# Patient Record
Sex: MALE | Race: BLACK OR AFRICAN AMERICAN | NOT HISPANIC OR LATINO
[De-identification: names, ages, dates, MRNs, and addresses within clinical notes are randomized per-mention and may not be internally consistent; named-entity substitution may affect disease eponyms.]

---

## 2017-01-13 ENCOUNTER — APPOINTMENT (OUTPATIENT)
Dept: CARDIOLOGY | Facility: CLINIC | Age: 62
End: 2017-01-13

## 2017-03-19 ENCOUNTER — RX RENEWAL (OUTPATIENT)
Age: 62
End: 2017-03-19

## 2017-04-24 ENCOUNTER — APPOINTMENT (OUTPATIENT)
Dept: CARDIOLOGY | Facility: CLINIC | Age: 62
End: 2017-04-24

## 2017-05-17 ENCOUNTER — EMERGENCY (EMERGENCY)
Facility: HOSPITAL | Age: 62
LOS: 1 days | Discharge: HOME | End: 2017-05-17
Attending: EMERGENCY MEDICINE | Admitting: SPECIALIST
Payer: COMMERCIAL

## 2017-05-17 VITALS
HEIGHT: 69 IN | SYSTOLIC BLOOD PRESSURE: 136 MMHG | OXYGEN SATURATION: 100 % | RESPIRATION RATE: 16 BRPM | HEART RATE: 71 BPM | WEIGHT: 218.04 LBS | TEMPERATURE: 98 F | DIASTOLIC BLOOD PRESSURE: 101 MMHG

## 2017-05-17 LAB
ALBUMIN SERPL ELPH-MCNC: 3.9 G/DL — SIGNIFICANT CHANGE UP (ref 3.3–5)
ALP SERPL-CCNC: 81 U/L — SIGNIFICANT CHANGE UP (ref 40–120)
ALT FLD-CCNC: 10 U/L — SIGNIFICANT CHANGE UP (ref 4–41)
AST SERPL-CCNC: 14 U/L — SIGNIFICANT CHANGE UP (ref 4–40)
BASE EXCESS BLDV CALC-SCNC: 4.2 MMOL/L — SIGNIFICANT CHANGE UP
BASOPHILS # BLD AUTO: 0.02 K/UL — SIGNIFICANT CHANGE UP (ref 0–0.2)
BASOPHILS NFR BLD AUTO: 0.3 % — SIGNIFICANT CHANGE UP (ref 0–2)
BILIRUB SERPL-MCNC: 0.6 MG/DL — SIGNIFICANT CHANGE UP (ref 0.2–1.2)
BLOOD GAS VENOUS - CREATININE: 1.18 MG/DL — SIGNIFICANT CHANGE UP (ref 0.5–1.3)
BUN SERPL-MCNC: 22 MG/DL — SIGNIFICANT CHANGE UP (ref 7–23)
CALCIUM SERPL-MCNC: 9.3 MG/DL — SIGNIFICANT CHANGE UP (ref 8.4–10.5)
CHLORIDE BLDV-SCNC: 110 MMOL/L — HIGH (ref 96–108)
CHLORIDE SERPL-SCNC: 105 MMOL/L — SIGNIFICANT CHANGE UP (ref 98–107)
CO2 SERPL-SCNC: 26 MMOL/L — SIGNIFICANT CHANGE UP (ref 22–31)
CREAT SERPL-MCNC: 1.22 MG/DL — SIGNIFICANT CHANGE UP (ref 0.5–1.3)
EOSINOPHIL # BLD AUTO: 0.08 K/UL — SIGNIFICANT CHANGE UP (ref 0–0.5)
EOSINOPHIL NFR BLD AUTO: 1.2 % — SIGNIFICANT CHANGE UP (ref 0–6)
GAS PNL BLDV: 136 MMOL/L — SIGNIFICANT CHANGE UP (ref 136–146)
GLUCOSE BLDV-MCNC: 99 — SIGNIFICANT CHANGE UP (ref 70–99)
GLUCOSE SERPL-MCNC: 94 MG/DL — SIGNIFICANT CHANGE UP (ref 70–99)
HCO3 BLDV-SCNC: 24 MMOL/L — SIGNIFICANT CHANGE UP (ref 20–27)
HCT VFR BLD CALC: 54.1 % — HIGH (ref 39–50)
HCT VFR BLDV CALC: 56.7 % — HIGH (ref 39–51)
HGB BLD-MCNC: 18.2 G/DL — HIGH (ref 13–17)
HGB BLDV-MCNC: 18.6 G/DL — HIGH (ref 13–17)
IMM GRANULOCYTES NFR BLD AUTO: 0.3 % — SIGNIFICANT CHANGE UP (ref 0–1.5)
LACTATE BLDV-MCNC: 1.6 MMOL/L — SIGNIFICANT CHANGE UP (ref 0.5–2)
LIDOCAIN IGE QN: 86.7 U/L — HIGH (ref 7–60)
LYMPHOCYTES # BLD AUTO: 1.3 K/UL — SIGNIFICANT CHANGE UP (ref 1–3.3)
LYMPHOCYTES # BLD AUTO: 19.5 % — SIGNIFICANT CHANGE UP (ref 13–44)
MCHC RBC-ENTMCNC: 31.1 PG — SIGNIFICANT CHANGE UP (ref 27–34)
MCHC RBC-ENTMCNC: 33.6 % — SIGNIFICANT CHANGE UP (ref 32–36)
MCV RBC AUTO: 92.3 FL — SIGNIFICANT CHANGE UP (ref 80–100)
MONOCYTES # BLD AUTO: 0.95 K/UL — HIGH (ref 0–0.9)
MONOCYTES NFR BLD AUTO: 14.2 % — HIGH (ref 2–14)
NEUTROPHILS # BLD AUTO: 4.3 K/UL — SIGNIFICANT CHANGE UP (ref 1.8–7.4)
NEUTROPHILS NFR BLD AUTO: 64.5 % — SIGNIFICANT CHANGE UP (ref 43–77)
PCO2 BLDV: 59 MMHG — HIGH (ref 41–51)
PH BLDV: 7.32 PH — SIGNIFICANT CHANGE UP (ref 7.32–7.43)
PLATELET # BLD AUTO: 197 K/UL — SIGNIFICANT CHANGE UP (ref 150–400)
PMV BLD: 11.8 FL — SIGNIFICANT CHANGE UP (ref 7–13)
PO2 BLDV: < 24 MMHG — LOW (ref 35–40)
POTASSIUM BLDV-SCNC: 4.5 MMOL/L — SIGNIFICANT CHANGE UP (ref 3.4–4.5)
POTASSIUM SERPL-MCNC: 5 MMOL/L — SIGNIFICANT CHANGE UP (ref 3.5–5.3)
POTASSIUM SERPL-SCNC: 5 MMOL/L — SIGNIFICANT CHANGE UP (ref 3.5–5.3)
PROT SERPL-MCNC: 7.6 G/DL — SIGNIFICANT CHANGE UP (ref 6–8.3)
RBC # BLD: 5.86 M/UL — HIGH (ref 4.2–5.8)
RBC # FLD: 13.6 % — SIGNIFICANT CHANGE UP (ref 10.3–14.5)
SAO2 % BLDV: 30.2 % — LOW (ref 60–85)
SODIUM SERPL-SCNC: 145 MMOL/L — SIGNIFICANT CHANGE UP (ref 135–145)
WBC # BLD: 6.67 K/UL — SIGNIFICANT CHANGE UP (ref 3.8–10.5)
WBC # FLD AUTO: 6.67 K/UL — SIGNIFICANT CHANGE UP (ref 3.8–10.5)

## 2017-05-17 PROCEDURE — 99285 EMERGENCY DEPT VISIT HI MDM: CPT

## 2017-05-17 RX ORDER — SODIUM CHLORIDE 9 MG/ML
1000 INJECTION INTRAMUSCULAR; INTRAVENOUS; SUBCUTANEOUS ONCE
Qty: 0 | Refills: 0 | Status: COMPLETED | OUTPATIENT
Start: 2017-05-17 | End: 2017-05-17

## 2017-05-17 RX ORDER — ACETAMINOPHEN 500 MG
1000 TABLET ORAL ONCE
Qty: 0 | Refills: 0 | Status: COMPLETED | OUTPATIENT
Start: 2017-05-17 | End: 2017-05-17

## 2017-05-17 RX ORDER — SODIUM CHLORIDE 9 MG/ML
1000 INJECTION INTRAMUSCULAR; INTRAVENOUS; SUBCUTANEOUS
Qty: 0 | Refills: 0 | Status: DISCONTINUED | OUTPATIENT
Start: 2017-05-17 | End: 2017-05-18

## 2017-05-17 RX ADMIN — Medication 400 MILLIGRAM(S): at 22:49

## 2017-05-17 RX ADMIN — SODIUM CHLORIDE 2000 MILLILITER(S): 9 INJECTION INTRAMUSCULAR; INTRAVENOUS; SUBCUTANEOUS at 20:45

## 2017-05-17 RX ADMIN — Medication 1000 MILLIGRAM(S): at 23:40

## 2017-05-17 NOTE — ED PROVIDER NOTE - CARE PLAN
Principal Discharge DX:	SBO (small bowel obstruction)  Secondary Diagnosis:	Ventral hernia  Secondary Diagnosis:	UTI (urinary tract infection)

## 2017-05-17 NOTE — ED PROVIDER NOTE - PROGRESS NOTE DETAILS
Pt ordered for CT c/p with IV and PO contrast given hx of abdominal surgery and ventral hernia.   f/u and reevaluate.

## 2017-05-17 NOTE — ED ADULT NURSE NOTE - OBJECTIVE STATEMENT
Pt received to room 20. Pt A&Ox3 complaining of abdominal pain. Pt states he started having mid abdominal pain that radiates to his flank area that started this morning. Pt states he has a hx of diverticulitis hx of colostomy bag that was reversed. Pt states he has felt nauseous but not vomiting or diarrhea.  Pt states he was seen at an urgent care and advised to come to the ED.

## 2017-05-17 NOTE — ED ADULT NURSE NOTE - CHIEF COMPLAINT QUOTE
Pt comes with complaints of  mid abdominal pain radiating around to flank area since this AM, admits to nausea denies V/D history of diverticulitis S/P colostomy bag with reversal in 2012 went to [facility] this afternoon who advised pt to come to ED for evaluation, at [facility] pt received an injection unsure of name

## 2017-05-17 NOTE — ED PROVIDER NOTE - PMH
Atrial fibrillation and flutter    Diverticulitis of Colon  with Hx of colostomy bag  Emphysema    HTN (Hypertension)    Inguinal hernia    Prostate Cancer  with seeds implant    Ventral hernia

## 2017-05-17 NOTE — ED PROVIDER NOTE - OBJECTIVE STATEMENT
Pt is a 62 year old M with a PMH of diverticulitis s/p colostomy s/p reversal in 2012, HTN, A-fib on eliquis, and prediabetes. He states that he was in his normal state of health yesterday, he had a egg plant parm for dinner and fell asleep. After he woke up he ate a sausage and started feeling abdominal pain. The pain he states was in he left lower abdomen and sometimes radiated to his left flank. He describes it as a dull ache associated with loss of appetite, and one episode of bilious vomiting. He had a BM the morning of presentation and a BM in the ED. He denies dysuria, melena, bloody diarrhea, back pain, or history of kidney stones. He denies fevers, sick contacts, or chest pain.  Of note pt does have reducible ventral hernia.

## 2017-05-17 NOTE — ED PROVIDER NOTE - MEDICAL DECISION MAKING DETAILS
att63 y/o m with h/o afib, on eliquis, HTN, diverticulitis, s/p diverting ileostomy and reversal , presents with mid abd cramping abd pain, 1bm, + vomiting, no fever. Exam nontoxic with lower abd ttp, reducible hernia, no rebound. Plan for ct with h/o multiple surgeries, r/u sbo, r/u abd complication/infection, labs, ua, and reassessment. + surgery c/s. + uti

## 2017-05-17 NOTE — ED ADULT TRIAGE NOTE - CHIEF COMPLAINT QUOTE
Pt comes with complaints of  mid abdominal pain radiating around to flank area since this AM, admits to nausea denies V/D history of diverticulitis S/P colostomy bag with reversal in 2012 went to [facility] this afternoon who advised pt to come to ED for evaluation, at [facility] pt received an injection unsure of name

## 2017-05-17 NOTE — ED PROVIDER NOTE - FAMILY HISTORY
Father  Still living? Unknown  Family history of heart attack, Age at diagnosis: Age Unknown  Family history of hypertension, Age at diagnosis: Age Unknown

## 2017-05-17 NOTE — ED PROVIDER NOTE - PSH
History of Appendectomy    History of Cholecystectomy    History of Hernia Surgery  BL inguinal, ventral  S/P Kamini's procedure  with reversal

## 2017-05-18 ENCOUNTER — TRANSCRIPTION ENCOUNTER (OUTPATIENT)
Age: 62
End: 2017-05-18

## 2017-05-18 VITALS
OXYGEN SATURATION: 96 % | HEART RATE: 66 BPM | SYSTOLIC BLOOD PRESSURE: 125 MMHG | DIASTOLIC BLOOD PRESSURE: 90 MMHG | RESPIRATION RATE: 18 BRPM | TEMPERATURE: 98 F

## 2017-05-18 DIAGNOSIS — K56.69 OTHER INTESTINAL OBSTRUCTION: ICD-10-CM

## 2017-05-18 LAB
APPEARANCE UR: SIGNIFICANT CHANGE UP
BACTERIA # UR AUTO: SIGNIFICANT CHANGE UP
BASOPHILS # BLD AUTO: 0.03 K/UL — SIGNIFICANT CHANGE UP (ref 0–0.2)
BASOPHILS NFR BLD AUTO: 0.5 % — SIGNIFICANT CHANGE UP (ref 0–2)
BILIRUB UR-MCNC: NEGATIVE — SIGNIFICANT CHANGE UP
BLOOD UR QL VISUAL: HIGH
BUN SERPL-MCNC: 20 MG/DL — SIGNIFICANT CHANGE UP (ref 7–23)
CALCIUM SERPL-MCNC: 8.6 MG/DL — SIGNIFICANT CHANGE UP (ref 8.4–10.5)
CHLORIDE SERPL-SCNC: 103 MMOL/L — SIGNIFICANT CHANGE UP (ref 98–107)
CO2 SERPL-SCNC: 20 MMOL/L — LOW (ref 22–31)
COLOR SPEC: YELLOW — SIGNIFICANT CHANGE UP
CREAT SERPL-MCNC: 1.05 MG/DL — SIGNIFICANT CHANGE UP (ref 0.5–1.3)
EOSINOPHIL # BLD AUTO: 0.2 K/UL — SIGNIFICANT CHANGE UP (ref 0–0.5)
EOSINOPHIL NFR BLD AUTO: 3.3 % — SIGNIFICANT CHANGE UP (ref 0–6)
GLUCOSE SERPL-MCNC: 82 MG/DL — SIGNIFICANT CHANGE UP (ref 70–99)
GLUCOSE UR-MCNC: NEGATIVE — SIGNIFICANT CHANGE UP
HCT VFR BLD CALC: 52.8 % — HIGH (ref 39–50)
HGB BLD-MCNC: 17.3 G/DL — HIGH (ref 13–17)
IMM GRANULOCYTES NFR BLD AUTO: 0.2 % — SIGNIFICANT CHANGE UP (ref 0–1.5)
KETONES UR-MCNC: SIGNIFICANT CHANGE UP
LEUKOCYTE ESTERASE UR-ACNC: HIGH
LYMPHOCYTES # BLD AUTO: 1.64 K/UL — SIGNIFICANT CHANGE UP (ref 1–3.3)
LYMPHOCYTES # BLD AUTO: 26.9 % — SIGNIFICANT CHANGE UP (ref 13–44)
MCHC RBC-ENTMCNC: 30 PG — SIGNIFICANT CHANGE UP (ref 27–34)
MCHC RBC-ENTMCNC: 32.8 % — SIGNIFICANT CHANGE UP (ref 32–36)
MCV RBC AUTO: 91.7 FL — SIGNIFICANT CHANGE UP (ref 80–100)
MONOCYTES # BLD AUTO: 0.99 K/UL — HIGH (ref 0–0.9)
MONOCYTES NFR BLD AUTO: 16.2 % — HIGH (ref 2–14)
MUCOUS THREADS # UR AUTO: SIGNIFICANT CHANGE UP
NEUTROPHILS # BLD AUTO: 3.23 K/UL — SIGNIFICANT CHANGE UP (ref 1.8–7.4)
NEUTROPHILS NFR BLD AUTO: 52.9 % — SIGNIFICANT CHANGE UP (ref 43–77)
NITRITE UR-MCNC: NEGATIVE — SIGNIFICANT CHANGE UP
NON-SQ EPI CELLS # UR AUTO: <1 — SIGNIFICANT CHANGE UP
PH UR: 6 — SIGNIFICANT CHANGE UP (ref 4.6–8)
PLATELET # BLD AUTO: 178 K/UL — SIGNIFICANT CHANGE UP (ref 150–400)
PMV BLD: 11.6 FL — SIGNIFICANT CHANGE UP (ref 7–13)
POTASSIUM SERPL-MCNC: 3.9 MMOL/L — SIGNIFICANT CHANGE UP (ref 3.5–5.3)
POTASSIUM SERPL-SCNC: 3.9 MMOL/L — SIGNIFICANT CHANGE UP (ref 3.5–5.3)
PROT UR-MCNC: 30 — SIGNIFICANT CHANGE UP
RBC # BLD: 5.76 M/UL — SIGNIFICANT CHANGE UP (ref 4.2–5.8)
RBC # FLD: 13.8 % — SIGNIFICANT CHANGE UP (ref 10.3–14.5)
RBC CASTS # UR COMP ASSIST: SIGNIFICANT CHANGE UP (ref 0–?)
SODIUM SERPL-SCNC: 138 MMOL/L — SIGNIFICANT CHANGE UP (ref 135–145)
SP GR SPEC: > 1.03 — HIGH (ref 1–1.03)
SQUAMOUS # UR AUTO: SIGNIFICANT CHANGE UP
UROBILINOGEN FLD QL: NORMAL E.U. — SIGNIFICANT CHANGE UP (ref 0.1–0.2)
WBC # BLD: 6.1 K/UL — SIGNIFICANT CHANGE UP (ref 3.8–10.5)
WBC # FLD AUTO: 6.1 K/UL — SIGNIFICANT CHANGE UP (ref 3.8–10.5)
WBC UR QL: >50 — HIGH (ref 0–?)

## 2017-05-18 RX ORDER — SENNA PLUS 8.6 MG/1
2 TABLET ORAL AT BEDTIME
Qty: 0 | Refills: 0 | Status: DISCONTINUED | OUTPATIENT
Start: 2017-05-18 | End: 2017-05-18

## 2017-05-18 RX ORDER — CEFTRIAXONE 500 MG/1
1 INJECTION, POWDER, FOR SOLUTION INTRAMUSCULAR; INTRAVENOUS ONCE
Qty: 0 | Refills: 0 | Status: COMPLETED | OUTPATIENT
Start: 2017-05-18 | End: 2017-05-18

## 2017-05-18 RX ORDER — SENNA PLUS 8.6 MG/1
2 TABLET ORAL
Qty: 28 | Refills: 0 | OUTPATIENT
Start: 2017-05-18 | End: 2017-06-01

## 2017-05-18 RX ORDER — METOPROLOL TARTRATE 50 MG
75 TABLET ORAL
Qty: 0 | Refills: 0 | Status: DISCONTINUED | OUTPATIENT
Start: 2017-05-18 | End: 2017-05-18

## 2017-05-18 RX ORDER — SODIUM CHLORIDE 9 MG/ML
1000 INJECTION, SOLUTION INTRAVENOUS
Qty: 0 | Refills: 0 | Status: DISCONTINUED | OUTPATIENT
Start: 2017-05-18 | End: 2017-05-18

## 2017-05-18 RX ORDER — HEPARIN SODIUM 5000 [USP'U]/ML
5000 INJECTION INTRAVENOUS; SUBCUTANEOUS EVERY 8 HOURS
Qty: 0 | Refills: 0 | Status: DISCONTINUED | OUTPATIENT
Start: 2017-05-18 | End: 2017-05-18

## 2017-05-18 RX ORDER — TIOTROPIUM BROMIDE 18 UG/1
1 CAPSULE ORAL; RESPIRATORY (INHALATION) AT BEDTIME
Qty: 0 | Refills: 0 | Status: DISCONTINUED | OUTPATIENT
Start: 2017-05-18 | End: 2017-05-18

## 2017-05-18 RX ORDER — ALBUTEROL 90 UG/1
2 AEROSOL, METERED ORAL EVERY 6 HOURS
Qty: 0 | Refills: 0 | Status: DISCONTINUED | OUTPATIENT
Start: 2017-05-18 | End: 2017-05-18

## 2017-05-18 RX ORDER — BUDESONIDE AND FORMOTEROL FUMARATE DIHYDRATE 160; 4.5 UG/1; UG/1
2 AEROSOL RESPIRATORY (INHALATION)
Qty: 0 | Refills: 0 | Status: DISCONTINUED | OUTPATIENT
Start: 2017-05-18 | End: 2017-05-18

## 2017-05-18 RX ORDER — METOPROLOL TARTRATE 50 MG
5 TABLET ORAL EVERY 6 HOURS
Qty: 0 | Refills: 0 | Status: DISCONTINUED | OUTPATIENT
Start: 2017-05-18 | End: 2017-05-18

## 2017-05-18 RX ORDER — DOCUSATE SODIUM 100 MG
100 CAPSULE ORAL DAILY
Qty: 0 | Refills: 0 | Status: DISCONTINUED | OUTPATIENT
Start: 2017-05-18 | End: 2017-05-18

## 2017-05-18 RX ORDER — DOCUSATE SODIUM 100 MG
1 CAPSULE ORAL
Qty: 14 | Refills: 0 | OUTPATIENT
Start: 2017-05-18 | End: 2017-06-01

## 2017-05-18 RX ORDER — ACETAMINOPHEN 500 MG
650 TABLET ORAL EVERY 6 HOURS
Qty: 0 | Refills: 0 | Status: DISCONTINUED | OUTPATIENT
Start: 2017-05-18 | End: 2017-05-18

## 2017-05-18 RX ADMIN — Medication 650 MILLIGRAM(S): at 12:45

## 2017-05-18 RX ADMIN — CEFTRIAXONE 100 GRAM(S): 500 INJECTION, POWDER, FOR SOLUTION INTRAMUSCULAR; INTRAVENOUS at 01:23

## 2017-05-18 RX ADMIN — Medication 5 MILLIGRAM(S): at 05:44

## 2017-05-18 RX ADMIN — SODIUM CHLORIDE 100 MILLILITER(S): 9 INJECTION INTRAMUSCULAR; INTRAVENOUS; SUBCUTANEOUS at 01:23

## 2017-05-18 RX ADMIN — SODIUM CHLORIDE 100 MILLILITER(S): 9 INJECTION, SOLUTION INTRAVENOUS at 02:34

## 2017-05-18 NOTE — DISCHARGE NOTE ADULT - CARE PLAN
Principal Discharge DX:	Ventral hernia  Goal:	Resolution of pain  Instructions for follow-up, activity and diet:	Please follow up with Dr. Ring in his office in 1-2 weeks.  Call his office to make an appointment.  Continue to eat a regular diet.  Take all home medications.  Please take stool softeners as needed.  Call office if you develop intractable pain, persistent nausea or vomiting, or hernia becomes irreducible.

## 2017-05-18 NOTE — DISCHARGE NOTE ADULT - MEDICATION SUMMARY - MEDICATIONS TO TAKE
I will START or STAY ON the medications listed below when I get home from the hospital:    Eliquis 5 mg oral tablet  -- 1 tab(s) by mouth 2 times a day  -- Indication: For A-fib    metoprolol tartrate 25 mg oral tablet  -- 3 tab(s) by mouth 2 times a day  -- Indication: For HTN    tiotropium 18 mcg inhalation capsule  -- 1 cap(s) inhaled once a day (at bedtime)  -- Indication: For Emphysema    Symbicort 160 mcg-4.5 mcg/inh inhalation aerosol  -- 2 puff(s) inhaled 2 times a day  -- Indication: For Emphysema    ProAir HFA 90 mcg/inh inhalation aerosol  -- 2 puff(s) inhaled 4 times a day, As Needed  -- Indication: For Emphysema    docusate sodium 100 mg oral capsule  -- 1 cap(s) by mouth once a day, As needed, Constipation  -- Indication: For Constipation    senna oral tablet  -- 2 tab(s) by mouth once a day (at bedtime), As needed, Constipation  -- Indication: For Constipation

## 2017-05-18 NOTE — H&P ADULT - ASSESSMENT
62y year old Male who presents with   - Admit to Team Surgery  - DVT PPx  - NPO/IVF  - Serial Abdominal Exams  - Full Support in Place  - If abdominal exam is unchanged in AM, would advance diet, restart home meds and D/C home with plans for outpatient repair  =========================  A Team Surgery  Pager: #[phone]

## 2017-05-18 NOTE — DISCHARGE NOTE ADULT - PLAN OF CARE
Resolution of pain Please follow up with Dr. Ring in his office in 1-2 weeks.  Call his office to make an appointment.  Continue to eat a regular diet.  Take all home medications.  Please take stool softeners as needed.  Call office if you develop intractable pain, persistent nausea or vomiting, or hernia becomes irreducible.

## 2017-05-18 NOTE — DISCHARGE NOTE ADULT - PATIENT PORTAL LINK FT
“You can access the Follow[facility] Patient Portal, offered by [facility], by registering with the following website: [email or web address]”

## 2017-05-18 NOTE — H&P ADULT - HISTORY OF PRESENT ILLNESS
CC: Abdominal Pain  HPI:    Patient is a 62y year old male with Hx of sanrda for perforated diverticulitis s/p reversal 2010, Ex lap, NEY, repair of incisional hernia with abdominal wall component separation and marlex mesh on 12/4/12. The patient developed an incisional hernia since that time at the superior aspect of the incision. He notes that he has to reduce it from time to time. Today he notes abdominal discomfort after eating a sausage. He has been passing flatus today and had multiple BMs. He notes one episode of bilious emesis at home, but no current nausea.      PMH  Prostate CA s/p seed therapy  Sandra and Reversal 2010  Diverticulitis  PSH  Appendectomy  Cholecystectomy  hernia repair  sandra/reversal 2010          MEDS  Lopressor 25  Tiotropium 18mcg  Symbicort 160mcg  Proair  Eliquis 5    Allergies  No Known Drug Allergies  shellfish (Unknown)  azithromycin (Vomiting)    Physical Exam  T(C): 36.4, Max: 37.1 (05-17 @ 19:32)  HR: 67 (65 - 75)  BP: 132/90 (113/72 - 138/99)  RR: 16 (16 - 18)  SpO2: 96% (95% - 100%)  Wt(kg): --  Tmax: T(C): , Max: 37.1 (05-17 @ 19:32)  Wt(kg): --    Gen: NAD  HEENT: normocephalic, atraumatic, no scleral icterus  CV: S1, S2, RRR  Pulm: CTA B/L  Abd: Soft, ND, Easily reducible ventral hernia, no tenderness, no rebound, no guarding, no palpable organomegaly/masses  Ext: warm, no edema    I & Os for current day (as of 05-18-17)  =============================================  IN:    Total IN: 0 ml  ---------------------------------------------  OUT:    Voided: 100 ml    Total OUT: 100 ml  ---------------------------------------------  Total NET: -100 ml      Labs:                        17.3   6.10  )-----------( 178      ( 18 May 2017 05:08 )             52.8     05-18    138  |  103  |  20  ----------------------------<  82  3.9   |  20<L>  |  1.05    Ca    8.6      18 May 2017 05:08    TPro  7.6  /  Alb  3.9  /  TBili  0.6  /  DBili  x   /  AST  14  /  ALT  10  /  AlkPhos  81  05-17      Urinalysis Basic - ( 18 May 2017 00:15 )    Color: YELLOW / Appearance: HAZY / SG: > 1.030 / pH: 6.0  Gluc: NEGATIVE / Ketone: TRACE  / Bili: NEGATIVE / Urobili: NORMAL E.U.   Blood: SMALL / Protein: 30 / Nitrite: NEGATIVE   Leuk Esterase: LARGE / RBC: 2-5 / WBC >50   Sq Epi: OCC / Non Sq Epi: x / Bacteria: MOD            Imaging  CT A/P: Small bowel obstruction caused by an upper ventral abdominal wall hernia.

## 2017-05-18 NOTE — DISCHARGE NOTE ADULT - HOSPITAL COURSE
Patient is a 62y year old male with Hx of sandra for perforated diverticulitis s/p reversal 2010, Ex lap, NEY, repair of incisional hernia with abdominal wall component separation and marlex mesh on 12/4/12. The patient developed an incisional hernia since that time at the superior aspect of the incision. He notes that he has to reduce it from time to time. Today he notes abdominal discomfort after eating a sausage. He has been passing flatus today and had multiple BMs. He notes one episode of bilious emesis at home, but no current nausea.  Hernia was reduced at bedside and patient was admitted for pain control.  Diet was advanced on HD 1, patient was started on home PO meds, and iv fluids were discontinued.  On day of discharge patient was ambulating independently, tolerating a regular diet, voiding appropriately, having regular bowel movements, and pain was well controlled.  Information for discharged was discussed with the patient and they were discharged to home.

## 2017-05-18 NOTE — PROGRESS NOTE ADULT - SUBJECTIVE AND OBJECTIVE BOX
S: Pain well controlled.  No nausea, vomiting, fevers, chills, chest pain or shortness of breath.    O:Vitals: T(C): 36.4, Max: 37.1 (05-17 @ 19:32)  HR: 67 (65 - 75)  BP: 132/90 (113/72 - 138/99)  RR: 16 (16 - 18)  SpO2: 96% (95% - 100%)  Wt(kg): --  Labs:  H/H:52.8/17.305-18 @ 05:08  Na:138     Cl:103     BUN:20   G  K:3.9     CO2:20    Cr:1.05  PT:--    INR:--    aPTT:--  H/H:54.1/18.205-17 @ 20:04  Na:145     Cl:105     BUN:22   G  K:5.0     CO2:26    Cr:1.22  PT:--    INR:--    aPTT:--    PE:  Gen: NAD,   Abd: soft, nontender, nondistended, well healed midline scar.  Midline epigastric hernia easily reducible with no tenders, or overlying skin changes.

## 2017-05-18 NOTE — PATIENT PROFILE ADULT. - NS TRANSFER PATIENT BELONGINGS
ring/Jewelry/Money (specify)/Clothing Cell Phone/PDA (specify)/Jewelry/Money (specify)/ring, chain/Clothing

## 2017-06-16 ENCOUNTER — RX RENEWAL (OUTPATIENT)
Age: 62
End: 2017-06-16

## 2017-09-26 ENCOUNTER — APPOINTMENT (OUTPATIENT)
Dept: CARDIOLOGY | Facility: CLINIC | Age: 62
End: 2017-09-26

## 2017-10-12 ENCOUNTER — EMERGENCY (EMERGENCY)
Facility: HOSPITAL | Age: 62
LOS: 1 days | Discharge: HOME | End: 2017-10-12
Attending: EMERGENCY MEDICINE | Admitting: EMERGENCY MEDICINE
Payer: COMMERCIAL

## 2017-10-12 VITALS
DIASTOLIC BLOOD PRESSURE: 86 MMHG | RESPIRATION RATE: 18 BRPM | OXYGEN SATURATION: 100 % | HEIGHT: 69 IN | HEART RATE: 64 BPM | SYSTOLIC BLOOD PRESSURE: 148 MMHG | WEIGHT: 218.04 LBS | TEMPERATURE: 98 F

## 2017-10-12 LAB
ALBUMIN SERPL ELPH-MCNC: 3.8 G/DL — SIGNIFICANT CHANGE UP (ref 3.3–5)
ALP SERPL-CCNC: 73 U/L — SIGNIFICANT CHANGE UP (ref 40–120)
ALT FLD-CCNC: 17 U/L — SIGNIFICANT CHANGE UP (ref 4–41)
APTT BLD: 31.6 SEC — SIGNIFICANT CHANGE UP (ref 27.5–37.4)
AST SERPL-CCNC: 21 U/L — SIGNIFICANT CHANGE UP (ref 4–40)
BASOPHILS # BLD AUTO: 0.08 K/UL — SIGNIFICANT CHANGE UP (ref 0–0.2)
BASOPHILS NFR BLD AUTO: 1.1 % — SIGNIFICANT CHANGE UP (ref 0–2)
BILIRUB SERPL-MCNC: 0.5 MG/DL — SIGNIFICANT CHANGE UP (ref 0.2–1.2)
BUN SERPL-MCNC: 10 MG/DL — SIGNIFICANT CHANGE UP (ref 7–23)
CALCIUM SERPL-MCNC: 9.3 MG/DL — SIGNIFICANT CHANGE UP (ref 8.4–10.5)
CHLORIDE SERPL-SCNC: 105 MMOL/L — SIGNIFICANT CHANGE UP (ref 98–107)
CO2 SERPL-SCNC: 25 MMOL/L — SIGNIFICANT CHANGE UP (ref 22–31)
CREAT SERPL-MCNC: 1.13 MG/DL — SIGNIFICANT CHANGE UP (ref 0.5–1.3)
EOSINOPHIL # BLD AUTO: 0.24 K/UL — SIGNIFICANT CHANGE UP (ref 0–0.5)
EOSINOPHIL NFR BLD AUTO: 3.2 % — SIGNIFICANT CHANGE UP (ref 0–6)
GLUCOSE SERPL-MCNC: 105 MG/DL — HIGH (ref 70–99)
HCT VFR BLD CALC: 55.5 % — HIGH (ref 39–50)
HGB BLD-MCNC: 18.1 G/DL — HIGH (ref 13–17)
IMM GRANULOCYTES # BLD AUTO: 0.02 # — SIGNIFICANT CHANGE UP
IMM GRANULOCYTES NFR BLD AUTO: 0.3 % — SIGNIFICANT CHANGE UP (ref 0–1.5)
INR BLD: 1.3 — HIGH (ref 0.88–1.17)
LYMPHOCYTES # BLD AUTO: 1.24 K/UL — SIGNIFICANT CHANGE UP (ref 1–3.3)
LYMPHOCYTES # BLD AUTO: 16.5 % — SIGNIFICANT CHANGE UP (ref 13–44)
MCHC RBC-ENTMCNC: 29.8 PG — SIGNIFICANT CHANGE UP (ref 27–34)
MCHC RBC-ENTMCNC: 32.6 % — SIGNIFICANT CHANGE UP (ref 32–36)
MCV RBC AUTO: 91.4 FL — SIGNIFICANT CHANGE UP (ref 80–100)
MONOCYTES # BLD AUTO: 0.8 K/UL — SIGNIFICANT CHANGE UP (ref 0–0.9)
MONOCYTES NFR BLD AUTO: 10.7 % — SIGNIFICANT CHANGE UP (ref 2–14)
NEUTROPHILS # BLD AUTO: 5.12 K/UL — SIGNIFICANT CHANGE UP (ref 1.8–7.4)
NEUTROPHILS NFR BLD AUTO: 68.2 % — SIGNIFICANT CHANGE UP (ref 43–77)
NRBC # FLD: 0 — SIGNIFICANT CHANGE UP
PLATELET # BLD AUTO: 216 K/UL — SIGNIFICANT CHANGE UP (ref 150–400)
PMV BLD: 11.3 FL — SIGNIFICANT CHANGE UP (ref 7–13)
POTASSIUM SERPL-MCNC: 5 MMOL/L — SIGNIFICANT CHANGE UP (ref 3.5–5.3)
POTASSIUM SERPL-SCNC: 5 MMOL/L — SIGNIFICANT CHANGE UP (ref 3.5–5.3)
PROT SERPL-MCNC: 7.5 G/DL — SIGNIFICANT CHANGE UP (ref 6–8.3)
PROTHROM AB SERPL-ACNC: 14.6 SEC — HIGH (ref 9.8–13.1)
RBC # BLD: 6.07 M/UL — HIGH (ref 4.2–5.8)
RBC # FLD: 13.7 % — SIGNIFICANT CHANGE UP (ref 10.3–14.5)
SODIUM SERPL-SCNC: 141 MMOL/L — SIGNIFICANT CHANGE UP (ref 135–145)
TROPONIN T SERPL-MCNC: < 0.06 NG/ML — SIGNIFICANT CHANGE UP (ref 0–0.06)
WBC # BLD: 7.5 K/UL — SIGNIFICANT CHANGE UP (ref 3.8–10.5)
WBC # FLD AUTO: 7.5 K/UL — SIGNIFICANT CHANGE UP (ref 3.8–10.5)

## 2017-10-12 PROCEDURE — 93010 ELECTROCARDIOGRAM REPORT: CPT

## 2017-10-12 PROCEDURE — 99285 EMERGENCY DEPT VISIT HI MDM: CPT | Mod: 25

## 2017-10-12 PROCEDURE — 71010: CPT | Mod: 26

## 2017-10-12 NOTE — ED PROVIDER NOTE - PROGRESS NOTE DETAILS
Spoke with cardiology fellow regarding patient, who states that the last time patient was seen in clinic, he was noted to be in atrial flutter (after the ablation).  Given normal labs and CXR, and patient asymptomatic from flutter, stable for discharge and recommends that he follow up with Dr. Cooley in the next 1-2 weeks.

## 2017-10-12 NOTE — ED ADULT NURSE NOTE - OBJECTIVE STATEMENT
Pt received to main ED room 4 with reports of '3 or 4'/10 reproducible L sided chest pain. Pt states 'it only hurts when I touch it, I was moving and I thought I just pulled a muscle. I was hoping it would go away but it didn't so I went to urgent care and they sent me here'. Pt states 'my mother passed away earlier this year and im moving out her apartment, its emotional and stressful'. Pt received awake, alert, oriented x4, denies lightheadedness/dizziness at this time. Pt appears calm, cooperative. Pt with respirations appearing even, unlabored, pt denies SOB at this time. Pt reporting intermittent nausea, pt attributes to prescribed PO Flagyl and cipro he is taking for diverticulitis. Pt ambulatory at baseline, denies recent falls. Unable to obtain IV access, MD Huerta made aware. labs drawn and sent per orders. Pt with no apparent acute distress observed at this time. cardiac monitor in place, EKG done. Safety maintained, will continue to monitor.

## 2017-10-12 NOTE — ED ADULT TRIAGE NOTE - CHIEF COMPLAINT QUOTE
pt presents via ems for evaluation of abnormal ekg sent from urgent care. pt reports chest pain with movement, denies any additional symptoms. PMHX: copd, htn, afib

## 2017-10-12 NOTE — ED PROVIDER NOTE - RESPIRATORY, MLM
Breath sounds clear and equal bilaterally.  +reproducible chest pain on palpation of chest wall over left pectoral muscle

## 2017-10-12 NOTE — CONSULT NOTE ADULT - SUBJECTIVE AND OBJECTIVE BOX
Patient seen and evaluated at bedside    Chief Complaint:  chest pain    HPI:  61yo M with PMHx of A-fib/A-flutter on eliquis, COPD who presents to the ED from his doctors office for an "abnormal ECG." Patient reports he was moving boxes two days ago and started having L sided sharp pain with moving his arm or deep breaths. If he does not take a deep breath or twist his torso or move his L arm then he does not have pain. Does not worsen with exertion. No diaphoresis or SOB. Patient hemodynamically stable in the ED. ECG showed a-flutter with a rate in 60's. Patient see Dr. Howell, last visit in 8/2016 showed rate controlled a-flutter on ECG as well. Trop neg, no ischemic changes on ECG.    PMH:   Atrial fibrillation and flutter  Ventral hernia  Inguinal hernia  Atrial fibrillation  Prostate Cancer  Diverticulitis of Colon  Emphysema  HTN (Hypertension)      PSH:   S/P Kamini's procedure  History of Hernia Surgery  History of Cholecystectomy  History of Appendectomy      Medications:       Allergies:  azithromycin (Vomiting)  No Known Drug Allergies  shellfish (Unknown)      FAMILY HISTORY:  Family history of hypertension  Family history of heart attack      Social History:  Smoking History:  Alcohol Use:  Drug Use:    Review of Systems:  REVIEW OF SYSTEMS:    CONSTITUTIONAL: No weakness, fevers or chills  EYES/ENT: No visual changes;  No dysphagia  NECK: No pain or stiffness  RESPIRATORY: No cough, wheezing, hemoptysis; No shortness of breath  CARDIOVASCULAR: +chest pain, no palpitations; No lower extremity edema  GASTROINTESTINAL: No abdominal or epigastric pain. No nausea, vomiting, or hematemesis; No diarrhea or constipation. No melena or hematochezia.  BACK: No back pain  GENITOURINARY: No dysuria, frequency or hematuria  NEUROLOGICAL: No numbness or weakness  SKIN: No itching, burning, rashes, or lesions   All other review of systems is negative unless indicated above.    Physical Exam:  T(F): 97.6 (10-12), Max: 97.6 (10-12)  HR: 64 (10-12) (64 - 64)  BP: 148/86 (10-12) (148/86 - 148/86)  RR: 18 (10-12)  SpO2: 100% (10-12)  GENERAL: No acute distress, well-developed  HEAD:  Atraumatic, Normocephalic  ENT: EOMI, PERRLA, conjunctiva and sclera clear, Neck supple, No JVD, moist mucosa  CHEST/LUNG: Clear to auscultation bilaterally  HEART: Irregular, normal S1, S2. No murmura  ABDOMEN: Soft, Nontender  EXTREMITIES:  No clubbing, cyanosis, or edema  PSYCH: Nl behavior, nl affect  NEUROLOGY: AAOx3, non-focal, cranial nerves intact  SKIN: Normal color, No rashes or lesions        Labs: Personally reviewed                        18.1   7.50  )-----------( 216      ( 12 Oct 2017 17:28 )             55.5     10-12    141  |  105  |  10  ----------------------------<  105<H>  5.0   |  25  |  1.13    Ca    9.3      12 Oct 2017 17:28    TPro  7.5  /  Alb  3.8  /  TBili  0.5  /  DBili  x   /  AST  21  /  ALT  17  /  AlkPhos  73  10-12    PT/INR - ( 12 Oct 2017 17:28 )   PT: 14.6 SEC;   INR: 1.30          PTT - ( 12 Oct 2017 17:28 )  PTT:31.6 SEC  CARDIAC MARKERS ( 12 Oct 2017 17:28 )  x     / < 0.06 ng/mL / x     / x     / x

## 2017-10-12 NOTE — ED PROVIDER NOTE - MEDICAL DECISION MAKING DETAILS
- atrial flutter, r/o ACS  - cbc, cmp, trop, ua, cxr  - will contact patient's cardiologist, Dr. Cooley

## 2017-10-12 NOTE — CONSULT NOTE ADULT - ASSESSMENT
63yo M with PMHx of A-fib/A-flutter s/p ablation on eliquis, COPD who presents to the ED from his doctors office for an "abnormal ECG." Patient in a-flutter that is rate controlled, no change from prior ECG in 2016  -rule out ACS, if ruled out then patient OK for discharge  -patient should continue current home meds - metoprolol and eliquis. Should follow up with Dr. Howell in next 2 weeks

## 2017-10-12 NOTE — ED PROVIDER NOTE - OBJECTIVE STATEMENT
62M h/o COPD, diverticulitis on current abx, afib s/p ablation and on Eliquis, sent from urgent care for abnormal ekg.  Pt went to urgent care for left sided chest pain x 2 days.  States he has been moving heavy boxes, chest pain is localized to left pectoral muscle, worse with coughing and with direct palpation.  Denies fever/chills, cough, cp with exertion, palpitations, sob, n/v/d.

## 2017-10-31 ENCOUNTER — APPOINTMENT (OUTPATIENT)
Dept: CARDIOLOGY | Facility: CLINIC | Age: 62
End: 2017-10-31
Payer: COMMERCIAL

## 2017-10-31 ENCOUNTER — NON-APPOINTMENT (OUTPATIENT)
Age: 62
End: 2017-10-31

## 2017-10-31 VITALS
DIASTOLIC BLOOD PRESSURE: 92 MMHG | BODY MASS INDEX: 30.98 KG/M2 | HEIGHT: 69.5 IN | RESPIRATION RATE: 16 BRPM | OXYGEN SATURATION: 95 % | SYSTOLIC BLOOD PRESSURE: 145 MMHG | HEART RATE: 72 BPM | WEIGHT: 214 LBS

## 2017-10-31 VITALS — DIASTOLIC BLOOD PRESSURE: 86 MMHG | SYSTOLIC BLOOD PRESSURE: 127 MMHG

## 2017-10-31 PROCEDURE — 99214 OFFICE O/P EST MOD 30 MIN: CPT

## 2017-10-31 PROCEDURE — 93000 ELECTROCARDIOGRAM COMPLETE: CPT

## 2017-11-16 ENCOUNTER — RX RENEWAL (OUTPATIENT)
Age: 62
End: 2017-11-16

## 2018-01-22 ENCOUNTER — INPATIENT (INPATIENT)
Facility: HOSPITAL | Age: 63
LOS: 8 days | Discharge: HOME | End: 2018-01-31
Attending: INTERNAL MEDICINE | Admitting: INTERNAL MEDICINE
Payer: COMMERCIAL

## 2018-01-22 VITALS
SYSTOLIC BLOOD PRESSURE: 133 MMHG | DIASTOLIC BLOOD PRESSURE: 92 MMHG | RESPIRATION RATE: 18 BRPM | HEART RATE: 105 BPM | TEMPERATURE: 99 F

## 2018-01-22 NOTE — ED ADULT TRIAGE NOTE - CHIEF COMPLAINT QUOTE
abdominal pain, vomiting and diarrhea since 2 days ago. vomited 4 times today and diarrhea  x5 times.  nauseous now. PMH of HTN,

## 2018-01-23 DIAGNOSIS — I10 ESSENTIAL (PRIMARY) HYPERTENSION: ICD-10-CM

## 2018-01-23 DIAGNOSIS — C61 MALIGNANT NEOPLASM OF PROSTATE: ICD-10-CM

## 2018-01-23 DIAGNOSIS — K43.9 VENTRAL HERNIA WITHOUT OBSTRUCTION OR GANGRENE: ICD-10-CM

## 2018-01-23 DIAGNOSIS — K56.609 UNSPECIFIED INTESTINAL OBSTRUCTION, UNSPECIFIED AS TO PARTIAL VERSUS COMPLETE OBSTRUCTION: ICD-10-CM

## 2018-01-23 DIAGNOSIS — Z98.890 OTHER SPECIFIED POSTPROCEDURAL STATES: Chronic | ICD-10-CM

## 2018-01-23 LAB
ALBUMIN SERPL ELPH-MCNC: 3.7 G/DL — SIGNIFICANT CHANGE UP (ref 3.3–5)
ALP SERPL-CCNC: 145 U/L — HIGH (ref 40–120)
ALT FLD-CCNC: 13 U/L — SIGNIFICANT CHANGE UP (ref 4–41)
AST SERPL-CCNC: 16 U/L — SIGNIFICANT CHANGE UP (ref 4–40)
BASE EXCESS BLDV CALC-SCNC: -0.4 MMOL/L — SIGNIFICANT CHANGE UP
BASE EXCESS BLDV CALC-SCNC: 1.6 MMOL/L — SIGNIFICANT CHANGE UP
BASOPHILS # BLD AUTO: 0.02 K/UL — SIGNIFICANT CHANGE UP (ref 0–0.2)
BASOPHILS NFR BLD AUTO: 0.3 % — SIGNIFICANT CHANGE UP (ref 0–2)
BILIRUB SERPL-MCNC: 0.7 MG/DL — SIGNIFICANT CHANGE UP (ref 0.2–1.2)
BLOOD GAS VENOUS - CREATININE: 0.8 MG/DL — SIGNIFICANT CHANGE UP (ref 0.5–1.3)
BLOOD GAS VENOUS - CREATININE: 1.14 MG/DL — SIGNIFICANT CHANGE UP (ref 0.5–1.3)
BUN SERPL-MCNC: 19 MG/DL — SIGNIFICANT CHANGE UP (ref 7–23)
BUN SERPL-MCNC: 24 MG/DL — HIGH (ref 7–23)
CALCIUM SERPL-MCNC: 8.3 MG/DL — LOW (ref 8.4–10.5)
CALCIUM SERPL-MCNC: 9.5 MG/DL — SIGNIFICANT CHANGE UP (ref 8.4–10.5)
CHLORIDE BLDV-SCNC: 102 MMOL/L — SIGNIFICANT CHANGE UP (ref 96–108)
CHLORIDE BLDV-SCNC: 110 MMOL/L — HIGH (ref 96–108)
CHLORIDE SERPL-SCNC: 102 MMOL/L — SIGNIFICANT CHANGE UP (ref 98–107)
CHLORIDE SERPL-SCNC: 105 MMOL/L — SIGNIFICANT CHANGE UP (ref 98–107)
CO2 SERPL-SCNC: 22 MMOL/L — SIGNIFICANT CHANGE UP (ref 22–31)
CO2 SERPL-SCNC: 24 MMOL/L — SIGNIFICANT CHANGE UP (ref 22–31)
CREAT SERPL-MCNC: 1.06 MG/DL — SIGNIFICANT CHANGE UP (ref 0.5–1.3)
CREAT SERPL-MCNC: 1.27 MG/DL — SIGNIFICANT CHANGE UP (ref 0.5–1.3)
EOSINOPHIL # BLD AUTO: 0.04 K/UL — SIGNIFICANT CHANGE UP (ref 0–0.5)
EOSINOPHIL NFR BLD AUTO: 0.6 % — SIGNIFICANT CHANGE UP (ref 0–6)
GAS PNL BLDV: 131 MMOL/L — LOW (ref 136–146)
GAS PNL BLDV: 139 MMOL/L — SIGNIFICANT CHANGE UP (ref 136–146)
GLUCOSE BLDV-MCNC: 116 — HIGH (ref 70–99)
GLUCOSE BLDV-MCNC: 93 — SIGNIFICANT CHANGE UP (ref 70–99)
GLUCOSE SERPL-MCNC: 108 MG/DL — HIGH (ref 70–99)
GLUCOSE SERPL-MCNC: 84 MG/DL — SIGNIFICANT CHANGE UP (ref 70–99)
HCO3 BLDV-SCNC: 22 MMOL/L — SIGNIFICANT CHANGE UP (ref 20–27)
HCO3 BLDV-SCNC: 24 MMOL/L — SIGNIFICANT CHANGE UP (ref 20–27)
HCT VFR BLD CALC: 52.8 % — HIGH (ref 39–50)
HCT VFR BLDV CALC: 52.8 % — HIGH (ref 39–51)
HCT VFR BLDV CALC: 54.1 % — HIGH (ref 39–51)
HGB BLD-MCNC: 17 G/DL — SIGNIFICANT CHANGE UP (ref 13–17)
HGB BLDV-MCNC: 17.3 G/DL — HIGH (ref 13–17)
HGB BLDV-MCNC: 17.7 G/DL — HIGH (ref 13–17)
IMM GRANULOCYTES # BLD AUTO: 0.03 # — SIGNIFICANT CHANGE UP
IMM GRANULOCYTES NFR BLD AUTO: 0.5 % — SIGNIFICANT CHANGE UP (ref 0–1.5)
LACTATE BLDV-MCNC: 1.8 MMOL/L — SIGNIFICANT CHANGE UP (ref 0.5–2)
LACTATE BLDV-MCNC: 2.1 MMOL/L — HIGH (ref 0.5–2)
LIDOCAIN IGE QN: 12.7 U/L — SIGNIFICANT CHANGE UP (ref 7–60)
LYMPHOCYTES # BLD AUTO: 0.89 K/UL — LOW (ref 1–3.3)
LYMPHOCYTES # BLD AUTO: 13.5 % — SIGNIFICANT CHANGE UP (ref 13–44)
MCHC RBC-ENTMCNC: 28.4 PG — SIGNIFICANT CHANGE UP (ref 27–34)
MCHC RBC-ENTMCNC: 32.2 % — SIGNIFICANT CHANGE UP (ref 32–36)
MCV RBC AUTO: 88.3 FL — SIGNIFICANT CHANGE UP (ref 80–100)
MONOCYTES # BLD AUTO: 1.04 K/UL — HIGH (ref 0–0.9)
MONOCYTES NFR BLD AUTO: 15.8 % — HIGH (ref 2–14)
NEUTROPHILS # BLD AUTO: 4.55 K/UL — SIGNIFICANT CHANGE UP (ref 1.8–7.4)
NEUTROPHILS NFR BLD AUTO: 69.3 % — SIGNIFICANT CHANGE UP (ref 43–77)
NRBC # FLD: 0 — SIGNIFICANT CHANGE UP
PCO2 BLDV: 47 MMHG — SIGNIFICANT CHANGE UP (ref 41–51)
PCO2 BLDV: 48 MMHG — SIGNIFICANT CHANGE UP (ref 41–51)
PH BLDV: 7.34 PH — SIGNIFICANT CHANGE UP (ref 7.32–7.43)
PH BLDV: 7.37 PH — SIGNIFICANT CHANGE UP (ref 7.32–7.43)
PLATELET # BLD AUTO: 349 K/UL — SIGNIFICANT CHANGE UP (ref 150–400)
PMV BLD: 11.1 FL — SIGNIFICANT CHANGE UP (ref 7–13)
PO2 BLDV: 28 MMHG — LOW (ref 35–40)
PO2 BLDV: 28 MMHG — LOW (ref 35–40)
POTASSIUM BLDV-SCNC: 4.4 MMOL/L — SIGNIFICANT CHANGE UP (ref 3.4–4.5)
POTASSIUM BLDV-SCNC: 8.5 MMOL/L — CRITICAL HIGH (ref 3.4–4.5)
POTASSIUM SERPL-MCNC: 3.8 MMOL/L — SIGNIFICANT CHANGE UP (ref 3.5–5.3)
POTASSIUM SERPL-MCNC: 4.3 MMOL/L — SIGNIFICANT CHANGE UP (ref 3.5–5.3)
POTASSIUM SERPL-MCNC: 6.4 MMOL/L — CRITICAL HIGH (ref 3.5–5.3)
POTASSIUM SERPL-SCNC: 3.8 MMOL/L — SIGNIFICANT CHANGE UP (ref 3.5–5.3)
POTASSIUM SERPL-SCNC: 4.3 MMOL/L — SIGNIFICANT CHANGE UP (ref 3.5–5.3)
POTASSIUM SERPL-SCNC: 6.4 MMOL/L — CRITICAL HIGH (ref 3.5–5.3)
PROT SERPL-MCNC: 8.2 G/DL — SIGNIFICANT CHANGE UP (ref 6–8.3)
RBC # BLD: 5.98 M/UL — HIGH (ref 4.2–5.8)
RBC # FLD: 13 % — SIGNIFICANT CHANGE UP (ref 10.3–14.5)
SAO2 % BLDV: 43.7 % — LOW (ref 60–85)
SAO2 % BLDV: 46.3 % — LOW (ref 60–85)
SODIUM SERPL-SCNC: 143 MMOL/L — SIGNIFICANT CHANGE UP (ref 135–145)
SODIUM SERPL-SCNC: 145 MMOL/L — SIGNIFICANT CHANGE UP (ref 135–145)
WBC # BLD: 6.57 K/UL — SIGNIFICANT CHANGE UP (ref 3.8–10.5)
WBC # FLD AUTO: 6.57 K/UL — SIGNIFICANT CHANGE UP (ref 3.8–10.5)

## 2018-01-23 PROCEDURE — 93010 ELECTROCARDIOGRAM REPORT: CPT | Mod: 76

## 2018-01-23 PROCEDURE — 74177 CT ABD & PELVIS W/CONTRAST: CPT | Mod: 26

## 2018-01-23 RX ORDER — METOPROLOL TARTRATE 50 MG
10 TABLET ORAL EVERY 6 HOURS
Qty: 0 | Refills: 0 | Status: DISCONTINUED | OUTPATIENT
Start: 2018-01-23 | End: 2018-01-24

## 2018-01-23 RX ORDER — SODIUM CHLORIDE 9 MG/ML
1000 INJECTION INTRAMUSCULAR; INTRAVENOUS; SUBCUTANEOUS ONCE
Qty: 0 | Refills: 0 | Status: COMPLETED | OUTPATIENT
Start: 2018-01-23 | End: 2018-01-23

## 2018-01-23 RX ORDER — SODIUM CHLORIDE 9 MG/ML
1000 INJECTION, SOLUTION INTRAVENOUS
Qty: 0 | Refills: 0 | Status: DISCONTINUED | OUTPATIENT
Start: 2018-01-23 | End: 2018-01-24

## 2018-01-23 RX ORDER — MORPHINE SULFATE 50 MG/1
4 CAPSULE, EXTENDED RELEASE ORAL ONCE
Qty: 0 | Refills: 0 | Status: DISCONTINUED | OUTPATIENT
Start: 2018-01-23 | End: 2018-01-23

## 2018-01-23 RX ORDER — METOPROLOL TARTRATE 50 MG
5 TABLET ORAL EVERY 6 HOURS
Qty: 0 | Refills: 0 | Status: DISCONTINUED | OUTPATIENT
Start: 2018-01-23 | End: 2018-01-23

## 2018-01-23 RX ORDER — ONDANSETRON 8 MG/1
4 TABLET, FILM COATED ORAL ONCE
Qty: 0 | Refills: 0 | Status: COMPLETED | OUTPATIENT
Start: 2018-01-23 | End: 2018-01-23

## 2018-01-23 RX ORDER — BUDESONIDE AND FORMOTEROL FUMARATE DIHYDRATE 160; 4.5 UG/1; UG/1
2 AEROSOL RESPIRATORY (INHALATION)
Qty: 0 | Refills: 0 | Status: DISCONTINUED | OUTPATIENT
Start: 2018-01-23 | End: 2018-01-31

## 2018-01-23 RX ORDER — ENOXAPARIN SODIUM 100 MG/ML
40 INJECTION SUBCUTANEOUS EVERY 24 HOURS
Qty: 0 | Refills: 0 | Status: DISCONTINUED | OUTPATIENT
Start: 2018-01-23 | End: 2018-01-26

## 2018-01-23 RX ORDER — SODIUM CHLORIDE 9 MG/ML
1000 INJECTION, SOLUTION INTRAVENOUS ONCE
Qty: 0 | Refills: 0 | Status: COMPLETED | OUTPATIENT
Start: 2018-01-23 | End: 2018-01-23

## 2018-01-23 RX ORDER — ALBUTEROL 90 UG/1
2 AEROSOL, METERED ORAL EVERY 6 HOURS
Qty: 0 | Refills: 0 | Status: DISCONTINUED | OUTPATIENT
Start: 2018-01-23 | End: 2018-01-31

## 2018-01-23 RX ORDER — METOPROLOL TARTRATE 50 MG
5 TABLET ORAL ONCE
Qty: 0 | Refills: 0 | Status: COMPLETED | OUTPATIENT
Start: 2018-01-23 | End: 2018-01-23

## 2018-01-23 RX ORDER — INFLUENZA VIRUS VACCINE 15; 15; 15; 15 UG/.5ML; UG/.5ML; UG/.5ML; UG/.5ML
0.5 SUSPENSION INTRAMUSCULAR ONCE
Qty: 0 | Refills: 0 | Status: DISCONTINUED | OUTPATIENT
Start: 2018-01-23 | End: 2018-01-31

## 2018-01-23 RX ORDER — TIOTROPIUM BROMIDE 18 UG/1
1 CAPSULE ORAL; RESPIRATORY (INHALATION) AT BEDTIME
Qty: 0 | Refills: 0 | Status: DISCONTINUED | OUTPATIENT
Start: 2018-01-23 | End: 2018-01-31

## 2018-01-23 RX ORDER — ACETAMINOPHEN 500 MG
1000 TABLET ORAL ONCE
Qty: 0 | Refills: 0 | Status: COMPLETED | OUTPATIENT
Start: 2018-01-23 | End: 2018-01-23

## 2018-01-23 RX ADMIN — BUDESONIDE AND FORMOTEROL FUMARATE DIHYDRATE 2 PUFF(S): 160; 4.5 AEROSOL RESPIRATORY (INHALATION) at 13:21

## 2018-01-23 RX ADMIN — Medication 110 MILLIGRAM(S): at 12:01

## 2018-01-23 RX ADMIN — SODIUM CHLORIDE 2000 MILLILITER(S): 9 INJECTION, SOLUTION INTRAVENOUS at 11:00

## 2018-01-23 RX ADMIN — MORPHINE SULFATE 4 MILLIGRAM(S): 50 CAPSULE, EXTENDED RELEASE ORAL at 02:40

## 2018-01-23 RX ADMIN — SODIUM CHLORIDE 100 MILLILITER(S): 9 INJECTION, SOLUTION INTRAVENOUS at 10:41

## 2018-01-23 RX ADMIN — MORPHINE SULFATE 4 MILLIGRAM(S): 50 CAPSULE, EXTENDED RELEASE ORAL at 02:55

## 2018-01-23 RX ADMIN — Medication 5 MILLIGRAM(S): at 16:36

## 2018-01-23 RX ADMIN — Medication 400 MILLIGRAM(S): at 16:36

## 2018-01-23 RX ADMIN — Medication 1000 MILLIGRAM(S): at 17:15

## 2018-01-23 RX ADMIN — SODIUM CHLORIDE 100 MILLILITER(S): 9 INJECTION, SOLUTION INTRAVENOUS at 16:36

## 2018-01-23 RX ADMIN — ONDANSETRON 4 MILLIGRAM(S): 8 TABLET, FILM COATED ORAL at 02:40

## 2018-01-23 RX ADMIN — SODIUM CHLORIDE 1000 MILLILITER(S): 9 INJECTION INTRAMUSCULAR; INTRAVENOUS; SUBCUTANEOUS at 03:55

## 2018-01-23 RX ADMIN — Medication 120 MILLIGRAM(S): at 22:17

## 2018-01-23 RX ADMIN — SODIUM CHLORIDE 1000 MILLILITER(S): 9 INJECTION INTRAMUSCULAR; INTRAVENOUS; SUBCUTANEOUS at 02:40

## 2018-01-23 RX ADMIN — ENOXAPARIN SODIUM 40 MILLIGRAM(S): 100 INJECTION SUBCUTANEOUS at 13:00

## 2018-01-23 RX ADMIN — BUDESONIDE AND FORMOTEROL FUMARATE DIHYDRATE 2 PUFF(S): 160; 4.5 AEROSOL RESPIRATORY (INHALATION) at 22:10

## 2018-01-23 NOTE — H&P ADULT - HISTORY OF PRESENT ILLNESS
HPI: 62 year old male with history of large incisional hernia presenting with 2 days of nonspecific abdominal discomfort, located mostly in the lower abdomen and suprapubic region, mildly improved with OTC analgesics, and tense in nature, associated with diarrhea and some nausea starting this morning. Patient has had episode of emesis in ED, and now reports moderate nausea. Nonbilious, nonbloody emesis. Patient states he has been able to reduce his hernia like normal, but because of the pain he presented to the ED. Denies fevers, chills, dysuria, chest pain, palpitations.    PMHx/PSHx: HTN, COPD, aflutter s/p ablation (2015), bilateral inguinal hernias, cholecystectomy, appendectomy, perforated diverticulitis s/p Kamini's (2010) with reversal (2010), incisional hernia s/p component separation and mesh (12/2012), prostate ca s/p radiation    Medications (home): spiriva, symbicort, albuterol, metoprolol, eliquis, docusate, senna    Allergies: No Known Drug Allergies  shellfish (Unknown)  Intolerances: azithromycin (Vomiting)    Social Hx:     Family Hx: Father - CAD    Physical exam significant for easily reducible hernia without abdominal tenderness    Imaging and labs remarkable for mildly elevated lactate, and evidence of obstruction with transition point at hernia, multiple large renal cysts, and bony lesions concerning for metastatic disease HPI: 62 year old male with history of large incisional hernia presenting with 2 days of nonspecific abdominal discomfort, located mostly in the lower abdomen and suprapubic region, mildly improved with OTC analgesics, and tense in nature, associated with diarrhea and some nausea starting this morning. Patient has had episode of emesis in ED, and now reports moderate nausea. Nonbilious, nonbloody emesis. Patient states he has been able to reduce his hernia like normal, but because of the pain he presented to the ED. Denies fevers, chills, dysuria, chest pain, palpitations. NG tube placed in ED with 500 ccs of bilious contents returned.    PMHx/PSHx: HTN, COPD, aflutter s/p ablation (2015), bilateral inguinal hernias, cholecystectomy, appendectomy, perforated diverticulitis s/p Kamini's (2010) with reversal (2010), incisional hernia s/p component separation and mesh (12/2012), prostate ca s/p radiation    Medications (home): spiriva, albuterol, metoprolol, eliquis    Allergies: No Known Drug Allergies, shellfish (Unknown)  Intolerances: azithromycin (Vomiting)    Social Hx: denies EtOH, tobacco, illicit drug use    Family Hx: Father - CAD, HTN    Physical exam significant for easily reducible hernia without abdominal tenderness    Imaging and labs remarkable for mildly elevated lactate, and evidence of obstruction with transition point at hernia, multiple large renal cysts, and bony lesions concerning for metastatic disease

## 2018-01-23 NOTE — ED PROCEDURE NOTE - CPROC ED GASTRIC INTUB DETAIL1
The nasogastric tube (see size above) was inserted via the anatomic location./Placement was confirmed by aspiration of gastric secretions./Gastric tube connected to low continuous suction.

## 2018-01-23 NOTE — H&P ADULT - NSHPPHYSICALEXAM_GEN_ALL_CORE
General: well developed, well nourished, NAD  Neuro: alert and oriented, no focal deficits, moves all extremities spontaneously  HEENT: NCAT, EOMI, anicteric  Respiratory: airway patent, respirations unlabored  CVS: regular rate  Abdomen: soft, easily reducible hernia at superior aspect of prior incision, mild distension without tenderness  Extremities: no edema, sensation and movement grossly intact  Skin: warm, dry, appropriate color

## 2018-01-23 NOTE — ED ADULT NURSE NOTE - OBJECTIVE STATEMENT
Pt recd A+Ox3. C/o abd pain x3 days with N/V/D. Denies fever. Pt medicated for pain and nausea as ordered. VS as noted. Will continue to monitor.

## 2018-01-23 NOTE — H&P ADULT - NSHPLABSRESULTS_GEN_ALL_CORE
17.0   6.57  )-----------( 349      ( 23 Jan 2018 02:45 )             52.8   01-23    145  |  102  |  24<H>  ----------------------------<  108<H>  4.3   |  22  |  1.27    Ca    9.5      23 Jan 2018 02:45    TPro  8.2  /  Alb  3.7  /  TBili  0.7  /  DBili  x   /  AST  16  /  ALT  13  /  AlkPhos  145<H>  01-23      < from: CT Abdomen and Pelvis w/ Oral Cont and w/ IV Cont (01.23.18 @ 05:06) >    IMPRESSION:   1. Small bowel obstruction with a transition point in the ventral hernia.  2. Multiple osteolytic lesions, likely metastasis. Prostate metastasis is   usually osteoblastic. Please check for other possibilities for primary   neoplasm.   3. Numerous renal cysts bilaterally. It is not certain whether these are   simple cysts or a manifestation of polycystic kidney disease.    < end of copied text > 17.0   6.57  )-----------( 349      ( 23 Jan 2018 02:45 )             52.8   01-23    145  |  102  |  24<H>  ----------------------------<  108<H>  4.3   |  22  |  1.27    Ca    9.5      23 Jan 2018 02:45    TPro  8.2  /  Alb  3.7  /  TBili  0.7  /  DBili  x   /  AST  16  /  ALT  13  /  AlkPhos  145<H>  01-23      < from: CT Abdomen and Pelvis w/ Oral Cont and w/ IV Cont (01.23.18 @ 05:06) >    IMPRESSION:   1. Small bowel obstruction with a transition point in the ventral hernia.  2. Multiple osteolytic lesions, likely metastasis. Prostate metastasis is usually osteoblastic. Please check for other possibilities for primary neoplasm.   3. Numerous renal cysts bilaterally. It is not certain whether these are simple cysts or a manifestation of polycystic kidney disease.    < end of copied text >

## 2018-01-23 NOTE — CONSULT NOTE ADULT - SUBJECTIVE AND OBJECTIVE BOX
Patient is a 62y old  Male who presents with a chief complaint of abdominal pain, nausea (2018 08:20), back pain with radiation into the right leg, diarrhea as the main symptoms on detailed ROS questions, No weight loss or bleeding or hematuria. Drinks lots of liquids.    last colonoscopy was 2 years ago.      PAST MEDICAL & SURGICAL HISTORY:  Atrial fibrillation and flutter  Ventral hernia  Inguinal hernia  Prostate Cancer: with seeds implant - in , no Rx with injections or pills.  had a colostomy for diverticulosis related issues    Diverticulitis of Colon: with Hx of colostomy bag  Emphysema  HTN (Hypertension)  History of ventral hernia repair  S/P Kamini's procedure: with reversal  History of Hernia Surgery: BL inguinal  History of Cholecystectomy  History of Appendectomy    Meds:  ALBUTerol    90 MICROgram(s) HFA Inhaler 2 Puff(s) Inhalation every 6 hours PRN  buDESOnide 160 MICROgram(s)/formoterol 4.5 MICROgram(s) Inhaler 2 Puff(s) Inhalation two times a day  enoxaparin Injectable 40 milliGRAM(s) SubCutaneous every 24 hours  influenza   Vaccine 0.5 milliLiter(s) IntraMuscular once  lactated ringers. 1000 milliLiter(s) IV Continuous <Continuous>  metoprolol   tartrate IVPB 10 milliGRAM(s) IV Intermittent every 6 hours  tiotropium 18 MICROgram(s) Capsule 1 Capsule(s) Inhalation at bedtime    Allergies:  azithromycin (Vomiting)  No Known Drug Allergies  shellfish (Unknown)      FAMILY HISTORY:  Family history of hypertension (Father)  Family history of heart attack (Father)  Mother had a renal cyst but never needed any intervention, was not on dialysis and  at 85  - no h/o any cancers in the family      Vital Signs Last 24 Hrs  T(C): 36.4 (2018 15:06), Max: 37.1 (2018 08:30)  T(F): 97.6 (2018 15:06), Max: 98.8 (2018 08:30)  HR: 112 (2018 16:33) (92 - 120)  BP: 141/101 (2018 16:33) (114/80 - 141/101)  BP(mean): --  RR: 18 (2018 15:06) (18 - 20)  SpO2: 95% (2018 15:06) (94% - 99%)                          17.0   6.57  )-----------( 349      ( 2018 02:45 )             52.8           143  |  105  |  19  ----------------------------<  84  3.8   |  24  |  1.06    Ca    8.3<L>      2018 15:30    TPro  8.2  /  Alb  3.7  /  TBili  0.7  /  DBili  x   /  AST  16  /  ALT  13  /  AlkPhos  145<H>          CT abd and pelvis: IMPRESSION:   1. Small bowel obstruction with a transition point in the ventral hernia.  2. Multiple osteolytic lesions, likely metastasis. Prostate metastasis is   usually osteoblastic. Please check for other possibilities for primary   neoplasm.   3. Numerous renal cysts bilaterally. It is not certain whether these are   simple cysts or a manifestation of polycystic kidney disease.    Findings were discussed with Dr. Barrett with read back at 6:00 AM.

## 2018-01-23 NOTE — CONSULT NOTE ADULT - ASSESSMENT
62M with above clinical features. Discussed the available results, possible differential and other issues in detail with him and questions answered. Prostate cancer is unlikely to cause lytic lesions. Will recommend:  - Rx of SBO as per surgical team  - pain ontrol  - obtain PEP and IFx studies  -- free light chains, QIG  - CT chest  - MRI of the kidneys with contrast, when safe to do so  - obtain iron studies, B12, uric acid, EPO levels and BHAVIN-2 mutation ( to be sent in lavender top tube and with a molecular genetics form)  - obtain PSA  - DVT prophylaxis

## 2018-01-23 NOTE — ED PROVIDER NOTE - MEDICAL DECISION MAKING DETAILS
62M PMH COPD, diverticulitis (last episode many yrs ago), afib on eliquis s/p ablation, HTN, ?umbilical hernia repair (per pt at [facility] w/ Dr. Ring) p/w periumbilical pain, NV, diarrhea. Tachycardic, other vitals wnl. Exam as above.  ddx: SBO vs. diverticulitis vs. pancreatitis  CBC, cmp, lipase, vbg comp. IVF, symptom control. CT a/p. Reassess.

## 2018-01-23 NOTE — ED PROVIDER NOTE - OBJECTIVE STATEMENT
62M PMH COPD, diverticulitis (last episode many yrs ago), afib on eliquis s/p ablation, HTN, ?umbilical hernia repair (per pt at [facility] w/ Dr. Ring) p/w periumbilical pain, x2d, intermittent but progressing, not similar to prior (doesn't remember what diverticular pain was like), no exacerbating/alleviating factors. Also NBNB NV. Had diarrhea x5 today. Denies f/c, SOB/CP, urinary complaints, black/bloody stool. Motrin at noon w/ some relief. No recent abx, sick contacts, recent travel.   Cards Yasir

## 2018-01-23 NOTE — CHART NOTE - NSCHARTNOTEFT_GEN_A_CORE
Patient seen and examined at bedside. Doing well since having NGT removed. Continues to have flatus and BM. BM are still loose. Denied n/v, fever, chills, CP or SOB.    Gen:NAD  Resp: Breathing comfortably on RA  GI: soft, nontender, minimally distended. Reducible  ventral hernia. No rebound or guarding.   Ext: Moving all 4 ext spontaneously    No acute changes on exam. Partial SBO now appears to have resolved. will continue to monitor.

## 2018-01-23 NOTE — H&P ADULT - PSH
History of Appendectomy    History of Cholecystectomy    History of Hernia Surgery  BL inguinal  History of ventral hernia repair    S/P Kamini's procedure  with reversal

## 2018-01-23 NOTE — ED PROVIDER NOTE - PHYSICAL EXAMINATION
no LE edema, normal equal distal pulses.   abd: soft, BS+, minimal diffuse ttp, more on LLQ  no CVAT

## 2018-01-23 NOTE — H&P ADULT - ASSESSMENT
62 year old male with evidence of partial small bowel obstruction secondary to ventral hernia, with nontender abdomen, hemodynamically stable and afebrile. 62 year old male with evidence of partial small bowel obstruction secondary to ventral hernia, with nontender abdomen, hemodynamically stable and afebrile.    - NG tube to low continuous suction  - await return of bowel function  - serial abdominal exams, no standing pain medication  - IV fluid while NPO  - repeat VBG requested now  - continue Eliquis when tolerating NPO    Discussed with Dr. Ring  --KHAI Lynn, [id number]

## 2018-01-23 NOTE — PROGRESS NOTE ADULT - PROBLEM SELECTOR PLAN 3
With evidence of skeletal metastasis   We will check PSA level and request Medical Oncology consultation

## 2018-01-23 NOTE — PROGRESS NOTE ADULT - SUBJECTIVE AND OBJECTIVE BOX
Subjective: No more abdominal pain. I have been having loose stools    Objective:   Vital Signs Last 24 Hrs  T(C): 37.1 (23 Jan 2018 08:30), Max: 37.1 (23 Jan 2018 08:30)  T(F): 98.8 (23 Jan 2018 08:30), Max: 98.8 (23 Jan 2018 08:30)  HR: 94 (23 Jan 2018 12:23) (92 - 120)  BP: 133/98 (23 Jan 2018 13:24) (114/80 - 138/95)  BP(mean): --  RR: 18 (23 Jan 2018 13:24) (18 - 20)  SpO2: 99% (23 Jan 2018 13:24) (94% - 99%)    Daily Height in cm: 176.53 (23 Jan 2018 02:46)    Daily     Heent: N/C, AT PER no scleral icterus, No JVD  Pul: clear  Cor: RRR  Abdomen: soft, normal BS. Soft tissue mass reducible to the right of the upper end of the scar/, non tender, no guarding or rebound  Extremities: without edema, motor/sensory intact    I&O's Detail    23 Jan 2018 07:01  -  23 Jan 2018 14:24  --------------------------------------------------------  IN:  Total IN: 0 mL    OUT:    Nasoenteral Tube: 600 mL  Total OUT: 600 mL    Total NET: -600 mL          MEDICATIONS  (STANDING):  buDESOnide 160 MICROgram(s)/formoterol 4.5 MICROgram(s) Inhaler 2 Puff(s) Inhalation two times a day  enoxaparin Injectable 40 milliGRAM(s) SubCutaneous every 24 hours  influenza   Vaccine 0.5 milliLiter(s) IntraMuscular once  lactated ringers. 1000 milliLiter(s) (100 mL/Hr) IV Continuous <Continuous>  metoprolol   tartrate IVPB 5 milliGRAM(s) IV Intermittent every 6 hours  tiotropium 18 MICROgram(s) Capsule 1 Capsule(s) Inhalation at bedtime    MEDICATIONS  (PRN):  ALBUTerol    90 MICROgram(s) HFA Inhaler 2 Puff(s) Inhalation every 6 hours PRN Shortness of Breath and/or Wheezing                            17.0   6.57  )-----------( 349      ( 23 Jan 2018 02:45 )             52.8     01-23    x   |  x   |  x   ----------------------------<  x   6.4<HH>   |  x   |  x     Ca    9.5      23 Jan 2018 02:45    TPro  8.2  /  Alb  3.7  /  TBili  0.7  /  DBili  x   /  AST  16  /  ALT  13  /  AlkPhos  145<H>  01-23        Radiologic Studies:< from: CT Abdomen and Pelvis w/ Oral Cont and w/ IV Cont (01.23.18 @ 05:06) >  1. Small bowel obstruction with a transition point in the ventral hernia.  2. Multiple osteolytic lesions, likely metastasis. Prostate metastasis is   usually osteoblastic. Please check for other possibilities for primary   neoplasm.   3. Numerous renal cysts bilaterally. It is not certain whether these are   simple cysts or a manifestation of polycystic kidney disease.      < end of copied text >

## 2018-01-24 ENCOUNTER — TRANSCRIPTION ENCOUNTER (OUTPATIENT)
Age: 63
End: 2018-01-24

## 2018-01-24 LAB
BUN SERPL-MCNC: 17 MG/DL — SIGNIFICANT CHANGE UP (ref 7–23)
CALCIUM SERPL-MCNC: 8.7 MG/DL — SIGNIFICANT CHANGE UP (ref 8.4–10.5)
CHLORIDE SERPL-SCNC: 103 MMOL/L — SIGNIFICANT CHANGE UP (ref 98–107)
CO2 SERPL-SCNC: 27 MMOL/L — SIGNIFICANT CHANGE UP (ref 22–31)
CREAT SERPL-MCNC: 0.96 MG/DL — SIGNIFICANT CHANGE UP (ref 0.5–1.3)
FERRITIN SERPL-MCNC: 402.6 NG/ML — HIGH (ref 30–400)
GLUCOSE SERPL-MCNC: 97 MG/DL — SIGNIFICANT CHANGE UP (ref 70–99)
HCT VFR BLD CALC: 48.5 % — SIGNIFICANT CHANGE UP (ref 39–50)
HGB BLD-MCNC: 15.4 G/DL — SIGNIFICANT CHANGE UP (ref 13–17)
IGA FLD-MCNC: 176 MG/DL — SIGNIFICANT CHANGE UP (ref 70–400)
IGM SERPL-MCNC: 55 MG/DL — SIGNIFICANT CHANGE UP (ref 40–230)
IRON SATN MFR SERPL: 204 UG/DL — SIGNIFICANT CHANGE UP (ref 155–535)
IRON SATN MFR SERPL: 33 UG/DL — LOW (ref 45–165)
MAGNESIUM SERPL-MCNC: 1.9 MG/DL — SIGNIFICANT CHANGE UP (ref 1.6–2.6)
MCHC RBC-ENTMCNC: 28.4 PG — SIGNIFICANT CHANGE UP (ref 27–34)
MCHC RBC-ENTMCNC: 31.8 % — LOW (ref 32–36)
MCV RBC AUTO: 89.5 FL — SIGNIFICANT CHANGE UP (ref 80–100)
NRBC # FLD: 0 — SIGNIFICANT CHANGE UP
PHOSPHATE SERPL-MCNC: 1.8 MG/DL — LOW (ref 2.5–4.5)
PLATELET # BLD AUTO: 320 K/UL — SIGNIFICANT CHANGE UP (ref 150–400)
PMV BLD: 11.3 FL — SIGNIFICANT CHANGE UP (ref 7–13)
POTASSIUM SERPL-MCNC: 3.3 MMOL/L — LOW (ref 3.5–5.3)
POTASSIUM SERPL-SCNC: 3.3 MMOL/L — LOW (ref 3.5–5.3)
PROT SERPL-MCNC: 6.8 G/DL — SIGNIFICANT CHANGE UP (ref 6–8.3)
PROT UR-MCNC: 29.7 MG/DL — SIGNIFICANT CHANGE UP
PSA FLD-MCNC: 69.78 NG/ML — HIGH (ref 0–4)
RBC # BLD: 5.42 M/UL — SIGNIFICANT CHANGE UP (ref 4.2–5.8)
RBC # FLD: 13.2 % — SIGNIFICANT CHANGE UP (ref 10.3–14.5)
SODIUM SERPL-SCNC: 143 MMOL/L — SIGNIFICANT CHANGE UP (ref 135–145)
UIBC SERPL-MCNC: 171 UG/DL — SIGNIFICANT CHANGE UP (ref 110–370)
URATE SERPL-MCNC: 6.9 MG/DL — SIGNIFICANT CHANGE UP (ref 3.4–8.8)
VIT B12 SERPL-MCNC: 376 PG/ML — SIGNIFICANT CHANGE UP (ref 200–900)
WBC # BLD: 6.89 K/UL — SIGNIFICANT CHANGE UP (ref 3.8–10.5)
WBC # FLD AUTO: 6.89 K/UL — SIGNIFICANT CHANGE UP (ref 3.8–10.5)

## 2018-01-24 PROCEDURE — 84166 PROTEIN E-PHORESIS/URINE/CSF: CPT | Mod: 26

## 2018-01-24 PROCEDURE — 71046 X-RAY EXAM CHEST 2 VIEWS: CPT | Mod: 26

## 2018-01-24 PROCEDURE — 84165 PROTEIN E-PHORESIS SERUM: CPT | Mod: 26

## 2018-01-24 PROCEDURE — 86334 IMMUNOFIX E-PHORESIS SERUM: CPT | Mod: 26

## 2018-01-24 PROCEDURE — 86335 IMMUNFIX E-PHORSIS/URINE/CSF: CPT | Mod: 26

## 2018-01-24 RX ORDER — SODIUM CHLORIDE 9 MG/ML
1000 INJECTION, SOLUTION INTRAVENOUS
Qty: 0 | Refills: 0 | Status: DISCONTINUED | OUTPATIENT
Start: 2018-01-24 | End: 2018-01-24

## 2018-01-24 RX ORDER — POTASSIUM CHLORIDE 20 MEQ
40 PACKET (EA) ORAL ONCE
Qty: 0 | Refills: 0 | Status: COMPLETED | OUTPATIENT
Start: 2018-01-24 | End: 2018-01-24

## 2018-01-24 RX ORDER — METOPROLOL TARTRATE 50 MG
75 TABLET ORAL EVERY 12 HOURS
Qty: 0 | Refills: 0 | Status: DISCONTINUED | OUTPATIENT
Start: 2018-01-24 | End: 2018-01-25

## 2018-01-24 RX ORDER — SODIUM,POTASSIUM PHOSPHATES 278-250MG
2 POWDER IN PACKET (EA) ORAL
Qty: 0 | Refills: 0 | Status: DISCONTINUED | OUTPATIENT
Start: 2018-01-24 | End: 2018-01-31

## 2018-01-24 RX ORDER — ACETAMINOPHEN 500 MG
650 TABLET ORAL ONCE
Qty: 0 | Refills: 0 | Status: COMPLETED | OUTPATIENT
Start: 2018-01-24 | End: 2018-01-24

## 2018-01-24 RX ORDER — METOPROLOL TARTRATE 50 MG
75 TABLET ORAL EVERY 8 HOURS
Qty: 0 | Refills: 0 | Status: DISCONTINUED | OUTPATIENT
Start: 2018-01-24 | End: 2018-01-24

## 2018-01-24 RX ORDER — POTASSIUM PHOSPHATE, MONOBASIC POTASSIUM PHOSPHATE, DIBASIC 236; 224 MG/ML; MG/ML
15 INJECTION, SOLUTION INTRAVENOUS ONCE
Qty: 0 | Refills: 0 | Status: COMPLETED | OUTPATIENT
Start: 2018-01-24 | End: 2018-01-24

## 2018-01-24 RX ORDER — MAGNESIUM SULFATE 500 MG/ML
1 VIAL (ML) INJECTION ONCE
Qty: 0 | Refills: 0 | Status: COMPLETED | OUTPATIENT
Start: 2018-01-24 | End: 2018-01-24

## 2018-01-24 RX ADMIN — Medication 650 MILLIGRAM(S): at 22:00

## 2018-01-24 RX ADMIN — BUDESONIDE AND FORMOTEROL FUMARATE DIHYDRATE 2 PUFF(S): 160; 4.5 AEROSOL RESPIRATORY (INHALATION) at 21:17

## 2018-01-24 RX ADMIN — Medication 650 MILLIGRAM(S): at 21:18

## 2018-01-24 RX ADMIN — BUDESONIDE AND FORMOTEROL FUMARATE DIHYDRATE 2 PUFF(S): 160; 4.5 AEROSOL RESPIRATORY (INHALATION) at 09:32

## 2018-01-24 RX ADMIN — Medication 100 GRAM(S): at 13:31

## 2018-01-24 RX ADMIN — POTASSIUM PHOSPHATE, MONOBASIC POTASSIUM PHOSPHATE, DIBASIC 62.5 MILLIMOLE(S): 236; 224 INJECTION, SOLUTION INTRAVENOUS at 16:07

## 2018-01-24 RX ADMIN — TIOTROPIUM BROMIDE 1 CAPSULE(S): 18 CAPSULE ORAL; RESPIRATORY (INHALATION) at 00:36

## 2018-01-24 RX ADMIN — ENOXAPARIN SODIUM 40 MILLIGRAM(S): 100 INJECTION SUBCUTANEOUS at 13:32

## 2018-01-24 RX ADMIN — TIOTROPIUM BROMIDE 1 CAPSULE(S): 18 CAPSULE ORAL; RESPIRATORY (INHALATION) at 21:17

## 2018-01-24 RX ADMIN — Medication 75 MILLIGRAM(S): at 13:32

## 2018-01-24 RX ADMIN — Medication 120 MILLIGRAM(S): at 04:52

## 2018-01-24 RX ADMIN — Medication 40 MILLIEQUIVALENT(S): at 13:31

## 2018-01-24 NOTE — DISCHARGE NOTE ADULT - MEDICATION SUMMARY - MEDICATIONS TO TAKE
I will START or STAY ON the medications listed below when I get home from the hospital:    Eliquis 5 mg oral tablet  -- 1 tab(s) by mouth 2 times a day  -- Indication: For Atrial fibrillation and flutter    metoprolol tartrate 25 mg oral tablet  -- 3 tab(s) by mouth 2 times a day  -- Indication: For HTN (Hypertension)    tiotropium 18 mcg inhalation capsule  -- 1 cap(s) inhaled once a day (at bedtime)  -- Indication: For Asthma    Symbicort 160 mcg-4.5 mcg/inh inhalation aerosol  -- 2 puff(s) inhaled 2 times a day  -- Indication: For Asthma    ProAir HFA 90 mcg/inh inhalation aerosol  -- 2 puff(s) inhaled 4 times a day, As Needed  -- Indication: For Asthma    docusate sodium 100 mg oral capsule  -- 1 cap(s) by mouth once a day, As needed, Constipation  -- Indication: For Constipation    senna oral tablet  -- 2 tab(s) by mouth once a day (at bedtime), As needed, Constipation  -- Indication: For Constipation I will START or STAY ON the medications listed below when I get home from the hospital:    ibuprofen 600 mg oral tablet  -- 1 tab(s) by mouth every 6 hours, as needed for pain  -- Indication: For Pain    acetaminophen 325 mg oral tablet  -- 2 tab(s) by mouth every 4 hours, as needed for pain  -- Indication: For Pain    Eliquis 5 mg oral tablet  -- 1 tab(s) by mouth 2 times a day  -- Indication: For Atrial fibrillation and flutter    bicalutamide 50 mg oral tablet  -- 1 tab(s) by mouth every 24 hours  -- Indication: For Chemotherapy    metoprolol tartrate 25 mg oral tablet  -- 3 tab(s) by mouth 2 times a day  -- Indication: For HTN (Hypertension)    tiotropium 18 mcg inhalation capsule  -- 1 cap(s) inhaled once a day (at bedtime)  -- Indication: For Asthma    Symbicort 160 mcg-4.5 mcg/inh inhalation aerosol  -- 2 puff(s) inhaled 2 times a day  -- Indication: For Asthma    ProAir HFA 90 mcg/inh inhalation aerosol  -- 2 puff(s) inhaled 4 times a day, As Needed  -- Indication: For Asthma    docusate sodium 100 mg oral capsule  -- 1 cap(s) by mouth once a day, As needed, Constipation  -- Indication: For Constipation    senna oral tablet  -- 2 tab(s) by mouth once a day (at bedtime), As needed, Constipation  -- Indication: For Constipation

## 2018-01-24 NOTE — DISCHARGE NOTE ADULT - CARE PLAN
Principal Discharge DX:	SBO (small bowel obstruction)  Goal:	Resolved Principal Discharge DX:	SBO (small bowel obstruction)  Goal:	Resolved  Assessment and plan of treatment:	- May resume regular diet.   -  Secondary Diagnosis:	Atrial fibrillation and flutter  Goal:	Continue treatment  Assessment and plan of treatment:	- Metoprolol 70 mg three times each day  Secondary Diagnosis:	Prostate Cancer  Goal:	Follow up with medical oncology for treatment planning. Principal Discharge DX:	SBO (small bowel obstruction)  Goal:	Resolved  Assessment and plan of treatment:	As per surgery, you may resume a regular diet. Follow up with Dr. Ring as needed. Please follow up with your primary care doctor 2-3 weeks from discharge. Please return to the hospital if you are vomiting, having abdominal pain, and not having bowel movements.  Secondary Diagnosis:	Atrial fibrillation and flutter  Goal:	Continue treatment  Assessment and plan of treatment:	Continue taking your metoprolol 70 mg three times each day and your eliquis. Follow up with your primary care doctor and cardiologist 2-3 weeks from discharge. Return to the hospital if you experience palpitations, chest pain, and SOB.  Secondary Diagnosis:	Prostate Cancer  Goal:	Follow up with medical oncology for treatment planning.  Assessment and plan of treatment:	Please follow up with your hematologist for further recommendations.  Secondary Diagnosis:	Renal cyst  Goal:	Follow up  Assessment and plan of treatment:	You had an MRI that showed multiple bilateral renal cortical cysts without evidence of renal malignancy. Please Inform your primary care doctor 2-3 weeks from discharge so that he can provide you with recommendations.  Secondary Diagnosis:	Osteolytic lesion  Goal:	follow up  Assessment and plan of treatment:	A CT scan shows multiple lesions in your spine and pelvis. An MRI confirmed that finding. Your hematologist, Dr. Beckwith, wanted to get a bone biopsy before discharging you. Please follow up with im 2-3 weeks from discharge.  Secondary Diagnosis:	Partial thromboplastin time (PTT) > 40 seconds  Goal:	Follow up  Assessment and plan of treatment:	Partial thromboplastin time (PTT) is a blood test that measures the time it takes your blood to clot. A PTT test can be used to check for bleeding problems. Yours was elevated. It was normal in october 2017. Dr. Beckwith ordered some further blood tests for evaluation. Please follow up with him 2-3 weeks from discharge for further evaluation. Principal Discharge DX:	SBO (small bowel obstruction)  Goal:	Resolved  Assessment and plan of treatment:	As per surgery, you may resume a regular diet. Follow up with Dr. Ring as needed. Please follow up with your primary care doctor 2-3 weeks from discharge. Please return to the hospital if you are vomiting, having abdominal pain, and not having bowel movements.  Secondary Diagnosis:	Atrial fibrillation and flutter  Goal:	Continue treatment  Assessment and plan of treatment:	Continue taking your metoprolol 70 mg three times each day and your eliquis. Follow up with your primary care doctor and cardiologist 2-3 weeks from discharge. Return to the hospital if you experience palpitations, chest pain, and SOB.  Secondary Diagnosis:	Prostate Cancer  Goal:	Follow up with medical oncology for treatment planning.  Assessment and plan of treatment:	Please follow up with your hematologist for further recommendations. Return to the hospital if you experience difficulty urinating or see blood in your urine.  Secondary Diagnosis:	Renal cyst  Goal:	Follow up  Assessment and plan of treatment:	You had an MRI that showed multiple bilateral renal cortical cysts without evidence of renal malignancy. Please Inform your primary care doctor 2-3 weeks from discharge so that he can provide you with recommendations.  Secondary Diagnosis:	Osteolytic lesion  Goal:	follow up  Assessment and plan of treatment:	A CT scan shows multiple lesions in your spine and pelvis. An MRI confirmed that finding. Your hematologist, Dr. Beckwith, wanted to get a bone biopsy before discharging you. Please follow up with im 2-3 weeks from discharge.  Secondary Diagnosis:	Partial thromboplastin time (PTT) > 40 seconds  Goal:	Follow up  Assessment and plan of treatment:	Partial thromboplastin time (PTT) is a blood test that measures the time it takes your blood to clot. A PTT test can be used to check for bleeding problems. Yours was elevated. It was normal in october 2017. Dr. Beckwith ordered some further blood tests for evaluation. Please follow up with him 2-3 weeks from discharge for further evaluation. Principal Discharge DX:	SBO (small bowel obstruction)  Goal:	Resolved  Assessment and plan of treatment:	You have an obstruction of your intestines causing your to have nausea and vomiting. This resolved after you had the nasogatric tube to decompress your intestines. Follow up with Dr. Ring as needed. Please follow up with your primary care doctor 2-3 weeks from discharge. Please return to the hospital if you are vomiting, having abdominal pain, and not having bowel movements.  Secondary Diagnosis:	Atrial fibrillation and flutter  Goal:	Continue treatment  Assessment and plan of treatment:	Continue taking your metoprolol 75 mg three times each day and your eliquis. Follow up with your primary care doctor and cardiologist 2-3 weeks from discharge. Return to the hospital if you experience palpitations, chest pain, and SOB.  Secondary Diagnosis:	Prostate Cancer  Goal:	Follow up with medical oncology for treatment planning.  Assessment and plan of treatment:	Please follow up with your hematologist for further recommendations. Return to the hospital if you experience difficulty urinating or see blood in your urine.  Secondary Diagnosis:	Renal cyst  Goal:	Follow up  Assessment and plan of treatment:	You had an MRI that showed multiple bilateral renal cortical cysts without evidence of cancerous features. Please Inform your primary care doctor 2-3 weeks from discharge so that he can provide you with recommendations.  Secondary Diagnosis:	Osteolytic lesion  Goal:	follow up  Assessment and plan of treatment:	A CT scan shows multiple lesions in your spine and pelvis. An MRI confirmed that finding. Your hematologist, Dr. Beckwith, wanted to get a bone biopsy before discharging you. Please follow up with him 2-3 weeks from discharge.  Secondary Diagnosis:	Partial thromboplastin time (PTT) > 40 seconds  Goal:	Follow up  Assessment and plan of treatment:	Partial thromboplastin time (PTT) is a blood test that measures the time it takes your blood to clot. A PTT test can be used to check for bleeding problems. Your PTT levels were elevated compared to normal values in October 2017. We ordered several test that suggest you have Principal Discharge DX:	SBO (small bowel obstruction)  Goal:	Resolved  Assessment and plan of treatment:	You have an obstruction of your intestines causing your to have nausea and vomiting. This resolved after you had the nasogatric tube to decompress your intestines. Follow up with Dr. Ring as needed. Please follow up with your primary care doctor 2-3 weeks from discharge. Please return to the hospital if you are vomiting, having abdominal pain, and not having bowel movements.  Secondary Diagnosis:	Atrial fibrillation and flutter  Goal:	Continue treatment  Assessment and plan of treatment:	Continue taking your metoprolol 75 mg three times each day and your eliquis. Follow up with your primary care doctor and cardiologist 2-3 weeks from discharge. Return to the hospital if you experience palpitations, chest pain, and SOB.  Secondary Diagnosis:	Prostate Cancer  Goal:	Follow up with medical oncology for treatment planning.  Assessment and plan of treatment:	Please follow up with your hematologist for further recommendations. Return to the hospital if you experience difficulty urinating or see blood in your urine.  Secondary Diagnosis:	Renal cyst  Goal:	Follow up  Assessment and plan of treatment:	You had an MRI that showed multiple bilateral renal cortical cysts without evidence of cancerous features. Please Inform your primary care doctor 2-3 weeks from discharge so that he can provide you with recommendations.  Secondary Diagnosis:	Osteolytic lesion  Goal:	follow up  Assessment and plan of treatment:	A CT scan shows multiple lesions in your spine and pelvis. An MRI confirmed that finding. Per Dr. Beckwith recommendation, you underwent bone biopsy. Please follow up w  Secondary Diagnosis:	Partial thromboplastin time (PTT) > 40 seconds  Goal:	Follow up  Assessment and plan of treatment:	Partial thromboplastin time (PTT) is a blood test that measures the time it takes your blood to clot. A PTT test can be used to check for bleeding problems. Your PTT levels were elevated compared to normal values in October 2017. We ordered several test that suggest you have a protein in your blood tat is impairing clotting factors from working optimally. Please have labs repeated in 6 weeks time.

## 2018-01-24 NOTE — DISCHARGE NOTE ADULT - ADDITIONAL INSTRUCTIONS
- Follow up with medical oncology.   - Follow up with surgery as needed. Please follow up with Dr. Beckwith, you medical oncologist after discharge.   Please follow up Dr. Jhonathan bai Please follow up with Dr. Beckwith, you medical oncologist on 2/5/2018 at 11 AM  Please follow up Dr. Ring from the surgical team.   Please follow up with your primary care doctor within 2 weeks of discharge.

## 2018-01-24 NOTE — DISCHARGE NOTE ADULT - SECONDARY DIAGNOSIS.
Atrial fibrillation and flutter Prostate Cancer Renal cyst Osteolytic lesion Partial thromboplastin time (PTT) > 40 seconds

## 2018-01-24 NOTE — DISCHARGE NOTE ADULT - PLAN OF CARE
Resolved - May resume regular diet.   - Continue treatment - Metoprolol 70 mg three times each day Follow up with medical oncology for treatment planning. As per surgery, you may resume a regular diet. Follow up with Dr. Ring as needed. Please follow up with your primary care doctor 2-3 weeks from discharge. Please return to the hospital if you are vomiting, having abdominal pain, and not having bowel movements. Continue taking your metoprolol 70 mg three times each day and your eliquis. Follow up with your primary care doctor and cardiologist 2-3 weeks from discharge. Return to the hospital if you experience palpitations, chest pain, and SOB. Please follow up with your hematologist for further recommendations. Follow up You had an MRI that showed multiple bilateral renal cortical cysts without evidence of renal malignancy. Please Inform your primary care doctor 2-3 weeks from discharge so that he can provide you with recommendations. follow up A CT scan shows multiple lesions in your spine and pelvis. An MRI confirmed that finding. Your hematologist, Dr. Beckwith, wanted to get a bone biopsy before discharging you. Please follow up with im 2-3 weeks from discharge. Partial thromboplastin time (PTT) is a blood test that measures the time it takes your blood to clot. A PTT test can be used to check for bleeding problems. Yours was elevated. It was normal in october 2017. Dr. Beckwith ordered some further blood tests for evaluation. Please follow up with him 2-3 weeks from discharge for further evaluation. Please follow up with your hematologist for further recommendations. Return to the hospital if you experience difficulty urinating or see blood in your urine. You have an obstruction of your intestines causing your to have nausea and vomiting. This resolved after you had the nasogatric tube to decompress your intestines. Follow up with Dr. Ring as needed. Please follow up with your primary care doctor 2-3 weeks from discharge. Please return to the hospital if you are vomiting, having abdominal pain, and not having bowel movements. Continue taking your metoprolol 75 mg three times each day and your eliquis. Follow up with your primary care doctor and cardiologist 2-3 weeks from discharge. Return to the hospital if you experience palpitations, chest pain, and SOB. You had an MRI that showed multiple bilateral renal cortical cysts without evidence of cancerous features. Please Inform your primary care doctor 2-3 weeks from discharge so that he can provide you with recommendations. A CT scan shows multiple lesions in your spine and pelvis. An MRI confirmed that finding. Your hematologist, Dr. Beckwith, wanted to get a bone biopsy before discharging you. Please follow up with him 2-3 weeks from discharge. Partial thromboplastin time (PTT) is a blood test that measures the time it takes your blood to clot. A PTT test can be used to check for bleeding problems. Your PTT levels were elevated compared to normal values in October 2017. We ordered several test that suggest you have A CT scan shows multiple lesions in your spine and pelvis. An MRI confirmed that finding. Per Dr. Beckwith recommendation, you underwent bone biopsy. Please follow up w Partial thromboplastin time (PTT) is a blood test that measures the time it takes your blood to clot. A PTT test can be used to check for bleeding problems. Your PTT levels were elevated compared to normal values in October 2017. We ordered several test that suggest you have a protein in your blood tat is impairing clotting factors from working optimally. Please have labs repeated in 6 weeks time.

## 2018-01-24 NOTE — PROGRESS NOTE ADULT - ASSESSMENT
62M with above clinical features. Discussed the available results, possible differential and other issues in detail with him and questions answered. Prostate cancer is unlikely to cause lytic lesions. Will recommend:  - Rx of SBO as per surgical team  - pain control  - obtain PEP and IFx studies - pending  -- free light chains, QIG  - CT chest with contrast  - MRI of the kidneys with contrast, when safe to do so  - f/u on pending labs  - DVT prophylaxis

## 2018-01-24 NOTE — DISCHARGE NOTE ADULT - PATIENT PORTAL LINK FT
“You can access the [facility] Patient Portal, offered by [facility], by registering with the following website: [email or web address]”

## 2018-01-24 NOTE — PROGRESS NOTE ADULT - ASSESSMENT
62 year old male presented with partial SBO treated with medical management, now resolved.     Plan:   Advance to clear liquid diet, then regular if able to tolerate  Will treat pain as needed  Restart home metoprolol 75mg BID for rate control  Albuterol, Budesonide, Spiriva  Heme/Onc consulted for osteolytic lesions seen on CT, Will obtain CXR to look for lung lesions, recs appreciated.  Lovenox for DVT prophylaxis  Out of bed to chair/Ambulate    [id number] 62 year old male pmhx  HTN, COPD, aflutter s/p ablation (2015), bilateral inguinal hernias, cholecystectomy, appendectomy, perforated diverticulitis s/p Kamini's (2010) with reversal (2010), incisional hernia s/p component separation and mesh (12/2012), pros presented with partial SBO treated with medical management, now resolved.     Plan:   Advance to clear liquid diet, then regular if able to tolerate  Will treat pain as needed  Restart home metoprolol 75mg BID for rate control  Albuterol, Budesonide, Spiriva  Heme/Onc consulted for osteolytic lesions seen on CT, Will obtain CXR to look for lung lesions, recs appreciated.  Lovenox for DVT prophylaxis  Out of bed to chair/Ambulate    [id number] 62 year old male pmhx  HTN, COPD, aflutter s/p ablation (2015), bilateral inguinal hernias, cholecystectomy, appendectomy, perforated diverticulitis s/p Kamini's (2010) with reversal (2010), incisional hernia s/p component separation and mesh (12/2012), Prostate CA, who presented with partial SBO, treated with medical management, now resolved.     Plan:   Advance to clear liquid diet, then regular if able to tolerate  Will treat pain as needed  Restart home metoprolol 75mg BID for rate control  Albuterol, Budesonide, Spiriva  Heme/Onc consulted for osteolytic lesions seen on CT, Will obtain CXR to look for lung lesions, recs appreciated.  Lovenox for DVT prophylaxis  Out of bed to chair/Ambulate    [id number]

## 2018-01-24 NOTE — DISCHARGE NOTE ADULT - CARE PROVIDER_API CALL
Lori Ring (MD), Surgery  [address] [place] [address]  Phone: [phone]  Fax: [phone] Lori Ring), Surgery  [address] [facility]  [address]  [place] [address]  Phone: [phone]  Fax: [phone]    Marcus Beckwith (CECILIO), Hematology; Medical Oncology  [address] [facility] [address]  [place] [address]  Phone: [phone]  Fax: [phone]

## 2018-01-24 NOTE — DISCHARGE NOTE ADULT - HOSPITAL COURSE
The patient presented to the ED on 1/23 with a chief complaint of abdominal pain with associated diarrhea and nausea. The patient has history of diverticulitis s/p Kamini with reversal (2010, incisional hernia repair with mesh (2012), cholecystectomy, bilateral inguinal hernias, and prostate cancer treated with radiation.     Given the extensive history, the patient was taken for CT scan, which showed, "1. Small bowel obstruction with a transition point in the ventral hernia.  2. Multiple osteolytic lesions, likely metastasis. Prostate metastasis is   usually osteoblastic. Please check for other possibilities for primary   neoplasm.   3. Numerous renal cysts bilaterally. It is not certain whether these are simple cysts or a manifestation of polycystic kidney disease. The patient presented to the ED on 1/23 with a chief complaint of abdominal pain with associated diarrhea and nausea. The patient has history of diverticulitis s/p Kamini with reversal (2010, incisional hernia repair with mesh (2012), cholecystectomy, bilateral inguinal hernias, and prostate cancer treated with radiation.     Given the extensive history, the patient was taken for CT scan, which showed, "1. Small bowel obstruction with a transition point in the ventral hernia. 2. Multiple osteolytic lesions, likely metastasis. Prostate metastasis is   usually osteoblastic. Please check for other possibilities for primary neoplasm. 3. Numerous renal cysts bilaterally. It is not certain whether these are simple cysts or a manifestation of polycystic kidney disease."     An NGT was placed in the ED and the patient was made NPO. The patient began having bowel function on hospital day 1 and the NGT was discontinued. The patient continued to have good GI function for the duration of his hospital stay.     Given the new osteolytic lesions identified, the patient was seen by medical oncology. Staging was started in the hospital and follow up was arranged. The patient presented to the ED on 1/23 with a chief complaint of abdominal pain with associated diarrhea and nausea. The patient has history of diverticulitis s/p Kamini with reversal (2010, incisional hernia repair with mesh (2012), cholecystectomy, bilateral inguinal hernias, and prostate cancer treated with radiation.   Given the extensive history, the patient was taken for CT scan, which showed, "1. Small bowel obstruction with a transition point in the ventral hernia. 2. Multiple osteolytic lesions, likely metastasis. Please check for other possibilities for primary neoplasm. 3. Numerous renal cysts bilaterally. It is not certain whether these are simple cysts or a manifestation of polycystic kidney disease." An NGT was placed in the ED and the patient was made NPO. The patient began having bowel function on hospital day 1 and the NGT was discontinued. The patient continued to have good GI function for the duration of his hospital stay. Given the new osteolytic lesions identified, the patient was seen by medical oncology and patient was transferred to the medicine team. IR was consulted for a bone bx, however it was not done at first as there was an isolated PTT. Patient had normal PTT in october 2017, repeat was also elevated. Mixing study showed no change. Further work up showed..... The patient presented to the ED on 1/23 with a chief complaint of abdominal pain with associated diarrhea and nausea. The patient has history of diverticulitis s/p Kamini with reversal (2010, incisional hernia repair with mesh (2012), cholecystectomy, bilateral inguinal hernias, and prostate cancer treated with radiation. Given the extensive history, the patient was taken for CT scan, which showed, "1. Small bowel obstruction with a transition point in the ventral hernia. 2. Multiple osteolytic lesions, likely metastasis. 3. Numerous renal cysts bilaterally. It is not certain whether these are simple cysts or a manifestation of polycystic kidney disease." An NGT was placed in the ED and the patient was made NPO. The patient began having bowel function on hospital day 1 and the NGT was discontinued. The patient continued to have good GI function for the duration of his hospital stay. Given the new osteolytic lesions identified, the patient was seen by medical oncology and patient was transferred to the medicine team. IR was consulted for a bone bx, however it was not done at first as there was an isolated PTT. Patient had normal PTT in october 2017, repeat was also elevated. Mixing study showed no change, suggestive of either APLS vs inhibitor. Further work up showed normal levels of FVIII, XII, and XI. In addition, APLS labs were elevated including silica clotting time and DRVVT S/C ratio elevated, overall suggestive of presence of lupus anticoagulant. The patient presented to the ED on 1/23 with a chief complaint of abdominal pain with associated diarrhea and nausea. The patient has history of diverticulitis s/p Kamini with reversal (2010, incisional hernia repair with mesh (2012), cholecystectomy, bilateral inguinal hernias, and prostate cancer treated with radiation. Given the extensive history, the patient was taken for CT scan, which showed, "1. Small bowel obstruction with a transition point in the ventral hernia. 2. Multiple osteolytic lesions, likely metastasis. 3. Numerous renal cysts bilaterally. It is not certain whether these are simple cysts or a manifestation of polycystic kidney disease." An NGT was placed in the ED and the patient was made NPO. The patient began having bowel function on hospital day 1 and the NGT was discontinued. The patient continued to have good GI function for the duration of his hospital stay. Given the new osteolytic lesions identified, the patient was seen by medical oncology and patient was transferred to the medicine team. IR was consulted for a bone bx, however it was not done at first as there was an isolated PTT. Patient had normal PTT in october 2017, repeat was also elevated. Mixing study showed no change, suggestive of either APLS vs inhibitor. Further work up showed normal levels of FVIII, XII, and XI. In addition, APLS labs were elevated including silica clotting time and DRVVT S/C ratio elevated, overall suggestive of presence of lupus anticoagulant. Patient underwent bone marrow bx uneventfully. Patient to follow up with Dr. Bearden on Monday, 2/5/2018.

## 2018-01-24 NOTE — DISCHARGE NOTE ADULT - CONDITIONS AT DISCHARGE
Patient is alert and oriented x4. Vital signs stable. No signs/symptoms fo respiratory distress. No complaints of pain. OOB ambulating independently. Voiding freely. Tolerating regular diet without GI distress. Biopsy site on lower back with tegaderm and gauze is clean, dry and intact. Safety maintained. Skin integrity maintained. Patient discharged to home. Discharge instructions given and understood. IV removed.

## 2018-01-24 NOTE — PROGRESS NOTE ADULT - SUBJECTIVE AND OBJECTIVE BOX
Pt is feeling a little better, able to tolerate some clear liquids, still has diarrhea. None other active symptoms today.      Meds:  ALBUTerol    90 MICROgram(s) HFA Inhaler 2 Puff(s) Inhalation every 6 hours PRN  buDESOnide 160 MICROgram(s)/formoterol 4.5 MICROgram(s) Inhaler 2 Puff(s) Inhalation two times a day  enoxaparin Injectable 40 milliGRAM(s) SubCutaneous every 24 hours  influenza   Vaccine 0.5 milliLiter(s) IntraMuscular once  metoprolol     tartrate 75 milliGRAM(s) Oral every 12 hours  potassium acid phosphate/sodium acid phosphate tablet (K-PHOS No. 2) 2 Tablet(s) Oral two times a day with meals  tiotropium 18 MICROgram(s) Capsule 1 Capsule(s) Inhalation at bedtime      Vital Signs Last 24 Hrs  T(C): 36.9 (24 Jan 2018 13:41), Max: 36.9 (24 Jan 2018 13:41)  T(F): 98.5 (24 Jan 2018 13:41), Max: 98.5 (24 Jan 2018 13:41)  HR: 110 (24 Jan 2018 13:41) (107 - 112)  BP: 142/95 (24 Jan 2018 13:41) (122/82 - 150/87)  BP(mean): --  RR: 17 (24 Jan 2018 13:41) (17 - 18)  SpO2: 94% (24 Jan 2018 13:41) (92% - 96%)                          15.4   6.89  )-----------( 320      ( 24 Jan 2018 07:00 )             48.5       01-24    143  |  103  |  17  ----------------------------<  97  3.3<L>   |  27  |  0.96    Ca    8.7      24 Jan 2018 07:00  Phos  1.8     01-24  Mg     1.9     01-24    TPro  6.8  /  Alb  x   /  TBili  x   /  DBili  x   /  AST  x   /  ALT  x   /  AlkPhos  x   01-24          IgM and IgA normal, IgG, k and lambda pending    uric acid, LD, iron studies, folate normal. B12 <400.

## 2018-01-24 NOTE — PROGRESS NOTE ADULT - SUBJECTIVE AND OBJECTIVE BOX
Team B Surgery Progress Note     Subjective/24hour Events: Patient seen and examined at the bedside. Patient had a bowel movement and is passing gas. NG tube removed yesterday. Diet advanced to sips. Patient is feeling well, no complains of abdominal pain or nausea.    Vital Signs:  Vital Signs Last 24 Hrs  T(C): 36.4 (24 Jan 2018 10:29), Max: 36.6 (23 Jan 2018 22:02)  T(F): 97.5 (24 Jan 2018 10:29), Max: 97.8 (23 Jan 2018 22:02)  HR: 112 (24 Jan 2018 10:29) (94 - 112)  BP: 150/87 (24 Jan 2018 05:00) (122/82 - 150/87)  RR: 18 (24 Jan 2018 10:29) (18 - 20)  SpO2: 94% (24 Jan 2018 10:29) (92% - 99%)      I&O's Detail    23 Jan 2018 07:01  -  24 Jan 2018 07:00  --------------------------------------------------------  IN:    IV PiggyBack: 100 mL    lactated ringers.: 1100 mL    lactated ringers.: 400 mL  Total IN: 1600 mL    OUT:    Nasoenteral Tube: 600 mL    Voided: 825 mL  Total OUT: 1425 mL    Total NET: 175 mL            MEDICATIONS  (STANDING):  buDESOnide 160 MICROgram(s)/formoterol 4.5 MICROgram(s) Inhaler 2 Puff(s) Inhalation two times a day  enoxaparin Injectable 40 milliGRAM(s) SubCutaneous every 24 hours  influenza   Vaccine 0.5 milliLiter(s) IntraMuscular once  magnesium sulfate  IVPB 1 Gram(s) IV Intermittent once  metoprolol     tartrate 75 milliGRAM(s) Oral every 12 hours  potassium acid phosphate/sodium acid phosphate tablet (K-PHOS No. 2) 2 Tablet(s) Oral two times a day with meals  potassium chloride    Tablet ER 40 milliEquivalent(s) Oral once  potassium phosphate IVPB 15 milliMole(s) IV Intermittent once  tiotropium 18 MICROgram(s) Capsule 1 Capsule(s) Inhalation at bedtime    MEDICATIONS  (PRN):  ALBUTerol    90 MICROgram(s) HFA Inhaler 2 Puff(s) Inhalation every 6 hours PRN Shortness of Breath and/or Wheezing        Physical Exam:  Gen: NAD, well-appearing  LS: unlabored breathing  Card: regular rhythm  GI: abdomen nondistended, soft, nontender  Ext: warm, well-perfused      Labs:    01-24    143  |  103  |  17  ----------------------------<  97  3.3<L>   |  27  |  0.96    Ca    8.7      24 Jan 2018 07:00  Phos  1.8     01-24  Mg     1.9     01-24    TPro  6.8  /  Alb  x   /  TBili  x   /  DBili  x   /  AST  x   /  ALT  x   /  AlkPhos  x   01-24    LIVER FUNCTIONS - ( 24 Jan 2018 07:00 )  Alb: x     / Pro: 6.8 g/dL / ALK PHOS: x     / ALT: x     / AST: x     / GGT: x                                 15.4   6.89  )-----------( 320      ( 24 Jan 2018 07:00 )             48.5 Team A Surgery Progress Note     Subjective/24hour Events: Patient seen and examined at the bedside. Patient had a bowel movement and is passing gas. NG tube removed yesterday. Diet advanced to sips. Patient is feeling well, no complains of abdominal pain or nausea.    Vital Signs:  Vital Signs Last 24 Hrs  T(C): 36.4 (24 Jan 2018 10:29), Max: 36.6 (23 Jan 2018 22:02)  T(F): 97.5 (24 Jan 2018 10:29), Max: 97.8 (23 Jan 2018 22:02)  HR: 112 (24 Jan 2018 10:29) (94 - 112)  BP: 150/87 (24 Jan 2018 05:00) (122/82 - 150/87)  RR: 18 (24 Jan 2018 10:29) (18 - 20)  SpO2: 94% (24 Jan 2018 10:29) (92% - 99%)      I&O's Detail    23 Jan 2018 07:01  -  24 Jan 2018 07:00  --------------------------------------------------------  IN:    IV PiggyBack: 100 mL    lactated ringers.: 1100 mL    lactated ringers.: 400 mL  Total IN: 1600 mL    OUT:    Nasoenteral Tube: 600 mL    Voided: 825 mL  Total OUT: 1425 mL    Total NET: 175 mL            MEDICATIONS  (STANDING):  buDESOnide 160 MICROgram(s)/formoterol 4.5 MICROgram(s) Inhaler 2 Puff(s) Inhalation two times a day  enoxaparin Injectable 40 milliGRAM(s) SubCutaneous every 24 hours  influenza   Vaccine 0.5 milliLiter(s) IntraMuscular once  magnesium sulfate  IVPB 1 Gram(s) IV Intermittent once  metoprolol     tartrate 75 milliGRAM(s) Oral every 12 hours  potassium acid phosphate/sodium acid phosphate tablet (K-PHOS No. 2) 2 Tablet(s) Oral two times a day with meals  potassium chloride    Tablet ER 40 milliEquivalent(s) Oral once  potassium phosphate IVPB 15 milliMole(s) IV Intermittent once  tiotropium 18 MICROgram(s) Capsule 1 Capsule(s) Inhalation at bedtime    MEDICATIONS  (PRN):  ALBUTerol    90 MICROgram(s) HFA Inhaler 2 Puff(s) Inhalation every 6 hours PRN Shortness of Breath and/or Wheezing        Physical Exam:  Gen: NAD, well-appearing  LS: unlabored breathing  Card: regular rhythm  GI: abdomen nondistended, soft, nontender  Ext: warm, well-perfused      Labs:    01-24    143  |  103  |  17  ----------------------------<  97  3.3<L>   |  27  |  0.96    Ca    8.7      24 Jan 2018 07:00  Phos  1.8     01-24  Mg     1.9     01-24    TPro  6.8  /  Alb  x   /  TBili  x   /  DBili  x   /  AST  x   /  ALT  x   /  AlkPhos  x   01-24    LIVER FUNCTIONS - ( 24 Jan 2018 07:00 )  Alb: x     / Pro: 6.8 g/dL / ALK PHOS: x     / ALT: x     / AST: x     / GGT: x                                 15.4   6.89  )-----------( 320      ( 24 Jan 2018 07:00 )             48.5

## 2018-01-24 NOTE — DISCHARGE NOTE ADULT - OTHER SIGNIFICANT FINDINGS
CXR (1/24): No focal consolidation or pleural effusion.    CT Abd/Pelvis (1/23): 1. Small bowel obstruction with a transition point in the ventral hernia.  2. Multiple osteolytic lesions, likely metastasis. Prostate metastasis is   usually osteoblastic. Please check for other possibilities for primary   neoplasm.   3. Numerous renal cysts bilaterally. It is not certain whether these are   simple cysts or a manifestation of polycystic kidney disease.

## 2018-01-25 DIAGNOSIS — Z29.9 ENCOUNTER FOR PROPHYLACTIC MEASURES, UNSPECIFIED: ICD-10-CM

## 2018-01-25 DIAGNOSIS — I48.91 UNSPECIFIED ATRIAL FIBRILLATION: ICD-10-CM

## 2018-01-25 DIAGNOSIS — J44.9 CHRONIC OBSTRUCTIVE PULMONARY DISEASE, UNSPECIFIED: ICD-10-CM

## 2018-01-25 DIAGNOSIS — I10 ESSENTIAL (PRIMARY) HYPERTENSION: ICD-10-CM

## 2018-01-25 DIAGNOSIS — M89.50 OSTEOLYSIS, UNSPECIFIED SITE: ICD-10-CM

## 2018-01-25 LAB
BUN SERPL-MCNC: 10 MG/DL — SIGNIFICANT CHANGE UP (ref 7–23)
CALCIUM SERPL-MCNC: 8.5 MG/DL — SIGNIFICANT CHANGE UP (ref 8.4–10.5)
CHLORIDE SERPL-SCNC: 105 MMOL/L — SIGNIFICANT CHANGE UP (ref 98–107)
CO2 SERPL-SCNC: 28 MMOL/L — SIGNIFICANT CHANGE UP (ref 22–31)
CREAT SERPL-MCNC: 0.98 MG/DL — SIGNIFICANT CHANGE UP (ref 0.5–1.3)
EPO SERPL-MCNC: 25.4 MIU/ML — HIGH (ref 2.6–18.5)
GAS PNL BLDMV: SIGNIFICANT CHANGE UP
GAS PNL BLDMV: SIGNIFICANT CHANGE UP
GLUCOSE SERPL-MCNC: 84 MG/DL — SIGNIFICANT CHANGE UP (ref 70–99)
HCT VFR BLD CALC: 47.6 % — SIGNIFICANT CHANGE UP (ref 39–50)
HGB BLD-MCNC: 15.6 G/DL — SIGNIFICANT CHANGE UP (ref 13–17)
IGG FLD-MCNC: 1332 MG/DL — SIGNIFICANT CHANGE UP (ref 700–1600)
KAPPA FREE LIGHT CHAINS, SERUM: 2.61 MG/DL — HIGH (ref 0.33–1.94)
LAMBDA FREE LIGHT CHAINS, SERUM: 2.22 MG/DL — SIGNIFICANT CHANGE UP (ref 0.57–2.63)
MAGNESIUM SERPL-MCNC: 1.9 MG/DL — SIGNIFICANT CHANGE UP (ref 1.6–2.6)
MCHC RBC-ENTMCNC: 29.3 PG — SIGNIFICANT CHANGE UP (ref 27–34)
MCHC RBC-ENTMCNC: 32.8 % — SIGNIFICANT CHANGE UP (ref 32–36)
MCV RBC AUTO: 89.3 FL — SIGNIFICANT CHANGE UP (ref 80–100)
NRBC # FLD: 0 — SIGNIFICANT CHANGE UP
PHOSPHATE SERPL-MCNC: 2.3 MG/DL — LOW (ref 2.5–4.5)
PLATELET # BLD AUTO: 288 K/UL — SIGNIFICANT CHANGE UP (ref 150–400)
PMV BLD: 10.7 FL — SIGNIFICANT CHANGE UP (ref 7–13)
POTASSIUM SERPL-MCNC: 3.7 MMOL/L — SIGNIFICANT CHANGE UP (ref 3.5–5.3)
POTASSIUM SERPL-SCNC: 3.7 MMOL/L — SIGNIFICANT CHANGE UP (ref 3.5–5.3)
RBC # BLD: 5.33 M/UL — SIGNIFICANT CHANGE UP (ref 4.2–5.8)
RBC # FLD: 12.8 % — SIGNIFICANT CHANGE UP (ref 10.3–14.5)
SODIUM SERPL-SCNC: 144 MMOL/L — SIGNIFICANT CHANGE UP (ref 135–145)
WBC # BLD: 5.62 K/UL — SIGNIFICANT CHANGE UP (ref 3.8–10.5)
WBC # FLD AUTO: 5.62 K/UL — SIGNIFICANT CHANGE UP (ref 3.8–10.5)

## 2018-01-25 PROCEDURE — 74182 MRI ABDOMEN W/CONTRAST: CPT | Mod: 26

## 2018-01-25 PROCEDURE — 99233 SBSQ HOSP IP/OBS HIGH 50: CPT | Mod: GC

## 2018-01-25 RX ORDER — METOPROLOL TARTRATE 50 MG
75 TABLET ORAL EVERY 8 HOURS
Qty: 0 | Refills: 0 | Status: DISCONTINUED | OUTPATIENT
Start: 2018-01-25 | End: 2018-01-25

## 2018-01-25 RX ORDER — SIMETHICONE 80 MG/1
80 TABLET, CHEWABLE ORAL
Qty: 0 | Refills: 0 | Status: DISCONTINUED | OUTPATIENT
Start: 2018-01-25 | End: 2018-01-31

## 2018-01-25 RX ORDER — METOPROLOL TARTRATE 50 MG
75 TABLET ORAL EVERY 12 HOURS
Qty: 0 | Refills: 0 | Status: DISCONTINUED | OUTPATIENT
Start: 2018-01-25 | End: 2018-01-31

## 2018-01-25 RX ORDER — SODIUM,POTASSIUM PHOSPHATES 278-250MG
1 POWDER IN PACKET (EA) ORAL
Qty: 0 | Refills: 0 | Status: COMPLETED | OUTPATIENT
Start: 2018-01-25 | End: 2018-01-26

## 2018-01-25 RX ORDER — POTASSIUM PHOSPHATE, MONOBASIC POTASSIUM PHOSPHATE, DIBASIC 236; 224 MG/ML; MG/ML
15 INJECTION, SOLUTION INTRAVENOUS ONCE
Qty: 0 | Refills: 0 | Status: DISCONTINUED | OUTPATIENT
Start: 2018-01-25 | End: 2018-01-25

## 2018-01-25 RX ORDER — SIMETHICONE 80 MG/1
80 TABLET, CHEWABLE ORAL ONCE
Qty: 0 | Refills: 0 | Status: COMPLETED | OUTPATIENT
Start: 2018-01-25 | End: 2018-01-25

## 2018-01-25 RX ORDER — SODIUM,POTASSIUM PHOSPHATES 278-250MG
1 POWDER IN PACKET (EA) ORAL
Qty: 0 | Refills: 0 | Status: DISCONTINUED | OUTPATIENT
Start: 2018-01-25 | End: 2018-01-25

## 2018-01-25 RX ADMIN — Medication 2 TABLET(S): at 10:13

## 2018-01-25 RX ADMIN — ENOXAPARIN SODIUM 40 MILLIGRAM(S): 100 INJECTION SUBCUTANEOUS at 13:25

## 2018-01-25 RX ADMIN — Medication 75 MILLIGRAM(S): at 01:48

## 2018-01-25 RX ADMIN — Medication 75 MILLIGRAM(S): at 18:31

## 2018-01-25 RX ADMIN — Medication 2 TABLET(S): at 05:26

## 2018-01-25 RX ADMIN — Medication 2 TABLET(S): at 18:30

## 2018-01-25 RX ADMIN — BUDESONIDE AND FORMOTEROL FUMARATE DIHYDRATE 2 PUFF(S): 160; 4.5 AEROSOL RESPIRATORY (INHALATION) at 10:15

## 2018-01-25 RX ADMIN — Medication 75 MILLIGRAM(S): at 13:25

## 2018-01-25 RX ADMIN — SIMETHICONE 80 MILLIGRAM(S): 80 TABLET, CHEWABLE ORAL at 10:14

## 2018-01-25 NOTE — PROGRESS NOTE ADULT - ASSESSMENT
62 year old male pmhx  HTN, COPD, aflutter s/p ablation (2015), bilateral inguinal hernias, cholecystectomy, appendectomy, perforated diverticulitis s/p Kamini's (2010) with reversal (2010), incisional hernia s/p component separation and mesh (12/2012), Prostate CA, who presented with partial SBO, treated with medical management, now resolved.     Plan:   LRD  Will treat pain as needed  Restart home metoprolol 75mg BID for rate control  Albuterol, Budesonide, Spiriva  Heme/Onc consulted for osteolytic lesions seen on CT, Will obtain CXR to look for lung lesions, recs appreciated.  Lovenox for DVT prophylaxis  Out of bed to chair/Ambulate  Will discuss potential transfer to medicine for further oncology workup    [id number]

## 2018-01-25 NOTE — PROGRESS NOTE ADULT - PROBLEM SELECTOR PLAN 1
-Hx of prostate cancer  -CT revealing multiple osteolytic lesions throughout spine and pelvis  -Plan for Bx tomorrow  -CXR clear for malignancy  -heme/onc on board

## 2018-01-25 NOTE — PROGRESS NOTE ADULT - SUBJECTIVE AND OBJECTIVE BOX
Team A Surgery Progress Note     Subjective/24hour Events: Some gas pain, but controlled with current medications. Tolerating LRD.     MEDICATIONS  (STANDING):  buDESOnide 160 MICROgram(s)/formoterol 4.5 MICROgram(s) Inhaler 2 Puff(s) Inhalation two times a day  enoxaparin Injectable 40 milliGRAM(s) SubCutaneous every 24 hours  influenza   Vaccine 0.5 milliLiter(s) IntraMuscular once  metoprolol     tartrate 75 milliGRAM(s) Oral every 12 hours  potassium acid phosphate/sodium acid phosphate tablet (K-PHOS No. 2) 2 Tablet(s) Oral two times a day with meals  potassium acid phosphate/sodium acid phosphate tablet (K-PHOS No. 2) 1 Tablet(s) Oral three times a day with meals  tiotropium 18 MICROgram(s) Capsule 1 Capsule(s) Inhalation at bedtime    MEDICATIONS  (PRN):  ALBUTerol    90 MICROgram(s) HFA Inhaler 2 Puff(s) Inhalation every 6 hours PRN Shortness of Breath and/or Wheezing  simethicone 80 milliGRAM(s) Chew four times a day PRN Gas    ICU Vital Signs Last 24 Hrs  T(C): 36.4 (25 Jan 2018 10:06), Max: 36.9 (24 Jan 2018 13:41)  T(F): 97.5 (25 Jan 2018 10:06), Max: 98.5 (24 Jan 2018 13:41)  HR: 110 (25 Jan 2018 10:06) (93 - 110)  BP: 135/96 (25 Jan 2018 10:06) (126/90 - 142/95)  BP(mean): --  ABP: --  ABP(mean): --  RR: 18 (25 Jan 2018 10:06) (16 - 18)  SpO2: 98% (25 Jan 2018 10:06) (93% - 98%)      I&O's Detail    24 Jan 2018 07:01  -  25 Jan 2018 07:00  --------------------------------------------------------  IN:    IV PiggyBack: 100 mL    lactated ringers.: 300 mL    Oral Fluid: 220 mL  Total IN: 620 mL    OUT:    Voided: 450 mL  Total OUT: 450 mL    Total NET: 170 mL      25 Jan 2018 07:01  -  25 Jan 2018 11:52  --------------------------------------------------------  IN:  Total IN: 0 mL    OUT:    Voided: 200 mL  Total OUT: 200 mL    Total NET: -200 mL    Physical Exam:  Gen: NAD, well-appearing  LS: unlabored breathing  Card: regular rate, normotensive   GI: abdomen nondistended, soft, nontender  Ext: warm, well-perfused      Labs:                        15.6   5.62  )-----------( 288      ( 25 Jan 2018 06:45 )             47.6     01-25    144  |  105  |  10  ----------------------------<  84  3.7   |  28  |  0.98    Ca    8.5      25 Jan 2018 06:45  Phos  2.3     01-25  Mg     1.9     01-25    TPro  6.8  /  Alb  x   /  TBili  x   /  DBili  x   /  AST  x   /  ALT  x   /  AlkPhos  x   01-24

## 2018-01-25 NOTE — PROGRESS NOTE ADULT - SUBJECTIVE AND OBJECTIVE BOX
Accept Note  Patient is a 62y old  Male who presents with a chief complaint of Partial SBO (24 Jan 2018 15:28)      SUBJECTIVE / OVERNIGHT EVENTS: Patient transferred from surgery team for partial SBO. tolerating PO.   Patient denies chest pain, SOB, palpitations, abdominal pain, nausea, vomiting, chills, and cough.    MEDICATIONS  (STANDING):  buDESOnide 160 MICROgram(s)/formoterol 4.5 MICROgram(s) Inhaler 2 Puff(s) Inhalation two times a day  enoxaparin Injectable 40 milliGRAM(s) SubCutaneous every 24 hours  influenza   Vaccine 0.5 milliLiter(s) IntraMuscular once  metoprolol     tartrate 75 milliGRAM(s) Oral every 12 hours  potassium acid phosphate/sodium acid phosphate tablet (K-PHOS No. 2) 2 Tablet(s) Oral two times a day with meals  potassium acid phosphate/sodium acid phosphate tablet (K-PHOS No. 2) 1 Tablet(s) Oral three times a day with meals  tiotropium 18 MICROgram(s) Capsule 1 Capsule(s) Inhalation at bedtime    MEDICATIONS  (PRN):  ALBUTerol    90 MICROgram(s) HFA Inhaler 2 Puff(s) Inhalation every 6 hours PRN Shortness of Breath and/or Wheezing  simethicone 80 milliGRAM(s) Chew four times a day PRN Gas      T(F): 98.6 (01-25 @ 14:11), Max: 98.6 (01-25 @ 14:11)  HR: 114 (01-25 @ 14:11) (93 - 114)  BP: 122/100 (01-25 @ 14:11) (122/100 - 140/95)  RR: 20 (01-25 @ 14:11) (16 - 20)  SpO2: 100% (01-25 @ 14:11) (93% - 100%)  CAPILLARY BLOOD GLUCOSE        I&O's Summary    24 Jan 2018 07:01  -  25 Jan 2018 07:00  --------------------------------------------------------  IN: 620 mL / OUT: 450 mL / NET: 170 mL    25 Jan 2018 07:01  -  25 Jan 2018 16:14  --------------------------------------------------------  IN: 0 mL / OUT: 200 mL / NET: -200 mL        PHYSICAL EXAM:  GEN: Appears in no acute distress  PULM: Clear to auscultation bilaterally, no wheezes, rales, rhonchi  CV: RRR, S1S2, no murmurs, rubs or gallops  Abdominal: Soft, nontender to palpation, non-distended, normoactive bowel sounds  Extremities: WWP, No edema, + peripheral pulses  NEURO: AAOx3, moving all four extremities spontaneously  Skin: No rashes, lesions, hematomas, ecchymosis      LABS:  Labs personally reviewed.                        15.6   5.62  )-----------( 288      ( 25 Jan 2018 06:45 )             47.6     Hgb Trend: 15.6<--, 15.4<--, 17.0<--  01-25    144  |  105  |  10  ----------------------------<  84  3.7   |  28  |  0.98    Ca    8.5      25 Jan 2018 06:45  Phos  2.3     01-25  Mg     1.9     01-25    TPro  6.8  /  Alb  x   /  TBili  x   /  DBili  x   /  AST  x   /  ALT  x   /  AlkPhos  x   01-24    Creatinine Trend: 0.98<--, 0.96<--, 1.06<--, 1.27<--        RADIOLOGY & ADDITIONAL TESTS:  Imaging Personally Reviewed.    Consultants: Hematology      Jun Fisher PGY-1 Pager: BELLA [phone]/ [phone]  Night Float: BELLA [phone]/ NS

## 2018-01-25 NOTE — PROGRESS NOTE ADULT - ASSESSMENT
62 year old male pmhx  HTN, COPD, aflutter s/p ablation (2015), bilateral inguinal hernias, cholecystectomy, appendectomy, perforated diverticulitis s/p Kamini's (2010) with reversal (2010), incisional hernia s/p component separation and mesh (12/2012), Prostate CA, who presented with partial SBO, treated with medical management, now resolved.     Plan:   LRD

## 2018-01-25 NOTE — CHART NOTE - NSCHARTNOTEFT_GEN_A_CORE
62 year old male pmhx  HTN, COPD, aflutter s/p ablation (2015), bilateral inguinal hernias, cholecystectomy, appendectomy, perforated diverticulitis s/p Kamini's (2010) with reversal (2010), incisional hernia s/p component separation and mesh (12/2012), Prostate CA, who presented with partial SBO, treated with medical management, now resolved.    Plan for IR guided Biopsy of R ilium on 1/26/18 with Dr. Pino.    Patient Hgb is 15.6, Plt of 288. BMP w/o abnormalities.    On lovenox for DVT PPX    Sushant Gross PGY 2  Spectra [phone]

## 2018-01-25 NOTE — PROGRESS NOTE ADULT - PROBLEM SELECTOR PLAN 3
-Patient is on eliquis  -CHADSVASC score: 1, will hold AC as patient to have IR guided bone bx of R ileum  -c/w metoprolol 75mg BID for rate control

## 2018-01-26 DIAGNOSIS — N28.1 CYST OF KIDNEY, ACQUIRED: ICD-10-CM

## 2018-01-26 LAB
APTT BLD: 56.4 SEC — HIGH (ref 27.5–37.4)
APTT BLD: 56.4 SEC — HIGH (ref 27.5–37.4)
APTT BLD: 57.5 SEC — HIGH (ref 27.5–37.4)
APTT BLD: 61.5 SEC — HIGH (ref 27.5–37.4)
BUN SERPL-MCNC: 10 MG/DL — SIGNIFICANT CHANGE UP (ref 7–23)
CALCIUM SERPL-MCNC: 8.4 MG/DL — SIGNIFICANT CHANGE UP (ref 8.4–10.5)
CHLORIDE SERPL-SCNC: 102 MMOL/L — SIGNIFICANT CHANGE UP (ref 98–107)
CO2 SERPL-SCNC: 26 MMOL/L — SIGNIFICANT CHANGE UP (ref 22–31)
CREAT SERPL-MCNC: 0.96 MG/DL — SIGNIFICANT CHANGE UP (ref 0.5–1.3)
DEPRECATED KAPPA LC FREE/LAMBDA SER: 17.7 — HIGH (ref 2.04–10.37)
FACT II CIRC INHIB PPP QL: 46 SEC — HIGH (ref 27.5–37.4)
FACT II CIRC INHIB PPP QL: SIGNIFICANT CHANGE UP SEC (ref 9.8–13.1)
GLUCOSE SERPL-MCNC: 75 MG/DL — SIGNIFICANT CHANGE UP (ref 70–99)
HCT VFR BLD CALC: 50.6 % — HIGH (ref 39–50)
HGB BLD-MCNC: 16.3 G/DL — SIGNIFICANT CHANGE UP (ref 13–17)
INR BLD: 1.11 — SIGNIFICANT CHANGE UP (ref 0.88–1.17)
INR BLD: 1.11 — SIGNIFICANT CHANGE UP (ref 0.88–1.17)
INR BLD: 1.13 — SIGNIFICANT CHANGE UP (ref 0.88–1.17)
KAPPA LC UR-MCNC: 476 MG/L — HIGH (ref 1.35–24.19)
KAPPA/LAMBDA FREE LIGHT CHAIN RATIO, SERUM: 1.18 — SIGNIFICANT CHANGE UP
LAMBDA LC UR-MCNC: 26.9 MG/L — HIGH (ref 0.24–6.66)
MAGNESIUM SERPL-MCNC: 1.8 MG/DL — SIGNIFICANT CHANGE UP (ref 1.6–2.6)
MCHC RBC-ENTMCNC: 28.3 PG — SIGNIFICANT CHANGE UP (ref 27–34)
MCHC RBC-ENTMCNC: 32.2 % — SIGNIFICANT CHANGE UP (ref 32–36)
MCV RBC AUTO: 87.8 FL — SIGNIFICANT CHANGE UP (ref 80–100)
NRBC # FLD: 0 — SIGNIFICANT CHANGE UP
PHOSPHATE SERPL-MCNC: 3.2 MG/DL — SIGNIFICANT CHANGE UP (ref 2.5–4.5)
PLATELET # BLD AUTO: 304 K/UL — SIGNIFICANT CHANGE UP (ref 150–400)
PMV BLD: 10.9 FL — SIGNIFICANT CHANGE UP (ref 7–13)
POTASSIUM SERPL-MCNC: 3.5 MMOL/L — SIGNIFICANT CHANGE UP (ref 3.5–5.3)
POTASSIUM SERPL-SCNC: 3.5 MMOL/L — SIGNIFICANT CHANGE UP (ref 3.5–5.3)
PROTHROM AB SERPL-ACNC: 12.3 SEC — SIGNIFICANT CHANGE UP (ref 9.8–13.1)
PROTHROM AB SERPL-ACNC: 12.3 SEC — SIGNIFICANT CHANGE UP (ref 9.8–13.1)
PROTHROM AB SERPL-ACNC: 12.6 SEC — SIGNIFICANT CHANGE UP (ref 9.8–13.1)
PROTHROMBIN TIME/NOMAL: 11.1 SEC — SIGNIFICANT CHANGE UP (ref 9.8–13.1)
PROTHROMBIN TIME/NOMAL: 34 SEC — SIGNIFICANT CHANGE UP (ref 27.5–37.4)
PSA FREE FLD-MCNC: 33.2 — SIGNIFICANT CHANGE UP
PSA FREE FLD-MCNC: SIGNIFICANT CHANGE UP
PSA FREE MFR FLD: 66.71 — SIGNIFICANT CHANGE UP
PTT INHIB SC 2 HR: 46.8 SEC — HIGH (ref 27.5–37.4)
RBC # BLD: 5.76 M/UL — SIGNIFICANT CHANGE UP (ref 4.2–5.8)
RBC # FLD: 12.6 % — SIGNIFICANT CHANGE UP (ref 10.3–14.5)
SODIUM SERPL-SCNC: 142 MMOL/L — SIGNIFICANT CHANGE UP (ref 135–145)
THROMBIN TIME: 19.6 SEC — SIGNIFICANT CHANGE UP (ref 17–26)
WBC # BLD: 7.07 K/UL — SIGNIFICANT CHANGE UP (ref 3.8–10.5)
WBC # FLD AUTO: 7.07 K/UL — SIGNIFICANT CHANGE UP (ref 3.8–10.5)

## 2018-01-26 PROCEDURE — 71260 CT THORAX DX C+: CPT | Mod: 26

## 2018-01-26 PROCEDURE — 99233 SBSQ HOSP IP/OBS HIGH 50: CPT | Mod: GC

## 2018-01-26 RX ORDER — BICALUTAMIDE 50 MG/1
50 TABLET, FILM COATED ORAL DAILY
Qty: 0 | Refills: 0 | Status: DISCONTINUED | OUTPATIENT
Start: 2018-01-26 | End: 2018-01-31

## 2018-01-26 RX ORDER — POTASSIUM CHLORIDE 20 MEQ
10 PACKET (EA) ORAL
Qty: 0 | Refills: 0 | Status: COMPLETED | OUTPATIENT
Start: 2018-01-26 | End: 2018-01-26

## 2018-01-26 RX ORDER — DOCUSATE SODIUM 100 MG
100 CAPSULE ORAL THREE TIMES A DAY
Qty: 0 | Refills: 0 | Status: DISCONTINUED | OUTPATIENT
Start: 2018-01-26 | End: 2018-01-31

## 2018-01-26 RX ORDER — MAGNESIUM SULFATE 500 MG/ML
2 VIAL (ML) INJECTION ONCE
Qty: 0 | Refills: 0 | Status: COMPLETED | OUTPATIENT
Start: 2018-01-26 | End: 2018-01-26

## 2018-01-26 RX ORDER — ENOXAPARIN SODIUM 100 MG/ML
40 INJECTION SUBCUTANEOUS EVERY 24 HOURS
Qty: 0 | Refills: 0 | Status: DISCONTINUED | OUTPATIENT
Start: 2018-01-26 | End: 2018-01-31

## 2018-01-26 RX ORDER — ACETAMINOPHEN 500 MG
650 TABLET ORAL EVERY 6 HOURS
Qty: 0 | Refills: 0 | Status: DISCONTINUED | OUTPATIENT
Start: 2018-01-26 | End: 2018-01-31

## 2018-01-26 RX ADMIN — Medication 2 TABLET(S): at 18:35

## 2018-01-26 RX ADMIN — BUDESONIDE AND FORMOTEROL FUMARATE DIHYDRATE 2 PUFF(S): 160; 4.5 AEROSOL RESPIRATORY (INHALATION) at 21:22

## 2018-01-26 RX ADMIN — Medication 100 MILLIEQUIVALENT(S): at 13:35

## 2018-01-26 RX ADMIN — Medication 650 MILLIGRAM(S): at 00:50

## 2018-01-26 RX ADMIN — Medication 100 MILLIEQUIVALENT(S): at 12:35

## 2018-01-26 RX ADMIN — Medication 75 MILLIGRAM(S): at 18:53

## 2018-01-26 RX ADMIN — TIOTROPIUM BROMIDE 1 CAPSULE(S): 18 CAPSULE ORAL; RESPIRATORY (INHALATION) at 00:10

## 2018-01-26 RX ADMIN — Medication 75 MILLIGRAM(S): at 06:22

## 2018-01-26 RX ADMIN — BUDESONIDE AND FORMOTEROL FUMARATE DIHYDRATE 2 PUFF(S): 160; 4.5 AEROSOL RESPIRATORY (INHALATION) at 00:10

## 2018-01-26 RX ADMIN — Medication 100 MILLIEQUIVALENT(S): at 10:57

## 2018-01-26 RX ADMIN — BUDESONIDE AND FORMOTEROL FUMARATE DIHYDRATE 2 PUFF(S): 160; 4.5 AEROSOL RESPIRATORY (INHALATION) at 08:49

## 2018-01-26 RX ADMIN — Medication 100 MILLIGRAM(S): at 21:22

## 2018-01-26 RX ADMIN — Medication 650 MILLIGRAM(S): at 00:20

## 2018-01-26 RX ADMIN — TIOTROPIUM BROMIDE 1 CAPSULE(S): 18 CAPSULE ORAL; RESPIRATORY (INHALATION) at 21:21

## 2018-01-26 RX ADMIN — Medication 50 GRAM(S): at 11:00

## 2018-01-26 NOTE — PROGRESS NOTE ADULT - ASSESSMENT
62 year old male pmhx  HTN, COPD, aflutter s/p ablation (2015), bilateral inguinal hernias, cholecystectomy, appendectomy, perforated diverticulitis s/p Kamini's (2010) with reversal (2010), incisional hernia s/p component separation and mesh (12/2012), Prostate CA, who presented with partial SBO, treated with medical management, now resolved.     Plan:   LRD 62 year old male pmhx  HTN, COPD, aflutter s/p ablation (2015), bilateral inguinal hernias, cholecystectomy, appendectomy, perforated diverticulitis s/p Kamini's (2010) with reversal (2010), incisional hernia s/p component separation and mesh (12/2012), Prostate CA, who presented with partial SBO, treated with medical management, now resolved.

## 2018-01-26 NOTE — PROGRESS NOTE ADULT - SUBJECTIVE AND OBJECTIVE BOX
Events since wednesday noted, spoke to the surgical and medical teams a few times yesterday. Pt has been doing good, denies any active symptoms except for mild low back pain since the MRI. ROS is otherwise unremarkable.    Meds:  acetaminophen   Tablet. 650 milliGRAM(s) Oral every 6 hours PRN  ALBUTerol    90 MICROgram(s) HFA Inhaler 2 Puff(s) Inhalation every 6 hours PRN  buDESOnide 160 MICROgram(s)/formoterol 4.5 MICROgram(s) Inhaler 2 Puff(s) Inhalation two times a day  influenza   Vaccine 0.5 milliLiter(s) IntraMuscular once  metoprolol     tartrate 75 milliGRAM(s) Oral every 12 hours  potassium acid phosphate/sodium acid phosphate tablet (K-PHOS No. 2) 2 Tablet(s) Oral two times a day with meals  simethicone 80 milliGRAM(s) Chew four times a day PRN  tiotropium 18 MICROgram(s) Capsule 1 Capsule(s) Inhalation at bedtime      Vital Signs Last 24 Hrs  T(C): 36.3 (2018 06:21), Max: 37 (2018 14:11)  T(F): 97.3 (2018 06:21), Max: 98.6 (2018 14:11)  HR: 100 (2018 06:21) (94 - 114)  BP: 146/98 (2018 06:21) (115/69 - 146/98)  BP(mean): --  RR: 18 (2018 06:21) (17 - 20)  SpO2: 94% (2018 06:21) (93% - 100%)                          16.3   7.07  )-----------( 304      ( 2018 07:20 )             50.6       -    144  |  105  |  10  ----------------------------<  84  3.7   |  28  |  0.98    Ca    8.5      2018 06:45  Phos  2.3     01-25  Mg     1.9     -25                PT/INR - ( 2018 07:20 )   PT: 12.3 SEC;   INR: 1.11          PTT - ( 2018 07:20 )B high      Special hematology:  Special Hematology Pathology:                       Department of Pathology   Laboratory Medicine                           [address] [place] [address]                                    [phone]            PATIENT: VENKATA SMITH/SEX:      59 - M         ACCT#: [id number]                             REG DR:       MORAIMA NEWTON                      UNIT#: [id number]                               LOCATION:     [facility]                                                 SPECIAL HEMATOLOGY PATHOLOGY              SUBMITTING DR:  MORAIMA NEWTON            SPECIMEN #: 18:                            [address] [facility]                      DATE OBTAINED : 18                       [place]     [id number]                     DATE SUBMITTED: 18                                                        DATE REPORTED:  18       TELEPHONE:      [phone]                       STATUS: TATY                                 DR: MORAIMA NEWTON             ====================================================================================              ****DIAGNOSIS****                                      URINE PROTEIN ELECTROPHORESIS      ===========================================================                      Albumin  -  PRESENT      Alpha-1 Globulin  -  NONE SEEN      Alpha-2 Globulin  -  NONE SEEN      Beta Globulins    -  NONE SEEN      Gamma Globulins   -  NONE SEEN      Total Protein     -  29.7 MG/DL      Specimen Type     -  RANDOM URINE X50 CONC             ASSESSMENT:        NORMAL URINE PROTEIN ELECTROPHORESIS           PROCEDURES: 12330 - PE CSF, D89.0    --------------------------------------------------------------------------------------------         Leroy JANG MD 18 1745              --------------------------------------------------------------------------------------------                                                                                                                      ** END OF REPORT **     (18 @ 09:03)    Special Hematology Pathology:                       Department of Pathology   Laboratory Medicine                           [address] [facility]  [place] [id number]                                    [phone]            PATIENT: VENKATA SMITH                            /SEX:      59 - M         ACCT#: [id number]                             REG DR:       MORAIMA NEWTON                      UNIT#: [id number]                               LOCATION:     [facility]                                                 SPECIAL HEMATOLOGY PATHOLOGY              SUBMITTING DR:  MORAIMA NEWTON            SPECIMEN #: 18:                            [address] [facility]                      DATE OBTAINED : 18                       [place]     [address]                     DATE SUBMITTED: 18                                                        DATE REPORTED:  18       TELEPHONE:      [phone]                       STATUS: TATY                                 DR: MORAIMA NEWTON             ====================================================================================              ****DIAGNOSIS****                                      SERUM PROTEIN ELECTROPHORESIS      ===========================================================                                            Reference Range         Total Protein -  6.80                6.2 - 8.2 G/DL             Albumin   -  2.99                3.3 - 4.4 G/DL      Alpha-1 Globulin -  0.41                0.1 - 0.3 G/DL      Alpha-2 Globulin -  1.10                0.6 - 1.0 G/DL      Beta Globulins   -  0.97                0.6 - 1.1 G/DL      Gamma Globulins  -  1.33                0.7 - 1.7 G/DL           FAINT BAND SEEN IN GAMMA REGION           TOTAL AMOUNT - 0.32 G/DL             ASSESSMENT:        A WEAK BAND IN GAMMA REGION IS NOTED. SERUM AND URINE IMMUNOFIXATION      ELECTROPHORESIS ARE RECOMMENDED IF NOT PREVIOUSLY PERFORMED.           PROCEDURES: 75381 - PROT GERMÁN, D47.2    --------------------------------------------------------------------------------------------         Leroy JANG MD 18 1742              --------------------------------------------------------------------------------------------                                                                                                             ** END OF REPORT **     (18 @ 09:02)

## 2018-01-26 NOTE — PROGRESS NOTE ADULT - SUBJECTIVE AND OBJECTIVE BOX
Patient is a 62y old  Male who presents with a chief complaint of Partial SBO (24 Jan 2018 15:28)      SUBJECTIVE / OVERNIGHT EVENTS:  Patient denies chest pain, SOB, palpitations, abdominal pain, nausea, vomiting, chills, and cough    MEDICATIONS  (STANDING):  buDESOnide 160 MICROgram(s)/formoterol 4.5 MICROgram(s) Inhaler 2 Puff(s) Inhalation two times a day  influenza   Vaccine 0.5 milliLiter(s) IntraMuscular once  metoprolol     tartrate 75 milliGRAM(s) Oral every 12 hours  potassium acid phosphate/sodium acid phosphate tablet (K-PHOS No. 2) 2 Tablet(s) Oral two times a day with meals  tiotropium 18 MICROgram(s) Capsule 1 Capsule(s) Inhalation at bedtime    MEDICATIONS  (PRN):  acetaminophen   Tablet. 650 milliGRAM(s) Oral every 6 hours PRN Mild Pain (1 - 3)  ALBUTerol    90 MICROgram(s) HFA Inhaler 2 Puff(s) Inhalation every 6 hours PRN Shortness of Breath and/or Wheezing  simethicone 80 milliGRAM(s) Chew four times a day PRN Gas      T(F): 97.3 (01-26 @ 06:21), Max: 98.6 (01-25 @ 14:11)  HR: 100 (01-26 @ 06:21) (94 - 114)  BP: 146/98 (01-26 @ 06:21) (115/69 - 146/98)  RR: 18 (01-26 @ 06:21) (17 - 20)  SpO2: 94% (01-26 @ 06:21) (93% - 100%)  CAPILLARY BLOOD GLUCOSE        I&O's Summary    25 Jan 2018 07:01  -  26 Jan 2018 07:00  --------------------------------------------------------  IN: 100 mL / OUT: 800 mL / NET: -700 mL        PHYSICAL EXAM:  GEN: Appears in no acute distress  HEENT: PERRLA, EOMI and accommodate, neck supple, no lymphadenopathy, no JVD  MOUTH: moist mucous membranes, no exudates or lesions   PULM: Clear to auscultation bilaterally, no wheezes, rales, rhonchi  CV: RRR, S1S2, no murmurs, rubs or gallops  Abdominal: Soft, nontender to palpation, non-distended, normoactive bowel sounds  Extremities: WWP, No edema, + peripheral pulses  NEURO: AAOx3, moving all four extremities spontaneously  Skin: No rashes, lesions, hematomas, ecchymosis      LABS:  Labs personally reviewed.                        15.6   5.62  )-----------( 288      ( 25 Jan 2018 06:45 )             47.6     Hgb Trend: 15.6<--, 15.4<--, 17.0<--  01-25    144  |  105  |  10  ----------------------------<  84  3.7   |  28  |  0.98    Ca    8.5      25 Jan 2018 06:45  Phos  2.3     01-25  Mg     1.9     01-25      Creatinine Trend: 0.98<--, 0.96<--, 1.06<--, 1.27<--        RADIOLOGY & ADDITIONAL TESTS:  Imaging Personally Reviewed.    Consultants:      Jun Fisher PGY-1 Pager: BELLA [phone]/ [phone]  Night Float: BELLA [phone]/ NS Patient is a 62y old  Male who presents with a chief complaint of Partial SBO (24 Jan 2018 15:28)      SUBJECTIVE / OVERNIGHT EVENTS: No events overnight. NPO for possible bone bx.  Patient denies chest pain, SOB, palpitations, abdominal pain, nausea, vomiting, chills, and cough.    MEDICATIONS  (STANDING):  buDESOnide 160 MICROgram(s)/formoterol 4.5 MICROgram(s) Inhaler 2 Puff(s) Inhalation two times a day  influenza   Vaccine 0.5 milliLiter(s) IntraMuscular once  metoprolol     tartrate 75 milliGRAM(s) Oral every 12 hours  potassium acid phosphate/sodium acid phosphate tablet (K-PHOS No. 2) 2 Tablet(s) Oral two times a day with meals  tiotropium 18 MICROgram(s) Capsule 1 Capsule(s) Inhalation at bedtime    MEDICATIONS  (PRN):  acetaminophen   Tablet. 650 milliGRAM(s) Oral every 6 hours PRN Mild Pain (1 - 3)  ALBUTerol    90 MICROgram(s) HFA Inhaler 2 Puff(s) Inhalation every 6 hours PRN Shortness of Breath and/or Wheezing  simethicone 80 milliGRAM(s) Chew four times a day PRN Gas      T(F): 97.3 (01-26 @ 06:21), Max: 98.6 (01-25 @ 14:11)  HR: 100 (01-26 @ 06:21) (94 - 114)  BP: 146/98 (01-26 @ 06:21) (115/69 - 146/98)  RR: 18 (01-26 @ 06:21) (17 - 20)  SpO2: 94% (01-26 @ 06:21) (93% - 100%)  CAPILLARY BLOOD GLUCOSE        I&O's Summary    25 Jan 2018 07:01  -  26 Jan 2018 07:00  --------------------------------------------------------  IN: 100 mL / OUT: 800 mL / NET: -700 mL        PHYSICAL EXAM:  GEN: Appears in no acute distress  PULM: Clear to auscultation bilaterally, no wheezes, rales, rhonchi  CV: RRR, S1S2, no murmurs, rubs or gallops  Abdominal: Soft, nontender to palpation, non-distended, normoactive bowel sounds  Extremities: WWP, No edema, + peripheral pulses  NEURO: AAOx3, moving all four extremities spontaneously  Skin: No rashes, lesions, hematomas, ecchymosis      LABS:  Labs personally reviewed.                        15.6   5.62  )-----------( 288      ( 25 Jan 2018 06:45 )             47.6     Hgb Trend: 15.6<--, 15.4<--, 17.0<--  01-25    144  |  105  |  10  ----------------------------<  84  3.7   |  28  |  0.98    Ca    8.5      25 Jan 2018 06:45  Phos  2.3     01-25  Mg     1.9     01-25      Creatinine Trend: 0.98<--, 0.96<--, 1.06<--, 1.27<--        RADIOLOGY & ADDITIONAL TESTS:  Imaging Personally Reviewed.    Consultants: Hematology, JESSICA Fisher PGY-1 Pager: BELLA [phone]/ [phone]  Night Float: BELLA [phone]/ NS

## 2018-01-26 NOTE — PROGRESS NOTE ADULT - ASSESSMENT
62M with above clinical features. Discussed the available results, possible differential and other issues in detail with him and questions answered. Prostate cancer is unlikely to cause lytic lesions. Will recommend:  - apTT is elevated, mixing studies ordered (called 7221 and added it)  - tolerating the PO food OK  - pain control  - weak band needs to be confirmed on IFx studies  -- free light chains  - CT chest with contrast  - f/u MRI of the kidneys with contrast, when safe to do so  - f/u on pending labs  - DVT prophylaxis  - possible Bone Bx today

## 2018-01-26 NOTE — PROGRESS NOTE ADULT - PROBLEM SELECTOR PLAN 7
DVT: Lovenox  Dispo: Bone bx to be done today and can f/u in with Onc MRI: Multiple bilateral renal cortical cysts without MR evidence of renal   neoplasm.

## 2018-01-26 NOTE — PROGRESS NOTE ADULT - PROBLEM SELECTOR PLAN 1
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

## 2018-01-27 DIAGNOSIS — R79.1 ABNORMAL COAGULATION PROFILE: ICD-10-CM

## 2018-01-27 LAB
APTT BLD: 50.9 SEC — HIGH (ref 27.5–37.4)
HCT VFR BLD CALC: 45.5 % — SIGNIFICANT CHANGE UP (ref 39–50)
HGB BLD-MCNC: 14.8 G/DL — SIGNIFICANT CHANGE UP (ref 13–17)
MCHC RBC-ENTMCNC: 28.2 PG — SIGNIFICANT CHANGE UP (ref 27–34)
MCHC RBC-ENTMCNC: 32.5 % — SIGNIFICANT CHANGE UP (ref 32–36)
MCV RBC AUTO: 86.8 FL — SIGNIFICANT CHANGE UP (ref 80–100)
NRBC # FLD: 0 — SIGNIFICANT CHANGE UP
PLATELET # BLD AUTO: 260 K/UL — SIGNIFICANT CHANGE UP (ref 150–400)
PMV BLD: 11 FL — SIGNIFICANT CHANGE UP (ref 7–13)
RBC # BLD: 5.24 M/UL — SIGNIFICANT CHANGE UP (ref 4.2–5.8)
RBC # FLD: 12.7 % — SIGNIFICANT CHANGE UP (ref 10.3–14.5)
WBC # BLD: 6.65 K/UL — SIGNIFICANT CHANGE UP (ref 3.8–10.5)
WBC # FLD AUTO: 6.65 K/UL — SIGNIFICANT CHANGE UP (ref 3.8–10.5)

## 2018-01-27 PROCEDURE — 99233 SBSQ HOSP IP/OBS HIGH 50: CPT

## 2018-01-27 RX ORDER — IBUPROFEN 200 MG
400 TABLET ORAL ONCE
Qty: 0 | Refills: 0 | Status: COMPLETED | OUTPATIENT
Start: 2018-01-27 | End: 2018-01-27

## 2018-01-27 RX ORDER — IBUPROFEN 200 MG
600 TABLET ORAL EVERY 6 HOURS
Qty: 0 | Refills: 0 | Status: DISCONTINUED | OUTPATIENT
Start: 2018-01-27 | End: 2018-01-31

## 2018-01-27 RX ADMIN — Medication 100 MILLIGRAM(S): at 05:29

## 2018-01-27 RX ADMIN — Medication 75 MILLIGRAM(S): at 18:11

## 2018-01-27 RX ADMIN — Medication 100 MILLIGRAM(S): at 13:37

## 2018-01-27 RX ADMIN — Medication 400 MILLIGRAM(S): at 01:51

## 2018-01-27 RX ADMIN — Medication 2 TABLET(S): at 13:37

## 2018-01-27 RX ADMIN — Medication 75 MILLIGRAM(S): at 05:30

## 2018-01-27 RX ADMIN — Medication 400 MILLIGRAM(S): at 02:43

## 2018-01-27 RX ADMIN — BUDESONIDE AND FORMOTEROL FUMARATE DIHYDRATE 2 PUFF(S): 160; 4.5 AEROSOL RESPIRATORY (INHALATION) at 21:53

## 2018-01-27 RX ADMIN — Medication 2 TABLET(S): at 18:11

## 2018-01-27 RX ADMIN — BICALUTAMIDE 50 MILLIGRAM(S): 50 TABLET, FILM COATED ORAL at 15:36

## 2018-01-27 RX ADMIN — ENOXAPARIN SODIUM 40 MILLIGRAM(S): 100 INJECTION SUBCUTANEOUS at 13:38

## 2018-01-27 RX ADMIN — Medication 100 MILLIGRAM(S): at 21:53

## 2018-01-27 RX ADMIN — TIOTROPIUM BROMIDE 1 CAPSULE(S): 18 CAPSULE ORAL; RESPIRATORY (INHALATION) at 21:53

## 2018-01-27 RX ADMIN — BUDESONIDE AND FORMOTEROL FUMARATE DIHYDRATE 2 PUFF(S): 160; 4.5 AEROSOL RESPIRATORY (INHALATION) at 13:37

## 2018-01-27 NOTE — PROGRESS NOTE ADULT - ASSESSMENT
62 year old male pmhx  HTN, COPD, aflutter s/p ablation (2015), bilateral inguinal hernias, cholecystectomy, appendectomy, perforated diverticulitis s/p Kamini's (2010) with reversal (2010), incisional hernia s/p component separation and mesh (12/2012), Prostate CA, who presented with partial SBO, treated with medical management, now resolved. 62 year old male pmhx  HTN, COPD, aflutter s/p ablation (2015), bilateral inguinal hernias, cholecystectomy, appendectomy, perforated diverticulitis s/p Kamini's (2010) with reversal (2010), incisional hernia s/p component separation and mesh (12/2012), Prostate CA, who presented with partial SBO, treated with medical management, now resolved. Now w/ likely mets to bone.

## 2018-01-27 NOTE — PROGRESS NOTE ADULT - PROBLEM SELECTOR PLAN 2
-Normal PTT on 10/2017  -Repeat PTT elevated  -Abnormal mixing study  -f/u lupas anticoagulant, factor VIII, XI, XII

## 2018-01-27 NOTE — PROGRESS NOTE ADULT - SUBJECTIVE AND OBJECTIVE BOX
Patient is a 62y old  Male who presents with a chief complaint of Partial SBO (24 Jan 2018 15:28)      SUBJECTIVE / OVERNIGHT EVENTS:  Patient denies chest pain, SOB, palpitations, abdominal pain, nausea, vomiting, chills, and cough    MEDICATIONS  (STANDING):  bicalutamide 50 milliGRAM(s) Oral daily  buDESOnide 160 MICROgram(s)/formoterol 4.5 MICROgram(s) Inhaler 2 Puff(s) Inhalation two times a day  docusate sodium 100 milliGRAM(s) Oral three times a day  enoxaparin Injectable 40 milliGRAM(s) SubCutaneous every 24 hours  influenza   Vaccine 0.5 milliLiter(s) IntraMuscular once  metoprolol     tartrate 75 milliGRAM(s) Oral every 12 hours  potassium acid phosphate/sodium acid phosphate tablet (K-PHOS No. 2) 2 Tablet(s) Oral two times a day with meals  tiotropium 18 MICROgram(s) Capsule 1 Capsule(s) Inhalation at bedtime    MEDICATIONS  (PRN):  acetaminophen   Tablet. 650 milliGRAM(s) Oral every 6 hours PRN Mild Pain (1 - 3)  ALBUTerol    90 MICROgram(s) HFA Inhaler 2 Puff(s) Inhalation every 6 hours PRN Shortness of Breath and/or Wheezing  simethicone 80 milliGRAM(s) Chew four times a day PRN Gas      T(F): 97.8 (01-27 @ 05:27), Max: 97.9 (01-26 @ 09:49)  HR: 100 (01-27 @ 05:27) (98 - 102)  BP: 131/90 (01-27 @ 05:27) (120/83 - 151/98)  RR: 18 (01-27 @ 05:27) (16 - 18)  SpO2: 96% (01-27 @ 05:27) (94% - 98%)  CAPILLARY BLOOD GLUCOSE        I&O's Summary    26 Jan 2018 07:01  -  27 Jan 2018 07:00  --------------------------------------------------------  IN: 0 mL / OUT: 1050 mL / NET: -1050 mL        PHYSICAL EXAM:  GEN: Appears in no acute distress  HEENT: PERRLA, EOMI and accommodate, neck supple, no lymphadenopathy, no JVD  MOUTH: moist mucous membranes, no exudates or lesions   PULM: Clear to auscultation bilaterally, no wheezes, rales, rhonchi  CV: RRR, S1S2, no murmurs, rubs or gallops  Abdominal: Soft, nontender to palpation, non-distended, normoactive bowel sounds  Extremities: WWP, No edema, + peripheral pulses  NEURO: AAOx3, moving all four extremities spontaneously  Skin: No rashes, lesions, hematomas, ecchymosis      LABS:  Labs personally reviewed.                        16.3   7.07  )-----------( 304      ( 26 Jan 2018 07:20 )             50.6     Hgb Trend: 16.3<--, 15.6<--, 15.4<--, 17.0<--  01-26    142  |  102  |  10  ----------------------------<  75  3.5   |  26  |  0.96    Ca    8.4      26 Jan 2018 07:20  Phos  3.2     01-26  Mg     1.8     01-26      Creatinine Trend: 0.96<--, 0.98<--, 0.96<--, 1.06<--, 1.27<--  PT/INR - ( 26 Jan 2018 19:16 )   PT: 12.6 SEC;   INR: 1.13          PTT - ( 27 Jan 2018 06:30 )  PTT:50.9 SEC      RADIOLOGY & ADDITIONAL TESTS:  Imaging Personally Reviewed.    Consultants:      Jun Fisher PGY-1 Pager: BELLA [phone]/ [phone]  Night Float: BELLA [id number]/ NS Patient is a 62y old  Male who presents with a chief complaint of Partial SBO (24 Jan 2018 15:28)      SUBJECTIVE / OVERNIGHT EVENTS: No overnight events. Tolerating PO.   Patient denies chest pain, SOB, palpitations, abdominal pain, nausea, vomiting, chills, and cough    MEDICATIONS  (STANDING):  bicalutamide 50 milliGRAM(s) Oral daily  buDESOnide 160 MICROgram(s)/formoterol 4.5 MICROgram(s) Inhaler 2 Puff(s) Inhalation two times a day  docusate sodium 100 milliGRAM(s) Oral three times a day  enoxaparin Injectable 40 milliGRAM(s) SubCutaneous every 24 hours  influenza   Vaccine 0.5 milliLiter(s) IntraMuscular once  metoprolol     tartrate 75 milliGRAM(s) Oral every 12 hours  potassium acid phosphate/sodium acid phosphate tablet (K-PHOS No. 2) 2 Tablet(s) Oral two times a day with meals  tiotropium 18 MICROgram(s) Capsule 1 Capsule(s) Inhalation at bedtime    MEDICATIONS  (PRN):  acetaminophen   Tablet. 650 milliGRAM(s) Oral every 6 hours PRN Mild Pain (1 - 3)  ALBUTerol    90 MICROgram(s) HFA Inhaler 2 Puff(s) Inhalation every 6 hours PRN Shortness of Breath and/or Wheezing  simethicone 80 milliGRAM(s) Chew four times a day PRN Gas      T(F): 97.8 (01-27 @ 05:27), Max: 97.9 (01-26 @ 09:49)  HR: 100 (01-27 @ 05:27) (98 - 102)  BP: 131/90 (01-27 @ 05:27) (120/83 - 151/98)  RR: 18 (01-27 @ 05:27) (16 - 18)  SpO2: 96% (01-27 @ 05:27) (94% - 98%)  CAPILLARY BLOOD GLUCOSE        I&O's Summary    26 Jan 2018 07:01  -  27 Jan 2018 07:00  --------------------------------------------------------  IN: 0 mL / OUT: 1050 mL / NET: -1050 mL        PHYSICAL EXAM:  GEN: Appears in no acute distress  PULM: Clear to auscultation bilaterally, no wheezes, rales, rhonchi  CV: RRR, S1S2, no murmurs, rubs or gallops  Abdominal: Soft, nontender to palpation, non-distended, normoactive bowel sounds  Extremities: WWP, No edema, + peripheral pulses  NEURO: AAOx3, moving all four extremities spontaneously  Skin: No rashes, lesions, hematomas, ecchymosis      LABS:  Labs personally reviewed.                        16.3   7.07  )-----------( 304      ( 26 Jan 2018 07:20 )             50.6     Hgb Trend: 16.3<--, 15.6<--, 15.4<--, 17.0<--  01-26    142  |  102  |  10  ----------------------------<  75  3.5   |  26  |  0.96    Ca    8.4      26 Jan 2018 07:20  Phos  3.2     01-26  Mg     1.8     01-26      Creatinine Trend: 0.96<--, 0.98<--, 0.96<--, 1.06<--, 1.27<--  PT/INR - ( 26 Jan 2018 19:16 )   PT: 12.6 SEC;   INR: 1.13          PTT - ( 27 Jan 2018 06:30 )  PTT:50.9 SEC      RADIOLOGY & ADDITIONAL TESTS:  Imaging Personally Reviewed.    Consultants:      Jun Fisher PGY-1 Pager: BELLA [phone]/ [phone]  Night Float: BELLA [id number]/ NS

## 2018-01-27 NOTE — PROGRESS NOTE ADULT - PROBLEM SELECTOR PLAN 4
-Patient is on eliquis  -CHADSVASC score: 1, will hold AC as patient to have IR guided bone bx of R ileum  -c/w metoprolol 75mg BID for rate control -Patient is on eliquis at home  -CHADSVASC score: 1, will hold AC as patient to have IR guided bone bx of R ileum  -c/w metoprolol 75mg BID for rate control

## 2018-01-27 NOTE — PROGRESS NOTE ADULT - PROBLEM SELECTOR PLAN 1
-Hx of prostate cancer  -CT/MRI revealing multiple osteolytic lesions throughout spine and pelvis  -Plan for Bx by IR on Monday  -heme/onc on board

## 2018-01-27 NOTE — PROGRESS NOTE ADULT - PROBLEM SELECTOR PLAN 5
-c/w Albuterol, Budesonide, Spiriva -c/w Albuterol, Budesonide, Spiriva  -CT chest shows Severe emphysema.

## 2018-01-27 NOTE — PROGRESS NOTE ADULT - PROBLEM SELECTOR PLAN 7
-In remission s/p RT -Was in remission s/p RT and seeds, now w/ suspicion for mets to bone possibly from prostate vs other malignancy

## 2018-01-28 PROCEDURE — 99233 SBSQ HOSP IP/OBS HIGH 50: CPT

## 2018-01-28 RX ADMIN — Medication 2 TABLET(S): at 17:58

## 2018-01-28 RX ADMIN — Medication 600 MILLIGRAM(S): at 00:13

## 2018-01-28 RX ADMIN — Medication 650 MILLIGRAM(S): at 20:20

## 2018-01-28 RX ADMIN — BICALUTAMIDE 50 MILLIGRAM(S): 50 TABLET, FILM COATED ORAL at 13:08

## 2018-01-28 RX ADMIN — Medication 100 MILLIGRAM(S): at 05:27

## 2018-01-28 RX ADMIN — Medication 600 MILLIGRAM(S): at 01:00

## 2018-01-28 RX ADMIN — Medication 650 MILLIGRAM(S): at 19:50

## 2018-01-28 RX ADMIN — Medication 2 TABLET(S): at 09:22

## 2018-01-28 RX ADMIN — Medication 75 MILLIGRAM(S): at 05:30

## 2018-01-28 RX ADMIN — Medication 75 MILLIGRAM(S): at 17:58

## 2018-01-28 RX ADMIN — ENOXAPARIN SODIUM 40 MILLIGRAM(S): 100 INJECTION SUBCUTANEOUS at 13:07

## 2018-01-28 RX ADMIN — TIOTROPIUM BROMIDE 1 CAPSULE(S): 18 CAPSULE ORAL; RESPIRATORY (INHALATION) at 21:25

## 2018-01-28 RX ADMIN — BUDESONIDE AND FORMOTEROL FUMARATE DIHYDRATE 2 PUFF(S): 160; 4.5 AEROSOL RESPIRATORY (INHALATION) at 21:26

## 2018-01-28 NOTE — PROGRESS NOTE ADULT - SUBJECTIVE AND OBJECTIVE BOX
Events since friday noted, discussed in detail with medicine team at length and multiple times since then. Pt is feeling OK today, pain in the back is better controlled, denies any other active symptoms on detailed ROS.    Pt has never bled either spontaneously or after tooth extraction or surgery.    Had RT at [facility] in 2010 for prostate cancer and did not require any injections for the cancer    Meds:   acetaminophen   Tablet. 650 milliGRAM(s) Oral every 6 hours PRN  ALBUTerol    90 MICROgram(s) HFA Inhaler 2 Puff(s) Inhalation every 6 hours PRN  bicalutamide 50 milliGRAM(s) Oral daily  buDESOnide 160 MICROgram(s)/formoterol 4.5 MICROgram(s) Inhaler 2 Puff(s) Inhalation two times a day  docusate sodium 100 milliGRAM(s) Oral three times a day  enoxaparin Injectable 40 milliGRAM(s) SubCutaneous every 24 hours  ibuprofen  Tablet 600 milliGRAM(s) Oral every 6 hours PRN  influenza   Vaccine 0.5 milliLiter(s) IntraMuscular once  metoprolol     tartrate 75 milliGRAM(s) Oral every 12 hours  potassium acid phosphate/sodium acid phosphate tablet (K-PHOS No. 2) 2 Tablet(s) Oral two times a day with meals  simethicone 80 milliGRAM(s) Chew four times a day PRN  tiotropium 18 MICROgram(s) Capsule 1 Capsule(s) Inhalation at bedtime      Vital Signs Last 24 Hrs  T(C): 36.4 (28 Jan 2018 10:33), Max: 36.8 (27 Jan 2018 18:00)  T(F): 97.6 (28 Jan 2018 10:33), Max: 98.2 (27 Jan 2018 18:00)  HR: 79 (28 Jan 2018 10:33) (78 - 100)  BP: 135/89 (28 Jan 2018 10:33) (123/90 - 147/100)  BP(mean): --  RR: 18 (28 Jan 2018 10:33) (18 - 20)  SpO2: 97% (28 Jan 2018 10:33) (94% - 100%)                          14.8   6.65  )-----------( 260      ( 27 Jan 2018 06:30 )             45.5                       PT/INR - ( 26 Jan 2018 19:16 )   PT: 12.6 SEC;   INR: 1.13          PTT - ( 27 Jan 2018 06:30 )  PTT:50.9 SEC    Mixing studies on aPTT - no correction at 0 or 2 hours for practical purposes.    DRVVT, LA profile, ACL abs and beta 2 GP-1, factor VIII, IX, XI and XII pending    urine free kappa 476, lambda also high but 26.9, serum kappa and lambda < 5      MRI abd/pelvis: IMPRESSION: Retroperitoneal lymphadenopathy. These appear amenable to   CT-guided percutaneous needle biopsy.    Numerous lesions in the axial skeleton, unchanged from recent CT.    Multiple bilateral renal cortical cysts without MR evidence of renal   neoplasm.                      ARTHUR BOB M.D., ATTENDING RADIOLOGIST      CT chest: IMPRESSION:   Multilevel vertebral lytic lesions, concerning for metastatic disease.  Severeemphysema.  Ventral epigastric hernia containing loop of small bowel with adjacent   dilated small bowel.                  JOLANTA TAMEZ M.D., RADIOLOGY RESIDENT  This document has been electronically signed.  STEFANIE JANSEN M.D., ATTENDING RADIOLOGIST  This document has been electronically signed. Jan 27 2018  9:53AM

## 2018-01-28 NOTE — PROGRESS NOTE ADULT - PROBLEM SELECTOR PLAN 2
-Normal PTT on 10/2017  -Repeat PTT elevated  -Abnormal mixing study  -f/u lupas anticoagulant, factor XI, XII. Factor VIII was not collected however PT WNL. Will reorder for the am

## 2018-01-28 NOTE — PROGRESS NOTE ADULT - ASSESSMENT
62 year old male pmhx  HTN, COPD, aflutter s/p ablation (2015), bilateral inguinal hernias, cholecystectomy, appendectomy, perforated diverticulitis s/p Kamini's (2010) with reversal (2010), incisional hernia s/p component separation and mesh (12/2012), Prostate CA, who presented with partial SBO, treated with medical management, now resolved. Now w/ likely mets to bone.

## 2018-01-28 NOTE — PROGRESS NOTE ADULT - PROBLEM SELECTOR PLAN 7
-Was in remission s/p RT and seeds, now w/ suspicion for mets to bone possibly from prostate vs other malignancy

## 2018-01-28 NOTE — PROGRESS NOTE ADULT - ASSESSMENT
62M with above clinical features. Discussed the available results, possible differential and other issues in detail with him and questions answered. Prostate cancer is unlikely to cause lytic lesions. Will recommend:  - apTT is elevated, w/u ongoing, it is favoring lupus or nonspecific anticoagulant and not an inhibitor that can cause bleeding (from clinical history)  - f/u on pending labs  - tolerating the PO food OK  - pain control  - weak band needs to be confirmed on IFx studies  - f/u on IFx studies  - casodex 50 mg daily  - DVT prophylaxis  - possible Bone Bx once safe to do so  - bone scan  - discussed all these issues in detail with him - pt has at least recurrent and ? metastatic prostate cancer but lytic bony lesions suggest another malignancy and Bx will be helpful

## 2018-01-28 NOTE — PROGRESS NOTE ADULT - PROBLEM SELECTOR PLAN 4
-Patient is on eliquis at home  -CHADSVASC score: 1, will hold AC as patient to have IR guided bone bx of R ileum  -c/w metoprolol 75mg BID for rate control

## 2018-01-28 NOTE — PROGRESS NOTE ADULT - SUBJECTIVE AND OBJECTIVE BOX
Sushant Gross MD PGY2    Pager [phone]/ [phone]    For Night coverage 7pm-7am: NS- page [phone] Team1-3, page [phone] Team4 & Care Model  Sat/[place] Coverage 12pm-7pm: NS- page [phone] for Team1-4, [facility]- pager forwarded to covering Resident    Patient is a 62y old  Male who presents with a chief complaint of Partial SBO (24 Jan 2018 15:28)      INTERVAL HPI/OVERNIGHT EVENTS: No overnight events, patient passing gas, denies abdominal pain. Has not yet had a BM. Denies CP, SOB, HA.     MEDICATIONS  (STANDING):  bicalutamide 50 milliGRAM(s) Oral daily  buDESOnide 160 MICROgram(s)/formoterol 4.5 MICROgram(s) Inhaler 2 Puff(s) Inhalation two times a day  docusate sodium 100 milliGRAM(s) Oral three times a day  enoxaparin Injectable 40 milliGRAM(s) SubCutaneous every 24 hours  influenza   Vaccine 0.5 milliLiter(s) IntraMuscular once  metoprolol     tartrate 75 milliGRAM(s) Oral every 12 hours  potassium acid phosphate/sodium acid phosphate tablet (K-PHOS No. 2) 2 Tablet(s) Oral two times a day with meals  tiotropium 18 MICROgram(s) Capsule 1 Capsule(s) Inhalation at bedtime    MEDICATIONS  (PRN):  acetaminophen   Tablet. 650 milliGRAM(s) Oral every 6 hours PRN Mild Pain (1 - 3)  ALBUTerol    90 MICROgram(s) HFA Inhaler 2 Puff(s) Inhalation every 6 hours PRN Shortness of Breath and/or Wheezing  ibuprofen  Tablet 600 milliGRAM(s) Oral every 6 hours PRN Moderate pain  simethicone 80 milliGRAM(s) Chew four times a day PRN Gas      Allergies    No Known Drug Allergies  shellfish (Unknown)    Intolerances    azithromycin (Vomiting)    Vital Signs Last 24 Hrs  T(C): 36.5 (28 Jan 2018 05:25), Max: 36.8 (27 Jan 2018 18:00)  T(F): 97.7 (28 Jan 2018 05:25), Max: 98.2 (27 Jan 2018 18:00)  HR: 90 (28 Jan 2018 05:25) (78 - 102)  BP: 126/89 (28 Jan 2018 05:25) (123/87 - 147/100)  BP(mean): --  RR: 18 (28 Jan 2018 05:25) (18 - 20)  SpO2: 99% (28 Jan 2018 05:25) (94% - 100%)  I&O's Summary    27 Jan 2018 07:01  -  28 Jan 2018 07:00  --------------------------------------------------------  IN: 0 mL / OUT: 800 mL / NET: -800 mL        PHYSICAL EXAM:  GENERAL: NAD, well-groomed, well-developed  HEAD:  Atraumatic, Normocephalic  EYES: EOMI, PERRLA, conjunctiva and sclera clear  ENMT: No tonsillar erythema, exudates, or enlargement; Moist mucous membranes, Good dentition, No lesions  NECK: Supple, No JVD  NERVOUS SYSTEM:  Alert & Oriented X3, Good concentration; Motor Strength 5/5 B/L upper and lower extremities  CHEST/LUNG: Clear to auscultation bilaterally; No rales, rhonchi, wheezing, or rubs  HEART: Regular rate and rhythm; No murmurs, rubs, or gallops  ABDOMEN: Soft, Nontender, Nondistended; Bowel sounds present. Epigastric palpated on exam.   EXTREMITIES:  2+ Peripheral Pulses, No clubbing, cyanosis, or edema  LYMPH: No lymphadenopathy noted  SKIN: No rashes or lesions    LABS:                        14.8   6.65  )-----------( 260      ( 27 Jan 2018 06:30 )             45.5           PT/INR - ( 26 Jan 2018 19:16 )   PT: 12.6 SEC;   INR: 1.13          PTT - ( 27 Jan 2018 06:30 )  PTT:50.9 SEC    CAPILLARY BLOOD GLUCOSE          Microbiology        RADIOLOGY & ADDITIONAL TESTS:    Imaging Personally Reviewed:  [x ] YES  [ ] NO    Consultant(s) Notes Reviewed:  x[ ] YES  [ ] NO

## 2018-01-29 LAB
APTT BLD: 53.6 SEC — HIGH (ref 27.5–37.4)
BUN SERPL-MCNC: 15 MG/DL — SIGNIFICANT CHANGE UP (ref 7–23)
CALCIUM SERPL-MCNC: 8.4 MG/DL — SIGNIFICANT CHANGE UP (ref 8.4–10.5)
CHLORIDE SERPL-SCNC: 103 MMOL/L — SIGNIFICANT CHANGE UP (ref 98–107)
CO2 SERPL-SCNC: 25 MMOL/L — SIGNIFICANT CHANGE UP (ref 22–31)
CREAT SERPL-MCNC: 0.92 MG/DL — SIGNIFICANT CHANGE UP (ref 0.5–1.3)
DRVVT SCREEN TO CONFIRM RATIO: 1.44 — HIGH (ref 0–1.2)
FACT VIII ACT/NOR PPP: 187.5 % — HIGH (ref 50–125)
FACT VIII ACT/NOR PPP: 198 % — HIGH (ref 50–125)
FACT XII ACT/NOR PPP: 130.1 % — SIGNIFICANT CHANGE UP (ref 50–140)
FACT XIIA PPP-ACNC: 73.9 % — SIGNIFICANT CHANGE UP (ref 45–150)
GAS PNL BLDMV: SIGNIFICANT CHANGE UP
GAS PNL BLDMV: SIGNIFICANT CHANGE UP
GLUCOSE SERPL-MCNC: 91 MG/DL — SIGNIFICANT CHANGE UP (ref 70–99)
HCT VFR BLD CALC: 50.2 % — HIGH (ref 39–50)
HGB BLD-MCNC: 16.3 G/DL — SIGNIFICANT CHANGE UP (ref 13–17)
INR BLD: 1.01 — SIGNIFICANT CHANGE UP (ref 0.88–1.17)
MAGNESIUM SERPL-MCNC: 1.8 MG/DL — SIGNIFICANT CHANGE UP (ref 1.6–2.6)
MCHC RBC-ENTMCNC: 28.4 PG — SIGNIFICANT CHANGE UP (ref 27–34)
MCHC RBC-ENTMCNC: 32.5 % — SIGNIFICANT CHANGE UP (ref 32–36)
MCV RBC AUTO: 87.5 FL — SIGNIFICANT CHANGE UP (ref 80–100)
NORMALIZED SCT PPP-RTO: 1.39 — HIGH (ref 0.88–1.27)
NRBC # FLD: 0 — SIGNIFICANT CHANGE UP
PHOSPHATE SERPL-MCNC: 2.8 MG/DL — SIGNIFICANT CHANGE UP (ref 2.5–4.5)
PLATELET # BLD AUTO: 289 K/UL — SIGNIFICANT CHANGE UP (ref 150–400)
PMV BLD: 11 FL — SIGNIFICANT CHANGE UP (ref 7–13)
POTASSIUM SERPL-MCNC: 3.8 MMOL/L — SIGNIFICANT CHANGE UP (ref 3.5–5.3)
POTASSIUM SERPL-SCNC: 3.8 MMOL/L — SIGNIFICANT CHANGE UP (ref 3.5–5.3)
PROTHROM AB SERPL-ACNC: 11.6 SEC — SIGNIFICANT CHANGE UP (ref 9.8–13.1)
RBC # BLD: 5.74 M/UL — SIGNIFICANT CHANGE UP (ref 4.2–5.8)
RBC # FLD: 13 % — SIGNIFICANT CHANGE UP (ref 10.3–14.5)
SODIUM SERPL-SCNC: 142 MMOL/L — SIGNIFICANT CHANGE UP (ref 135–145)
WBC # BLD: 6.07 K/UL — SIGNIFICANT CHANGE UP (ref 3.8–10.5)
WBC # FLD AUTO: 6.07 K/UL — SIGNIFICANT CHANGE UP (ref 3.8–10.5)

## 2018-01-29 PROCEDURE — 99233 SBSQ HOSP IP/OBS HIGH 50: CPT | Mod: GC

## 2018-01-29 RX ORDER — IPRATROPIUM/ALBUTEROL SULFATE 18-103MCG
3 AEROSOL WITH ADAPTER (GRAM) INHALATION EVERY 6 HOURS
Qty: 0 | Refills: 0 | Status: DISCONTINUED | OUTPATIENT
Start: 2018-01-29 | End: 2018-01-31

## 2018-01-29 RX ADMIN — BUDESONIDE AND FORMOTEROL FUMARATE DIHYDRATE 2 PUFF(S): 160; 4.5 AEROSOL RESPIRATORY (INHALATION) at 21:42

## 2018-01-29 RX ADMIN — Medication 2 TABLET(S): at 17:55

## 2018-01-29 RX ADMIN — BICALUTAMIDE 50 MILLIGRAM(S): 50 TABLET, FILM COATED ORAL at 13:37

## 2018-01-29 RX ADMIN — Medication 75 MILLIGRAM(S): at 05:11

## 2018-01-29 RX ADMIN — Medication 75 MILLIGRAM(S): at 17:55

## 2018-01-29 RX ADMIN — Medication 600 MILLIGRAM(S): at 22:12

## 2018-01-29 RX ADMIN — Medication 600 MILLIGRAM(S): at 21:42

## 2018-01-29 RX ADMIN — Medication 2 TABLET(S): at 10:45

## 2018-01-29 RX ADMIN — BUDESONIDE AND FORMOTEROL FUMARATE DIHYDRATE 2 PUFF(S): 160; 4.5 AEROSOL RESPIRATORY (INHALATION) at 10:46

## 2018-01-29 RX ADMIN — ENOXAPARIN SODIUM 40 MILLIGRAM(S): 100 INJECTION SUBCUTANEOUS at 13:37

## 2018-01-29 RX ADMIN — TIOTROPIUM BROMIDE 1 CAPSULE(S): 18 CAPSULE ORAL; RESPIRATORY (INHALATION) at 22:12

## 2018-01-29 NOTE — PROGRESS NOTE ADULT - ASSESSMENT
62 M pmhx  HTN, COPD, aflutter s/p ablation (2015), bilateral inguinal hernias, cholecystectomy, appendectomy, perforated diverticulitis s/p Kamini's (2010) with reversal (2010), incisional hernia s/p component separation and mesh (12/2012), Prostate CA, who presented with partial SBO, treated with medical management, now resolved. Incidentally noted on MRI abdomen to have osteolytic lesions.

## 2018-01-29 NOTE — PROGRESS NOTE ADULT - PROBLEM SELECTOR PLAN 3
-dx with partial SBO  -Resolving  -Tolerating regular diet with low residue Patient initially with partial SBO, s/p NGT decompression now resolved. Tolerating low residue diet.

## 2018-01-29 NOTE — PROGRESS NOTE ADULT - SUBJECTIVE AND OBJECTIVE BOX
Rayray Wright, PGY1  Pager: [phone]  After 7: Night Float pager  Alternate contact:     Patient is a 62y old  Male who presents with a chief complaint of Partial SBO (24 Jan 2018 15:28)      SUBJECTIVE / OVERNIGHT EVENTS:    MEDICATIONS  (STANDING):  bicalutamide 50 milliGRAM(s) Oral daily  buDESOnide 160 MICROgram(s)/formoterol 4.5 MICROgram(s) Inhaler 2 Puff(s) Inhalation two times a day  docusate sodium 100 milliGRAM(s) Oral three times a day  enoxaparin Injectable 40 milliGRAM(s) SubCutaneous every 24 hours  influenza   Vaccine 0.5 milliLiter(s) IntraMuscular once  metoprolol     tartrate 75 milliGRAM(s) Oral every 12 hours  potassium acid phosphate/sodium acid phosphate tablet (K-PHOS No. 2) 2 Tablet(s) Oral two times a day with meals  tiotropium 18 MICROgram(s) Capsule 1 Capsule(s) Inhalation at bedtime    MEDICATIONS  (PRN):  acetaminophen   Tablet. 650 milliGRAM(s) Oral every 6 hours PRN Mild Pain (1 - 3)  ALBUTerol    90 MICROgram(s) HFA Inhaler 2 Puff(s) Inhalation every 6 hours PRN Shortness of Breath and/or Wheezing  ibuprofen  Tablet 600 milliGRAM(s) Oral every 6 hours PRN Moderate pain  simethicone 80 milliGRAM(s) Chew four times a day PRN Gas      Vital Signs Last 24 Hrs  T(C): 36.4 (29 Jan 2018 10:59), Max: 36.7 (28 Jan 2018 18:03)  T(F): 97.5 (29 Jan 2018 10:59), Max: 98.1 (28 Jan 2018 18:03)  HR: 74 (29 Jan 2018 10:59) (67 - 85)  BP: 150/100 (29 Jan 2018 10:59) (131/91 - 150/100)  BP(mean): --  RR: 18 (29 Jan 2018 10:59) (17 - 18)  SpO2: 96% (29 Jan 2018 10:59) (95% - 100%)  CAPILLARY BLOOD GLUCOSE        I&O's Summary    28 Jan 2018 07:01  -  29 Jan 2018 07:00  --------------------------------------------------------  IN: 0 mL / OUT: 2325 mL / NET: -2325 mL    29 Jan 2018 07:01  -  29 Jan 2018 13:17  --------------------------------------------------------  IN: 0 mL / OUT: 400 mL / NET: -400 mL        PHYSICAL EXAM:  GENERAL: NAD, well-developed  HEAD:  Atraumatic, Normocephalic  EYES: EOMI, PERRLA, conjunctiva and sclera clear  NECK: Supple, No JVD  CHEST/LUNG: Clear to auscultation bilaterally; No wheeze  HEART: Regular rate and rhythm; No murmurs, rubs, or gallops  ABDOMEN: Soft, Nontender, Nondistended; Bowel sounds present  EXTREMITIES:  2+ Peripheral Pulses, No clubbing, cyanosis, or edema  PSYCH: AAOx3  NEUROLOGY: non-focal  SKIN: No rashes or lesions    LABS:                        16.3   6.07  )-----------( 289      ( 29 Jan 2018 07:00 )             50.2     01-29    142  |  103  |  15  ----------------------------<  91  3.8   |  25  |  0.92    Ca    8.4      29 Jan 2018 07:00  Phos  2.8     01-29  Mg     1.8     01-29      PT/INR - ( 29 Jan 2018 07:40 )   PT: 11.6 SEC;   INR: 1.01          PTT - ( 29 Jan 2018 07:40 )  PTT:53.6 SEC          RADIOLOGY & ADDITIONAL TESTS:    Imaging Personally Reviewed:    Consultant(s) Notes Reviewed:      Care Discussed with Consultants/Other Providers: Rayray Wright, PGY1  Pager: [phone]  After 7: Night Float pager  Alternate contact:     Patient is a 62y old  Male who presents with a chief complaint of Partial SBO (24 Jan 2018 15:28)      SUBJECTIVE / OVERNIGHT EVENTS:  No acute events overnight. Patient without abdominal pain     MEDICATIONS  (STANDING):  bicalutamide 50 milliGRAM(s) Oral daily  buDESOnide 160 MICROgram(s)/formoterol 4.5 MICROgram(s) Inhaler 2 Puff(s) Inhalation two times a day  docusate sodium 100 milliGRAM(s) Oral three times a day  enoxaparin Injectable 40 milliGRAM(s) SubCutaneous every 24 hours  influenza   Vaccine 0.5 milliLiter(s) IntraMuscular once  metoprolol     tartrate 75 milliGRAM(s) Oral every 12 hours  potassium acid phosphate/sodium acid phosphate tablet (K-PHOS No. 2) 2 Tablet(s) Oral two times a day with meals  tiotropium 18 MICROgram(s) Capsule 1 Capsule(s) Inhalation at bedtime    MEDICATIONS  (PRN):  acetaminophen   Tablet. 650 milliGRAM(s) Oral every 6 hours PRN Mild Pain (1 - 3)  ALBUTerol    90 MICROgram(s) HFA Inhaler 2 Puff(s) Inhalation every 6 hours PRN Shortness of Breath and/or Wheezing  ibuprofen  Tablet 600 milliGRAM(s) Oral every 6 hours PRN Moderate pain  simethicone 80 milliGRAM(s) Chew four times a day PRN Gas      Vital Signs Last 24 Hrs  T(C): 36.4 (29 Jan 2018 10:59), Max: 36.7 (28 Jan 2018 18:03)  T(F): 97.5 (29 Jan 2018 10:59), Max: 98.1 (28 Jan 2018 18:03)  HR: 74 (29 Jan 2018 10:59) (67 - 85)  BP: 150/100 (29 Jan 2018 10:59) (131/91 - 150/100)  BP(mean): --  RR: 18 (29 Jan 2018 10:59) (17 - 18)  SpO2: 96% (29 Jan 2018 10:59) (95% - 100%)  CAPILLARY BLOOD GLUCOSE        I&O's Summary    28 Jan 2018 07:01  -  29 Jan 2018 07:00  --------------------------------------------------------  IN: 0 mL / OUT: 2325 mL / NET: -2325 mL    29 Jan 2018 07:01  -  29 Jan 2018 13:17  --------------------------------------------------------  IN: 0 mL / OUT: 400 mL / NET: -400 mL        PHYSICAL EXAM:  GENERAL: NAD   HEAD:  Atraumatic, Normocephalic  EYES: EOMI, PERRLA, conjunctiva and sclera clear  NECK: Supple, No JVD  CHEST/LUNG: CTABL  HEART: Regular rate and rhythm; No murmurs, rubs, or gallops  ABDOMEN: Soft, Nontender, Nondistended; Bowel sounds present  EXTREMITIES:  2+ Peripheral Pulses, No edema  PSYCH: AAOx3  NEUROLOGY: non-focal  SKIN: No rashes or lesions    LABS:                        16.3   6.07  )-----------( 289      ( 29 Jan 2018 07:00 )             50.2     01-29    142  |  103  |  15  ----------------------------<  91  3.8   |  25  |  0.92    Ca    8.4      29 Jan 2018 07:00  Phos  2.8     01-29  Mg     1.8     01-29      PT/INR - ( 29 Jan 2018 07:40 )   PT: 11.6 SEC;   INR: 1.01          PTT - ( 29 Jan 2018 07:40 )  PTT:53.6 SEC          RADIOLOGY & ADDITIONAL TESTS:    Imaging Personally Reviewed:  < from: CT Chest w/ IV Cont (01.26.18 @ 18:24) >    IMPRESSION:   Multilevel vertebral lytic lesions, concerning for metastatic disease.  Severeemphysema.  Ventral epigastric hernia containing loop of small bowel with adjacent   dilated small bowel.    < end of copied text >          Consultant(s) Notes Reviewed:      Care Discussed with Consultants/Other Providers: Rayray Wright, PGY1  Pager: [phone]  After 7: Night Float pager  Alternate contact:     Patient is a 62y old  Male who presents with a chief complaint of Partial SBO (24 Jan 2018 15:28)      SUBJECTIVE / OVERNIGHT EVENTS:  No acute events overnight. Patient denying fevers, chills, dyspnea, or abdominal pain. He has been NPO for possible biopsy today.     MEDICATIONS  (STANDING):  bicalutamide 50 milliGRAM(s) Oral daily  buDESOnide 160 MICROgram(s)/formoterol 4.5 MICROgram(s) Inhaler 2 Puff(s) Inhalation two times a day  docusate sodium 100 milliGRAM(s) Oral three times a day  enoxaparin Injectable 40 milliGRAM(s) SubCutaneous every 24 hours  influenza   Vaccine 0.5 milliLiter(s) IntraMuscular once  metoprolol     tartrate 75 milliGRAM(s) Oral every 12 hours  potassium acid phosphate/sodium acid phosphate tablet (K-PHOS No. 2) 2 Tablet(s) Oral two times a day with meals  tiotropium 18 MICROgram(s) Capsule 1 Capsule(s) Inhalation at bedtime    MEDICATIONS  (PRN):  acetaminophen   Tablet. 650 milliGRAM(s) Oral every 6 hours PRN Mild Pain (1 - 3)  ALBUTerol    90 MICROgram(s) HFA Inhaler 2 Puff(s) Inhalation every 6 hours PRN Shortness of Breath and/or Wheezing  ibuprofen  Tablet 600 milliGRAM(s) Oral every 6 hours PRN Moderate pain  simethicone 80 milliGRAM(s) Chew four times a day PRN Gas      Vital Signs Last 24 Hrs  T(C): 36.4 (29 Jan 2018 10:59), Max: 36.7 (28 Jan 2018 18:03)  T(F): 97.5 (29 Jan 2018 10:59), Max: 98.1 (28 Jan 2018 18:03)  HR: 74 (29 Jan 2018 10:59) (67 - 85)  BP: 150/100 (29 Jan 2018 10:59) (131/91 - 150/100)  BP(mean): --  RR: 18 (29 Jan 2018 10:59) (17 - 18)  SpO2: 96% (29 Jan 2018 10:59) (95% - 100%)  CAPILLARY BLOOD GLUCOSE        I&O's Summary    28 Jan 2018 07:01  -  29 Jan 2018 07:00  --------------------------------------------------------  IN: 0 mL / OUT: 2325 mL / NET: -2325 mL    29 Jan 2018 07:01  -  29 Jan 2018 13:17  --------------------------------------------------------  IN: 0 mL / OUT: 400 mL / NET: -400 mL        PHYSICAL EXAM:  GENERAL: NAD   HEAD:  Atraumatic, Normocephalic  EYES: EOMI, PERRLA, conjunctiva and sclera clear  NECK: Supple, No JVD  CHEST/LUNG: CTABL  HEART: Regular rate and rhythm; No murmurs, rubs, or gallops  ABDOMEN: Soft, Nontender, Nondistended; Bowel sounds present  EXTREMITIES:  2+ Peripheral Pulses, No edema  PSYCH: AAOx3  NEUROLOGY: non-focal  SKIN: No rashes or lesions    LABS:                        16.3   6.07  )-----------( 289      ( 29 Jan 2018 07:00 )             50.2     01-29    142  |  103  |  15  ----------------------------<  91  3.8   |  25  |  0.92    Ca    8.4      29 Jan 2018 07:00  Phos  2.8     01-29  Mg     1.8     01-29      PT/INR - ( 29 Jan 2018 07:40 )   PT: 11.6 SEC;   INR: 1.01          PTT - ( 29 Jan 2018 07:40 )  PTT:53.6 SEC          RADIOLOGY & ADDITIONAL TESTS:    Imaging Personally Reviewed:  < from: CT Chest w/ IV Cont (01.26.18 @ 18:24) >    IMPRESSION:   Multilevel vertebral lytic lesions, concerning for metastatic disease.  Severeemphysema.  Ventral epigastric hernia containing loop of small bowel with adjacent   dilated small bowel.    < end of copied text >    < from: MR Abdomen w/ IV Cont (01.25.18 @ 17:29) >  IMPRESSION: Retroperitoneal lymphadenopathy. These appear amenable to   CT-guided percutaneous needle biopsy.    Numerous lesions in the axial skeleton, unchanged from recent CT.    Multiple bilateral renal cortical cysts without MR evidence of renal   neoplasm.    < end of copied text >    Consultant(s) Notes Reviewed:    heme onc      Care Discussed with Consultants/Other Providers: Rayray Wright, PGY1  Pager: [phone]  After 7: Night Float pager  Alternate contact:     Patient is a 62y old  Male who presents with a chief complaint of Partial SBO (24 Jan 2018 15:28)      SUBJECTIVE / OVERNIGHT EVENTS:  No acute events overnight. Patient denying fevers, chills, dyspnea, or abdominal pain. He has been NPO for possible biopsy today.     MEDICATIONS  (STANDING):  bicalutamide 50 milliGRAM(s) Oral daily  buDESOnide 160 MICROgram(s)/formoterol 4.5 MICROgram(s) Inhaler 2 Puff(s) Inhalation two times a day  docusate sodium 100 milliGRAM(s) Oral three times a day  enoxaparin Injectable 40 milliGRAM(s) SubCutaneous every 24 hours  influenza   Vaccine 0.5 milliLiter(s) IntraMuscular once  metoprolol     tartrate 75 milliGRAM(s) Oral every 12 hours  potassium acid phosphate/sodium acid phosphate tablet (K-PHOS No. 2) 2 Tablet(s) Oral two times a day with meals  tiotropium 18 MICROgram(s) Capsule 1 Capsule(s) Inhalation at bedtime    MEDICATIONS  (PRN):  acetaminophen   Tablet. 650 milliGRAM(s) Oral every 6 hours PRN Mild Pain (1 - 3)  ALBUTerol    90 MICROgram(s) HFA Inhaler 2 Puff(s) Inhalation every 6 hours PRN Shortness of Breath and/or Wheezing  ibuprofen  Tablet 600 milliGRAM(s) Oral every 6 hours PRN Moderate pain  simethicone 80 milliGRAM(s) Chew four times a day PRN Gas      Vital Signs Last 24 Hrs  T(C): 36.4 (29 Jan 2018 10:59), Max: 36.7 (28 Jan 2018 18:03)  T(F): 97.5 (29 Jan 2018 10:59), Max: 98.1 (28 Jan 2018 18:03)  HR: 74 (29 Jan 2018 10:59) (67 - 85)  BP: 150/100 (29 Jan 2018 10:59) (131/91 - 150/100)  BP(mean): --  RR: 18 (29 Jan 2018 10:59) (17 - 18)  SpO2: 96% (29 Jan 2018 10:59) (95% - 100%)  CAPILLARY BLOOD GLUCOSE        I&O's Summary    28 Jan 2018 07:01  -  29 Jan 2018 07:00  --------------------------------------------------------  IN: 0 mL / OUT: 2325 mL / NET: -2325 mL    29 Jan 2018 07:01  -  29 Jan 2018 13:17  --------------------------------------------------------  IN: 0 mL / OUT: 400 mL / NET: -400 mL        PHYSICAL EXAM:  GENERAL: NAD   HEAD:  Atraumatic, Normocephalic  EYES: EOMI, PERRLA, conjunctiva and sclera clear  NECK: Supple, No JVD  CHEST/LUNG: CTABL  HEART: Regular rate and rhythm; No murmurs, rubs, or gallops  ABDOMEN: Soft, Nontender, Nondistended; Bowel sounds present  EXTREMITIES:  2+ Peripheral Pulses, No edema  PSYCH: AAOx3  NEUROLOGY: non-focal  SKIN: No rashes or lesions    LABS:                        16.3   6.07  )-----------( 289      ( 29 Jan 2018 07:00 )             50.2     01-29    142  |  103  |  15  ----------------------------<  91  3.8   |  25  |  0.92    Ca    8.4      29 Jan 2018 07:00  Phos  2.8     01-29  Mg     1.8     01-29      PT/INR - ( 29 Jan 2018 07:40 )   PT: 11.6 SEC;   INR: 1.01          PTT - ( 29 Jan 2018 07:40 )  PTT:53.6 SEC    Free Kappa and Lambda Light Chains, Urine (01.24.18 @ 07:00)    [place] Free Light Chains, Urine: 476.00 mg/L    Lambda Free Light Chains, Urine: 26.90 mg/L    Kappa/Lambda Free Light Chain Ratio, Urine: 17.70: Test Performed by [facility],  [facility],  [address] [place] [address]  Wali Hassan M.D., Ph.D., Director of Laboratories  [phone], CLIA [id number]    Lupus Profile (01.26.18 @ 19:16)    Silica Clotting Time S/C Ratio: 1.39: NOTE: THE PRESENCE OF DIRECT THROMBIN INHIBITORS (SUCH AS  ARGATROBAN, REFLUDAN), UF HEPARIN >0.5 U/ml OR LMW HEPARIN  >1.0 U/ml MAY AFFECT RESULTS.  RATIO > 1.27 IS POSITIVE FOR LUPUS.  RATIO <= 1.27 IS NEGATIVE FOR LUPUS.      Imaging Personally Reviewed:  Prostate Ca Screen, PSA Total (01.24.18 @ 07:00)    Prostate Ca Screen, PSA Total: 69.78: Test Repeated  Serum PSA is not an absolute test for malignancy. The PSA  value  should be used in conjunction with information available  from  clinical and other diagnostic procedures.  METHOD: Roche EIA ng/mL      Factor VIII Assay (01.29.18 @ 07:40)    Factor VIII Assay: 198.0 %    Factor XI Assay (01.26.18 @ 19:16)    Factor XI Assay: 130.1 %    Factor XII Assay (01.26.18 @ 19:16)    Factor XII Assay: 73.9 %          RADIOLOGY & ADDITIONAL TESTS:      < from: CT Chest w/ IV Cont (01.26.18 @ 18:24) >    IMPRESSION:   Multilevel vertebral lytic lesions, concerning for metastatic disease.  Severeemphysema.  Ventral epigastric hernia containing loop of small bowel with adjacent   dilated small bowel.    < end of copied text >    < from: MR Abdomen w/ IV Cont (01.25.18 @ 17:29) >  IMPRESSION: Retroperitoneal lymphadenopathy. These appear amenable to   CT-guided percutaneous needle biopsy.    Numerous lesions in the axial skeleton, unchanged from recent CT.    Multiple bilateral renal cortical cysts without MR evidence of renal   neoplasm.    < end of copied text >    Consultant(s) Notes Reviewed:    heme onc      Care Discussed with Consultants/Other Providers:

## 2018-01-29 NOTE — PROGRESS NOTE ADULT - PROBLEM SELECTOR PLAN 5
-c/w Albuterol, Budesonide, Spiriva  -CT chest shows Severe emphysema. CT chest shows Severe emphysema. Currently saturating well on room air, VSS.   - C/w home Albuterol, Budesonide, Spiriva

## 2018-01-29 NOTE — PROGRESS NOTE ADULT - PROBLEM SELECTOR PLAN 7
-Was in remission s/p RT and seeds, now w/ suspicion for mets to bone possibly from prostate vs other malignancy In remission s/p RT and seeds, now w/ suspicion for mets to bone possibly from prostate vs other malignancy. PSA total screening value elevated.  - f/u bone bx

## 2018-01-29 NOTE — PROGRESS NOTE ADULT - NEGATIVE GENERAL SYMPTOMS
no chills/no anorexia/no fever
no fever/no chills
no fever/no chills/no anorexia
no weight loss/no chills/no anorexia/no fever

## 2018-01-29 NOTE — PROGRESS NOTE ADULT - ASSESSMENT
62M with above clinical features. Discussed the available results, possible differential and other issues in detail with him and questions answered. Prostate cancer is unlikely to cause lytic lesions. Will recommend:  - apTT is elevated, w/u shows lupus anticoagulant    - PT IS AT REGULAR RISK FOR BLEEDING AND NOT HIGH RISK FROM ANY BIOPSY    - tolerating the PO food OK  - pain control  - weak band needs to be confirmed on IFx studies  - f/u on IFx studies  - casodex 50 mg daily, WILL ADD LUPRON AS OUT PT  - DVT prophylaxis  - discharge plan after the Bx

## 2018-01-29 NOTE — PROGRESS NOTE ADULT - GASTROINTESTINAL DETAILS
soft/nontender/no distention
soft/nontender/no distention
nontender/soft/no distention
soft/no distention/nontender

## 2018-01-29 NOTE — PROGRESS NOTE ADULT - PROBLEM SELECTOR PLAN 4
-Patient is on eliquis at home  -CHADSVASC score: 1, will hold AC as patient to have IR guided bone bx of R ileum  -c/w metoprolol 75mg BID for rate control Patient is on eliquis at home for afib. LMTEC4APND of 1.   - Will maintain on eliquis for DVT ppx as pending bone bx.   - c/w metoprolol 75mg BID for rate control

## 2018-01-29 NOTE — PROGRESS NOTE ADULT - MS EXT PE MLT D E PC
no cyanosis/no pedal edema
no cyanosis/no pedal edema
no pedal edema/no cyanosis
no pedal edema/no cyanosis

## 2018-01-29 NOTE — PROGRESS NOTE ADULT - NEGATIVE HEMATOLOGY SYMPTOMS
no nose bleeding/no gum bleeding
no nose bleeding/no gum bleeding
no gum bleeding/no nose bleeding
no nose bleeding/no gum bleeding

## 2018-01-29 NOTE — PROGRESS NOTE ADULT - PROBLEM SELECTOR PLAN 2
Patient with history of normal PTT on 10/2017. Now with persistent Repeat PTT elevated  -Abnormal mixing study  -f/u lupas anticoagulant, factor XI, XII. Factor VIII was not collected however PT WNL. Will reorder for the am Patient with history of normal PTT on 10/2017. Now with persistent prolonged PTT in 50s.   - Uncorrected by mixing study, suggestive of lupus anticoagulant vs factor inhibitor  - FVIII, XII, and XI wnl. Thrombin time assay wnl   - silica clotting time and DRVVT S/C ratio elevated, suggestive of APLS  - f/u heme recs  - per heme, okay to proceed with IR bx

## 2018-01-29 NOTE — PROGRESS NOTE ADULT - NEGATIVE GASTROINTESTINAL SYMPTOMS
no nausea/no vomiting/no diarrhea/no constipation
no constipation/no nausea/no vomiting/no diarrhea
no nausea/no diarrhea/no constipation/no vomiting
no nausea/no vomiting

## 2018-01-29 NOTE — PROGRESS NOTE ADULT - PROBLEM SELECTOR PLAN 8
MRI: Multiple bilateral renal cortical cysts without MR evidence of renal   neoplasm. Abdominal MRI revealing multiple bilateral renal cortical cysts without MR evidence of renal neoplasm. Per patient, family history of renal cysts with likely component of PCKD.  - renal fxn wnl. no hematuria  - continue to monitor for now

## 2018-01-29 NOTE — PROGRESS NOTE ADULT - PROBLEM SELECTOR PLAN 1
CT/MRI revealing multiple osteolytic lesions throughout spine and pelvis. Possibly metastatic disease of prior prostate cancer vs new  heme malignancy.   - Biopsy postponed to 1/30 as patient with new prolonged PTT  - NPO after midnight  - Hold AM dose of Lovenox CT/MRI revealing multiple osteolytic lesions throughout spine and pelvis. Possibly metastatic disease of prior prostate cancer vs new  heme malignancy.   - Biopsy postponed to 1/30 as patient with new prolonged PTT  - serum and urine kappa/lambda free light chain ratio elevated, will f/u ANTOINETTE assay  - per heme, no objections to proceed with bx  - NPO after midnight  - Hold AM dose of Lovenox

## 2018-01-29 NOTE — PROGRESS NOTE ADULT - RS GEN PE MLT RESP DETAILS PC
no rhonchi/no rales/breath sounds equal
no rhonchi/no rales/breath sounds equal
breath sounds equal/no rales/no rhonchi
breath sounds equal/no rhonchi/no rales

## 2018-01-29 NOTE — PROGRESS NOTE ADULT - SUBJECTIVE AND OBJECTIVE BOX
Events since yesterday noted, pt is doing well, denies any active or bothersome symptoms on detailed ROS questions. No bleeding and none other active symptoms on ROS.    Meds;  acetaminophen   Tablet. 650 milliGRAM(s) Oral every 6 hours PRN  ALBUTerol    90 MICROgram(s) HFA Inhaler 2 Puff(s) Inhalation every 6 hours PRN  ALBUTerol/ipratropium for Nebulization 3 milliLiter(s) Nebulizer every 6 hours PRN  bicalutamide 50 milliGRAM(s) Oral daily  buDESOnide 160 MICROgram(s)/formoterol 4.5 MICROgram(s) Inhaler 2 Puff(s) Inhalation two times a day  docusate sodium 100 milliGRAM(s) Oral three times a day  enoxaparin Injectable 40 milliGRAM(s) SubCutaneous every 24 hours  ibuprofen  Tablet 600 milliGRAM(s) Oral every 6 hours PRN  influenza   Vaccine 0.5 milliLiter(s) IntraMuscular once  metoprolol     tartrate 75 milliGRAM(s) Oral every 12 hours  potassium acid phosphate/sodium acid phosphate tablet (K-PHOS No. 2) 2 Tablet(s) Oral two times a day with meals  simethicone 80 milliGRAM(s) Chew four times a day PRN  tiotropium 18 MICROgram(s) Capsule 1 Capsule(s) Inhalation at bedtime      Vital Signs Last 24 Hrs  T(C): 36.2 (29 Jan 2018 17:42), Max: 36.7 (28 Jan 2018 18:03)  T(F): 97.1 (29 Jan 2018 17:42), Max: 98.1 (28 Jan 2018 18:03)  HR: 96 (29 Jan 2018 17:42) (67 - 96)  BP: 125/91 (29 Jan 2018 13:38) (125/91 - 150/100)  BP(mean): --  RR: 18 (29 Jan 2018 17:42) (17 - 18)  SpO2: 96% (29 Jan 2018 17:42) (95% - 100%)                          16.3   6.07  )-----------( 289      ( 29 Jan 2018 07:00 )             50.2       01-29    142  |  103  |  15  ----------------------------<  91  3.8   |  25  |  0.92    Ca    8.4      29 Jan 2018 07:00  Phos  2.8     01-29  Mg     1.8     01-29                PT/INR - ( 29 Jan 2018 07:40 )   PT: 11.6 SEC;   INR: 1.01          PTT - ( 29 Jan 2018 07:40 )  PTT:53.6 SEC    DRVVT high and silica time high - c/w lupus anticoagulant    Factor VIII, XI and XII - normal or high

## 2018-01-30 ENCOUNTER — RESULT REVIEW (OUTPATIENT)
Age: 63
End: 2018-01-30

## 2018-01-30 LAB
APTT BLD: 55 SEC — HIGH (ref 27.5–37.4)
BUN SERPL-MCNC: 15 MG/DL — SIGNIFICANT CHANGE UP (ref 7–23)
CALCIUM SERPL-MCNC: 8.3 MG/DL — LOW (ref 8.4–10.5)
CHLORIDE SERPL-SCNC: 103 MMOL/L — SIGNIFICANT CHANGE UP (ref 98–107)
CO2 SERPL-SCNC: 24 MMOL/L — SIGNIFICANT CHANGE UP (ref 22–31)
CREAT SERPL-MCNC: 0.86 MG/DL — SIGNIFICANT CHANGE UP (ref 0.5–1.3)
GLUCOSE SERPL-MCNC: 88 MG/DL — SIGNIFICANT CHANGE UP (ref 70–99)
HCT VFR BLD CALC: 49.1 % — SIGNIFICANT CHANGE UP (ref 39–50)
HGB BLD-MCNC: 16.2 G/DL — SIGNIFICANT CHANGE UP (ref 13–17)
INR BLD: 1.08 — SIGNIFICANT CHANGE UP (ref 0.88–1.17)
MAGNESIUM SERPL-MCNC: 1.9 MG/DL — SIGNIFICANT CHANGE UP (ref 1.6–2.6)
MCHC RBC-ENTMCNC: 28.6 PG — SIGNIFICANT CHANGE UP (ref 27–34)
MCHC RBC-ENTMCNC: 33 % — SIGNIFICANT CHANGE UP (ref 32–36)
MCV RBC AUTO: 86.6 FL — SIGNIFICANT CHANGE UP (ref 80–100)
NRBC # FLD: 0 — SIGNIFICANT CHANGE UP
PHOSPHATE SERPL-MCNC: 3.1 MG/DL — SIGNIFICANT CHANGE UP (ref 2.5–4.5)
PLATELET # BLD AUTO: 275 K/UL — SIGNIFICANT CHANGE UP (ref 150–400)
PMV BLD: 10.9 FL — SIGNIFICANT CHANGE UP (ref 7–13)
POTASSIUM SERPL-MCNC: 3.8 MMOL/L — SIGNIFICANT CHANGE UP (ref 3.5–5.3)
POTASSIUM SERPL-SCNC: 3.8 MMOL/L — SIGNIFICANT CHANGE UP (ref 3.5–5.3)
PROTHROM AB SERPL-ACNC: 12 SEC — SIGNIFICANT CHANGE UP (ref 9.8–13.1)
RBC # BLD: 5.67 M/UL — SIGNIFICANT CHANGE UP (ref 4.2–5.8)
RBC # FLD: 13.2 % — SIGNIFICANT CHANGE UP (ref 10.3–14.5)
SODIUM SERPL-SCNC: 140 MMOL/L — SIGNIFICANT CHANGE UP (ref 135–145)
WBC # BLD: 5.37 K/UL — SIGNIFICANT CHANGE UP (ref 3.8–10.5)
WBC # FLD AUTO: 5.37 K/UL — SIGNIFICANT CHANGE UP (ref 3.8–10.5)

## 2018-01-30 PROCEDURE — 88341 IMHCHEM/IMCYTCHM EA ADD ANTB: CPT | Mod: 26

## 2018-01-30 PROCEDURE — 20220 BONE BIOPSY TROCAR/NDL SUPFC: CPT

## 2018-01-30 PROCEDURE — 99233 SBSQ HOSP IP/OBS HIGH 50: CPT | Mod: GC

## 2018-01-30 PROCEDURE — 77012 CT SCAN FOR NEEDLE BIOPSY: CPT | Mod: 26

## 2018-01-30 PROCEDURE — 88342 IMHCHEM/IMCYTCHM 1ST ANTB: CPT | Mod: 26

## 2018-01-30 PROCEDURE — 88305 TISSUE EXAM BY PATHOLOGIST: CPT | Mod: 26

## 2018-01-30 RX ADMIN — BICALUTAMIDE 50 MILLIGRAM(S): 50 TABLET, FILM COATED ORAL at 17:34

## 2018-01-30 RX ADMIN — Medication 75 MILLIGRAM(S): at 16:33

## 2018-01-30 RX ADMIN — Medication 600 MILLIGRAM(S): at 22:04

## 2018-01-30 RX ADMIN — Medication 600 MILLIGRAM(S): at 22:34

## 2018-01-30 RX ADMIN — BUDESONIDE AND FORMOTEROL FUMARATE DIHYDRATE 2 PUFF(S): 160; 4.5 AEROSOL RESPIRATORY (INHALATION) at 21:27

## 2018-01-30 RX ADMIN — Medication 2 TABLET(S): at 17:34

## 2018-01-30 RX ADMIN — Medication 75 MILLIGRAM(S): at 05:18

## 2018-01-30 RX ADMIN — TIOTROPIUM BROMIDE 1 CAPSULE(S): 18 CAPSULE ORAL; RESPIRATORY (INHALATION) at 21:27

## 2018-01-30 NOTE — PROGRESS NOTE ADULT - PROBLEM SELECTOR PLAN 5
CT chest shows severe emphysema. Currently saturating well on room air, VSS.   - C/w home Albuterol, Budesonide, Spiriva  - patient with extensive large bullase, monitor for spontaneous pneumothorax

## 2018-01-30 NOTE — PROGRESS NOTE ADULT - SUBJECTIVE AND OBJECTIVE BOX
Rayray Wright, PGY1  Pager: [phone]  After 7: Night Float pager  Alternate contact:     Patient is a 62y old  Male who presents with a chief complaint of Partial SBO (24 Jan 2018 15:28)      SUBJECTIVE / OVERNIGHT EVENTS:  No acute events overnight    MEDICATIONS  (STANDING):  bicalutamide 50 milliGRAM(s) Oral daily  buDESOnide 160 MICROgram(s)/formoterol 4.5 MICROgram(s) Inhaler 2 Puff(s) Inhalation two times a day  docusate sodium 100 milliGRAM(s) Oral three times a day  enoxaparin Injectable 40 milliGRAM(s) SubCutaneous every 24 hours  influenza   Vaccine 0.5 milliLiter(s) IntraMuscular once  metoprolol     tartrate 75 milliGRAM(s) Oral every 12 hours  potassium acid phosphate/sodium acid phosphate tablet (K-PHOS No. 2) 2 Tablet(s) Oral two times a day with meals  tiotropium 18 MICROgram(s) Capsule 1 Capsule(s) Inhalation at bedtime    MEDICATIONS  (PRN):  acetaminophen   Tablet. 650 milliGRAM(s) Oral every 6 hours PRN Mild Pain (1 - 3)  ALBUTerol    90 MICROgram(s) HFA Inhaler 2 Puff(s) Inhalation every 6 hours PRN Shortness of Breath and/or Wheezing  ALBUTerol/ipratropium for Nebulization 3 milliLiter(s) Nebulizer every 6 hours PRN Shortness of Breath and/or Wheezing  ibuprofen  Tablet 600 milliGRAM(s) Oral every 6 hours PRN Moderate pain  simethicone 80 milliGRAM(s) Chew four times a day PRN Gas      Vital Signs Last 24 Hrs  T(C): 36.4 (30 Jan 2018 05:17), Max: 36.4 (29 Jan 2018 10:59)  T(F): 97.5 (30 Jan 2018 05:17), Max: 97.5 (29 Jan 2018 10:59)  HR: 78 (30 Jan 2018 05:17) (74 - 96)  BP: 141/98 (30 Jan 2018 05:17) (125/91 - 150/100)  BP(mean): --  RR: 17 (30 Jan 2018 05:17) (17 - 18)  SpO2: 98% (30 Jan 2018 05:17) (96% - 98%)  CAPILLARY BLOOD GLUCOSE        I&O's Summary    29 Jan 2018 07:01  -  30 Jan 2018 07:00  --------------------------------------------------------  IN: 480 mL / OUT: 1075 mL / NET: -595 mL        PHYSICAL EXAM:  GENERAL: NAD   HEAD:  Atraumatic, Normocephalic  EYES: EOMI, PERRLA, conjunctiva and sclera clear  NECK: Supple, No JVD  CHEST/LUNG: CTABL  HEART: Regular rate and rhythm; No murmurs, rubs, or gallops  ABDOMEN: Soft, Nontender, Nondistended; Bowel sounds present  EXTREMITIES:  2+ Peripheral Pulses, No edema  PSYCH: AAOx3  NEUROLOGY: non-focal  SKIN: No rashes or lesions    LABS:                        16.3   6.07  )-----------( 289      ( 29 Jan 2018 07:00 )             50.2     01-29    142  |  103  |  15  ----------------------------<  91  3.8   |  25  |  0.92    Ca    8.4      29 Jan 2018 07:00  Phos  2.8     01-29  Mg     1.8     01-29      PT/INR - ( 29 Jan 2018 07:40 )   PT: 11.6 SEC;   INR: 1.01          PTT - ( 29 Jan 2018 07:40 )  PTT:53.6 SEC          RADIOLOGY & ADDITIONAL TESTS:    Imaging Personally Reviewed:    Consultant(s) Notes Reviewed:      Care Discussed with Consultants/Other Providers:

## 2018-01-30 NOTE — PROGRESS NOTE ADULT - PROBLEM SELECTOR PLAN 4
Patient is on eliquis at home for afib. [id number] of 1.   - Will maintain on eliquis for DVT ppx as pending bone bx.   - c/w metoprolol 75mg BID for rate control

## 2018-01-30 NOTE — CHART NOTE - NSCHARTNOTEFT_GEN_A_CORE
PRE-INTERVENTIONAL RADIOLOGY PROCEDURE NOTE      Patient Age: 62    Patient Gender: Male    Procedure: Bone biopsy    Diagnosis/Indication:    Interventional Radiology Attending Physician: Dr. Hall    Ordering Attending Physician: Dr. Sawyer    Pertinent Medical History: prostate cancer, sandra w/ reversal, prolonged PTT likely APLS    Patient cleared by hematology for procedure    Pertinent labs:                      16.2   5.37  )-----------( 275      ( 30 Jan 2018 07:10 )             49.1       01-30    140  |  103  |  15  ----------------------------<  88  3.8   |  24  |  0.86    Ca    8.3<L>      30 Jan 2018 07:10  Phos  3.1     01-30  Mg     1.9     01-30        PT/INR - ( 30 Jan 2018 07:00 )   PT: 12.0 SEC;   INR: 1.08          PTT - ( 30 Jan 2018 07:00 )  PTT:55.0 SEC        Patient and Family Aware ? Yes      Rayray Wright, PGY1  [phone]  spectra [phone]

## 2018-01-30 NOTE — PROGRESS NOTE ADULT - PROBLEM SELECTOR PLAN 1
CT/MRI revealing multiple osteolytic lesions throughout spine and pelvis. Possibly metastatic disease of prior prostate cancer vs new  heme malignancy. Elevated total PSA as well as serum and urine kappa/lambda free light chain ratio elevated, raising concern for either recurrence of prior prostate Ca vs new heme malignancy  - Biopsy per IR today, cleared by heme  - f/u ANTOINETTE assay

## 2018-01-30 NOTE — PROGRESS NOTE ADULT - ASSESSMENT
62 M PMH HTN, COPD, aflutter s/p ablation (2015), bilateral inguinal hernias, cholecystectomy, appendectomy, perforated diverticulitis s/p Kamini's (2010) with reversal (2010), incisional hernia s/p repair (12/2012), Prostate CA, who presented with partial SBO, treated with medical management, now resolved. Patient noted to have osteolytic vertebral  and pelvic lesions,  concerning for malignancy. Also with new onset prolong PTT, raising concern for APLS vs factor inhibitor

## 2018-01-30 NOTE — PROGRESS NOTE ADULT - PROBLEM SELECTOR PLAN 8
Abdominal MRI revealing multiple bilateral renal cortical cysts without MR evidence of renal neoplasm. Per patient, family history of renal cysts with likely component of PCKD.  - renal fxn wnl. no hematuria  - continue to monitor for now

## 2018-01-30 NOTE — PROGRESS NOTE ADULT - PROBLEM SELECTOR PLAN 7
In remission s/p RT and seeds, now w/ suspicion for mets to bone possibly from prostate vs other malignancy. PSA total screening value elevated.  - f/u bone bx

## 2018-01-30 NOTE — PROGRESS NOTE ADULT - PROBLEM SELECTOR PLAN 2
Patient with history of normal PTT on 10/2017. Now with persistent prolonged PTT in 50s.   - Uncorrected by mixing study, suggestive of lupus anticoagulant vs factor inhibitor  - FVIII, XII, and XI wnl. Thrombin time assay wnl   - silica clotting time and DRVVT S/C ratio elevated, suggestive of APLS  - f/u heme recs  - per heme, okay to proceed with IR bx

## 2018-01-30 NOTE — PROGRESS NOTE ADULT - PROBLEM SELECTOR PLAN 3
Patient initially with partial SBO, s/p NGT decompression now resolved. Tolerating low residue diet.  - will resume diet after IR procedure

## 2018-01-31 VITALS — WEIGHT: 210.1 LBS

## 2018-01-31 PROCEDURE — 99239 HOSP IP/OBS DSCHRG MGMT >30: CPT

## 2018-01-31 RX ORDER — ACETAMINOPHEN 500 MG
2 TABLET ORAL
Qty: 0 | Refills: 0 | COMMUNITY
Start: 2018-01-31

## 2018-01-31 RX ORDER — BICALUTAMIDE 50 MG/1
1 TABLET, FILM COATED ORAL
Qty: 30 | Refills: 0 | OUTPATIENT
Start: 2018-01-31 | End: 2018-03-01

## 2018-01-31 RX ORDER — IBUPROFEN 200 MG
1 TABLET ORAL
Qty: 0 | Refills: 0 | COMMUNITY
Start: 2018-01-31

## 2018-01-31 RX ADMIN — BICALUTAMIDE 50 MILLIGRAM(S): 50 TABLET, FILM COATED ORAL at 13:37

## 2018-01-31 RX ADMIN — Medication 2 TABLET(S): at 08:08

## 2018-01-31 RX ADMIN — Medication 75 MILLIGRAM(S): at 05:40

## 2018-01-31 NOTE — PROGRESS NOTE ADULT - PROBLEM SELECTOR PLAN 4
Patient is on eliquis at home for afib. [id number] of 1.   - c/w metoprolol 75mg BID for rate control  - will resume home dose eliquis

## 2018-01-31 NOTE — DIETITIAN INITIAL EVALUATION ADULT. - ADHERENCE
Regular diet. Avoids corn, seeds, lettuce due to h/o diverticulitis. Noted food allergy to shellfish.

## 2018-01-31 NOTE — PROGRESS NOTE ADULT - PROBLEM SELECTOR PROBLEM 2
Partial thromboplastin time (PTT) > 40 seconds
Ventral hernia
Partial thromboplastin time (PTT) > 40 seconds
SBO (small bowel obstruction)
SBO (small bowel obstruction)
Partial thromboplastin time (PTT) > 40 seconds

## 2018-01-31 NOTE — PROGRESS NOTE ADULT - PROBLEM SELECTOR PROBLEM 4
Atrial fibrillation and flutter
HTN (Hypertension)
Atrial fibrillation and flutter
COPD (chronic obstructive pulmonary disease)
COPD (chronic obstructive pulmonary disease)
Atrial fibrillation and flutter

## 2018-01-31 NOTE — PROGRESS NOTE ADULT - PROVIDER SPECIALTY LIST ADULT
Heme/Onc
Internal Medicine
Surgery
Heme/Onc
Internal Medicine

## 2018-01-31 NOTE — PROGRESS NOTE ADULT - ATTENDING COMMENTS
Getting bone biopsy today. Possible d/c after Bone biopsy.  F/U with Dr Beckwith as outpatient
Medically stable for d/c. Will f/u with Dr Beckwith on Monday at 11:00Am (appointment made for pt).  Spoke to dr Beckwith on the phone about discharge and a/c. OK to resume Eliquis as per Hem.  Spoke to pt at length about f/u issues, he understood and agreed with the plan.
(+) Isolated PTT elevation, etiology is not clear (Oct, 17 pt had normal PTT). Will repeat PTT and f/u Hem/Onc  rec re: further w/u.

## 2018-01-31 NOTE — PROGRESS NOTE ADULT - PROBLEM SELECTOR PLAN 1
CT/MRI revealing multiple osteolytic lesions throughout spine and pelvis. Possibly metastatic disease of prior prostate cancer vs new  heme malignancy. Elevated total PSA as well as serum and urine kappa/lambda free light chain ratio elevated, raising concern for either recurrence of prior prostate Ca vs new heme malignancy  - s/p bone bx  - f/u ANTOINETTE assay

## 2018-01-31 NOTE — PROGRESS NOTE ADULT - PROBLEM SELECTOR PROBLEM 6
Hypertension, unspecified type
Prostate Cancer
Prostate Cancer
Hypertension, unspecified type

## 2018-01-31 NOTE — PROGRESS NOTE ADULT - SUBJECTIVE AND OBJECTIVE BOX
Rayray Wright, PGY1  Pager: [phone]  After 7: Night Float pager  Alternate contact:     Patient is a 62y old  Male who presents with a chief complaint of Partial SBO (24 Jan 2018 15:28)      SUBJECTIVE / OVERNIGHT EVENTS:  Patient underwent bone marrow biopsy. Minimal pain at biopsy site. No fevers, chills, shortness of breath, or chest pain overnight.       MEDICATIONS  (STANDING):  bicalutamide 50 milliGRAM(s) Oral daily  buDESOnide 160 MICROgram(s)/formoterol 4.5 MICROgram(s) Inhaler 2 Puff(s) Inhalation two times a day  docusate sodium 100 milliGRAM(s) Oral three times a day  enoxaparin Injectable 40 milliGRAM(s) SubCutaneous every 24 hours  influenza   Vaccine 0.5 milliLiter(s) IntraMuscular once  metoprolol     tartrate 75 milliGRAM(s) Oral every 12 hours  potassium acid phosphate/sodium acid phosphate tablet (K-PHOS No. 2) 2 Tablet(s) Oral two times a day with meals  tiotropium 18 MICROgram(s) Capsule 1 Capsule(s) Inhalation at bedtime    MEDICATIONS  (PRN):  acetaminophen   Tablet. 650 milliGRAM(s) Oral every 6 hours PRN Mild Pain (1 - 3)  ALBUTerol    90 MICROgram(s) HFA Inhaler 2 Puff(s) Inhalation every 6 hours PRN Shortness of Breath and/or Wheezing  ALBUTerol/ipratropium for Nebulization 3 milliLiter(s) Nebulizer every 6 hours PRN Shortness of Breath and/or Wheezing  ibuprofen  Tablet 600 milliGRAM(s) Oral every 6 hours PRN Moderate pain  simethicone 80 milliGRAM(s) Chew four times a day PRN Gas      Vital Signs Last 24 Hrs  T(C): 36.5 (31 Jan 2018 05:34), Max: 36.7 (30 Jan 2018 13:28)  T(F): 97.7 (31 Jan 2018 05:34), Max: 98.1 (30 Jan 2018 13:28)  HR: 80 (31 Jan 2018 05:34) (68 - 92)  BP: 134/87 (31 Jan 2018 05:34) (128/88 - 153/108)  BP(mean): --  RR: 17 (31 Jan 2018 05:34) (16 - 18)  SpO2: 98% (31 Jan 2018 05:34) (93% - 99%)  CAPILLARY BLOOD GLUCOSE        I&O's Summary    30 Jan 2018 07:01  -  31 Jan 2018 07:00  --------------------------------------------------------  IN: 360 mL / OUT: 650 mL / NET: -290 mL        PHYSICAL EXAM:  GENERAL: NAD   HEAD:  Atraumatic, Normocephalic  EYES: EOMI, PERRLA, conjunctiva and sclera clear  NECK: Supple, No JVD  CHEST/LUNG: CTABL  HEART: Regular rate and rhythm; No murmurs, rubs, or gallops  ABDOMEN: Soft, Nontender, Nondistended; Bowel sounds present  EXTREMITIES:  2+ Peripheral Pulses, No edema  PSYCH: AAOx3  NEUROLOGY: non-focal  SKIN: No rashes or lesions    LABS:                        16.2   5.37  )-----------( 275      ( 30 Jan 2018 07:10 )             49.1     01-30    140  |  103  |  15  ----------------------------<  88  3.8   |  24  |  0.86    Ca    8.3<L>      30 Jan 2018 07:10  Phos  3.1     01-30  Mg     1.9     01-30      PT/INR - ( 30 Jan 2018 07:00 )   PT: 12.0 SEC;   INR: 1.08          PTT - ( 30 Jan 2018 07:00 )  PTT:55.0 SEC          RADIOLOGY & ADDITIONAL TESTS:    Imaging Personally Reviewed:    Consultant(s) Notes Reviewed:      Care Discussed with Consultants/Other Providers: Rayray Wright, PGY1  Pager: [phone]  After 7: Night Float pager  Alternate contact:     Patient is a 62y old  Male who presents with a chief complaint of Partial SBO (24 Jan 2018 15:28)      SUBJECTIVE / OVERNIGHT EVENTS:  Patient underwent bone marrow biopsy. Minimal pain at biopsy site. No fevers, chills, shortness of breath, or chest pain overnight.       MEDICATIONS  (STANDING):  bicalutamide 50 milliGRAM(s) Oral daily  buDESOnide 160 MICROgram(s)/formoterol 4.5 MICROgram(s) Inhaler 2 Puff(s) Inhalation two times a day  docusate sodium 100 milliGRAM(s) Oral three times a day  enoxaparin Injectable 40 milliGRAM(s) SubCutaneous every 24 hours  influenza   Vaccine 0.5 milliLiter(s) IntraMuscular once  metoprolol     tartrate 75 milliGRAM(s) Oral every 12 hours  potassium acid phosphate/sodium acid phosphate tablet (K-PHOS No. 2) 2 Tablet(s) Oral two times a day with meals  tiotropium 18 MICROgram(s) Capsule 1 Capsule(s) Inhalation at bedtime    MEDICATIONS  (PRN):  acetaminophen   Tablet. 650 milliGRAM(s) Oral every 6 hours PRN Mild Pain (1 - 3)  ALBUTerol    90 MICROgram(s) HFA Inhaler 2 Puff(s) Inhalation every 6 hours PRN Shortness of Breath and/or Wheezing  ALBUTerol/ipratropium for Nebulization 3 milliLiter(s) Nebulizer every 6 hours PRN Shortness of Breath and/or Wheezing  ibuprofen  Tablet 600 milliGRAM(s) Oral every 6 hours PRN Moderate pain  simethicone 80 milliGRAM(s) Chew four times a day PRN Gas      Vital Signs Last 24 Hrs  T(C): 36.5 (31 Jan 2018 05:34), Max: 36.7 (30 Jan 2018 13:28)  T(F): 97.7 (31 Jan 2018 05:34), Max: 98.1 (30 Jan 2018 13:28)  HR: 80 (31 Jan 2018 05:34) (68 - 92)  BP: 134/87 (31 Jan 2018 05:34) (128/88 - 153/108)  BP(mean): --  RR: 17 (31 Jan 2018 05:34) (16 - 18)  SpO2: 98% (31 Jan 2018 05:34) (93% - 99%)  CAPILLARY BLOOD GLUCOSE        I&O's Summary    30 Jan 2018 07:01  -  31 Jan 2018 07:00  --------------------------------------------------------  IN: 360 mL / OUT: 650 mL / NET: -290 mL        PHYSICAL EXAM:  GENERAL: NAD   HEAD:  Atraumatic, Normocephalic  EYES: EOMI, PERRLA, conjunctiva and sclera clear  NECK: Supple, No JVD  CHEST/LUNG: CTABL  HEART: Regular rate and rhythm; No murmurs, rubs, or gallops  ABDOMEN: Soft, Nontender, Nondistended; Bowel sounds present  EXTREMITIES:  2+ Peripheral Pulses, No edema  PSYCH: AAOx3  NEUROLOGY: non-focal  SKIN: No rashes or lesions. Biopsy site on left flank without hematoma, erythema, or TTP    LABS:                        16.2   5.37  )-----------( 275      ( 30 Jan 2018 07:10 )             49.1     01-30    140  |  103  |  15  ----------------------------<  88  3.8   |  24  |  0.86    Ca    8.3<L>      30 Jan 2018 07:10  Phos  3.1     01-30  Mg     1.9     01-30      PT/INR - ( 30 Jan 2018 07:00 )   PT: 12.0 SEC;   INR: 1.08          PTT - ( 30 Jan 2018 07:00 )  PTT:55.0 SEC          RADIOLOGY & ADDITIONAL TESTS:    Imaging Personally Reviewed:    Consultant(s) Notes Reviewed:      Care Discussed with Consultants/Other Providers:

## 2018-01-31 NOTE — DIETITIAN INITIAL EVALUATION ADULT. - NS AS NUTRI INTERV MEALS SNACK
1) Continue with low fiber diet which remains appropriate and adequate at this time. 2) Provide food preferences when requested by patient.

## 2018-01-31 NOTE — PROGRESS NOTE ADULT - PROBLEM SELECTOR PROBLEM 3
Prostate Cancer
SBO (small bowel obstruction)
Atrial fibrillation and flutter
Atrial fibrillation and flutter
SBO (small bowel obstruction)

## 2018-01-31 NOTE — DIETITIAN INITIAL EVALUATION ADULT. - OTHER INFO
Nutrition assessment for LOS. Pt is 61 y/o M with past medical history of Afib, HTN, SBO found to have osteolytic lesions on CT/MRI scan with concern for malignant metastatic disease. Met with patient at bedside. Pt reports good appetite and PO intake during this admission; consumes >75% of his meals. He denies any nausea, vomiting, diarrhea or constipation nor any chewing or swallowing difficulty. Patient has no known food allergies. No micronutrient supplements taken PTA. Pt denies any significant weight loss in the past; usual body weight 210-212 pounds. Noted weight Dec 2012 (210 pounds). Reviewed low fiber diet with patient. Food preferences obtained. RD remains available for any additional nutrition related recommendations.

## 2018-01-31 NOTE — PROGRESS NOTE ADULT - PROBLEM SELECTOR PLAN 3
Patient initially with partial SBO, s/p NGT decompression now resolved. Tolerating low residue diet. Diet resumed without issues

## 2018-01-31 NOTE — PROGRESS NOTE ADULT - PROBLEM SELECTOR PLAN 2
Persistent prolonged PTT in 50s despite normal values in 10/2017.  Uncorrected by mixing study. FVIII, XII, and XI wnl. Thrombin time assay wnl. No clinical thrombosis or hemorrhage. Silica clotting time and DRVVT S/C ratio elevated, suggestive of APLS  - will likely need repeat APLS labs in 6 wks  - f/u heme recs  - per heme, okay to proceed with IR bx

## 2018-01-31 NOTE — PROGRESS NOTE ADULT - ASSESSMENT
62 M PMH HTN, COPD, aflutter s/p ablation (2015), bilateral inguinal hernias, cholecystectomy, appendectomy, perforated diverticulitis s/p Kamini's (2010) with reversal (2010), incisional hernia s/p repair (12/2012), Prostate CA, who presented with partial SBO, treated with medical management, now resolved. Patient noted to have osteolytic vertebral  and pelvic lesions,  concerning for malignancy s/p IR guided bone biopsy. Also with new onset prolong PTT, raising concern for APLS vs factor inhibitor

## 2018-01-31 NOTE — PROGRESS NOTE ADULT - PROBLEM SELECTOR PROBLEM 5
COPD (chronic obstructive pulmonary disease)
Hypertension, unspecified type
Hypertension, unspecified type
COPD (chronic obstructive pulmonary disease)

## 2018-02-05 LAB
CARDIOLIPIN IGM SER-MCNC: 6.6 MPL — SIGNIFICANT CHANGE UP (ref 0–11)
CARDIOLIPIN IGM SER-MCNC: 8.47 GPL — SIGNIFICANT CHANGE UP (ref 0–23)
NON-GYNECOLOGICAL CYTOLOGY STUDY: SIGNIFICANT CHANGE UP

## 2018-02-09 ENCOUNTER — APPOINTMENT (OUTPATIENT)
Dept: CARDIOLOGY | Facility: CLINIC | Age: 63
End: 2018-02-09

## 2018-02-26 ENCOUNTER — RX RENEWAL (OUTPATIENT)
Age: 63
End: 2018-02-26

## 2018-02-27 ENCOUNTER — APPOINTMENT (OUTPATIENT)
Dept: INTERVENTIONAL RADIOLOGY/VASCULAR | Facility: CLINIC | Age: 63
End: 2018-02-27
Payer: COMMERCIAL

## 2018-02-28 ENCOUNTER — APPOINTMENT (OUTPATIENT)
Dept: INTERVENTIONAL RADIOLOGY/VASCULAR | Facility: CLINIC | Age: 63
End: 2018-02-28
Payer: COMMERCIAL

## 2018-03-06 ENCOUNTER — APPOINTMENT (OUTPATIENT)
Dept: INTERVENTIONAL RADIOLOGY/VASCULAR | Facility: CLINIC | Age: 63
End: 2018-03-06
Payer: COMMERCIAL

## 2018-03-06 VITALS
DIASTOLIC BLOOD PRESSURE: 88 MMHG | BODY MASS INDEX: 28.09 KG/M2 | HEIGHT: 69.5 IN | HEART RATE: 66 BPM | OXYGEN SATURATION: 97 % | WEIGHT: 194 LBS | SYSTOLIC BLOOD PRESSURE: 145 MMHG | RESPIRATION RATE: 16 BRPM

## 2018-03-06 DIAGNOSIS — M89.9 DISORDER OF BONE, UNSPECIFIED: ICD-10-CM

## 2018-03-06 PROCEDURE — 99244 OFF/OP CNSLTJ NEW/EST MOD 40: CPT

## 2018-03-06 RX ORDER — LEVOFLOXACIN 250 MG/1
250 TABLET, FILM COATED ORAL
Qty: 20 | Refills: 0 | Status: COMPLETED | COMMUNITY
Start: 2017-12-13

## 2018-03-06 RX ORDER — BACLOFEN 20 MG/1
20 TABLET ORAL
Qty: 30 | Refills: 0 | Status: COMPLETED | COMMUNITY
Start: 2018-01-18

## 2018-03-06 RX ORDER — DICLOFENAC SODIUM 75 MG/1
75 TABLET, DELAYED RELEASE ORAL
Qty: 20 | Refills: 0 | Status: COMPLETED | COMMUNITY
Start: 2018-01-18

## 2018-03-06 RX ORDER — CYCLOBENZAPRINE HYDROCHLORIDE 5 MG/1
5 TABLET, FILM COATED ORAL
Qty: 21 | Refills: 0 | Status: COMPLETED | COMMUNITY
Start: 2017-12-31

## 2018-03-06 RX ORDER — BENZONATATE 100 MG/1
100 CAPSULE ORAL
Qty: 30 | Refills: 0 | Status: COMPLETED | COMMUNITY
Start: 2017-12-13

## 2018-03-09 ENCOUNTER — RX RENEWAL (OUTPATIENT)
Age: 63
End: 2018-03-09

## 2018-04-04 ENCOUNTER — INPATIENT (INPATIENT)
Facility: HOSPITAL | Age: 63
LOS: 6 days | Discharge: HOME | End: 2018-04-11
Attending: HOSPITALIST | Admitting: HOSPITALIST
Payer: COMMERCIAL

## 2018-04-04 ENCOUNTER — OUTPATIENT (OUTPATIENT)
Dept: OUTPATIENT SERVICES | Facility: HOSPITAL | Age: 63
LOS: 1 days | End: 2018-04-04

## 2018-04-04 VITALS
RESPIRATION RATE: 18 BRPM | TEMPERATURE: 98 F | HEART RATE: 94 BPM | OXYGEN SATURATION: 100 % | SYSTOLIC BLOOD PRESSURE: 131 MMHG | DIASTOLIC BLOOD PRESSURE: 90 MMHG

## 2018-04-04 DIAGNOSIS — I48.91 UNSPECIFIED ATRIAL FIBRILLATION: ICD-10-CM

## 2018-04-04 DIAGNOSIS — J44.9 CHRONIC OBSTRUCTIVE PULMONARY DISEASE, UNSPECIFIED: ICD-10-CM

## 2018-04-04 DIAGNOSIS — I10 ESSENTIAL (PRIMARY) HYPERTENSION: ICD-10-CM

## 2018-04-04 DIAGNOSIS — Z98.890 OTHER SPECIFIED POSTPROCEDURAL STATES: Chronic | ICD-10-CM

## 2018-04-04 DIAGNOSIS — C61 MALIGNANT NEOPLASM OF PROSTATE: ICD-10-CM

## 2018-04-04 DIAGNOSIS — Z29.9 ENCOUNTER FOR PROPHYLACTIC MEASURES, UNSPECIFIED: ICD-10-CM

## 2018-04-04 DIAGNOSIS — I48.92 UNSPECIFIED ATRIAL FLUTTER: ICD-10-CM

## 2018-04-04 LAB
ALBUMIN SERPL ELPH-MCNC: 2.8 G/DL — LOW (ref 3.3–5)
ALP SERPL-CCNC: 229 U/L — HIGH (ref 40–120)
ALT FLD-CCNC: 14 U/L — SIGNIFICANT CHANGE UP (ref 4–41)
APTT BLD: 51.9 SEC — HIGH (ref 27.5–37.4)
AST SERPL-CCNC: 21 U/L — SIGNIFICANT CHANGE UP (ref 4–40)
BASOPHILS # BLD AUTO: 0.06 K/UL — SIGNIFICANT CHANGE UP (ref 0–0.2)
BASOPHILS NFR BLD AUTO: 0.6 % — SIGNIFICANT CHANGE UP (ref 0–2)
BILIRUB SERPL-MCNC: 0.4 MG/DL — SIGNIFICANT CHANGE UP (ref 0.2–1.2)
BUN SERPL-MCNC: 19 MG/DL — SIGNIFICANT CHANGE UP (ref 7–23)
CALCIUM SERPL-MCNC: 8.8 MG/DL — SIGNIFICANT CHANGE UP (ref 8.4–10.5)
CHLORIDE SERPL-SCNC: 100 MMOL/L — SIGNIFICANT CHANGE UP (ref 98–107)
CK MB BLD-MCNC: 1 NG/ML — SIGNIFICANT CHANGE UP (ref 1–6.6)
CK SERPL-CCNC: 55 U/L — SIGNIFICANT CHANGE UP (ref 30–200)
CO2 SERPL-SCNC: 26 MMOL/L — SIGNIFICANT CHANGE UP (ref 22–31)
CREAT SERPL-MCNC: 1.09 MG/DL — SIGNIFICANT CHANGE UP (ref 0.5–1.3)
EOSINOPHIL # BLD AUTO: 0.17 K/UL — SIGNIFICANT CHANGE UP (ref 0–0.5)
EOSINOPHIL NFR BLD AUTO: 1.8 % — SIGNIFICANT CHANGE UP (ref 0–6)
GLUCOSE SERPL-MCNC: 114 MG/DL — HIGH (ref 70–99)
HCT VFR BLD CALC: 44.3 % — SIGNIFICANT CHANGE UP (ref 39–50)
HGB BLD-MCNC: 14.1 G/DL — SIGNIFICANT CHANGE UP (ref 13–17)
IMM GRANULOCYTES # BLD AUTO: 0.12 # — SIGNIFICANT CHANGE UP
IMM GRANULOCYTES NFR BLD AUTO: 1.3 % — SIGNIFICANT CHANGE UP (ref 0–1.5)
INR BLD: 1.17 — SIGNIFICANT CHANGE UP (ref 0.88–1.17)
LYMPHOCYTES # BLD AUTO: 1.7 K/UL — SIGNIFICANT CHANGE UP (ref 1–3.3)
LYMPHOCYTES # BLD AUTO: 18.3 % — SIGNIFICANT CHANGE UP (ref 13–44)
MAGNESIUM SERPL-MCNC: 2 MG/DL — SIGNIFICANT CHANGE UP (ref 1.6–2.6)
MCHC RBC-ENTMCNC: 28.1 PG — SIGNIFICANT CHANGE UP (ref 27–34)
MCHC RBC-ENTMCNC: 31.8 % — LOW (ref 32–36)
MCV RBC AUTO: 88.4 FL — SIGNIFICANT CHANGE UP (ref 80–100)
MONOCYTES # BLD AUTO: 1.21 K/UL — HIGH (ref 0–0.9)
MONOCYTES NFR BLD AUTO: 13 % — SIGNIFICANT CHANGE UP (ref 2–14)
NEUTROPHILS # BLD AUTO: 6.05 K/UL — SIGNIFICANT CHANGE UP (ref 1.8–7.4)
NEUTROPHILS NFR BLD AUTO: 65 % — SIGNIFICANT CHANGE UP (ref 43–77)
NRBC # FLD: 0 — SIGNIFICANT CHANGE UP
PHOSPHATE SERPL-MCNC: 3.1 MG/DL — SIGNIFICANT CHANGE UP (ref 2.5–4.5)
PLATELET # BLD AUTO: 429 K/UL — HIGH (ref 150–400)
PMV BLD: 10.3 FL — SIGNIFICANT CHANGE UP (ref 7–13)
POTASSIUM SERPL-MCNC: 3.8 MMOL/L — SIGNIFICANT CHANGE UP (ref 3.5–5.3)
POTASSIUM SERPL-SCNC: 3.8 MMOL/L — SIGNIFICANT CHANGE UP (ref 3.5–5.3)
PROT SERPL-MCNC: 6.9 G/DL — SIGNIFICANT CHANGE UP (ref 6–8.3)
PROTHROM AB SERPL-ACNC: 13.5 SEC — HIGH (ref 9.8–13.1)
RBC # BLD: 5.01 M/UL — SIGNIFICANT CHANGE UP (ref 4.2–5.8)
RBC # FLD: 14.6 % — HIGH (ref 10.3–14.5)
SODIUM SERPL-SCNC: 138 MMOL/L — SIGNIFICANT CHANGE UP (ref 135–145)
TROPONIN T SERPL-MCNC: < 0.06 NG/ML — SIGNIFICANT CHANGE UP (ref 0–0.06)
TSH SERPL-MCNC: 4.12 UIU/ML — SIGNIFICANT CHANGE UP (ref 0.27–4.2)
WBC # BLD: 9.31 K/UL — SIGNIFICANT CHANGE UP (ref 3.8–10.5)
WBC # FLD AUTO: 9.31 K/UL — SIGNIFICANT CHANGE UP (ref 3.8–10.5)

## 2018-04-04 PROCEDURE — 99223 1ST HOSP IP/OBS HIGH 75: CPT

## 2018-04-04 PROCEDURE — 93010 ELECTROCARDIOGRAM REPORT: CPT

## 2018-04-04 RX ORDER — SENNA PLUS 8.6 MG/1
2 TABLET ORAL AT BEDTIME
Qty: 0 | Refills: 0 | Status: DISCONTINUED | OUTPATIENT
Start: 2018-04-04 | End: 2018-04-11

## 2018-04-04 RX ORDER — BICALUTAMIDE 50 MG/1
50 TABLET, FILM COATED ORAL DAILY
Qty: 0 | Refills: 0 | Status: DISCONTINUED | OUTPATIENT
Start: 2018-04-04 | End: 2018-04-11

## 2018-04-04 RX ORDER — METOPROLOL TARTRATE 50 MG
75 TABLET ORAL EVERY 12 HOURS
Qty: 0 | Refills: 0 | Status: DISCONTINUED | OUTPATIENT
Start: 2018-04-04 | End: 2018-04-05

## 2018-04-04 RX ORDER — METOPROLOL TARTRATE 50 MG
75 TABLET ORAL EVERY 8 HOURS
Qty: 0 | Refills: 0 | Status: DISCONTINUED | OUTPATIENT
Start: 2018-04-04 | End: 2018-04-04

## 2018-04-04 RX ADMIN — Medication 75 MILLIGRAM(S): at 19:08

## 2018-04-04 RX ADMIN — SENNA PLUS 2 TABLET(S): 8.6 TABLET ORAL at 21:12

## 2018-04-04 RX ADMIN — BICALUTAMIDE 50 MILLIGRAM(S): 50 TABLET, FILM COATED ORAL at 21:12

## 2018-04-04 NOTE — H&P ADULT - ASSESSMENT
62M PMH HTN, COPD, aflutter s/p ablation (2015) on eliquis, Prostate Ca (diag ~2013) s/p radiation seed implants, no chemo w/ ?mets to spine and pelvis, who presented for IR guided iliac crest bone biopsy found to be in afib with RVR. 62M PMH HTN, COPD, aflutter s/p ablation (2015) on eliquis, Prostate Ca (diag ~2013) s/p radiation seed implants, no chemo w/ ?mets to spine and pelvis, who presented for IR guided iliac crest bone biopsy found to be in aflutter with rate of 150. 63M PMH HTN, COPD, aflutter s/p ablation (2015) on eliquis, Prostate Ca (diag ~2013) s/p radiation seed implants, no chemo w/ ?mets to spine and pelvis, who presented for IR guided iliac crest bone biopsy found to be in aflutter with rate of 150.

## 2018-04-04 NOTE — H&P ADULT - NSHPLABSRESULTS_GEN_ALL_CORE
CBC Full  -  ( 04 Apr 2018 19:25 )  WBC Count : 9.31 K/uL  Hemoglobin : 14.1 g/dL  Hematocrit : 44.3 %  Platelet Count - Automated : 429 K/uL  Mean Cell Volume : 88.4 fL  Mean Cell Hemoglobin : 28.1 pg  Mean Cell Hemoglobin Concentration : 31.8 %  Auto Neutrophil # : 6.05 K/uL  Auto Lymphocyte # : 1.70 K/uL  Auto Monocyte # : 1.21 K/uL  Auto Eosinophil # : 0.17 K/uL  Auto Basophil # : 0.06 K/uL  Auto Neutrophil % : 65.0 %  Auto Lymphocyte % : 18.3 %  Auto Monocyte % : 13.0 %  Auto Eosinophil % : 1.8 %  Auto Basophil % : 0.6 %  04-04    138  |  100  |  19  ----------------------------<  114<H>  3.8   |  26  |  1.09    Ca    8.8      04 Apr 2018 19:25  Phos  3.1     04-04  Mg     2.0     04-04    TPro  6.9  /  Alb  2.8<L>  /  TBili  0.4  /  DBili  x   /  AST  21  /  ALT  14  /  AlkPhos  229<H>  04-04

## 2018-04-04 NOTE — H&P ADULT - NSHPREVIEWOFSYSTEMS_GEN_ALL_CORE
General: Denies fever, chills, weight loss  HEENT: Denies vision changes  Cardio: Denies CP, palpitations  Pulm: Denies SOB, wheezing, cough,   GI: Denies nausea, vomiting, abdominal pain, dark stools  Neuro: Denies focal weakness, headaches, decreased/altered sensation  Musc Skel: (+) back pain,   Uro: Denies dysuria  Skin: Denies recent rashes  Endocrine: Denies polyuria, polydipsia  Psych: Denies anxiety and depressed mood

## 2018-04-04 NOTE — H&P ADULT - PROBLEM SELECTOR PLAN 5
DVT: heparin sq, CHADSVASc 1 therefore bridging not indicated while holding eliquis for potential bone biopsy DVT ppx: Improve score of 2. Holding eliquis for now in anticipation of possible bone bx. CHADSVASc 1 therefore bridging not indicated at this time. Will use HSQ prophylactic dose for now.

## 2018-04-04 NOTE — H&P ADULT - PROBLEM SELECTOR PLAN 2
- presented for outpatient bone biopsy  - will continue with bicalutamide here - presented for outpatient bone biopsy  - will continue with bicalutamide here  - will contact Dr Beckwith tomorrow for any further w/u needed at this time while admitted - presented for outpatient bone biopsy  - c/w with bicalutamide here  - will contact pts oncologist Dr Beckwith tomorrow

## 2018-04-04 NOTE — H&P ADULT - HISTORY OF PRESENT ILLNESS
62M PMH HTN, COPD, aflutter s/p ablation (2015), Prostate Ca (diag) s/p radiation seed implants, no chemo w/ mets to spine ad pelvis, who presented for IR guided iliac crest bone biopsy. During preprocedure eval, pt noted to be tachycardic to 120s. EKG showed aflutter/afin. Pt received metop 5mg IV, esomol 10mg IV, fentanyl 100mg IV, phenylephrine 100. HR remained > 120, one documentation showed HR > 150. Sees a urologist, does not remember his name.     On tele in recovery room, HR 100s-140 mostly in 110s-120, BP 120s/80, RR 14, o2 97, on RA, 62M PMH HTN, COPD, aflutter s/p ablation (2015) on eliquis, Prostate Ca (diag ~2013) s/p radiation seed implants, no chemo w/ ?mets to spine and pelvis, who presented for IR guided iliac crest bone biopsy. During preprocedure eval, pt noted to be tachycardic to 120s. EKG showed aflutter/afib. Pt received metop 5mg IV, esmolol 10mg IV, fentanyl 100mg IV, phenylephrine 100. HR remained > 120, one documentation showed HR > 150. Sees a urologist, does not remember his name, last seen over a year ago. Patient reports 2 weeks ago he had an upper respiratory infection and went to urgent care for cough and increased sputum production. He reports he had a CXR that was clear. He was given 10 days of ceftin which was completed on yesterday (4/3). Patient denies any recent CP, palpitations, HA, lightheadedness. He reports SOB only with increased exertion, he is able to ambulate multiple blocks without SOB. Denies any LE swelling. He reports since the ablation he has always had good HR in his cardiology office. Patient reports during this episode at the preprocedure room he was not having any symptoms, only baseline back pain for which he takes ibuprofen at home. Denies any numbness, tingling, weakness, bowel or bladder incontinence.     On tele in recovery room, HR 100s-140 mostly in 110s-120, BP 120s/80, RR 14, o2 97, on RA. 63M PMH HTN, COPD, aflutter s/p ablation (2015) on eliquis, Prostate Ca (diag ~2013) s/p radiation seed implants, no chemo w/ ?mets to spine and pelvis, who presented for IR guided iliac crest bone biopsy. During preprocedure eval, pt noted to be tachycardic to 120s. EKG showed aflutter/afib. Pt received metop 5mg IV, esmolol 10mg IV, fentanyl 100mg IV, phenylephrine 100. HR remained > 120, one documentation showed HR > 150. Sees a urologist, does not remember his name, last seen over a year ago. Patient reports 2 weeks ago he had an upper respiratory infection and went to urgent care for cough and increased sputum production. He reports he had a CXR that was clear. He was given 10 days of ceftin which was completed on yesterday (4/3). Patient denies any recent CP, palpitations, HA, lightheadedness. He reports SOB only with increased exertion, he is able to ambulate multiple blocks without SOB. Denies any LE swelling. He reports since the ablation he has always had good HR in his cardiology office. Patient reports during this episode at the preprocedure room he was not having any symptoms, only baseline back pain for which he takes ibuprofen at home. Denies any numbness, tingling, weakness, bowel or bladder incontinence.     On tele in recovery room, HR 100s-140 mostly in 110s-120, BP 120s/80, RR 14, o2 97, on RA.

## 2018-04-04 NOTE — H&P ADULT - NSHPPHYSICALEXAM_GEN_ALL_CORE
GENERAL: NAD, well-developed  HEAD:  Atraumatic, Normocephalic  EYES: EOMI, PERRLA, conjunctiva and sclera clear  NECK: Supple, No JVD  CHEST/LUNG: Clear to auscultation bilaterally; No wheezes or rales  HEART: tachycardic and irregular rhythm; No murmurs, rubs, or gallops  ABDOMEN: Soft, Nontender, Nondistended; Bowel sounds present\  BACK: entirety of spine nontender to palpation  EXTREMITIES:  2+ Peripheral Pulses, No clubbing, cyanosis, or edema  PSYCH: AAOx3  NEUROLOGY: sensation grossly intact, strength intact in b/l LE and UE  SKIN: No rashes or lesions

## 2018-04-04 NOTE — CONSULT NOTE ADULT - ASSESSMENT
62 M PMH HTN, COPD, aflutter s/p ablation (2015),  Prostate CA, who presented for IR guided bone biopsy, which was cancelled due to tachycardia. Patient appears to be in aflutter on tele however no ECG available. No labs drawn today, no vitals charted.     -check basic labs- CBC, CMP, TSH  -check ECG  -continue home metoprolol dose. Recommend improved pain control. If pain adequately controlled, then would recommend increasing beta blocker for goal resting HR<110

## 2018-04-04 NOTE — CONSULT NOTE ADULT - SUBJECTIVE AND OBJECTIVE BOX
Chief Complaint:  back pain    HPI:  62 M PMH HTN, COPD, aflutter s/p ablation (2015),  Prostate CA, who presented for IR guided bone biopsy, which was cancelled due to tachycardia. Patient noted to have osteolytic vertebral  and pelvic lesions on previous scans. Patient was in preop and HR in 110-120's. Was complaining of back pain, reports history of herniated disc. Patient appeared to be in afib or aflutter per IR fellow. Was given esmolol IV and lopressor IV and HR has been 100-110s. No CP or SOB. Last TTE in 2015 with no abnormalities.  No vitals, labs, or ECG available at this time.    PMH:   Atrial fibrillation and flutter  Ventral hernia  Inguinal hernia  Atrial fibrillation  Prostate Cancer  Diverticulitis of Colon  Emphysema  HTN (Hypertension)      PSH:   History of ventral hernia repair  S/P Kamini's procedure  History of Hernia Surgery  History of Cholecystectomy  History of Appendectomy      Medications:       Allergies:  azithromycin (Vomiting)  No Known Drug Allergies  shellfish (Unknown)      FAMILY HISTORY:  Family history of hypertension (Father)  Family history of heart attack (Father)      Social History:  Smoking History: former  Alcohol Use: denies  Drug Use: denies    Review of Systems:  REVIEW OF SYSTEMS:    CONSTITUTIONAL: No weakness, fevers or chills  EYES/ENT: No visual changes;  No dysphagia  NECK: No pain or stiffness  RESPIRATORY: No cough, wheezing, hemoptysis; No shortness of breath  CARDIOVASCULAR: No chest pain or palpitations; No lower extremity edema  GASTROINTESTINAL: No abdominal or epigastric pain. No nausea, vomiting, or hematemesis; No diarrhea or constipation. No melena or hematochezia.  BACK: No back pain  GENITOURINARY: No dysuria, frequency or hematuria  NEUROLOGICAL: No numbness or weakness  SKIN: No itching, burning, rashes, or lesions   All other review of systems is negative unless indicated above.    Physical Exam:  T(F): --  HR: --  BP: --  RR: --  SpO2: --  GENERAL: No acute distress, NC in place  HEAD:  Atraumatic, Normocephalic  ENT: EOMI, no JVD  CHEST/LUNG: Clear to auscultation bilaterally  HEART: Irregular, tachycardic; No murmurs  ABDOMEN: Soft, Nontender  EXTREMITIES:  No LE edema  PSYCH: Nl behavior, nl affect  NEUROLOGY: AAOx3, non-focal, cranial nerves intact      Cardiovascular Diagnostic Testing:    TTE:  CONCLUSIONS:  1. Normal mitral valve. Mild mitral regurgitation.  2. Normal trileaflet aortic valve.  3. Normal size aortic root, aortic arch and descending  thoracic aorta. No plaque.  4. No left atrial or left atrial appendage thrombus.  5. Normal left ventricular systolic function. No segmental  wall motion abnormalities.  6. Normal pericardium with no pericardial effusion.

## 2018-04-04 NOTE — H&P ADULT - PROBLEM SELECTOR PLAN 1
- patient presented for outpatient bone biopsy and found to be in afib with RVR requiring metoprolol and esmolol IV pushes  - EKG   - f/u BMP, TSH  - will obtain 2 set cardiac enzymes  - monitor on tele  - c/w home dose metoprolol 75 BID, will uptitrate if needed  - holding AC for potential biopsy, will continue to hold for biopsy once rate controlled - patient presented for outpatient bone biopsy and found to be in aflutter with RVR requiring metoprolol and esmolol IV pushes, possibly in setting of uncontrolled pain  - EKG   - f/u BMP, TSH  - will obtain 2 set cardiac enzymes  - monitor on tele  - c/w home dose metoprolol 75 BID, will uptitrate if needed  - holding AC for potential biopsy, will continue to hold for biopsy once rate controlled - patient presented for outpatient bone biopsy and found to be in aflutter with RVR requiring metoprolol and esmolol IV pushes, possibly in setting of uncontrolled pain  - EKG with aflutter rate 106  - f/u BMP, TSH  - will obtain 2 set cardiac enzymes  - monitor on tele  - c/w home dose metoprolol 75 BID, will uptitrate if needed  - holding AC for potential biopsy, will continue to hold for biopsy once rate controlled [facility] for [facility] bone biopsy and found to be in aflutter with RVR requiring metoprolol and esmolol IV pushes, Possibly in the setting of pain  - EKG personally reviewed with aflutter rate 106  - BMP and TSH unremarkable, CE neg x1   - monitor on tele  - c/w home dose metoprolol 75 BID, will uptitrate if needed  - holding AC for potential biopsy, will continue to hold for biopsy once rate controlled

## 2018-04-04 NOTE — H&P ADULT - NSHPSOCIALHISTORY_GEN_ALL_CORE
Patient is a former smoker ~15py, quit 8 years ago. Does not drink alcohol. 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

## 2018-04-04 NOTE — H&P ADULT - PROBLEM SELECTOR PLAN 4
- no SOB, cough, sputum at this time  - not in exacerbation  - uses albuterol prn at home, will restart if symptomatic - no SOB, cough, sputum at this time  - not in exacerbation  - will treat w/ albuterol prn

## 2018-04-05 LAB
ALBUMIN SERPL ELPH-MCNC: 2.6 G/DL — LOW (ref 3.3–5)
ALP SERPL-CCNC: 223 U/L — HIGH (ref 40–120)
ALT FLD-CCNC: 14 U/L — SIGNIFICANT CHANGE UP (ref 4–41)
APTT BLD: 46 SEC — HIGH (ref 27.5–37.4)
AST SERPL-CCNC: 21 U/L — SIGNIFICANT CHANGE UP (ref 4–40)
BASOPHILS # BLD AUTO: 0.06 K/UL — SIGNIFICANT CHANGE UP (ref 0–0.2)
BASOPHILS NFR BLD AUTO: 0.6 % — SIGNIFICANT CHANGE UP (ref 0–2)
BILIRUB SERPL-MCNC: 0.4 MG/DL — SIGNIFICANT CHANGE UP (ref 0.2–1.2)
BUN SERPL-MCNC: 19 MG/DL — SIGNIFICANT CHANGE UP (ref 7–23)
BUN SERPL-MCNC: 21 MG/DL — SIGNIFICANT CHANGE UP (ref 7–23)
CALCIUM SERPL-MCNC: 8.2 MG/DL — LOW (ref 8.4–10.5)
CALCIUM SERPL-MCNC: 8.7 MG/DL — SIGNIFICANT CHANGE UP (ref 8.4–10.5)
CHLORIDE SERPL-SCNC: 101 MMOL/L — SIGNIFICANT CHANGE UP (ref 98–107)
CHLORIDE SERPL-SCNC: 103 MMOL/L — SIGNIFICANT CHANGE UP (ref 98–107)
CK MB BLD-MCNC: 1 NG/ML — SIGNIFICANT CHANGE UP (ref 1–6.6)
CK SERPL-CCNC: 57 U/L — SIGNIFICANT CHANGE UP (ref 30–200)
CO2 SERPL-SCNC: 22 MMOL/L — SIGNIFICANT CHANGE UP (ref 22–31)
CO2 SERPL-SCNC: 27 MMOL/L — SIGNIFICANT CHANGE UP (ref 22–31)
CREAT SERPL-MCNC: 0.95 MG/DL — SIGNIFICANT CHANGE UP (ref 0.5–1.3)
CREAT SERPL-MCNC: 0.98 MG/DL — SIGNIFICANT CHANGE UP (ref 0.5–1.3)
EOSINOPHIL # BLD AUTO: 0.14 K/UL — SIGNIFICANT CHANGE UP (ref 0–0.5)
EOSINOPHIL NFR BLD AUTO: 1.4 % — SIGNIFICANT CHANGE UP (ref 0–6)
GLUCOSE SERPL-MCNC: 109 MG/DL — HIGH (ref 70–99)
GLUCOSE SERPL-MCNC: 114 MG/DL — HIGH (ref 70–99)
HCT VFR BLD CALC: 39.6 % — SIGNIFICANT CHANGE UP (ref 39–50)
HGB BLD-MCNC: 12.9 G/DL — LOW (ref 13–17)
IMM GRANULOCYTES # BLD AUTO: 0.1 # — SIGNIFICANT CHANGE UP
IMM GRANULOCYTES NFR BLD AUTO: 1 % — SIGNIFICANT CHANGE UP (ref 0–1.5)
INR BLD: 1.27 — HIGH (ref 0.88–1.17)
LYMPHOCYTES # BLD AUTO: 1.24 K/UL — SIGNIFICANT CHANGE UP (ref 1–3.3)
LYMPHOCYTES # BLD AUTO: 12.1 % — LOW (ref 13–44)
MAGNESIUM SERPL-MCNC: 1.8 MG/DL — SIGNIFICANT CHANGE UP (ref 1.6–2.6)
MAGNESIUM SERPL-MCNC: 1.9 MG/DL — SIGNIFICANT CHANGE UP (ref 1.6–2.6)
MCHC RBC-ENTMCNC: 28.4 PG — SIGNIFICANT CHANGE UP (ref 27–34)
MCHC RBC-ENTMCNC: 32.6 % — SIGNIFICANT CHANGE UP (ref 32–36)
MCV RBC AUTO: 87.2 FL — SIGNIFICANT CHANGE UP (ref 80–100)
MONOCYTES # BLD AUTO: 1.43 K/UL — HIGH (ref 0–0.9)
MONOCYTES NFR BLD AUTO: 13.9 % — SIGNIFICANT CHANGE UP (ref 2–14)
NEUTROPHILS # BLD AUTO: 7.31 K/UL — SIGNIFICANT CHANGE UP (ref 1.8–7.4)
NEUTROPHILS NFR BLD AUTO: 71 % — SIGNIFICANT CHANGE UP (ref 43–77)
NRBC # FLD: 0 — SIGNIFICANT CHANGE UP
PHOSPHATE SERPL-MCNC: 2.1 MG/DL — LOW (ref 2.5–4.5)
PHOSPHATE SERPL-MCNC: 2.6 MG/DL — SIGNIFICANT CHANGE UP (ref 2.5–4.5)
PLATELET # BLD AUTO: 383 K/UL — SIGNIFICANT CHANGE UP (ref 150–400)
PMV BLD: 10.7 FL — SIGNIFICANT CHANGE UP (ref 7–13)
POTASSIUM SERPL-MCNC: 3 MMOL/L — LOW (ref 3.5–5.3)
POTASSIUM SERPL-MCNC: 4.1 MMOL/L — SIGNIFICANT CHANGE UP (ref 3.5–5.3)
POTASSIUM SERPL-SCNC: 3 MMOL/L — LOW (ref 3.5–5.3)
POTASSIUM SERPL-SCNC: 4.1 MMOL/L — SIGNIFICANT CHANGE UP (ref 3.5–5.3)
PROT SERPL-MCNC: 6.1 G/DL — SIGNIFICANT CHANGE UP (ref 6–8.3)
PROTHROM AB SERPL-ACNC: 14.2 SEC — HIGH (ref 9.8–13.1)
RBC # BLD: 4.54 M/UL — SIGNIFICANT CHANGE UP (ref 4.2–5.8)
RBC # FLD: 14.6 % — HIGH (ref 10.3–14.5)
SODIUM SERPL-SCNC: 138 MMOL/L — SIGNIFICANT CHANGE UP (ref 135–145)
SODIUM SERPL-SCNC: 140 MMOL/L — SIGNIFICANT CHANGE UP (ref 135–145)
TROPONIN T SERPL-MCNC: < 0.06 NG/ML — SIGNIFICANT CHANGE UP (ref 0–0.06)
WBC # BLD: 10.28 K/UL — SIGNIFICANT CHANGE UP (ref 3.8–10.5)
WBC # FLD AUTO: 10.28 K/UL — SIGNIFICANT CHANGE UP (ref 3.8–10.5)

## 2018-04-05 PROCEDURE — 99233 SBSQ HOSP IP/OBS HIGH 50: CPT

## 2018-04-05 PROCEDURE — 99222 1ST HOSP IP/OBS MODERATE 55: CPT

## 2018-04-05 RX ORDER — HEPARIN SODIUM 5000 [USP'U]/ML
5000 INJECTION INTRAVENOUS; SUBCUTANEOUS EVERY 8 HOURS
Qty: 0 | Refills: 0 | Status: COMPLETED | OUTPATIENT
Start: 2018-04-05 | End: 2018-04-05

## 2018-04-05 RX ORDER — MAGNESIUM SULFATE 500 MG/ML
1 VIAL (ML) INJECTION ONCE
Qty: 0 | Refills: 0 | Status: COMPLETED | OUTPATIENT
Start: 2018-04-05 | End: 2018-04-05

## 2018-04-05 RX ORDER — POTASSIUM CHLORIDE 20 MEQ
10 PACKET (EA) ORAL
Qty: 0 | Refills: 0 | Status: COMPLETED | OUTPATIENT
Start: 2018-04-05 | End: 2018-04-05

## 2018-04-05 RX ORDER — HEPARIN SODIUM 5000 [USP'U]/ML
5000 INJECTION INTRAVENOUS; SUBCUTANEOUS EVERY 8 HOURS
Qty: 0 | Refills: 0 | Status: DISCONTINUED | OUTPATIENT
Start: 2018-04-05 | End: 2018-04-05

## 2018-04-05 RX ORDER — METOPROLOL TARTRATE 50 MG
25 TABLET ORAL ONCE
Qty: 0 | Refills: 0 | Status: COMPLETED | OUTPATIENT
Start: 2018-04-05 | End: 2018-04-05

## 2018-04-05 RX ORDER — OXYCODONE HYDROCHLORIDE 5 MG/1
5 TABLET ORAL ONCE
Qty: 0 | Refills: 0 | Status: DISCONTINUED | OUTPATIENT
Start: 2018-04-05 | End: 2018-04-05

## 2018-04-05 RX ORDER — ACETAMINOPHEN 500 MG
975 TABLET ORAL EVERY 6 HOURS
Qty: 0 | Refills: 0 | Status: DISCONTINUED | OUTPATIENT
Start: 2018-04-05 | End: 2018-04-11

## 2018-04-05 RX ORDER — METOPROLOL TARTRATE 50 MG
5 TABLET ORAL ONCE
Qty: 0 | Refills: 0 | Status: COMPLETED | OUTPATIENT
Start: 2018-04-05 | End: 2018-04-05

## 2018-04-05 RX ORDER — OXYCODONE HYDROCHLORIDE 5 MG/1
5 TABLET ORAL EVERY 4 HOURS
Qty: 0 | Refills: 0 | Status: DISCONTINUED | OUTPATIENT
Start: 2018-04-05 | End: 2018-04-11

## 2018-04-05 RX ORDER — METOPROLOL TARTRATE 50 MG
100 TABLET ORAL EVERY 12 HOURS
Qty: 0 | Refills: 0 | Status: DISCONTINUED | OUTPATIENT
Start: 2018-04-05 | End: 2018-04-06

## 2018-04-05 RX ORDER — POTASSIUM CHLORIDE 20 MEQ
40 PACKET (EA) ORAL ONCE
Qty: 0 | Refills: 0 | Status: COMPLETED | OUTPATIENT
Start: 2018-04-05 | End: 2018-04-05

## 2018-04-05 RX ORDER — ACETAMINOPHEN 500 MG
975 TABLET ORAL ONCE
Qty: 0 | Refills: 0 | Status: COMPLETED | OUTPATIENT
Start: 2018-04-05 | End: 2018-04-05

## 2018-04-05 RX ORDER — SODIUM,POTASSIUM PHOSPHATES 278-250MG
1 POWDER IN PACKET (EA) ORAL
Qty: 0 | Refills: 0 | Status: COMPLETED | OUTPATIENT
Start: 2018-04-05 | End: 2018-04-06

## 2018-04-05 RX ORDER — OXYCODONE HYDROCHLORIDE 5 MG/1
10 TABLET ORAL EVERY 4 HOURS
Qty: 0 | Refills: 0 | Status: DISCONTINUED | OUTPATIENT
Start: 2018-04-05 | End: 2018-04-11

## 2018-04-05 RX ADMIN — Medication 975 MILLIGRAM(S): at 11:05

## 2018-04-05 RX ADMIN — HEPARIN SODIUM 5000 UNIT(S): 5000 INJECTION INTRAVENOUS; SUBCUTANEOUS at 13:41

## 2018-04-05 RX ADMIN — Medication 100 MILLIEQUIVALENT(S): at 11:05

## 2018-04-05 RX ADMIN — Medication 975 MILLIGRAM(S): at 05:56

## 2018-04-05 RX ADMIN — OXYCODONE HYDROCHLORIDE 5 MILLIGRAM(S): 5 TABLET ORAL at 01:59

## 2018-04-05 RX ADMIN — BICALUTAMIDE 50 MILLIGRAM(S): 50 TABLET, FILM COATED ORAL at 11:05

## 2018-04-05 RX ADMIN — Medication 5 MILLIGRAM(S): at 14:51

## 2018-04-05 RX ADMIN — Medication 1 TABLET(S): at 21:11

## 2018-04-05 RX ADMIN — Medication 40 MILLIEQUIVALENT(S): at 09:57

## 2018-04-05 RX ADMIN — Medication 100 MILLIGRAM(S): at 17:36

## 2018-04-05 RX ADMIN — Medication 100 MILLIEQUIVALENT(S): at 09:57

## 2018-04-05 RX ADMIN — Medication 75 MILLIGRAM(S): at 05:57

## 2018-04-05 RX ADMIN — Medication 975 MILLIGRAM(S): at 06:26

## 2018-04-05 RX ADMIN — Medication 1 TABLET(S): at 11:05

## 2018-04-05 RX ADMIN — Medication 100 GRAM(S): at 18:01

## 2018-04-05 RX ADMIN — Medication 975 MILLIGRAM(S): at 11:06

## 2018-04-05 RX ADMIN — HEPARIN SODIUM 5000 UNIT(S): 5000 INJECTION INTRAVENOUS; SUBCUTANEOUS at 21:11

## 2018-04-05 RX ADMIN — Medication 25 MILLIGRAM(S): at 14:38

## 2018-04-05 RX ADMIN — Medication 975 MILLIGRAM(S): at 17:36

## 2018-04-05 RX ADMIN — OXYCODONE HYDROCHLORIDE 5 MILLIGRAM(S): 5 TABLET ORAL at 12:44

## 2018-04-05 RX ADMIN — OXYCODONE HYDROCHLORIDE 5 MILLIGRAM(S): 5 TABLET ORAL at 22:03

## 2018-04-05 RX ADMIN — Medication 100 MILLIEQUIVALENT(S): at 12:54

## 2018-04-05 RX ADMIN — OXYCODONE HYDROCHLORIDE 5 MILLIGRAM(S): 5 TABLET ORAL at 02:30

## 2018-04-05 RX ADMIN — OXYCODONE HYDROCHLORIDE 5 MILLIGRAM(S): 5 TABLET ORAL at 14:10

## 2018-04-05 RX ADMIN — Medication 1 TABLET(S): at 17:36

## 2018-04-05 RX ADMIN — OXYCODONE HYDROCHLORIDE 5 MILLIGRAM(S): 5 TABLET ORAL at 23:03

## 2018-04-05 NOTE — PROGRESS NOTE ADULT - ASSESSMENT
63M PMH HTN, COPD, aflutter s/p ablation (2015) on eliquis, Prostate Ca (diag ~2013) s/p radiation seed implants, no chemo w/ ?mets to spine and pelvis, who presented for IR guided iliac crest bone biopsy found to be in aflutter with rate of 150.

## 2018-04-05 NOTE — PROGRESS NOTE ADULT - PROBLEM SELECTOR PLAN 5
DVT ppx: Improve score of 2. Holding eliquis for now in anticipation of possible bone bx. [facility]SV[facility] 1 therefore bridging not indicated at this time. Will use HSQ prophylactic dose for now. DVT ppx: Improve score of 2. Holding eliquis for now in anticipation of possible bone bx. CHADSVASc 1 therefore bridging not indicated at this time. Will use HSQ prophylactic dose for now. will restart eliquis after biopsy

## 2018-04-05 NOTE — PROGRESS NOTE ADULT - SUBJECTIVE AND OBJECTIVE BOX
Patient is a 63y old  Male who presents with a chief complaint of aflutter with RVR (04 Apr 2018 18:41)      SUBJECTIVE / OVERNIGHT EVENTS: Overnight patient had spikes of HR to 130's. All mostly associated with movement.     MEDICATIONS  (STANDING):  acetaminophen   Tablet. 975 milliGRAM(s) Oral every 6 hours  bicalutamide 50 milliGRAM(s) Oral daily  metoprolol tartrate 75 milliGRAM(s) Oral every 12 hours  senna 2 Tablet(s) Oral at bedtime    MEDICATIONS  (PRN):  oxyCODONE    IR 5 milliGRAM(s) Oral every 4 hours PRN Moderate Pain (4 - 6)  oxyCODONE    IR 10 milliGRAM(s) Oral every 4 hours PRN Severe Pain (7 - 10)      PHYSICAL EXAM:  Vital Signs Last 24 Hrs  T(C): 37.6 (05 Apr 2018 04:16), Max: 37.6 (05 Apr 2018 04:16)  T(F): 99.6 (05 Apr 2018 04:16), Max: 99.6 (05 Apr 2018 04:16)  HR: 107 (05 Apr 2018 05:54) (90 - 107)  BP: 150/98 (05 Apr 2018 05:54) (106/76 - 150/98)  BP(mean): --  RR: 18 (05 Apr 2018 05:54) (18 - 18)  SpO2: 99% (05 Apr 2018 05:54) (96% - 100%)    GENERAL: NAD, well-developed  HEAD:  Atraumatic, Normocephalic  EYES: EOMI, PERRLA, conjunctiva and sclera clear  NECK: Supple, No JVD  CHEST/LUNG: Clear to auscultation bilaterally; No wheeze  HEART: Regular rate and rhythm; No murmurs, rubs, or gallops  ABDOMEN: Soft, Nontender, Nondistended; Bowel sounds present  EXTREMITIES:  2+ Peripheral Pulses, No clubbing, cyanosis, or edema  PSYCH: AAOx3  NEUROLOGY: non-focal  SKIN: No rashes or lesions    LABS:  (04-05 @ 06:38)                        12.9  10.28 )-----------( 383                 39.6    Neutrophils = 7.31 (71.0%)  Lymphocytes = 1.24 (12.1%)  Eosinophils = 0.14 (1.4%)  Basophils = 0.06 (0.6%)  Monocytes = 1.43 (13.9%)  Bands = --%    WBC Trend: 10.28<--, 9.31<--  Hb Trend: 12.9<--, 14.1<--  Plt Trend: 383<--, 429<--  04-04    138  |  100  |  19  ----------------------------<  114<H>  3.8   |  26  |  1.09    Ca    8.8      04 Apr 2018 19:25  Phos  3.1     04-04  Mg     2.0     04-04    TPro  6.9  /  Alb  2.8<L>  /  TBili  0.4  /  DBili  x   /  AST  21  /  ALT  14  /  AlkPhos  229<H>  04-04    Creatinine Trend: 1.09<--  ( 05 Apr 2018 06:38 )   PT: 14.2 SEC;   INR: 1.27 ;       PTT:46.0 SEC  CARDIAC MARKERS ( 04 Apr 2018 19:25 )  Trop < 0.06 ng/mL / CK 55 u/L / CKMB 1.00 ng/mL       RADIOLOGY & ADDITIONAL TESTS:  X- Ray:  CT:  Ultrasound:  [ ] imaging personally reviewed and interpreted by me    Consultant(s) Notes Reviewed:  Cardiology    Care Discussed with Consultants/Other Providers: Patient is a 63y old  Male who presents with a chief complaint of aflutter with RVR (04 Apr 2018 18:41)      SUBJECTIVE / OVERNIGHT EVENTS: Overnight patient had spikes of HR to 130's. All mostly associated with movement. Longest episode to 130's for ~10 minutes, did not require IV medication. Patient seen this morning with no complaints. States he has some occasional pain but none this morning on exam. Denies palpitations, CP, SOB, dizziness, abdominal pain, N/V.     MEDICATIONS  (STANDING):  acetaminophen   Tablet. 975 milliGRAM(s) Oral every 6 hours  bicalutamide 50 milliGRAM(s) Oral daily  metoprolol tartrate 75 milliGRAM(s) Oral every 12 hours  senna 2 Tablet(s) Oral at bedtime    MEDICATIONS  (PRN):  oxyCODONE    IR 5 milliGRAM(s) Oral every 4 hours PRN Moderate Pain (4 - 6)  oxyCODONE    IR 10 milliGRAM(s) Oral every 4 hours PRN Severe Pain (7 - 10)      PHYSICAL EXAM:  Vital Signs Last 24 Hrs  T(C): 37.6 (05 Apr 2018 04:16), Max: 37.6 (05 Apr 2018 04:16)  T(F): 99.6 (05 Apr 2018 04:16), Max: 99.6 (05 Apr 2018 04:16)  HR: 107 (05 Apr 2018 05:54) (90 - 107)  BP: 150/98 (05 Apr 2018 05:54) (106/76 - 150/98)  BP(mean): --  RR: 18 (05 Apr 2018 05:54) (18 - 18)  SpO2: 99% (05 Apr 2018 05:54) (96% - 100%)    GENERAL: NAD, well-developed  HEAD:  Atraumatic, Normocephalic  EYES: EOMI, PERRLA, conjunctiva and sclera clear  NECK: Supple, No JVD  CHEST/LUNG: Clear to auscultation bilaterally; No wheezes or rales  HEART: regular rate and irregular rhythm; No murmurs, rubs, or gallops  ABDOMEN: Soft, Nontender, Nondistended; Bowel sounds present\  BACK: entirety of spine nontender to palpation  EXTREMITIES:  2+ Peripheral Pulses, No clubbing, cyanosis, or edema  PSYCH: AAOx3  NEUROLOGY: sensation grossly intact, strength intact in b/l LE and UE  SKIN: No rashes or lesions    LABS:  (04-05 @ 06:38)                        12.9  10.28 )-----------( 383                 39.6    Neutrophils = 7.31 (71.0%)  Lymphocytes = 1.24 (12.1%)  Eosinophils = 0.14 (1.4%)  Basophils = 0.06 (0.6%)  Monocytes = 1.43 (13.9%)  Bands = --%    WBC Trend: 10.28<--, 9.31<--  Hb Trend: 12.9<--, 14.1<--  Plt Trend: 383<--, 429<--  04-04    138  |  100  |  19  ----------------------------<  114<H>  3.8   |  26  |  1.09    Ca    8.8      04 Apr 2018 19:25  Phos  3.1     04-04  Mg     2.0     04-04    TPro  6.9  /  Alb  2.8<L>  /  TBili  0.4  /  DBili  x   /  AST  21  /  ALT  14  /  AlkPhos  229<H>  04-04    Creatinine Trend: 1.09<--  ( 05 Apr 2018 06:38 )   PT: 14.2 SEC;   INR: 1.27 ;       PTT:46.0 SEC  CARDIAC MARKERS ( 04 Apr 2018 19:25 )  Trop < 0.06 ng/mL / CK 55 u/L / CKMB 1.00 ng/mL       RADIOLOGY & ADDITIONAL TESTS:  X- Ray:  CT:  Ultrasound:  [ ] imaging personally reviewed and interpreted by me    Consultant(s) Notes Reviewed:  Cardiology    Care Discussed with Consultants/Other Providers:

## 2018-04-05 NOTE — PROGRESS NOTE ADULT - SUBJECTIVE AND OBJECTIVE BOX
Overnight Events:   Patient in aflutter up to 140's overnight on tele. Patient denies CP or SOB. No palpitations    Medications:  acetaminophen   Tablet. 975 milliGRAM(s) Oral every 6 hours  bicalutamide 50 milliGRAM(s) Oral daily  heparin  Injectable 5000 Unit(s) SubCutaneous every 8 hours  heparin  Injectable 5000 Unit(s) SubCutaneous every 8 hours  metoprolol tartrate 100 milliGRAM(s) Oral every 12 hours  oxyCODONE    IR 5 milliGRAM(s) Oral every 4 hours PRN  oxyCODONE    IR 10 milliGRAM(s) Oral every 4 hours PRN  potassium acid phosphate/sodium acid phosphate tablet (K-PHOS No. 2) 1 Tablet(s) Oral four times a day with meals  potassium chloride  10 mEq/100 mL IVPB 10 milliEquivalent(s) IV Intermittent every 1 hour  senna 2 Tablet(s) Oral at bedtime      PAST MEDICAL & SURGICAL HISTORY:  Atrial fibrillation and flutter  Ventral hernia  Inguinal hernia  Prostate Cancer: with seeds implant    Diverticulitis of Colon: with Hx of colostomy bag  Emphysema  HTN (Hypertension)  History of ventral hernia repair  S/P Kamini's procedure: with reversal  History of Hernia Surgery: BL inguinal  History of Cholecystectomy  History of Appendectomy      Vitals:  T(F): 99.6 (04-05), Max: 99.6 (04-05)  HR: 91 (04-05) (90 - 107)  BP: 110/68 (04-05) (106/76 - 150/98)  RR: 18 (04-05)  SpO2: 96% (04-05)  I&O's Summary      Physical Exam:  Appearance: No acute distress  Eyes: PERRL, EOMI, pink conjunctiva  HENT: Normal oral mucosa  Cardiovascular: irregular, rate , S1, S2, no murmurs; no edema; no JVD  Respiratory: Clear to auscultation bilaterally  Gastrointestinal: soft, non-tender  Musculoskeletal: No clubbing; no joint deformity   Neurologic: Non-focal  Psychiatry: AAOx3, mood & affect appropriate  Skin: No rashes, ecchymoses, or cyanosis                          12.9   10.28 )-----------( 383      ( 05 Apr 2018 06:38 )             39.6     04-05    138  |  101  |  19  ----------------------------<  109<H>  3.0<L>   |  22  |  0.95    Ca    8.2<L>      05 Apr 2018 06:38  Phos  2.1     04-05  Mg     1.8     04-05    TPro  6.1  /  Alb  2.6<L>  /  TBili  0.4  /  DBili  x   /  AST  21  /  ALT  14  /  AlkPhos  223<H>  04-05    PT/INR - ( 05 Apr 2018 06:38 )   PT: 14.2 SEC;   INR: 1.27          PTT - ( 05 Apr 2018 06:38 )  PTT:46.0 SEC  CARDIAC MARKERS ( 05 Apr 2018 06:38 )  x     / < 0.06 ng/mL / 57 u/L / 1.00 ng/mL / x      CARDIAC MARKERS ( 04 Apr 2018 19:25 )  x     / < 0.06 ng/mL / 55 u/L / 1.00 ng/mL / x

## 2018-04-05 NOTE — PROGRESS NOTE ADULT - PROBLEM SELECTOR PLAN 1
[facility] for [facility] biopsy and found to be in aflutter with RVR requiring metoprolol and esmolol IV pushes, Possibly in the setting of pain  - EKG personally reviewed with aflutter rate 106  - BMP and TSH unremarkable, CE neg x1   - monitor on tele  - c/w home dose metoprolol 75 BID, will uptitrate if needed  - holding AC for potential biopsy, will continue to hold for biopsy once rate controlled [facility] 1- patient presented for outpatient bone biopsy and found to be in aflutter with RVR requiring metoprolol and esmolol IV pushes, Possibly in the setting of pain  - EKG personally reviewed with aflutter rate 106 on admission  - BMP and TSH unremarkable, CE neg x2  - monitor on tele, o/n with one episode of RVR to 130s for ~10 minutes, no IV push needed  - increased metoprolol to 100 BID as patient had RVR without pain episode  - holding AC for potential biopsy, will continue to hold for biopsy once rate controlled

## 2018-04-05 NOTE — PROGRESS NOTE ADULT - PROBLEM SELECTOR PLAN 2
- presented for outpatient bone biopsy  - c/w with bicalutamide here  - will contact pts oncologist Dr Beckwith tomorrow - presented for outpatient bone biopsy  - c/w with bicalutamide here  - will contact pts oncologist Dr Beckwith today

## 2018-04-05 NOTE — PROGRESS NOTE ADULT - ASSESSMENT
62 M PMH HTN, COPD, aflutter s/p ablation (2015),  Prostate CA, who presented for IR guided bone biopsy, which was cancelled due to tachycardia. Patient in ?atypical atrial flutter on ECG with rates up to 140's on tele overnight. Denies symptoms. TSH wnl. CE neg x2.    -K>4, Mg>2  -continue lopressor BID. Continue pain control. If pain adequately controlled, then would recommend increasing beta blocker for goal resting HR<110.  -patient would consider repeat ablation probably as outpatient 62 M PMH HTN, COPD, aflutter s/p ablation (2015),  Prostate CA, who presented for IR guided bone biopsy, which was cancelled due to tachycardia. Patient in ?atypical atrial flutter on ECG with rates up to 140's on tele overnight. Denies symptoms. TSH wnl. CE neg x2.    -K>4, Mg>2  -continue lopressor BID. Continue pain control. If pain adequately controlled, then would recommend increasing beta blocker for goal resting HR<110.  -patient would consider repeat ablation probably as outpatient  -continue eliquis 62 M PMH HTN, COPD, aflutter s/p ablation (2015),  Prostate CA, who presented for IR guided bone biopsy, which was cancelled due to tachycardia. Patient in ?atypical atrial flutter on ECG with rates up to 140's on tele overnight. Denies symptoms. TSH wnl. CE neg x2.    -K>4, Mg>2  -continue lopressor BID. Continue pain control. If pain adequately controlled, then would recommend increasing beta blocker for goal resting HR<110.  -patient would consider repeat ablation probably as outpatient  -continue eliquis if no planned procedure

## 2018-04-06 ENCOUNTER — TRANSCRIPTION ENCOUNTER (OUTPATIENT)
Age: 63
End: 2018-04-06

## 2018-04-06 ENCOUNTER — RESULT REVIEW (OUTPATIENT)
Age: 63
End: 2018-04-06

## 2018-04-06 LAB
APTT BLD: 50.2 SEC — HIGH (ref 27.5–37.4)
BUN SERPL-MCNC: 15 MG/DL — SIGNIFICANT CHANGE UP (ref 7–23)
CALCIUM SERPL-MCNC: 8.4 MG/DL — SIGNIFICANT CHANGE UP (ref 8.4–10.5)
CHLORIDE SERPL-SCNC: 98 MMOL/L — SIGNIFICANT CHANGE UP (ref 98–107)
CO2 SERPL-SCNC: 24 MMOL/L — SIGNIFICANT CHANGE UP (ref 22–31)
CREAT SERPL-MCNC: 0.91 MG/DL — SIGNIFICANT CHANGE UP (ref 0.5–1.3)
GLUCOSE SERPL-MCNC: 104 MG/DL — HIGH (ref 70–99)
HCT VFR BLD CALC: 43.4 % — SIGNIFICANT CHANGE UP (ref 39–50)
HGB BLD-MCNC: 13.7 G/DL — SIGNIFICANT CHANGE UP (ref 13–17)
INR BLD: 1.36 — HIGH (ref 0.88–1.17)
MAGNESIUM SERPL-MCNC: 1.9 MG/DL — SIGNIFICANT CHANGE UP (ref 1.6–2.6)
MCHC RBC-ENTMCNC: 27.7 PG — SIGNIFICANT CHANGE UP (ref 27–34)
MCHC RBC-ENTMCNC: 31.6 % — LOW (ref 32–36)
MCV RBC AUTO: 87.9 FL — SIGNIFICANT CHANGE UP (ref 80–100)
NRBC # FLD: 0 — SIGNIFICANT CHANGE UP
PHOSPHATE SERPL-MCNC: 2.4 MG/DL — LOW (ref 2.5–4.5)
PLATELET # BLD AUTO: 375 K/UL — SIGNIFICANT CHANGE UP (ref 150–400)
PMV BLD: 10.7 FL — SIGNIFICANT CHANGE UP (ref 7–13)
POTASSIUM SERPL-MCNC: 3.5 MMOL/L — SIGNIFICANT CHANGE UP (ref 3.5–5.3)
POTASSIUM SERPL-SCNC: 3.5 MMOL/L — SIGNIFICANT CHANGE UP (ref 3.5–5.3)
PROTHROM AB SERPL-ACNC: 15.7 SEC — HIGH (ref 9.8–13.1)
RBC # BLD: 4.94 M/UL — SIGNIFICANT CHANGE UP (ref 4.2–5.8)
RBC # FLD: 14.6 % — HIGH (ref 10.3–14.5)
SODIUM SERPL-SCNC: 138 MMOL/L — SIGNIFICANT CHANGE UP (ref 135–145)
WBC # BLD: 10.6 K/UL — HIGH (ref 3.8–10.5)
WBC # FLD AUTO: 10.6 K/UL — HIGH (ref 3.8–10.5)

## 2018-04-06 PROCEDURE — 88305 TISSUE EXAM BY PATHOLOGIST: CPT | Mod: 26,59

## 2018-04-06 PROCEDURE — 88305 TISSUE EXAM BY PATHOLOGIST: CPT | Mod: 26

## 2018-04-06 PROCEDURE — 88342 IMHCHEM/IMCYTCHM 1ST ANTB: CPT | Mod: 26

## 2018-04-06 PROCEDURE — 99233 SBSQ HOSP IP/OBS HIGH 50: CPT

## 2018-04-06 PROCEDURE — 88173 CYTOPATH EVAL FNA REPORT: CPT | Mod: 26

## 2018-04-06 PROCEDURE — 99232 SBSQ HOSP IP/OBS MODERATE 35: CPT

## 2018-04-06 PROCEDURE — 88341 IMHCHEM/IMCYTCHM EA ADD ANTB: CPT | Mod: 26

## 2018-04-06 PROCEDURE — 88342 IMHCHEM/IMCYTCHM 1ST ANTB: CPT | Mod: 26,59

## 2018-04-06 PROCEDURE — 99223 1ST HOSP IP/OBS HIGH 75: CPT

## 2018-04-06 PROCEDURE — 20220 BONE BIOPSY TROCAR/NDL SUPFC: CPT

## 2018-04-06 PROCEDURE — 88341 IMHCHEM/IMCYTCHM EA ADD ANTB: CPT | Mod: 26,59

## 2018-04-06 PROCEDURE — 88189 FLOWCYTOMETRY/READ 16 & >: CPT

## 2018-04-06 PROCEDURE — 88313 SPECIAL STAINS GROUP 2: CPT | Mod: 26

## 2018-04-06 PROCEDURE — 77012 CT SCAN FOR NEEDLE BIOPSY: CPT | Mod: 26

## 2018-04-06 PROCEDURE — 38222 DX BONE MARROW BX & ASPIR: CPT | Mod: 59,LT

## 2018-04-06 RX ORDER — METOPROLOL TARTRATE 50 MG
25 TABLET ORAL ONCE
Qty: 0 | Refills: 0 | Status: COMPLETED | OUTPATIENT
Start: 2018-04-06 | End: 2018-04-06

## 2018-04-06 RX ORDER — METOPROLOL TARTRATE 50 MG
125 TABLET ORAL EVERY 12 HOURS
Qty: 0 | Refills: 0 | Status: DISCONTINUED | OUTPATIENT
Start: 2018-04-06 | End: 2018-04-11

## 2018-04-06 RX ORDER — METOPROLOL TARTRATE 50 MG
5 TABLET ORAL ONCE
Qty: 0 | Refills: 0 | Status: COMPLETED | OUTPATIENT
Start: 2018-04-06 | End: 2018-04-06

## 2018-04-06 RX ORDER — POTASSIUM CHLORIDE 20 MEQ
40 PACKET (EA) ORAL ONCE
Qty: 0 | Refills: 0 | Status: COMPLETED | OUTPATIENT
Start: 2018-04-06 | End: 2018-04-06

## 2018-04-06 RX ORDER — DILTIAZEM HCL 120 MG
5 CAPSULE, EXT RELEASE 24 HR ORAL
Qty: 125 | Refills: 0 | Status: DISCONTINUED | OUTPATIENT
Start: 2018-04-06 | End: 2018-04-06

## 2018-04-06 RX ORDER — DILTIAZEM HCL 120 MG
2.5 CAPSULE, EXT RELEASE 24 HR ORAL
Qty: 125 | Refills: 0 | Status: DISCONTINUED | OUTPATIENT
Start: 2018-04-06 | End: 2018-04-08

## 2018-04-06 RX ADMIN — Medication 25 MILLIGRAM(S): at 07:51

## 2018-04-06 RX ADMIN — Medication 975 MILLIGRAM(S): at 11:23

## 2018-04-06 RX ADMIN — Medication 125 MILLIGRAM(S): at 19:12

## 2018-04-06 RX ADMIN — Medication 100 MILLIGRAM(S): at 05:42

## 2018-04-06 RX ADMIN — Medication 40 MILLIEQUIVALENT(S): at 07:51

## 2018-04-06 RX ADMIN — Medication 30 MILLILITER(S): at 02:47

## 2018-04-06 RX ADMIN — Medication 975 MILLIGRAM(S): at 05:42

## 2018-04-06 RX ADMIN — Medication 975 MILLIGRAM(S): at 06:33

## 2018-04-06 RX ADMIN — Medication 975 MILLIGRAM(S): at 11:15

## 2018-04-06 RX ADMIN — BICALUTAMIDE 50 MILLIGRAM(S): 50 TABLET, FILM COATED ORAL at 11:16

## 2018-04-06 RX ADMIN — Medication 5 MILLIGRAM(S): at 02:25

## 2018-04-06 RX ADMIN — OXYCODONE HYDROCHLORIDE 5 MILLIGRAM(S): 5 TABLET ORAL at 19:37

## 2018-04-06 RX ADMIN — Medication 3 MG/HR: at 21:09

## 2018-04-06 RX ADMIN — Medication 5 MG/HR: at 15:08

## 2018-04-06 RX ADMIN — OXYCODONE HYDROCHLORIDE 5 MILLIGRAM(S): 5 TABLET ORAL at 20:21

## 2018-04-06 RX ADMIN — Medication 1 TABLET(S): at 07:51

## 2018-04-06 NOTE — PROGRESS NOTE ADULT - SUBJECTIVE AND OBJECTIVE BOX
Patient is a 63y old  Male who presents with a chief complaint of aflutter with RVR (04 Apr 2018 18:41)      SUBJECTIVE / OVERNIGHT EVENTS: overnight patient had episode of aflutter with RVR to 140s requiring push of metoprolol 5mg IV.     MEDICATIONS  (STANDING):  acetaminophen   Tablet. 975 milliGRAM(s) Oral every 6 hours  bicalutamide 50 milliGRAM(s) Oral daily  metoprolol tartrate 100 milliGRAM(s) Oral every 12 hours  potassium acid phosphate/sodium acid phosphate tablet (K-PHOS No. 2) 1 Tablet(s) Oral four times a day with meals  senna 2 Tablet(s) Oral at bedtime    MEDICATIONS  (PRN):  oxyCODONE    IR 5 milliGRAM(s) Oral every 4 hours PRN Moderate Pain (4 - 6)  oxyCODONE    IR 10 milliGRAM(s) Oral every 4 hours PRN Severe Pain (7 - 10)        CAPILLARY BLOOD GLUCOSE        I&O's Summary    05 Apr 2018 07:01  -  06 Apr 2018 07:00  --------------------------------------------------------  IN: 0 mL / OUT: 500 mL / NET: -500 mL        PHYSICAL EXAM:  Vital Signs Last 24 Hrs  T(C): 36.8 (06 Apr 2018 05:36), Max: 37.6 (05 Apr 2018 20:16)  T(F): 98.2 (06 Apr 2018 05:36), Max: 99.6 (05 Apr 2018 20:16)  HR: 142 (06 Apr 2018 05:36) (91 - 144)  BP: 129/95 (06 Apr 2018 05:36) (110/68 - 133/95)  BP(mean): --  RR: 18 (06 Apr 2018 05:36) (17 - 18)  SpO2: 94% (06 Apr 2018 05:36) (94% - 99%)    GENERAL: NAD, well-developed  HEAD:  Atraumatic, Normocephalic  EYES: EOMI, PERRLA, conjunctiva and sclera clear  NECK: Supple, No JVD  CHEST/LUNG: Clear to auscultation bilaterally; No wheezes or rales  HEART: regular rate and irregular rhythm; No murmurs, rubs, or gallops  ABDOMEN: Soft, Nontender, Nondistended; Bowel sounds present\  BACK: entirety of spine nontender to palpation  EXTREMITIES:  2+ Peripheral Pulses, No clubbing, cyanosis, or edema  PSYCH: AAOx3  NEUROLOGY: sensation grossly intact, strength intact in b/l LE and UE  SKIN: No rashes or lesions    LABS:  (04-06 @ 06:15)                        13.7  10.60 )-----------( 375                 43.4    Neutrophils = -- (--%)  Lymphocytes = -- (--%)  Eosinophils = -- (--%)  Basophils = -- (--%)  Monocytes = -- (--%)  Bands = --%    WBC Trend: 10.60<--, 10.28<--, 9.31<--  Hb Trend: 13.7<--, 12.9<--, 14.1<--  Plt Trend: 375<--, 383<--, 429<--  04-05    140  |  103  |  21  ----------------------------<  114<H>  4.1   |  27  |  0.98    Ca    8.7      05 Apr 2018 15:10  Phos  2.6     04-05  Mg     1.9     04-05    TPro  6.1  /  Alb  2.6<L>  /  TBili  0.4  /  DBili  x   /  AST  21  /  ALT  14  /  AlkPhos  223<H>  04-05    Creatinine Trend: 0.98<--, 0.95<--, 1.09<--  ( 06 Apr 2018 06:15 )   PT: 15.7 SEC;   INR: 1.36 ;       PTT:50.2 SEC  CARDIAC MARKERS ( 05 Apr 2018 06:38 )  Trop < 0.06 ng/mL / CK 57 u/L / CKMB 1.00 ng/mL   CARDIAC MARKERS ( 04 Apr 2018 19:25 )  Trop < 0.06 ng/mL / CK 55 u/L / CKMB 1.00 ng/mL           Microbiology:  Urine Cx:  Blood Cx:    RADIOLOGY & ADDITIONAL TESTS:  X- Ray:  CT:  Ultrasound:  [ ] imaging personally reviewed and interpreted by me    Consultant(s) Notes Reviewed:  cardiology, oncology    Care Discussed with Consultants/Other Providers: Patient is a 63y old  Male who presents with a chief complaint of aflutter with RVR (04 Apr 2018 18:41)      SUBJECTIVE / OVERNIGHT EVENTS: overnight patient had episode of aflutter with RVR to 140s requiring push of metoprolol 5mg IV. Patient continued to spike into 140's in the morning prior to dose of PO metoprolol. After dose HR came down to low 100's. Patient denies any symptoms during these episodes. He reports pain is controlled and did not bother him overnight. Denies CP, palpitations, SOB, dizziness, N/V.     MEDICATIONS  (STANDING):  acetaminophen   Tablet. 975 milliGRAM(s) Oral every 6 hours  bicalutamide 50 milliGRAM(s) Oral daily  metoprolol tartrate 100 milliGRAM(s) Oral every 12 hours  potassium acid phosphate/sodium acid phosphate tablet (K-PHOS No. 2) 1 Tablet(s) Oral four times a day with meals  senna 2 Tablet(s) Oral at bedtime    MEDICATIONS  (PRN):  oxyCODONE    IR 5 milliGRAM(s) Oral every 4 hours PRN Moderate Pain (4 - 6)  oxyCODONE    IR 10 milliGRAM(s) Oral every 4 hours PRN Severe Pain (7 - 10)        I&O's Summary    05 Apr 2018 07:01  -  06 Apr 2018 07:00  --------------------------------------------------------  IN: 0 mL / OUT: 500 mL / NET: -500 mL        PHYSICAL EXAM:  Vital Signs Last 24 Hrs  T(C): 36.8 (06 Apr 2018 05:36), Max: 37.6 (05 Apr 2018 20:16)  T(F): 98.2 (06 Apr 2018 05:36), Max: 99.6 (05 Apr 2018 20:16)  HR: 142 (06 Apr 2018 05:36) (91 - 144)  BP: 129/95 (06 Apr 2018 05:36) (110/68 - 133/95)  BP(mean): --  RR: 18 (06 Apr 2018 05:36) (17 - 18)  SpO2: 94% (06 Apr 2018 05:36) (94% - 99%)    GENERAL: NAD, well-developed  HEAD:  Atraumatic, Normocephalic  EYES: EOMI, PERRLA, conjunctiva and sclera clear  NECK: Supple, No JVD  CHEST/LUNG: Clear to auscultation bilaterally; No wheezes or rales  HEART: tachycardic and irregular rhythm; No murmurs, rubs, or gallops  ABDOMEN: Soft, Nontender, Nondistended; Bowel sounds present\  BACK: entirety of spine nontender to palpation  EXTREMITIES:  2+ Peripheral Pulses, No clubbing, cyanosis, or edema  PSYCH: AAOx3  NEUROLOGY: sensation grossly intact, strength intact in b/l LE and UE  SKIN: No rashes or lesions    LABS:  (04-06 @ 06:15)                        13.7  10.60 )-----------( 375                 43.4    Neutrophils = -- (--%)  Lymphocytes = -- (--%)  Eosinophils = -- (--%)  Basophils = -- (--%)  Monocytes = -- (--%)  Bands = --%    WBC Trend: 10.60<--, 10.28<--, 9.31<--  Hb Trend: 13.7<--, 12.9<--, 14.1<--  Plt Trend: 375<--, 383<--, 429<--  04-05    140  |  103  |  21  ----------------------------<  114<H>  4.1   |  27  |  0.98    Ca    8.7      05 Apr 2018 15:10  Phos  2.6     04-05  Mg     1.9     04-05    TPro  6.1  /  Alb  2.6<L>  /  TBili  0.4  /  DBili  x   /  AST  21  /  ALT  14  /  AlkPhos  223<H>  04-05    Creatinine Trend: 0.98<--, 0.95<--, 1.09<--  ( 06 Apr 2018 06:15 )   PT: 15.7 SEC;   INR: 1.36 ;       PTT:50.2 SEC  CARDIAC MARKERS ( 05 Apr 2018 06:38 )  Trop < 0.06 ng/mL / CK 57 u/L / CKMB 1.00 ng/mL   CARDIAC MARKERS ( 04 Apr 2018 19:25 )  Trop < 0.06 ng/mL / CK 55 u/L / CKMB 1.00 ng/mL           Microbiology:  Urine Cx:  Blood Cx:    RADIOLOGY & ADDITIONAL TESTS:  X- Ray:  CT:  Ultrasound:  [ ] imaging personally reviewed and interpreted by me    Consultant(s) Notes Reviewed:  cardiology, oncology    Care Discussed with Consultants/Other Providers:

## 2018-04-06 NOTE — PROGRESS NOTE ADULT - ASSESSMENT
62 M PMH HTN, COPD, aflutter s/p ablation (2015),  Prostate CA, who presented for IR guided bone biopsy, which was cancelled due to tachycardia. Patient in ?atypical atrial flutter on ECG with rates up to 140's on tele overnight. Denies symptoms. TSH wnl. CE neg x2.    -K>4, Mg>2  -continue lopressor BID. Continue pain control. Aflutter is difficult to rate control  -EP eval for repeat ablation, already consulted this AM  -continue eliquis if no planned procedure

## 2018-04-06 NOTE — CONSULT NOTE ADULT - SUBJECTIVE AND OBJECTIVE BOX
Patient is a 63y old male with multiple medical problems including COPD, hypertension, atrial flutter s/p ablation 08/2015, on Eliquis and metoprolol and prostate cancer with seed implant with recent MRI and PET/CT scan with multiple lytic bone lesions who was admitted for an outpatient bone biopsy on 4/4/2018, off Eliquis since 4/2/2018. Prior to the biopsy he was found to be in atrial flutter with RVR . He was admitted for rate control but despite increased doses of beta blocker continues to have ventricular rates to 140 b/min. He is asymptomatic denying chest pain, shortness of breath, palpitations or dizziness and has no idea how long he has been in this rhythm. He is currently NPO for possible bone biopsy.        PAST MEDICAL & SURGICAL HISTORY:  Atrial fibrillation and flutter s/p flutter ablation 2015  Ventral hernia  Inguinal hernia  Prostate Cancer: with seeds implant    Diverticulitis of Colon: with Hx of colostomy bag (reversed)  Emphysema  HTN (Hypertension)  History of ventral hernia repair with justyna-op AFLutter  History of Hernia Surgery: BL inguinal  History of Cholecystectomy  History of Appendectomy    ALlergies:  shellfish and azithromycin tabs    MEDICATIONS  (STANDING):  acetaminophen   Tablet. 975 milliGRAM(s) Oral every 6 hours  bicalutamide 50 milliGRAM(s) Oral daily  diltiazem Infusion 5 mG/Hr (5 mL/Hr) IV Continuous <Continuous>  metoprolol tartrate 125 milliGRAM(s) Oral every 12 hours  senna 2 Tablet(s) Oral at bedtime    MEDICATIONS  (PRN):  oxyCODONE    IR 5 milliGRAM(s) Oral every 4 hours PRN Moderate Pain (4 - 6)  oxyCODONE    IR 10 milliGRAM(s) Oral every 4 hours PRN Severe Pain (7 - 10)      FAMILY HISTORY:  Family history of hypertension (Father)  Family history of heart attack (Father)      SOCIAL HISTORY:    CIGARETTES: no    ALCOHOL:     No    REVIEW OF SYSTEMS:    CONSTITUTIONAL: No fever, weight loss, chills, shakes   +fatigue  EYES: No eye pain, visual disturbances, or discharge  ENMT:  No difficulty hearing, tinnitus, vertigo; No sinus or throat pain  NECK: No pain or stiffness  BREASTS: No pain, masses, or nipple discharge  RESPIRATORY: No cough, wheezing, hemoptysis, or shortness of breath  CARDIOVASCULAR: No chest pain, dyspnea, palpitations, dizziness, syncope, paroxysmal nocturnal dyspnea, orthopnea, or arm or leg swelling  GASTROINTESTINAL: No abdominal  or epigastric pain, nausea, vomiting, hematemesis, diarrhea, constipation, melena or bright red blood.  GENITOURINARY: No dysuria, nocturia, hematuria, or urinary incontinence  NEUROLOGICAL: No headaches, memory loss, slurred speech, limb weakness, loss of strength, numbness, or tremors  SKIN: No itching, burning, rashes, or lesions   LYMPH NODES: No enlarged glands  ENDOCRINE: No heat or cold intolerance, or hair loss  MUSCULOSKELETAL: persistent  low back pain which he states is related to herniated disc's  PSYCHIATRIC: No depression, anxiety, or difficulty sleeping  HEME/LYMPH: No easy bruising or bleeding gums  ALLERGY AND IMMUNOLOGIC: No hives or rash.  Allergic to shellfish      Vital Signs Last 24 Hrs  T(C): 36.3 (06 Apr 2018 14:54), Max: 37.6 (05 Apr 2018 20:16)  T(F): 97.4 (06 Apr 2018 14:54), Max: 99.6 (05 Apr 2018 20:16)  HR: 110 (06 Apr 2018 14:54) (94 - 144)  BP: 123/93 (06 Apr 2018 14:54) (113/78 - 133/95)  BP(mean): --  RR: 18 (06 Apr 2018 14:54) (17 - 18)  SpO2: 95% (06 Apr 2018 14:54) (94% - 96%)    PHYSICAL EXAM:    GENERAL: In no apparent distress, well nourished, and hydrated.  HEAD:  Atraumatic, Normocephalic  EYES: EOMI, PERRLA, conjunctiva and sclera clear  ENMT: No tonsillar erythema, exudates, or enlargement; Moist mucous membranes, Good dentition, No lesions  NECK: Supple and normal thyroid.  No JVD or carotid bruit.  Carotid pulse is 2+ bilaterally.  HEART: Regular rate and rhythm; No murmurs, rubs, or gallops.  PULMONARY: Clear to auscultation and perfusion.  No rales, wheezing, or rhonchi bilaterally.  ABDOMEN: Soft, Nontender, Nondistended; Bowel sounds present  EXTREMITIES:  2+ Peripheral Pulses, No clubbing, cyanosis, or edema  LYMPH: No lymphadenopathy noted  NEUROLOGICAL: Grossly nonfocal          INTERPRETATION OF TELEMETRY: Atrial flutter with  with trends to 140's.    ECG: Atrial flutter with variable block. Questionable typical vs atypical        LABS:                        13.7   10.60 )-----------( 375      ( 06 Apr 2018 06:15 )             43.4     04-06    138  |  98  |  15  ----------------------------<  104<H>  3.5   |  24  |  0.91    Ca    8.4      06 Apr 2018 06:15  Phos  2.4     04-06  Mg     1.9     04-06    TPro  6.1  /  Alb  2.6<L>  /  TBili  0.4  /  DBili  x   /  AST  21  /  ALT  14  /  AlkPhos  223<H>  04-05    CARDIAC MARKERS ( 05 Apr 2018 06:38 )  x     / < 0.06 ng/mL / 57 u/L / 1.00 ng/mL / x      CARDIAC MARKERS ( 04 Apr 2018 19:25 )  x     / < 0.06 ng/mL / 55 u/L / 1.00 ng/mL / x          PT/INR - ( 06 Apr 2018 06:15 )   PT: 15.7 SEC;   INR: 1.36          PTT - ( 06 Apr 2018 06:15 )  PTT:50.2 SEC        RADIOLOGY & ADDITIONAL STUDIES:  PREVIOUS DIAGNOSTIC TESTING:      ECHO  FINDINGS:  ------------------------------------------------------------------------  OBSERVATIONS:  Mitral Valve: Normal mitral valve. Mild mitral  regurgitation.  Aortic Root: Normal size aortic root, aortic arch and  descending thoracic aorta. No plaque.  Aortic Valve: Normal trileaflet aortic valve.  Left Atrium: No left atrial or left atrialappendage  thrombus.  Left Ventricle: Normal left ventricular systolic function.  No segmental wall motion abnormalities.  Pericardium/PleuraNormal pericardium with no pericardial  effusion.  ------------------------------------------------------------------------  CONCLUSIONS:  1. Normal mitral valve. Mild mitral regurgitation.  2. Normal trileaflet aortic valve.  3. Normal size aortic root, aortic arch and descending  thoracic aorta. No plaque.  4. No left atrial or left atrial appendage thrombus.  5. Normal left ventricular systolic function. No segmental  wall motion abnormalities.  6. Normal pericardium with no pericardial effusion.  ------------------------------------------------------------------------  Confirmed on  8/12/2015 - 16:26:32 by Geovanny Sanchez MD    STRESS  FINDINGS:  NUCLEAR FINDINGS:  There is a medium sized, moderate defect in inferior wall,  suggestive of diaphragmatic attenuation artifact.  The observed defect(s) are no longer significant with  prone imaging.  ------------------------------------------------------------------------  GATED ANALYSIS:  Post-stress gated wall motion analysis was performed (LVEF  = 36 %;LVEDV = 122 ml.) There is moderate to severe global  LV dysfunction.  ------------------------------------------------------------------------  IMPRESSIONS:Abnormal Study  * There is a medium sized, moderate defect in inferior  wall, suggestive of diaphragmatic attenuation artifact.  * The observed defect(s) are no longer significant with  prone imaging.  * Post-stress gated wall motion analysis was performed  (LVEF = 36 %;LVEDV = 122 ml.) There is moderate to severe  global LV dysfunction.  ------------------------------------------------------------------------  Confirmed on  12/2/2012 - 11:46:29 by Brenton Heaton MD  ------------------------------------------------------------------------
Patient is a 63y old  Male who presents with a chief complaint of aflutter with RVR (04 Apr 2018 18:41) while being prepared for bx by IR, Pt has been very nervous and has delayed the bx for several weeks for one reason or other. Feels better, pain is being controlled at this time. Denies any other active symptoms on detailed ROS questions and denies any weight loss.      PAST MEDICAL & SURGICAL HISTORY:  Atrial fibrillation and flutter  Ventral hernia  Inguinal hernia  Prostate Cancer: with seeds implant    Diverticulitis of Colon: with Hx of colostomy bag  Emphysema  HTN (Hypertension)  History of ventral hernia repair  S/P Kamini's procedure: with reversal  History of Hernia Surgery: BL inguinal  History of Cholecystectomy  History of Appendectomy      Meds:  acetaminophen   Tablet. 975 milliGRAM(s) Oral every 6 hours  bicalutamide 50 milliGRAM(s) Oral daily  heparin  Injectable 5000 Unit(s) SubCutaneous every 8 hours  metoprolol tartrate 100 milliGRAM(s) Oral every 12 hours  oxyCODONE    IR 5 milliGRAM(s) Oral every 4 hours PRN  oxyCODONE    IR 10 milliGRAM(s) Oral every 4 hours PRN  potassium acid phosphate/sodium acid phosphate tablet (K-PHOS No. 2) 1 Tablet(s) Oral four times a day with meals  senna 2 Tablet(s) Oral at bedtime      Allergies:  azithromycin (Vomiting)  No Known Drug Allergies  shellfish (Unknown)      FAMILY HISTORY:  Family history of hypertension (Father)  Family history of heart attack (Father)      Vital Signs Last 24 Hrs  T(C): 36.4 (05 Apr 2018 14:49), Max: 37.6 (05 Apr 2018 04:16)  T(F): 97.6 (05 Apr 2018 14:49), Max: 99.6 (05 Apr 2018 04:16)  HR: 118 (05 Apr 2018 14:49) (90 - 120)  BP: 127/92 (05 Apr 2018 14:49) (106/76 - 150/98)  BP(mean): --  RR: 18 (05 Apr 2018 14:49) (18 - 18)  SpO2: 97% (05 Apr 2018 14:49) (96% - 100%)                          12.9   10.28 )-----------( 383      ( 05 Apr 2018 06:38 )             39.6       04-05    140  |  103  |  21  ----------------------------<  114<H>  4.1   |  27  |  0.98    Ca    8.7      05 Apr 2018 15:10  Phos  2.6     04-05  Mg     1.9     04-05    TPro  6.1  /  Alb  2.6<L>  /  TBili  0.4  /  DBili  x   /  AST  21  /  ALT  14  /  AlkPhos  223<H>  04-05      PT/INR - ( 05 Apr 2018 06:38 )   PT: 14.2 SEC;   INR: 1.27          PTT - ( 05 Apr 2018 06:38 )  PTT:46.0 SEC

## 2018-04-06 NOTE — DISCHARGE NOTE ADULT - FINDINGS/TREATMENT
performed under sedation by anesthesia  DIMENSIONS:  Dimensions:         Normal Values:  LA:     4.7 cm        2.0 - 4.0 cm  Ao:     2.8 cm        2.0 - 3.8 cm  SEPTUM: 0.9 cm    0.6 - 1.2 cm  PWT:    0.8 cm      0.6 - 1.1 cm  LVIDd:  5.1 cm      3.0 - 5.6 cm  LVIDs:  3.8 cm      1.8 - 4.0 cm  Derived Variables: LVMI: 79 g/m2, RWT: 0.31, Fractional short: 25 %  Ejection Fraction (Teicholtz): 50 %  1. Mitral annular calcification, otherwise normal mitral valve. Mild mitral regurgitation.  2. Calcified trileaflet aortic valve with normal opening. No aortic valve regurgitation seen.  3. Normal aortic root.  Mild non-mobile atherosclerotic plaque in the aortic arch and descending thoracic aorta.  4. Severely dilated L atrium.  LA volume index = 70 cc/m2.  No L atrium or L atrial appendage thrombus.  5. Normal left ventricular internal dimensions and wall thicknesses.  6. Mild segmental left ventricular systolic dysfunction. Hypokinesis of the inferior wall.  7. Right ventricular enlargement with decreased right ventricular systolic function.  8. Estimated right ventricular systolic pressure equals 61 mm Hg, assuming right atrial pressure equals 10 mm Hg, consistent with severe pulmonary hypertension.  9. Contrast injection demonstrates no evidence of a patent foramen ovale. of L iliac bone marrow of R iliac bone lytic lesion performed under sedation by anesthesia  DIMENSIONS:  Dimensions:         Normal Values:  LA:     4.7 cm        2.0 - 4.0 cm  Ao:     2.8 cm        2.0 - 3.8 cm  SEPTUM: 0.9 cm    0.6 - 1.2 cm  PWT:    0.8 cm      0.6 - 1.1 cm  LVIDd:  5.1 cm      3.0 - 5.6 cm  LVIDs:  3.8 cm      1.8 - 4.0 cm  Derived Variables: LVMI: 79 g/m2, RWT: 0.31, Fractional short: 25 %  Ejection Fraction (Teicholtz): 50 %  1. Mitral annular calcification, otherwise normal mitral valve. Mild mitral regurgitation.  2. Calcified trileaflet aortic valve with normal opening. No aortic valve regurgitation seen.  3. Normal aortic root.  Mild nonmobile atherosclerotic plaque in aortic arch & descending thoracic aorta.  4. Severely dilated L atrium.  LA volume index = 70 cc/m2.  No L atrium or L atrial appendage thrombus.  5. Normal left ventricular internal dimensions and wall thicknesses.  6. Mild segmental left ventricular systolic dysfunction. Hypokinesis of the inferior wall.  7. Right ventricular enlargement with decreased right ventricular systolic function.  8. Estimated right ventricular systolic pressure equals 61 mm Hg, assuming right atrial pressure equals 10 mm Hg, consistent with severe pulmonary hypertension.  9. Contrast injection demonstrates no evidence of a patent foramen ovale. of L iliac bone marrow.   Results pending of R iliac bone lytic lesion.   Results pending

## 2018-04-06 NOTE — PROGRESS NOTE ADULT - PROBLEM SELECTOR PLAN 2
- presented for outpatient bone biopsy  - outpt oncologist following - not due for lupron for a few weeks, c/w with bicalutamide here - presented for outpatient bone biopsy  - outpt oncologist following - not due for lupron for a few weeks, c/w with bicalutamide here  - given uncontrolled rate still will pursue biopsy possibly Monday as outpatient procedure

## 2018-04-06 NOTE — PROGRESS NOTE ADULT - ASSESSMENT
63M PMH HTN, COPD, aflutter s/p ablation (2015) on eliquis, Prostate Ca (diag ~2013) s/p radiation seed implants, no chemo w/ ?mets to spine and pelvis, who presented for IR guided iliac crest bone biopsy found to be in aflutter with rate of 150. 63M PMH HTN, COPD, aflutter s/p ablation (2015) on eliquis, Prostate Ca (diag ~2013) s/p radiation seed implants, no chemo w/ ?mets to spine and pelvis, who presented for IR guided iliac crest bone biopsy found to be in aflutter with rate of 150, course c/b recurrent episodes of aflutter w/ avr

## 2018-04-06 NOTE — CONSULT NOTE ADULT - ASSESSMENT
This is a 64 yo male with past medical history of diverticulitis with partial colectomy with Orta procedure and reversal, COPD, HTN, ventral hernia with repair, atrial flutter s/p ablation 2015, on metoprolol and Eliquis, and prostate cancer with seed implant 2012 with recent MRI and and PET scan showing multiple lytic lesions admitted for outpatient bone bx.  On admission he was found to be in atrial flutter with RVR and has been difficult to rate control.   Plan:  Would continue beta blocker at current dose and start IV Cardizem slowly as not to cause hypotension.            As patient is asymptomatic and hemodynamically stable, there is no contraindication to bone biopsy while in atrial flutter.  In fact, it would be prudent to do the biopsy as soon as possible so that we can restart Eliquis within the next 48-72 hours (as per interventional radiologist).            When the patient is able to be anticoagulated, we can arrange for MARCEL/DCCV for rhythm control.           Echocardiogram prior to discharge.      This plan has been discussed with Dr. Polk.
63M with recurrent prostate cancer, lytic bony lesions, renal mass, 1st bx was nondiagnostic, has not been able to get another bx or BM asp and Bx because of pt canceling appointments, now with possible cardiac issues, doing better and likely to go for bx tomorrow. Will recommend:  - continue RX as per medicine, cardiology  - pain control  - continue casodex 50 mg daily.  - lupron not due for a few weeks  - pt's aPTT is elevated and work up had revealed nonspecific anticoagulant, no bleeding history  - likely Bx of the bone and BM tomorrow  - DVT prophylaxis  - all other supportive Rx

## 2018-04-06 NOTE — PROGRESS NOTE ADULT - SUBJECTIVE AND OBJECTIVE BOX
Overnight Events:   No CP or SOB  Patient in aflutter up to 140's overnight         Medications:  acetaminophen   Tablet. 975 milliGRAM(s) Oral every 6 hours  bicalutamide 50 milliGRAM(s) Oral daily  metoprolol tartrate 125 milliGRAM(s) Oral every 12 hours  oxyCODONE    IR 5 milliGRAM(s) Oral every 4 hours PRN  oxyCODONE    IR 10 milliGRAM(s) Oral every 4 hours PRN  senna 2 Tablet(s) Oral at bedtime      PAST MEDICAL & SURGICAL HISTORY:  Atrial fibrillation and flutter  Ventral hernia  Inguinal hernia  Prostate Cancer: with seeds implant    Diverticulitis of Colon: with Hx of colostomy bag  Emphysema  HTN (Hypertension)  History of ventral hernia repair  S/P Kamini's procedure: with reversal  History of Hernia Surgery: BL inguinal  History of Cholecystectomy  History of Appendectomy      Vitals:  T(F): 98.3 (04-06), Max: 99.6 (04-05)  HR: 101 (04-06) (94 - 144)  BP: 115/74 (04-06) (115/74 - 133/95)  RR: 18 (04-06)  SpO2: 94% (04-06)  I&O's Summary    05 Apr 2018 07:01  -  06 Apr 2018 07:00  --------------------------------------------------------  IN: 0 mL / OUT: 500 mL / NET: -500 mL        Physical Exam:  Appearance: No acute distress  Eyes: PERRL, EOMI, pink conjunctiva  HENT: Normal oral mucosa  Cardiovascular: irregular, rate 100-110, S1, S2, no murmurs; no edema; no JVD  Respiratory: Clear to auscultation bilaterally  Gastrointestinal: soft, non-tender  Musculoskeletal: No clubbing; no joint deformity   Neurologic: Non-focal  Psychiatry: AAOx3, mood & affect appropriate  Skin: No rashes, ecchymoses, or cyanosis                            13.7   10.60 )-----------( 375      ( 06 Apr 2018 06:15 )             43.4     04-06    138  |  98  |  15  ----------------------------<  104<H>  3.5   |  24  |  0.91    Ca    8.4      06 Apr 2018 06:15  Phos  2.4     04-06  Mg     1.9     04-06    TPro  6.1  /  Alb  2.6<L>  /  TBili  0.4  /  DBili  x   /  AST  21  /  ALT  14  /  AlkPhos  223<H>  04-05    PT/INR - ( 06 Apr 2018 06:15 )   PT: 15.7 SEC;   INR: 1.36          PTT - ( 06 Apr 2018 06:15 )  PTT:50.2 SEC  CARDIAC MARKERS ( 05 Apr 2018 06:38 )  x     / < 0.06 ng/mL / 57 u/L / 1.00 ng/mL / x      CARDIAC MARKERS ( 04 Apr 2018 19:25 )  x     / < 0.06 ng/mL / 55 u/L / 1.00 ng/mL / x

## 2018-04-06 NOTE — DISCHARGE NOTE ADULT - CARE PROVIDERS DIRECT ADDRESSES
,DirectAddress_Unknown ,DirectAddress_Unknown,[email or web address]

## 2018-04-06 NOTE — DISCHARGE NOTE ADULT - PLAN OF CARE
rate control You were admitted to the hospital because of a very fast heart rate while you were evaluated to get You were admitted to the hospital because of a very fast heart rate while you were being evaluated prior to a bone biopsy. You were found to have an irregular rhythm called atrial flutter with a very fast heart rate up to 150. This required IV medication as well as increasing your oral metoprolol dose. Cardiology and electrophysiology were consulted and you were started on a diltiazem continuous infusion in order to control your heart rate for you to get the bone biopsy done. Your blood thinner called eliquis was held in order to get the biopsy done and was restarted 48 hours after the biopsy. You were admitted to the hospital because of a very fast heart rate while you were being evaluated prior to a bone biopsy. You were found to have an irregular rhythm called atrial flutter with a very fast heart rate up to 150. This required IV medication as well as increasing your oral metoprolol dose. Cardiology and electrophysiology were consulted and you were started on a diltiazem continuous infusion in order to control your heart rate for you to get the bone biopsy done. Your blood thinner called eliquis was held in order to get the biopsy done and was restarted 48 hours after the biopsy. Please follow up with your oncologist for the results of the biopsy and further management and treatment. If you experience any pain at the site or increased bleeding please contact your doctor immediately. Please continue to use your inhaler as needed and monitor for any signs of increased cough, increased sputum production, shortness of breath or fevers. You were admitted to the hospital because of a very fast heart rate while you were being evaluated prior to a bone biopsy. You were found to have an irregular rhythm called atrial flutter with a very fast heart rate up to 150. This required IV medication as well as increasing your oral metoprolol dose. Cardiology and electrophysiology were consulted and you were started on a diltiazem continuous infusion in order to control your heart rate for you to get the bone biopsy done. Your blood thinner called eliquis was held in order to get the biopsy done and was restarted 48 hours after the biopsy. Your heart rhythm continued to be "atrial flutter" and often very fast, requiring multiple ongoing medicines. To restore your heart to a more normal rhythm, the cardiac electrophysiologist Dr. Polk gave your heart an electric shock; this procedure is called direct-current cardioversion (DCCV). To increase the odds that your heart will stay in this better rhythm, continue to take your heart medicines as directed. If you feel dizzy/lightheaded, have chest pain, get short of breath, feel like your heart is racing, or otherwise feel seriously unwell, please contact Dr. Polk or your primary-care physician immediately, and consider returning to the emergency department. You were admitted to the hospital because of a very fast heart rate while you were being evaluated prior to a bone biopsy. You were found to have an irregular rhythm called atrial flutter with a very fast heart rate up to 150. This required IV medication as well as increasing your oral metoprolol dose. Cardiology and electrophysiology were consulted and you were started on a diltiazem continuous infusion in order to control your heart rate for you to get the bone biopsy done. Your blood thinner called eliquis was held in order to get the biopsy done and was restarted 48 hours after the biopsy. Then, in order to restore your heart to a more normal rhythm, the cardiac electrophysiologist Dr. Polk gave your heart an electric shock; this procedure is called direct-current cardioversion (DCCV). To increase the odds that your heart will stay in this better rhythm, continue to take your heart medicines as directed. If you feel dizzy/lightheaded, have chest pain, get short of breath, feel like your heart is racing, or otherwise feel seriously unwell, please contact Dr. Polk or your primary-care physician immediately, and consider returning to the emergency department. follow up with your oncologist, Dr Beckwith, within 1 week of discharge ongoing care

## 2018-04-06 NOTE — DISCHARGE NOTE ADULT - OTHER SIGNIFICANT FINDINGS
MARCEL 4/10  CONCLUSIONS:  1. Mitral annular calcification, otherwise normal mitral  valve. Mild mitral regurgitation.  2. Calcified trileaflet aortic valve with normal opening.  No aortic valve regurgitation seen.  3. Normal aortic root.  Mild non-mobile atherosclerotic  plaque in the aortic arch and descending thoracic aorta.  4. Severely dilated left atrium.  LA volume index = 70  cc/m2.  No left atrium or left atrial appendage thrombus.  5. Normal left ventricular internal dimensions and wall  thicknesses.  6. Mild segmental left ventricular systolic dysfunction.  Hypokinesis of the inferior wall.  7. Right ventricular enlargement with decreased right  ventricular systolic function.  8. Estimated right ventricular systolic pressure equals 61  mm Hg, assuming right atrial pressure equals 10 mm Hg,  consistent with severe pulmonary hypertension.  9. Contrast injection demonstrates no evidence of a patent  foramen ovale.

## 2018-04-06 NOTE — PROGRESS NOTE ADULT - PROBLEM SELECTOR PLAN 1
[facility] for [facility] biopsy and found to be in aflutter with RVR requiring metoprolol and esmolol IV pushes, Possibly in the setting of pain  - EKG personally reviewed with aflutter rate 106 on admission  - BMP and TSH unremarkable, CE neg x2  - monitor on tele, o/n with another episode of RVR to 140s, metoprolol 5mg IV push x1 with resolution  - will increase metoprolol to 125 BID as patient had another episode of RVR after 2 doses of 100  - holding AC for biopsy today [facility] 1- patient presented for outpatient bone biopsy and found to be in aflutter with RVR requiring metoprolol and esmolol IV pushes, Possibly in the setting of pain  - EKG personally reviewed with aflutter rate 106 on admission  - BMP and TSH unremarkable, CE neg x2  - monitor on tele, o/n with another episode of RVR to 140s, metoprolol 5mg IV push x1 with resolution  - will increase metoprolol to 125 BID as patient had another episode of RVR after 2 doses of 100  - holding AC for biopsy today, will restart after [facility]- patient presented for outpatient bone biopsy and found to be in aflutter with RVR requiring metoprolol and esmolol IV pushes, Possibly in the setting of pain  - EKG personally reviewed with aflutter rate 106 on admission  - BMP and TSH unremarkable, CE neg x2  - monitor on tele, o/n with another episode of RVR to 140s, metoprolol 5mg IV push x1 with resolution  - will increase metoprolol to 125 BID as patient had another episode of RVR after 2 doses of 100  - cardiology consult - EP consulted this AM for repeat ablation eval, aflutter difficult to rate control

## 2018-04-06 NOTE — DISCHARGE NOTE ADULT - CARE PLAN
Principal Discharge DX:	Atrial flutter with rapid ventricular response  Secondary Diagnosis:	Prostate cancer  Secondary Diagnosis:	COPD (chronic obstructive pulmonary disease) Principal Discharge DX:	Atrial flutter with rapid ventricular response  Goal:	rate control  Assessment and plan of treatment:	You were admitted to the hospital because of a very fast heart rate while you were evaluated to get  Secondary Diagnosis:	Prostate cancer  Secondary Diagnosis:	COPD (chronic obstructive pulmonary disease) Principal Discharge DX:	Atrial flutter with rapid ventricular response  Goal:	rate control  Assessment and plan of treatment:	You were admitted to the hospital because of a very fast heart rate while you were being evaluated prior to a bone biopsy. You were found to have an irregular rhythm called atrial flutter with a very fast heart rate up to 150. This required IV medication as well as increasing your oral metoprolol dose. Cardiology and electrophysiology were consulted and you were started on a diltiazem continuous infusion in order to control your heart rate for you to get the bone biopsy done. Your blood thinner called eliquis was held in order to get the biopsy done and was restarted 48 hours after the biopsy.  Secondary Diagnosis:	Prostate cancer  Assessment and plan of treatment:	You were admitted to the hospital because of a very fast heart rate while you were being evaluated prior to a bone biopsy. You were found to have an irregular rhythm called atrial flutter with a very fast heart rate up to 150. This required IV medication as well as increasing your oral metoprolol dose. Cardiology and electrophysiology were consulted and you were started on a diltiazem continuous infusion in order to control your heart rate for you to get the bone biopsy done. Your blood thinner called eliquis was held in order to get the biopsy done and was restarted 48 hours after the biopsy. Please follow up with your oncologist for the results of the biopsy and further management and treatment. If you experience any pain at the site or increased bleeding please contact your doctor immediately.  Secondary Diagnosis:	COPD (chronic obstructive pulmonary disease)  Assessment and plan of treatment:	Please continue to use your inhaler as needed and monitor for any signs of increased cough, increased sputum production, shortness of breath or fevers. Principal Discharge DX:	Atrial flutter with rapid ventricular response  Goal:	rate control  Assessment and plan of treatment:	You were admitted to the hospital because of a very fast heart rate while you were being evaluated prior to a bone biopsy. You were found to have an irregular rhythm called atrial flutter with a very fast heart rate up to 150. This required IV medication as well as increasing your oral metoprolol dose. Cardiology and electrophysiology were consulted and you were started on a diltiazem continuous infusion in order to control your heart rate for you to get the bone biopsy done. Your blood thinner called eliquis was held in order to get the biopsy done and was restarted 48 hours after the biopsy. Your heart rhythm continued to be "atrial flutter" and often very fast, requiring multiple ongoing medicines. To restore your heart to a more normal rhythm, the cardiac electrophysiologist Dr. Polk gave your heart an electric shock; this procedure is called direct-current cardioversion (DCCV). To increase the odds that your heart will stay in this better rhythm, continue to take your heart medicines as directed. If you feel dizzy/lightheaded, have chest pain, get short of breath, feel like your heart is racing, or otherwise feel seriously unwell, please contact Dr. Polk or your primary-care physician immediately, and consider returning to the emergency department.  Secondary Diagnosis:	Prostate cancer  Assessment and plan of treatment:	You were admitted to the hospital because of a very fast heart rate while you were being evaluated prior to a bone biopsy. You were found to have an irregular rhythm called atrial flutter with a very fast heart rate up to 150. This required IV medication as well as increasing your oral metoprolol dose. Cardiology and electrophysiology were consulted and you were started on a diltiazem continuous infusion in order to control your heart rate for you to get the bone biopsy done. Your blood thinner called eliquis was held in order to get the biopsy done and was restarted 48 hours after the biopsy. Please follow up with your oncologist for the results of the biopsy and further management and treatment. If you experience any pain at the site or increased bleeding please contact your doctor immediately.  Secondary Diagnosis:	COPD (chronic obstructive pulmonary disease)  Assessment and plan of treatment:	Please continue to use your inhaler as needed and monitor for any signs of increased cough, increased sputum production, shortness of breath or fevers. Principal Discharge DX:	Atrial flutter with rapid ventricular response  Goal:	rate control  Assessment and plan of treatment:	You were admitted to the hospital because of a very fast heart rate while you were being evaluated prior to a bone biopsy. You were found to have an irregular rhythm called atrial flutter with a very fast heart rate up to 150. This required IV medication as well as increasing your oral metoprolol dose. Cardiology and electrophysiology were consulted and you were started on a diltiazem continuous infusion in order to control your heart rate for you to get the bone biopsy done. Your blood thinner called eliquis was held in order to get the biopsy done and was restarted 48 hours after the biopsy. Then, in order to restore your heart to a more normal rhythm, the cardiac electrophysiologist Dr. Polk gave your heart an electric shock; this procedure is called direct-current cardioversion (DCCV). To increase the odds that your heart will stay in this better rhythm, continue to take your heart medicines as directed. If you feel dizzy/lightheaded, have chest pain, get short of breath, feel like your heart is racing, or otherwise feel seriously unwell, please contact Dr. Polk or your primary-care physician immediately, and consider returning to the emergency department.  Secondary Diagnosis:	Prostate cancer  Assessment and plan of treatment:	You were admitted to the hospital because of a very fast heart rate while you were being evaluated prior to a bone biopsy. You were found to have an irregular rhythm called atrial flutter with a very fast heart rate up to 150. This required IV medication as well as increasing your oral metoprolol dose. Cardiology and electrophysiology were consulted and you were started on a diltiazem continuous infusion in order to control your heart rate for you to get the bone biopsy done. Your blood thinner called eliquis was held in order to get the biopsy done and was restarted 48 hours after the biopsy. Please follow up with your oncologist for the results of the biopsy and further management and treatment. If you experience any pain at the site or increased bleeding please contact your doctor immediately.  Secondary Diagnosis:	COPD (chronic obstructive pulmonary disease)  Assessment and plan of treatment:	Please continue to use your inhaler as needed and monitor for any signs of increased cough, increased sputum production, shortness of breath or fevers. Principal Discharge DX:	Atrial flutter with rapid ventricular response  Goal:	rate control  Assessment and plan of treatment:	You were admitted to the hospital because of a very fast heart rate while you were being evaluated prior to a bone biopsy. You were found to have an irregular rhythm called atrial flutter with a very fast heart rate up to 150. This required IV medication as well as increasing your oral metoprolol dose. Cardiology and electrophysiology were consulted and you were started on a diltiazem continuous infusion in order to control your heart rate for you to get the bone biopsy done. Your blood thinner called eliquis was held in order to get the biopsy done and was restarted 48 hours after the biopsy. Then, in order to restore your heart to a more normal rhythm, the cardiac electrophysiologist Dr. Polk gave your heart an electric shock; this procedure is called direct-current cardioversion (DCCV). To increase the odds that your heart will stay in this better rhythm, continue to take your heart medicines as directed. If you feel dizzy/lightheaded, have chest pain, get short of breath, feel like your heart is racing, or otherwise feel seriously unwell, please contact Dr. Polk or your primary-care physician immediately, and consider returning to the emergency department.  Secondary Diagnosis:	Prostate cancer  Goal:	follow up with your oncologist, Dr Beckwith, within 1 week of discharge  Assessment and plan of treatment:	You were admitted to the hospital because of a very fast heart rate while you were being evaluated prior to a bone biopsy. You were found to have an irregular rhythm called atrial flutter with a very fast heart rate up to 150. This required IV medication as well as increasing your oral metoprolol dose. Cardiology and electrophysiology were consulted and you were started on a diltiazem continuous infusion in order to control your heart rate for you to get the bone biopsy done. Your blood thinner called eliquis was held in order to get the biopsy done and was restarted 48 hours after the biopsy. Please follow up with your oncologist for the results of the biopsy and further management and treatment. If you experience any pain at the site or increased bleeding please contact your doctor immediately.  Secondary Diagnosis:	COPD (chronic obstructive pulmonary disease)  Goal:	ongoing care  Assessment and plan of treatment:	Please continue to use your inhaler as needed and monitor for any signs of increased cough, increased sputum production, shortness of breath or fevers.

## 2018-04-06 NOTE — DISCHARGE NOTE ADULT - MEDICATION SUMMARY - MEDICATIONS TO TAKE
I will START or STAY ON the medications listed below when I get home from the hospital:    ibuprofen 600 mg oral tablet  -- 1 tab(s) by mouth every 6 hours, as needed for pain  -- Indication: For Prostate cancer    acetaminophen 325 mg oral tablet  -- 2 tab(s) by mouth every 4 hours, as needed for pain  -- Indication: For Prostate cancer    tamsulosin 0.4 mg oral capsule  -- 1 cap(s) by mouth once a day (at bedtime)  -- Indication: For Prostate cancer    DilTIAZem Hydrochloride  mg/24 hours oral capsule, extended release  -- 1 cap(s) by mouth once a day   -- It is very important that you take or use this exactly as directed.  Do not skip doses or discontinue unless directed by your doctor.  Some non-prescription drugs may aggravate your condition.  Read all labels carefully.  If a warning appears, check with your doctor before taking.  Swallow whole.  Do not crush.    -- Indication: For Atrial flutter with rapid ventricular response    Eliquis 5 mg oral tablet  -- 1 tab(s) by mouth 2 times a day  -- Indication: For Atrial flutter with rapid ventricular response    bicalutamide 50 mg oral tablet  -- 1 tab(s) by mouth every 24 hours  -- Indication: For Prostate cancer    Lopressor 100 mg oral tablet  -- 1 tab(s) by mouth 2 times a day  -- Indication: For Atrial flutter with rapid ventricular response    tiotropium 18 mcg inhalation capsule  -- 1 cap(s) inhaled once a day (at bedtime)  -- Indication: For COPD (chronic obstructive pulmonary disease)    Symbicort 160 mcg-4.5 mcg/inh inhalation aerosol  -- 2 puff(s) inhaled 2 times a day  -- Indication: For COPD (chronic obstructive pulmonary disease)    ProAir HFA 90 mcg/inh inhalation aerosol  -- 2 puff(s) inhaled 4 times a day, As Needed  -- Indication: For COPD (chronic obstructive pulmonary disease)    docusate sodium 100 mg oral capsule  -- 1 cap(s) by mouth once a day, As needed, Constipation  -- Indication: For COnstipation    senna oral tablet  -- 2 tab(s) by mouth once a day (at bedtime), As needed, Constipation  -- Indication: For COnstipation I will START or STAY ON the medications listed below when I get home from the hospital:    ibuprofen 600 mg oral tablet  -- 1 tab(s) by mouth every 6 hours, as needed for pain  -- Indication: For Prostate cancer    acetaminophen 325 mg oral tablet  -- 2 tab(s) by mouth every 4 hours, as needed for pain  -- Indication: For Prostate cancer    oxyCODONE 5 mg oral capsule  -- 1 cap(s) by mouth every 6 hours, As Needed for severe pain MDD:4 tabs  -- Caution federal law prohibits the transfer of this drug to any person other  than the person for whom it was prescribed.  It is very important that you take or use this exactly as directed.  Do not skip doses or discontinue unless directed by your doctor.  May cause drowsiness.  Alcohol may intensify this effect.  Use care when operating dangerous machinery.  This prescription cannot be refilled.  Using more of this medication than prescribed may cause serious breathing problems.    -- Indication: For Prostate cancer    tamsulosin 0.4 mg oral capsule  -- 1 cap(s) by mouth once a day (at bedtime)  -- Indication: For Prostate cancer    DilTIAZem Hydrochloride  mg/24 hours oral capsule, extended release  -- 1 cap(s) by mouth once a day   -- It is very important that you take or use this exactly as directed.  Do not skip doses or discontinue unless directed by your doctor.  Some non-prescription drugs may aggravate your condition.  Read all labels carefully.  If a warning appears, check with your doctor before taking.  Swallow whole.  Do not crush.    -- Indication: For Atrial flutter with rapid ventricular response    Eliquis 5 mg oral tablet  -- 1 tab(s) by mouth 2 times a day  -- Indication: For Atrial flutter with rapid ventricular response    bicalutamide 50 mg oral tablet  -- 1 tab(s) by mouth every 24 hours  -- Indication: For Prostate cancer    Lopressor 100 mg oral tablet  -- 1 tab(s) by mouth 2 times a day  -- Indication: For Atrial flutter with rapid ventricular response    tiotropium 18 mcg inhalation capsule  -- 1 cap(s) inhaled once a day (at bedtime)  -- Indication: For COPD (chronic obstructive pulmonary disease)    Symbicort 160 mcg-4.5 mcg/inh inhalation aerosol  -- 2 puff(s) inhaled 2 times a day  -- Indication: For COPD (chronic obstructive pulmonary disease)    ProAir HFA 90 mcg/inh inhalation aerosol  -- 2 puff(s) inhaled 4 times a day, As Needed  -- Indication: For COPD (chronic obstructive pulmonary disease)    docusate sodium 100 mg oral capsule  -- 1 cap(s) by mouth once a day, As needed, Constipation  -- Indication: For COnstipation    senna oral tablet  -- 2 tab(s) by mouth once a day (at bedtime), As needed, Constipation  -- Indication: For COnstipation

## 2018-04-06 NOTE — DISCHARGE NOTE ADULT - HOSPITAL COURSE
63M PMH HTN, COPD, aflutter s/p ablation (2015) on eliquis, Prostate Ca (diag ~2013) s/p radiation seed implants, no chemo w/ ?mets to spine and pelvis, who presented for IR guided iliac crest bone biopsy. During preprocedure eval, pt noted to be tachycardic to 120s. EKG showed aflutter/afib. Pt received metop 5mg IV, esmolol 10mg IV, fentanyl 100mg IV, phenylephrine 100. HR remained > 120, one documentation showed HR > 150. Sees a urologist, does not remember his name, last seen over a year ago. Patient reports 2 weeks ago he had an upper respiratory infection and went to urgent care for cough and increased sputum production. He reports he had a CXR that was clear. He was given 10 days of ceftin which was completed on yesterday (4/3). Patient denies any recent CP, palpitations, HA, lightheadedness. He reports SOB only with increased exertion, he is able to ambulate multiple blocks without SOB. Denies any LE swelling. He reports since the ablation he has always had good HR in his cardiology office. Patient reports during this episode at the preprocedure room he was not having any symptoms, only baseline back pain for which he takes ibuprofen at home. Denies any numbness, tingling, weakness, bowel or bladder incontinence.     On tele in recovery room, HR 100s-140 mostly in 110s-120, BP 120s/80, RR 14, o2 97, on RA. 63M PMH HTN, COPD, aflutter s/p ablation (2015) on eliquis, Prostate Ca (diag ~2013) s/p radiation seed implants, no chemo w/ ?mets to spine and pelvis, who presented for IR guided iliac crest bone biopsy as outpatient procedure. During preprocedure eval, pt noted to be tachycardic to 120s. EKG showed aflutter. Pt received metop 5mg IV, esmolol 10mg IV, fentanyl 100mg IV, phenylephrine 100. HR remained > 120, one documentation showed HR > 150. Patient denies any recent CP, palpitations, HA, lightheadedness. He reports SOB only with increased exertion, he is able to ambulate multiple blocks without SOB. Denies any LE swelling. Patient reports during this episode at the preprocedure room he was not having any symptoms, only baseline back pain for which he takes ibuprofen at home. Denies any numbness, tingling, weakness, bowel or bladder incontinence.     On tele in recovery room, HR 100s-140 mostly in 110s-120, BP 120s/80, RR 14, o2 97, on RA. Patient was admitted to telemetry for further monitoring. Electrolytes were wnl, TSH wnl, CE neg x2. Tachycardia likely 2/2 uncontrolled pain. On the floor patient's HR 63M PMH HTN, COPD, aflutter s/p ablation (2015) on eliquis, Prostate Ca (diag ~2013) s/p radiation seed implants, no chemo w/ ?mets to spine and pelvis, who presented for IR guided iliac crest bone biopsy as outpatient procedure. During preprocedure eval, pt noted to be tachycardic to 120s. EKG showed aflutter. Pt received metop 5mg IV, esmolol 10mg IV, fentanyl 100mg IV, phenylephrine 100. HR remained > 120, one documentation showed HR > 150. Patient denies any recent CP, palpitations, HA, lightheadedness. He reports SOB only with increased exertion, he is able to ambulate multiple blocks without SOB. Denies any LE swelling. Patient reports during this episode at the preprocedure room he was not having any symptoms, only baseline back pain for which he takes ibuprofen at home. Denies any numbness, tingling, weakness, bowel or bladder incontinence.     On tele in recovery room, HR 100s-140 mostly in 110s-120, BP 120s/80, RR 14, o2 97, on RA. Patient was admitted to telemetry for further monitoring. Electrolytes were wnl, TSH wnl, CE neg x2. Tachycardia likely 2/2 uncontrolled pain. Oxycodone and tylenol were ordered for pain and metoprolol was increased to 100BID. On the floor patient's HR continued to spike to 140-150s and required metoprolol 5IV x1 with increase in po to 125mg BID. Cardiology was consulted and recommended increasing BB as well as EP eval for repeat ablation. Eliquis was initially held in hopes of getting the bone biopsy done once rate controlled however patient continued to be uncontrolled and therefore eliquis was restarted. 63M PMH HTN, COPD, aflutter s/p ablation (2015) on eliquis, Prostate Ca (diag ~2013) s/p radiation seed implants, no chemo w/ ?mets to spine and pelvis, who presented for IR guided iliac crest bone biopsy as outpatient procedure. During preprocedure eval, pt noted to be tachycardic to 120s. EKG showed aflutter. Pt received metop 5mg IV, esmolol 10mg IV, fentanyl 100mg IV, phenylephrine 100. HR remained > 120, one documentation showed HR > 150. Patient denies any recent CP, palpitations, HA, lightheadedness. He reports SOB only with increased exertion, he is able to ambulate multiple blocks without SOB. Denies any LE swelling. Patient reports during this episode at the preprocedure room he was not having any symptoms, only baseline back pain for which he takes ibuprofen at home. Denies any numbness, tingling, weakness, bowel or bladder incontinence.     On tele in recovery room, HR 100s-140 mostly in 110s-120, BP 120s/80, RR 14, o2 97, on RA. Patient was admitted to telemetry for further monitoring. Electrolytes were wnl, TSH wnl, CE neg x2. Tachycardia likely 2/2 uncontrolled pain. Oxycodone and tylenol were ordered for pain and metoprolol was increased to 100BID. On the floor patient's HR continued to spike to 140-150s and required metoprolol 5IV x1 with increase in po to 125mg BID. Cardiology was consulted and recommended increasing BB as well as EP eval for repeat ablation. Patient was started on a diltiazem drip in order to control the heart rate for the biopsy to be done. Biopsy was done on 4/6 and eliquis was restarted 48 hours later. 63M PMH HTN, COPD, aflutter s/p ablation (2015) on eliquis, Prostate Ca (diag ~2013) s/p radiation seed implants, no chemo w/ ?mets to spine and pelvis, who presented for IR guided iliac crest bone biopsy as outpatient procedure. During preprocedure eval, pt noted to be tachycardic to 120s. EKG showed aflutter. Pt received metop 5mg IV, esmolol 10mg IV, fentanyl 100mg IV, phenylephrine 100. HR remained > 120, one documentation showed HR > 150. Patient denies any recent CP, palpitations, HA, lightheadedness. He reports SOB only with increased exertion, he is able to ambulate multiple blocks without SOB. Denies any LE swelling. Patient reports during this episode at the preprocedure room he was not having any symptoms, only baseline back pain for which he takes ibuprofen at home. Denies any numbness, tingling, weakness, bowel or bladder incontinence.     On tele in recovery room, HR 100s-140 mostly in 110s-120, BP 120s/80, RR 14, o2 97, on RA. Patient was admitted to telemetry for further monitoring. Electrolytes were wnl, TSH wnl, CE neg x2. Tachycardia likely 2/2 uncontrolled pain. Oxycodone and tylenol were ordered for pain and metoprolol was increased to 100BID. On the floor patient's HR continued to spike to 140-150s and required metoprolol 5IV x1 with increase in po to 125mg BID. Cardiology was consulted and recommended increasing BB as well as EP eval for repeat ablation. Patient was started on a diltiazem drip in order to control the heart rate for the biopsy to be done. Biopsy was done on 4/6 and eliquis was restarted 48 hours later.  the interim patient continued to have sustained periods of atrial flutter with rapid ventricular despite diltiazem gtt; rate control was achieved with additional doses of diltiazem and metoprolol. On 4/10 patient underwent MARCEL cardioversion; MARCEL demonstrated 63M PMH HTN, COPD, aflutter s/p ablation (2015) on eliquis, Prostate Ca (diag ~2013) s/p radiation seed implants, no chemo w/ ?mets to spine and pelvis, who presented for IR guided iliac crest bone biopsy as outpatient procedure. During preprocedure eval, pt noted to be tachycardic to 120s. EKG showed aflutter. Pt received metop 5mg IV, esmolol 10mg IV, fentanyl 100mg IV, phenylephrine 100. HR remained > 120, one documentation showed HR > 150. Patient denies any recent CP, palpitations, HA, lightheadedness. He reports SOB only with increased exertion, he is able to ambulate multiple blocks without SOB. Denies any LE swelling. Patient reports during this episode at the preprocedure room he was not having any symptoms, only baseline back pain for which he takes ibuprofen at home. Denies any numbness, tingling, weakness, bowel or bladder incontinence.     On tele in recovery room, HR 100s-140 mostly in 110s-120, BP 120s/80, RR 14, o2 97, on RA. Patient was admitted to telemetry for further monitoring. Electrolytes were wnl, TSH wnl, CE neg x2. Tachycardia likely 2/2 uncontrolled pain. Oxycodone and tylenol were ordered for pain and metoprolol was increased to 100BID. On the floor patient's HR continued to spike to 140-150s and required metoprolol 5IV x1 with increase in po to 125mg BID. Cardiology was consulted and recommended increasing BB as well as EP eval for repeat ablation. Patient was started on a diltiazem drip in order to control the heart rate for the biopsy to be done. Biopsy was done on 4/6 and eliquis was restarted 48 hours later. In the interim patient continued to have a few sustained periods of atrial flutter with rapid ventricular despite diltiazem gtt; rate control was achieved either with additional doses of diltiazem and metoprolol, or by self-resolution. On 4/10 patient underwent MARCEL cardioversion. MARCEL itself was notable for LA severely dilated, LVEF 50% w LV inferior-wall hypokinesis, RV enlargement w decreased systolic function, and RV systolic pressure estimated at 61mm Hg (severe pulmonary hypertension).  Diltiazem gtt was discontinue. Patient's cardiac medicine regimen as of 4/11 AM consisted of diltiazem 90mg PO q6h and metoprolol tartrate 125mg PO q12h, each with hold parameters. 63M PMH HTN, COPD, aflutter s/p ablation (2015) on eliquis, Prostate Ca (diag ~2013) s/p radiation seed implants, no chemo w/ ?mets to spine and pelvis, who presented for IR guided iliac crest bone biopsy as outpatient procedure. During preprocedure eval, pt noted to be tachycardic to 120s. EKG showed aflutter. Pt received metop 5mg IV, esmolol 10mg IV, fentanyl 100mg IV, phenylephrine 100. HR remained > 120, one documentation showed HR > 150. Patient denies any recent CP, palpitations, HA, lightheadedness. He reports SOB only with increased exertion, he is able to ambulate multiple blocks without SOB. Denies any LE swelling. Patient reports during this episode at the preprocedure room he was not having any symptoms, only baseline back pain for which he takes ibuprofen at home. Denies any numbness, tingling, weakness, bowel or bladder incontinence.     On tele in recovery room, HR 100s-140 mostly in 110s-120, BP 120s/80, RR 14, o2 97, on RA. Patient was admitted to telemetry for further monitoring. Electrolytes were wnl, TSH wnl, CE neg x2. Tachycardia likely 2/2 uncontrolled pain. Oxycodone and tylenol were ordered for pain and metoprolol was increased to 100BID. On the floor patient's HR continued to spike to 140-150s and required metoprolol 5IV x1 with increase in po to 125mg BID. Cardiology was consulted and recommended increasing BB as well as EP eval for repeat ablation. Patient was started on a diltiazem drip in order to control the heart rate for the biopsy to be done. Biopsy was done on 4/6 and eliquis was restarted 48 hours later. In the interim patient continued to have a few sustained periods of atrial flutter with rapid ventricular despite diltiazem gtt; rate control was achieved either with additional doses of diltiazem and metoprolol, or by self-resolution. On 4/10 patient underwent MARCEL cardioversion. MARCEL itself was notable for LA severely dilated, LVEF 50% w LV inferior-wall hypokinesis, RV enlargement w decreased systolic function, and RV systolic pressure estimated at 61mm Hg (severe pulmonary hypertension).  Diltiazem gtt was discontinue. Patient's cardiac medicine regimen as of 4/11 AM consisted of diltiazem 90mg PO q6h and metoprolol tartrate 125mg PO q12h, each with hold parameters. Due to bradycardia and low SBP diltiazem was discontinued completely and metoprolol was decreased to 100 BID. 63M PMH HTN, COPD, aflutter s/p ablation (2015) on eliquis, Prostate Ca (diag ~2013) s/p radiation seed implants, no chemo w/ ?mets to spine and pelvis, who presented for IR guided iliac crest bone biopsy as outpatient procedure. During preprocedure eval, pt noted to be tachycardic to 120s. EKG showed aflutter. Pt received metop 5mg IV, esmolol 10mg IV, fentanyl 100mg IV, phenylephrine 100. HR remained > 120, one documentation showed HR > 150. Patient denies any recent CP, palpitations, HA, lightheadedness. He reports SOB only with increased exertion, he is able to ambulate multiple blocks without SOB. Denies any LE swelling. Patient reports during this episode at the preprocedure room he was not having any symptoms, only baseline back pain for which he takes ibuprofen at home. Denies any numbness, tingling, weakness, bowel or bladder incontinence.     On tele in recovery room, HR 100s-140 mostly in 110s-120, BP 120s/80, RR 14, o2 97, on RA. Patient was admitted to telemetry for further monitoring. Electrolytes were wnl, TSH wnl, CE neg x2. Tachycardia likely 2/2 uncontrolled pain. Oxycodone and tylenol were ordered for pain and metoprolol was increased to 100BID. On the floor patient's HR continued to spike to 140-150s and required metoprolol 5IV x1 with increase in po to 125mg BID. Cardiology was consulted and recommended increasing BB as well as EP eval for repeat ablation. Patient was started on a diltiazem drip in order to control the heart rate for the biopsy to be done. Biopsy was done on 4/6 and eliquis was restarted 48 hours later. In the interim patient continued to have a few sustained periods of atrial flutter with rapid ventricular despite diltiazem gtt; rate control was achieved either with additional doses of diltiazem and metoprolol, or by self-resolution. On 4/10 patient underwent MARCEL cardioversion. MARCEL itself was notable for LA severely dilated, LVEF 50% w LV inferior-wall hypokinesis, RV enlargement w decreased systolic function, and RV systolic pressure estimated at 61mm Hg (severe pulmonary hypertension).  Diltiazem gtt was discontinue. Patient's cardiac medicine regimen as of 4/11 AM consisted of diltiazem 90mg PO q6h and metoprolol tartrate 125mg PO q12h, each with hold parameters. Due to bradycardia and low SBP diltiazem dose was decreased to 180 and metoprolol decreased to 100 BID. Patient sent with 1 week of oxycodone for pain control to prevent tachycardia as well, Istop Reference #: [id number]. Patient was asymptomatic and HDS for d/c. 63M PMH HTN, COPD, aflutter s/p ablation (2015) on eliquis, Prostate Ca (diag ~2013) s/p radiation seed implants, no chemo w/ ?mets to spine and pelvis, who presented for IR guided iliac crest bone biopsy as outpatient procedure. During preprocedure eval, pt noted to be tachycardic to 120s. EKG showed aflutter. Pt received metop 5mg IV, esmolol 10mg IV, fentanyl 100mg IV, phenylephrine 100. HR remained > 120, one documentation showed HR > 150. Patient denies any recent CP, palpitations, HA, lightheadedness. He reports SOB only with increased exertion, he is able to ambulate multiple blocks without SOB. Denies any LE swelling. Patient reports during this episode at the preprocedure room he was not having any symptoms, only baseline back pain for which he takes ibuprofen at home. Denies any numbness, tingling, weakness, bowel or bladder incontinence.     On tele in recovery room, HR 100s-140 mostly in 110s-120, BP 120s/80, RR 14, o2 97, on RA. Patient was admitted to telemetry for further monitoring. Electrolytes were wnl, TSH wnl, CE neg x2. Tachycardia likely 2/2 uncontrolled pain. Oxycodone and tylenol were ordered for pain and metoprolol was increased to 100BID. On the floor patient's HR continued to spike to 140-150s and required metoprolol 5IV x1 with increase in po to 125mg BID. Cardiology was consulted and recommended increasing BB as well as EP eval for repeat ablation. Patient was started on a diltiazem drip in order to control the heart rate for the biopsy to be done. Biopsy was done on 4/6 and eliquis was restarted 48 hours later. In the interim patient continued to have a few sustained periods of atrial flutter with rapid ventricular despite diltiazem gtt; rate control was achieved either with additional doses of diltiazem and metoprolol, or by self-resolution. On 4/10 patient underwent MARCEL cardioversion. MARCEL itself was notable for LA severely dilated, LVEF 50% w LV inferior-wall hypokinesis, RV enlargement w decreased systolic function, and RV systolic pressure estimated at 61mm Hg (severe pulmonary hypertension).  Diltiazem gtt was discontinue. Patient's cardiac medicine regimen as of 4/11 AM consisted of diltiazem 90mg PO q6h and metoprolol tartrate 125mg PO q12h, each with hold parameters. Due to bradycardia and low SBP diltiazem dose was decreased to 180 and metoprolol decreased to 100 BID. Patient sent with 1 week of oxycodone for pain control to prevent tachycardia as well, Istop Reference #: [id number]. Patient was asymptomatic and hemodynamically for d/c.

## 2018-04-06 NOTE — DISCHARGE NOTE ADULT - CARE PROVIDER_API CALL
Marcus Beckwith (CECILIO), Hematology; Medical Oncology  [address] [facility] [address] [place] [address]  Phone: [phone]  Fax: [phone] Marcus Beckwith (CECILIO), Hematology; Medical Oncology  [address] [facility] [address]  [place] [address]  Phone: [phone]  Fax: [phone]    Larry Polk), Cardiac Electrophysiology; Cardiovascular Disease; Internal Medicine  [address] [address] [place] [address]  [address] [id number]  [place] [address]  Phone: [phone]  Fax: [phone]

## 2018-04-06 NOTE — DISCHARGE NOTE ADULT - MEDICATION SUMMARY - MEDICATIONS TO CHANGE
I will SWITCH the dose or number of times a day I take the medications listed below when I get home from the hospital:    metoprolol tartrate 25 mg oral tablet  -- 3 tab(s) by mouth 2 times a day

## 2018-04-06 NOTE — PROGRESS NOTE ADULT - PROBLEM SELECTOR PLAN 5
DVT ppx: Improve score of 2. Holding eliquis for now in anticipation of possible bone bx. CHADSVASc 1 therefore bridging not indicated at this time. Will use HSQ prophylactic dose for now. will restart eliquis after biopsy DVT ppx: Improve score of 2. Holding eliquis for now in anticipation of possible bone bx. CHADSVASc 1 therefore bridging not indicated at this time. Will restart eliquis after bone biopsy

## 2018-04-06 NOTE — DISCHARGE NOTE ADULT - ADDITIONAL INSTRUCTIONS
- Follow up with your oncologist, Dr. Beckwith, within two weeks. To make an appointment, call [phone].  - Follow up with your cardiac electrophysiologist, Dr. Polk, within two weeks. To make an appointment, call [phone]. - Follow up with your oncologist, Dr. Beckwith, within one week. To make an appointment, call [phone].  - Follow up with your cardiac electrophysiologist, Dr. Polk, within two weeks. To make an appointment, call [phone]. - Follow up with your oncologist, Dr. Beckwith, within one week. To make an appointment, call [phone].  - Follow up with your cardiac electrophysiologist, Dr. Polk on May 1st at 11AM, 4th floor of Oncology Building at [facility], you can reach them at [phone]

## 2018-04-07 LAB
BUN SERPL-MCNC: 17 MG/DL — SIGNIFICANT CHANGE UP (ref 7–23)
CALCIUM SERPL-MCNC: 8.4 MG/DL — SIGNIFICANT CHANGE UP (ref 8.4–10.5)
CHLORIDE SERPL-SCNC: 103 MMOL/L — SIGNIFICANT CHANGE UP (ref 98–107)
CO2 SERPL-SCNC: 21 MMOL/L — LOW (ref 22–31)
CREAT SERPL-MCNC: 0.87 MG/DL — SIGNIFICANT CHANGE UP (ref 0.5–1.3)
GLUCOSE SERPL-MCNC: 93 MG/DL — SIGNIFICANT CHANGE UP (ref 70–99)
HCT VFR BLD CALC: 43.7 % — SIGNIFICANT CHANGE UP (ref 39–50)
HGB BLD-MCNC: 14 G/DL — SIGNIFICANT CHANGE UP (ref 13–17)
MAGNESIUM SERPL-MCNC: 1.9 MG/DL — SIGNIFICANT CHANGE UP (ref 1.6–2.6)
MCHC RBC-ENTMCNC: 28.6 PG — SIGNIFICANT CHANGE UP (ref 27–34)
MCHC RBC-ENTMCNC: 32 % — SIGNIFICANT CHANGE UP (ref 32–36)
MCV RBC AUTO: 89.4 FL — SIGNIFICANT CHANGE UP (ref 80–100)
NRBC # FLD: 0 — SIGNIFICANT CHANGE UP
PHOSPHATE SERPL-MCNC: 2.7 MG/DL — SIGNIFICANT CHANGE UP (ref 2.5–4.5)
PLATELET # BLD AUTO: 379 K/UL — SIGNIFICANT CHANGE UP (ref 150–400)
PMV BLD: 10.7 FL — SIGNIFICANT CHANGE UP (ref 7–13)
POTASSIUM SERPL-MCNC: 3.7 MMOL/L — SIGNIFICANT CHANGE UP (ref 3.5–5.3)
POTASSIUM SERPL-SCNC: 3.7 MMOL/L — SIGNIFICANT CHANGE UP (ref 3.5–5.3)
RBC # BLD: 4.89 M/UL — SIGNIFICANT CHANGE UP (ref 4.2–5.8)
RBC # FLD: 14.7 % — HIGH (ref 10.3–14.5)
SODIUM SERPL-SCNC: 139 MMOL/L — SIGNIFICANT CHANGE UP (ref 135–145)
WBC # BLD: 9.49 K/UL — SIGNIFICANT CHANGE UP (ref 3.8–10.5)
WBC # FLD AUTO: 9.49 K/UL — SIGNIFICANT CHANGE UP (ref 3.8–10.5)

## 2018-04-07 PROCEDURE — 93010 ELECTROCARDIOGRAM REPORT: CPT | Mod: 76

## 2018-04-07 PROCEDURE — 99233 SBSQ HOSP IP/OBS HIGH 50: CPT

## 2018-04-07 RX ORDER — SODIUM CHLORIDE 9 MG/ML
1000 INJECTION, SOLUTION INTRAVENOUS ONCE
Qty: 0 | Refills: 0 | Status: DISCONTINUED | OUTPATIENT
Start: 2018-04-07 | End: 2018-04-07

## 2018-04-07 RX ORDER — METOPROLOL TARTRATE 50 MG
5 TABLET ORAL ONCE
Qty: 0 | Refills: 0 | Status: COMPLETED | OUTPATIENT
Start: 2018-04-07 | End: 2018-04-07

## 2018-04-07 RX ORDER — APIXABAN 2.5 MG/1
5 TABLET, FILM COATED ORAL EVERY 12 HOURS
Qty: 0 | Refills: 0 | Status: DISCONTINUED | OUTPATIENT
Start: 2018-04-07 | End: 2018-04-11

## 2018-04-07 RX ORDER — METOPROLOL TARTRATE 50 MG
5 TABLET ORAL ONCE
Qty: 0 | Refills: 0 | Status: DISCONTINUED | OUTPATIENT
Start: 2018-04-07 | End: 2018-04-11

## 2018-04-07 RX ORDER — SODIUM CHLORIDE 9 MG/ML
500 INJECTION INTRAMUSCULAR; INTRAVENOUS; SUBCUTANEOUS ONCE
Qty: 0 | Refills: 0 | Status: COMPLETED | OUTPATIENT
Start: 2018-04-07 | End: 2018-04-07

## 2018-04-07 RX ADMIN — Medication 10 MG/HR: at 04:40

## 2018-04-07 RX ADMIN — SENNA PLUS 2 TABLET(S): 8.6 TABLET ORAL at 21:15

## 2018-04-07 RX ADMIN — Medication 975 MILLIGRAM(S): at 07:01

## 2018-04-07 RX ADMIN — Medication 125 MILLIGRAM(S): at 17:58

## 2018-04-07 RX ADMIN — Medication 975 MILLIGRAM(S): at 22:09

## 2018-04-07 RX ADMIN — Medication 975 MILLIGRAM(S): at 13:17

## 2018-04-07 RX ADMIN — Medication 975 MILLIGRAM(S): at 21:15

## 2018-04-07 RX ADMIN — SODIUM CHLORIDE 250 MILLILITER(S): 9 INJECTION INTRAMUSCULAR; INTRAVENOUS; SUBCUTANEOUS at 04:36

## 2018-04-07 RX ADMIN — Medication 5 MG/HR: at 02:24

## 2018-04-07 RX ADMIN — Medication 975 MILLIGRAM(S): at 02:15

## 2018-04-07 RX ADMIN — Medication 2.5 MG/HR: at 15:34

## 2018-04-07 RX ADMIN — BICALUTAMIDE 50 MILLIGRAM(S): 50 TABLET, FILM COATED ORAL at 13:17

## 2018-04-07 RX ADMIN — OXYCODONE HYDROCHLORIDE 10 MILLIGRAM(S): 5 TABLET ORAL at 21:12

## 2018-04-07 RX ADMIN — APIXABAN 5 MILLIGRAM(S): 2.5 TABLET, FILM COATED ORAL at 18:17

## 2018-04-07 RX ADMIN — Medication 975 MILLIGRAM(S): at 01:15

## 2018-04-07 RX ADMIN — Medication 125 MILLIGRAM(S): at 05:52

## 2018-04-07 RX ADMIN — OXYCODONE HYDROCHLORIDE 10 MILLIGRAM(S): 5 TABLET ORAL at 17:55

## 2018-04-07 RX ADMIN — Medication 975 MILLIGRAM(S): at 17:43

## 2018-04-07 RX ADMIN — Medication 5 MILLIGRAM(S): at 05:51

## 2018-04-07 NOTE — PROGRESS NOTE ADULT - PROBLEM SELECTOR PLAN 2
- presented for outpatient bone biopsy  - outpt oncologist following - not due for lupron for a few weeks, c/w with bicalutamide here  - given uncontrolled rate still will pursue biopsy possibly Monday as outpatient procedure - presented for outpatient bone biopsy  - outpt oncologist following - not due for lupron for a few weeks, c/w with bicalutamide here  - Bone biopsy performed yesterday, site looks c/d/i

## 2018-04-07 NOTE — PROGRESS NOTE ADULT - ASSESSMENT
63M PMH HTN, COPD, aflutter s/p ablation (2015) on eliquis, Prostate Ca (diag ~2013) s/p radiation seed implants, no chemo w/ ?mets to spine and pelvis, who presented for IR guided iliac crest bone biopsy found to be in aflutter with rate of 150, course c/b recurrent episodes of aflutter w/ avr

## 2018-04-07 NOTE — PROGRESS NOTE ADULT - PROBLEM SELECTOR PLAN 1
CHADSVASc 1- patient presented for outpatient bone biopsy and found to be in aflutter with RVR requiring metoprolol and esmolol IV pushes, Possibly in the setting of pain  - monitor on tele, o/n with another episode of RVR to 140s, metoprolol 5mg IV push x1 with resolution  - Metoprolol to 125 BID and on Cardizem drip for procedure yesterday  - Will attempt to wean cardizem today  - PRN lopressor IVP for acute episodes  - cardiology consulted CHADSVAS 1- patient presented for outpatient bone biopsy and found to be in aflutter with RVR requiring metoprolol and esmolol IV pushes, Possibly in the setting of pain  - monitor on tele, o/n with another episode of RVR  - Metoprolol to 125 BID and on Cardizem drip at 10mg/hr  - Will attempt to wean cardizem today  - PRN lopressor IVP for acute episodes  - cardiology consulted: possible ablatation after AC restarted CHADSVAS 1- patient presented for outpatient bone biopsy and found to be in aflutter with RVR requiring metoprolol and esmolol IV pushes, Possibly in the setting of pain  - monitor on tele, o/n with another episode of RVR since 4pm yesterday  - Metoprolol to 125 BID and on Cardizem drip at 10mg/hr currently  - Will start Cardizem 90 q6h and titrate off of drip  - PRN lopressor IVP for acute episodes  - cardiology consulted: possible ablatation after AC restarted CHADSVASc 1- patient presented for outpatient bone biopsy and found to be in aflutter with RVR requiring metoprolol and esmolol IV pushes, Possibly in the setting of pain  - monitor on tele, o/n with another episode of RVR since 4pm yesterday  - Metoprolol to 125 BID and on Cardizem drip at 10mg/hr currently  - Will start Cardizem 90 q6h and titrate off of drip  - PRN lopressor IVP for acute episodes  - cardiology consulted: possible ablatation after AC restarted  - Will touch base with IR re: when we can restart eliquis after bone biopsy CHADSVASc 1- patient presented for outpatient bone biopsy and found to be in aflutter with RVR requiring metoprolol and esmolol IV pushes, Possibly in the setting of pain  - monitor on tele, o/n with another episode of RVR since 4pm yesterday  - Metoprolol to 125 BID and on Cardizem drip at 10mg/hr currently  - Will start Cardizem 90 q6h and titrate off of drip  - PRN lopressor IVP for acute episodes  - cardiology consulted: possible ablation after AC restarted  - Will touch base with IR re: when we can restart eliquis after bone biopsy

## 2018-04-07 NOTE — PROGRESS NOTE ADULT - SUBJECTIVE AND OBJECTIVE BOX
Patient is a 63y old  Male who presents with a chief complaint of aflutter with RVR (06 Apr 2018 10:48)      SUBJECTIVE / OVERNIGHT EVENTS:    MEDICATIONS  (STANDING):  acetaminophen   Tablet. 975 milliGRAM(s) Oral every 6 hours  bicalutamide 50 milliGRAM(s) Oral daily  diltiazem Infusion 10 mG/Hr (10 mL/Hr) IV Continuous <Continuous>  metoprolol tartrate 125 milliGRAM(s) Oral every 12 hours  metoprolol tartrate Injectable 5 milliGRAM(s) IV Push once  senna 2 Tablet(s) Oral at bedtime    MEDICATIONS  (PRN):  oxyCODONE    IR 5 milliGRAM(s) Oral every 4 hours PRN Moderate Pain (4 - 6)  oxyCODONE    IR 10 milliGRAM(s) Oral every 4 hours PRN Severe Pain (7 - 10)        CAPILLARY BLOOD GLUCOSE        I&O's Summary      PHYSICAL EXAM:  GENERAL: NAD, well-developed  HEAD:  Atraumatic, Normocephalic  EYES: EOMI, PERRLA, conjunctiva and sclera clear  NECK: Supple, No JVD  CHEST/LUNG: Clear to auscultation bilaterally; No wheeze  HEART: Regular rate and rhythm; No murmurs, rubs, or gallops  ABDOMEN: Soft, Nontender, Nondistended; Bowel sounds present  EXTREMITIES:  2+ Peripheral Pulses, No clubbing, cyanosis, or edema  PSYCH: AAOx3  NEUROLOGY: non-focal  SKIN: No rashes or lesions    LABS:                          13.7   10.60 )-----------( 375      ( 06 Apr 2018 06:15 )             43.4     04-06    138  |  98  |  15  ----------------------------<  104<H>  3.5   |  24  |  0.91    Ca    8.4      06 Apr 2018 06:15  Phos  2.4     04-06  Mg     1.9     04-06      PT/INR - ( 06 Apr 2018 06:15 )   PT: 15.7 SEC;   INR: 1.36          PTT - ( 06 Apr 2018 06:15 )  PTT:50.2 SEC          RADIOLOGY & ADDITIONAL TESTS:    Imaging Personally Reviewed:    Consultant(s) Notes Reviewed:      Care Discussed with Consultants/Other Providers: Patient is a 63y old  Male who presents with a chief complaint of aflutter with RVR (06 Apr 2018 10:48)      SUBJECTIVE / OVERNIGHT EVENTS:    Pt had episode of aflutter over night requiring multiple pushes of cardizem and broke with 5mg IVP of lopressor. Dilt trip now at 10 mg/hr.    MEDICATIONS  (STANDING):  acetaminophen   Tablet. 975 milliGRAM(s) Oral every 6 hours  bicalutamide 50 milliGRAM(s) Oral daily  diltiazem Infusion 10 mG/Hr (10 mL/Hr) IV Continuous <Continuous>  metoprolol tartrate 125 milliGRAM(s) Oral every 12 hours  metoprolol tartrate Injectable 5 milliGRAM(s) IV Push once  senna 2 Tablet(s) Oral at bedtime    MEDICATIONS  (PRN):  oxyCODONE    IR 5 milliGRAM(s) Oral every 4 hours PRN Moderate Pain (4 - 6)  oxyCODONE    IR 10 milliGRAM(s) Oral every 4 hours PRN Severe Pain (7 - 10)        CAPILLARY BLOOD GLUCOSE        I&O's Summary      PHYSICAL EXAM:  GENERAL: NAD, well-developed  HEAD:  Atraumatic, Normocephalic  EYES: EOMI, PERRLA, conjunctiva and sclera clear  NECK: Supple, No JVD  CHEST/LUNG: Clear to auscultation bilaterally; No wheeze  HEART: Regular rate and rhythm; No murmurs, rubs, or gallops  ABDOMEN: Soft, Nontender, Nondistended; Bowel sounds present  EXTREMITIES:  2+ Peripheral Pulses, No clubbing, cyanosis, or edema  PSYCH: AAOx3  NEUROLOGY: non-focal  SKIN: No rashes or lesions    LABS:                          13.7   10.60 )-----------( 375      ( 06 Apr 2018 06:15 )             43.4     04-06    138  |  98  |  15  ----------------------------<  104<H>  3.5   |  24  |  0.91    Ca    8.4      06 Apr 2018 06:15  Phos  2.4     04-06  Mg     1.9     04-06      PT/INR - ( 06 Apr 2018 06:15 )   PT: 15.7 SEC;   INR: 1.36          PTT - ( 06 Apr 2018 06:15 )  PTT:50.2 SEC          RADIOLOGY & ADDITIONAL TESTS:    Imaging Personally Reviewed:    Consultant(s) Notes Reviewed:      Care Discussed with Consultants/Other Providers: Patient is a 63y old  Male who presents with a chief complaint of aflutter with RVR (06 Apr 2018 10:48)      SUBJECTIVE / OVERNIGHT EVENTS:    Pt had episode of aflutter over night requiring multiple pushes of cardizem and broke with 5mg IVP of lopressor. Dilt trip now at 10 mg/hr and rates controlled in the 80's.    Pt states he has been asymptomatic. Some mild pain around BM biopsy site s/p tylenol. Pt will notify nurse if pain worsens.  Pt states otherwise stable, eating/drinking well and able to ambulate.   No CP, Lightheadedness, SOB.    MEDICATIONS  (STANDING):  acetaminophen   Tablet. 975 milliGRAM(s) Oral every 6 hours  bicalutamide 50 milliGRAM(s) Oral daily  diltiazem Infusion 10 mG/Hr (10 mL/Hr) IV Continuous <Continuous>  metoprolol tartrate 125 milliGRAM(s) Oral every 12 hours  metoprolol tartrate Injectable 5 milliGRAM(s) IV Push once  senna 2 Tablet(s) Oral at bedtime    MEDICATIONS  (PRN):  oxyCODONE    IR 5 milliGRAM(s) Oral every 4 hours PRN Moderate Pain (4 - 6)  oxyCODONE    IR 10 milliGRAM(s) Oral every 4 hours PRN Severe Pain (7 - 10)        CAPILLARY BLOOD GLUCOSE        I&O's Summary      PHYSICAL EXAM:  GENERAL: NAD  EYES: conjunctiva and sclera clear  CHEST/LUNG: Clear to auscultation bilaterally; No wheeze  HEART: Irregular rate and rhythm; No murmurs, rubs, or gallops  ABDOMEN: Soft, Nontender, Nondistended; Bowel sounds present  EXTREMITIES:  2+ Peripheral Pulses, No clubbing, cyanosis, or edema  PSYCH: AAOx3  NEUROLOGY: non-focal  SKIN: No rashes or lesions, 2 small wounds on L flank which are clean with no signs of infection    LABS:                          13.7   10.60 )-----------( 375      ( 06 Apr 2018 06:15 )             43.4     04-06    138  |  98  |  15  ----------------------------<  104<H>  3.5   |  24  |  0.91    Ca    8.4      06 Apr 2018 06:15  Phos  2.4     04-06  Mg     1.9     04-06      PT/INR - ( 06 Apr 2018 06:15 )   PT: 15.7 SEC;   INR: 1.36          PTT - ( 06 Apr 2018 06:15 )  PTT:50.2 SEC          RADIOLOGY & ADDITIONAL TESTS:    Imaging Personally Reviewed:    Consultant(s) Notes Reviewed:      Care Discussed with Consultants/Other Providers: Patient is a 63y old  Male who presents with a chief complaint of aflutter with RVR (06 Apr 2018 10:48)      SUBJECTIVE / OVERNIGHT EVENTS:    Pt had episode of aflutter over night requiring multiple pushes of cardizem and broke with 5mg IVP of lopressor. Dilt drip now at 10 mg/hr and rates controlled in the 80's.    Pt states he has been asymptomatic. Some mild pain around BM biopsy site s/p tylenol. Pt will notify nurse if pain worsens.  Pt states otherwise stable, eating/drinking well and able to ambulate.   No CP, Lightheadedness, SOB.    MEDICATIONS  (STANDING):  acetaminophen   Tablet. 975 milliGRAM(s) Oral every 6 hours  bicalutamide 50 milliGRAM(s) Oral daily  diltiazem Infusion 10 mG/Hr (10 mL/Hr) IV Continuous <Continuous>  metoprolol tartrate 125 milliGRAM(s) Oral every 12 hours  metoprolol tartrate Injectable 5 milliGRAM(s) IV Push once  senna 2 Tablet(s) Oral at bedtime    MEDICATIONS  (PRN):  oxyCODONE    IR 5 milliGRAM(s) Oral every 4 hours PRN Moderate Pain (4 - 6)  oxyCODONE    IR 10 milliGRAM(s) Oral every 4 hours PRN Severe Pain (7 - 10)        CAPILLARY BLOOD GLUCOSE        I&O's Summary      PHYSICAL EXAM:  Vital Signs Last 24 Hrs  T(C): 36.9 (07 Apr 2018 04:52), Max: 37.6 (07 Apr 2018 01:13)  T(F): 98.4 (07 Apr 2018 04:52), Max: 99.7 (07 Apr 2018 01:13)  HR: 90 (07 Apr 2018 09:47) (90 - 139)  BP: 110/85 (07 Apr 2018 09:47) (102/69 - 135/93)  BP(mean): --  RR: 17 (07 Apr 2018 04:52) (17 - 18)  SpO2: 93% (07 Apr 2018 04:52) (93% - 95%)  GENERAL: NAD  EYES: conjunctiva and sclera clear  CHEST/LUNG: Clear to auscultation bilaterally; No wheeze  HEART: Irregular rate and rhythm; No murmurs, rubs, or gallops  ABDOMEN: Soft, Nontender, Nondistended; Bowel sounds present  EXTREMITIES:  2+ Peripheral Pulses, No clubbing, cyanosis, or edema  PSYCH: AAOx3  NEUROLOGY: non-focal  SKIN: No rashes or lesions, 2 small wounds on L flank which are clean with no signs of infection    LABS:                          13.7   10.60 )-----------( 375      ( 06 Apr 2018 06:15 )             43.4     04-06    138  |  98  |  15  ----------------------------<  104<H>  3.5   |  24  |  0.91    Ca    8.4      06 Apr 2018 06:15  Phos  2.4     04-06  Mg     1.9     04-06      PT/INR - ( 06 Apr 2018 06:15 )   PT: 15.7 SEC;   INR: 1.36          PTT - ( 06 Apr 2018 06:15 )  PTT:50.2 SEC          RADIOLOGY & ADDITIONAL TESTS:    Imaging Personally Reviewed:    Consultant(s) Notes Reviewed:      Care Discussed with Consultants/Other Providers:

## 2018-04-07 NOTE — CHART NOTE - NSCHARTNOTEFT_GEN_A_CORE
Spoke with IR fellow (Dr. PADMINI KASPER) who states it is safe to start eliquis today after getting biopsy yesterday. Will start evening dose tonight.

## 2018-04-07 NOTE — PROGRESS NOTE ADULT - PROBLEM SELECTOR PLAN 5
DVT ppx: Holding eliquis for now in anticipation of possible bone bx. Will restart eliquis after bone biopsy DVT ppx: Holding eliquis for now in anticipation of possible bone bx. Will touch base with IR re: when we can restart eliquis after bone biopsy DVT ppx: Will touch base with IR re: when we can restart eliquis after bone biopsy. In the interim encourage ambulation.

## 2018-04-08 LAB
BUN SERPL-MCNC: 17 MG/DL — SIGNIFICANT CHANGE UP (ref 7–23)
CALCIUM SERPL-MCNC: 8.6 MG/DL — SIGNIFICANT CHANGE UP (ref 8.4–10.5)
CHLORIDE SERPL-SCNC: 101 MMOL/L — SIGNIFICANT CHANGE UP (ref 98–107)
CO2 SERPL-SCNC: 20 MMOL/L — LOW (ref 22–31)
CREAT SERPL-MCNC: 0.9 MG/DL — SIGNIFICANT CHANGE UP (ref 0.5–1.3)
GLUCOSE SERPL-MCNC: 110 MG/DL — HIGH (ref 70–99)
HCT VFR BLD CALC: 44.5 % — SIGNIFICANT CHANGE UP (ref 39–50)
HGB BLD-MCNC: 14.1 G/DL — SIGNIFICANT CHANGE UP (ref 13–17)
MAGNESIUM SERPL-MCNC: 2 MG/DL — SIGNIFICANT CHANGE UP (ref 1.6–2.6)
MCHC RBC-ENTMCNC: 28.4 PG — SIGNIFICANT CHANGE UP (ref 27–34)
MCHC RBC-ENTMCNC: 31.7 % — LOW (ref 32–36)
MCV RBC AUTO: 89.5 FL — SIGNIFICANT CHANGE UP (ref 80–100)
NRBC # FLD: 0 — SIGNIFICANT CHANGE UP
PHOSPHATE SERPL-MCNC: 2.6 MG/DL — SIGNIFICANT CHANGE UP (ref 2.5–4.5)
PLATELET # BLD AUTO: 429 K/UL — HIGH (ref 150–400)
PMV BLD: 10.7 FL — SIGNIFICANT CHANGE UP (ref 7–13)
POTASSIUM SERPL-MCNC: 4 MMOL/L — SIGNIFICANT CHANGE UP (ref 3.5–5.3)
POTASSIUM SERPL-SCNC: 4 MMOL/L — SIGNIFICANT CHANGE UP (ref 3.5–5.3)
RBC # BLD: 4.97 M/UL — SIGNIFICANT CHANGE UP (ref 4.2–5.8)
RBC # FLD: 14.6 % — HIGH (ref 10.3–14.5)
SODIUM SERPL-SCNC: 138 MMOL/L — SIGNIFICANT CHANGE UP (ref 135–145)
WBC # BLD: 8.85 K/UL — SIGNIFICANT CHANGE UP (ref 3.8–10.5)
WBC # FLD AUTO: 8.85 K/UL — SIGNIFICANT CHANGE UP (ref 3.8–10.5)

## 2018-04-08 PROCEDURE — 99233 SBSQ HOSP IP/OBS HIGH 50: CPT

## 2018-04-08 RX ORDER — ONDANSETRON 8 MG/1
4 TABLET, FILM COATED ORAL
Qty: 0 | Refills: 0 | Status: DISCONTINUED | OUTPATIENT
Start: 2018-04-08 | End: 2018-04-11

## 2018-04-08 RX ORDER — DILTIAZEM HCL 120 MG
5 CAPSULE, EXT RELEASE 24 HR ORAL
Qty: 125 | Refills: 0 | Status: DISCONTINUED | OUTPATIENT
Start: 2018-04-08 | End: 2018-04-10

## 2018-04-08 RX ADMIN — Medication 975 MILLIGRAM(S): at 09:19

## 2018-04-08 RX ADMIN — APIXABAN 5 MILLIGRAM(S): 2.5 TABLET, FILM COATED ORAL at 17:09

## 2018-04-08 RX ADMIN — APIXABAN 5 MILLIGRAM(S): 2.5 TABLET, FILM COATED ORAL at 05:54

## 2018-04-08 RX ADMIN — Medication 2.5 MG/HR: at 02:15

## 2018-04-08 RX ADMIN — Medication 125 MILLIGRAM(S): at 17:09

## 2018-04-08 RX ADMIN — Medication 975 MILLIGRAM(S): at 21:13

## 2018-04-08 RX ADMIN — Medication 975 MILLIGRAM(S): at 10:20

## 2018-04-08 RX ADMIN — Medication 975 MILLIGRAM(S): at 18:09

## 2018-04-08 RX ADMIN — Medication 975 MILLIGRAM(S): at 22:00

## 2018-04-08 RX ADMIN — Medication 5 MG/HR: at 17:50

## 2018-04-08 RX ADMIN — Medication 125 MILLIGRAM(S): at 05:54

## 2018-04-08 RX ADMIN — Medication 975 MILLIGRAM(S): at 17:09

## 2018-04-08 RX ADMIN — BICALUTAMIDE 50 MILLIGRAM(S): 50 TABLET, FILM COATED ORAL at 11:46

## 2018-04-08 NOTE — PROGRESS NOTE ADULT - PROBLEM SELECTOR PLAN 1
[facility]ADSVAS 1- patient presented for outpatient bone biopsy and found to be in aflutter with RVR requiring metoprolol and esmolol IV pushes, Possibly in the setting of pain  - monitor on tele, o/n with another episode of RVR since 4pm yesterday  - Metoprolol to 125 BID, cardizem 90 q6h and on Cardizem drip at 2.5mg/hr currently, will uptitrate PO to titrate off drip  - PRN lopressor IVP for acute episodes  - cardiology consulted: possible ablation after AC restarted  - restarted eliquis yesterday [facility]SVAS[facility] 1- patient presented for outpatient bone biopsy and found to be in aflutter with RVR  - monitor on tele, o/n with another episode of RVR since 1:30AM, rate controlled on exam to 70s  - Metoprolol to 125 BID, cardizem 90 q6h and on Cardizem drip at 2.5mg/hr currently, will uptitrate PO to titrate off drip  - PRN lopressor IVP for acute episodes however patient did not receive any overnight  - cardiology consulted: AC restarted yesterday, will f/u rec possible ablation [facility] 1- patient presented for outpatient bone biopsy and found to be in aflutter with RVR  - monitor on tele, o/n with another episode of RVR since 1:30AM, rate controlled on exam to 70s  - c/w Metoprolol 125 BID, cardizem 90 q6h and on Cardizem drip at 2.5mg/hr currently, will uptitrate PO to titrate off drip  - PRN lopressor IVP for acute episodes however patient did not receive any overnight  - AC restarted yesterday  -will f/u EP and ther rec for possible ablation  -TTE pendidng

## 2018-04-08 NOTE — PROGRESS NOTE ADULT - ASSESSMENT
63M with recurrent prostate cancer, lytic bony lesions, renal mass, 1st bx was nondiagnostic, has not been able to get another bx or BM asp and Bx because of pt canceling appointments, now with possible cardiac issues, doing better and likely to go for bx tomorrow. Will recommend:  - continue RX as per medicine, cardiology  - start zofran 4 mg every 6 hours as needed, especially prior to ensure  - f/u on Bx results  - pain control  - continue casodex 50 mg daily.  - lupron not due for a few weeks  - pt's aPTT is elevated and work up had revealed nonspecific anticoagulant, no bleeding history  - DVT prophylaxis  - all other supportive Rx  - to f/u as out pt after discharge.

## 2018-04-08 NOTE — PROGRESS NOTE ADULT - SUBJECTIVE AND OBJECTIVE BOX
Patient is a 63y old  Male who presents with a chief complaint of aflutter with RVR (06 Apr 2018 10:48)      SUBJECTIVE / OVERNIGHT EVENTS:    MEDICATIONS  (STANDING):  acetaminophen   Tablet. 975 milliGRAM(s) Oral every 6 hours  apixaban 5 milliGRAM(s) Oral every 12 hours  bicalutamide 50 milliGRAM(s) Oral daily  diltiazem    Tablet 90 milliGRAM(s) Oral every 6 hours  diltiazem Infusion 2.5 mG/Hr (2.5 mL/Hr) IV Continuous <Continuous>  metoprolol tartrate 125 milliGRAM(s) Oral every 12 hours  metoprolol tartrate Injectable 5 milliGRAM(s) IV Push once  senna 2 Tablet(s) Oral at bedtime    MEDICATIONS  (PRN):  oxyCODONE    IR 5 milliGRAM(s) Oral every 4 hours PRN Moderate Pain (4 - 6)  oxyCODONE    IR 10 milliGRAM(s) Oral every 4 hours PRN Severe Pain (7 - 10)      PHYSICAL EXAM:  Vital Signs Last 24 Hrs  T(C): 36.7 (08 Apr 2018 05:50), Max: 36.9 (07 Apr 2018 21:07)  T(F): 98 (08 Apr 2018 05:50), Max: 98.5 (07 Apr 2018 21:07)  HR: 139 (08 Apr 2018 05:50) (57 - 139)  BP: 115/91 (08 Apr 2018 05:50) (103/89 - 119/79)  BP(mean): --  RR: 17 (08 Apr 2018 05:50) (16 - 18)  SpO2: 95% (08 Apr 2018 05:50) (95% - 97%)    GENERAL: NAD, well-developed  HEAD:  Atraumatic, Normocephalic  EYES: EOMI, PERRLA, conjunctiva and sclera clear  NECK: Supple, No JVD  CHEST/LUNG: Clear to auscultation bilaterally; No wheeze  HEART: Regular rate and rhythm; No murmurs, rubs, or gallops  ABDOMEN: Soft, Nontender, Nondistended; Bowel sounds present  EXTREMITIES:  2+ Peripheral Pulses, No clubbing, cyanosis, or edema  PSYCH: AAOx3  NEUROLOGY: non-focal  SKIN: No rashes or lesions    LABS:    WBC Trend: 9.49<--, 10.60<--, 10.28<--  Hb Trend: 14.0<--, 13.7<--, 12.9<--, 14.1<--  Plt Trend: 379<--, 375<--, 383<--, 429<--  04-07    139  |  103  |  17  ----------------------------<  93  3.7   |  21<L>  |  0.87    Ca    8.4      07 Apr 2018 06:30  Phos  2.7     04-07  Mg     1.9     04-07      Creatinine Trend: 0.87<--, 0.91<--, 0.98<--, 0.95<--, 1.09<--            Microbiology:  Urine Cx:  Blood Cx:    RADIOLOGY & ADDITIONAL TESTS:  X- Ray:  CT:  Ultrasound:  [ ] imaging personally reviewed and interpreted by me    Consultant(s) Notes Reviewed:      Care Discussed with Consultants/Other Providers: Patient is a 63y old  Male who presents with a chief complaint of aflutter with RVR (06 Apr 2018 10:48)      SUBJECTIVE / OVERNIGHT EVENTS:    MEDICATIONS  (STANDING):  acetaminophen   Tablet. 975 milliGRAM(s) Oral every 6 hours  apixaban 5 milliGRAM(s) Oral every 12 hours  bicalutamide 50 milliGRAM(s) Oral daily  diltiazem    Tablet 90 milliGRAM(s) Oral every 6 hours  diltiazem Infusion 2.5 mG/Hr (2.5 mL/Hr) IV Continuous <Continuous>  metoprolol tartrate 125 milliGRAM(s) Oral every 12 hours  metoprolol tartrate Injectable 5 milliGRAM(s) IV Push once  senna 2 Tablet(s) Oral at bedtime    MEDICATIONS  (PRN):  oxyCODONE    IR 5 milliGRAM(s) Oral every 4 hours PRN Moderate Pain (4 - 6)  oxyCODONE    IR 10 milliGRAM(s) Oral every 4 hours PRN Severe Pain (7 - 10)      PHYSICAL EXAM:  Vital Signs Last 24 Hrs  T(C): 36.7 (08 Apr 2018 05:50), Max: 36.9 (07 Apr 2018 21:07)  T(F): 98 (08 Apr 2018 05:50), Max: 98.5 (07 Apr 2018 21:07)  HR: 139 (08 Apr 2018 05:50) (57 - 139)  BP: 115/91 (08 Apr 2018 05:50) (103/89 - 119/79)  BP(mean): --  RR: 17 (08 Apr 2018 05:50) (16 - 18)  SpO2: 95% (08 Apr 2018 05:50) (95% - 97%)    GENERAL: NAD  EYES: conjunctiva and sclera clear  CHEST/LUNG: Clear to auscultation bilaterally; No wheeze  HEART: Irregular rate and rhythm; No murmurs, rubs, or gallops  ABDOMEN: Soft, Nontender, Nondistended; Bowel sounds present  EXTREMITIES:  2+ Peripheral Pulses, No clubbing, cyanosis, or edema  PSYCH: AAOx3  NEUROLOGY: non-focal  SKIN: No rashes or lesions,     LABS:    WBC Trend: 9.49<--, 10.60<--, 10.28<--  Hb Trend: 14.0<--, 13.7<--, 12.9<--, 14.1<--  Plt Trend: 379<--, 375<--, 383<--, 429<--  04-07    139  |  103  |  17  ----------------------------<  93  3.7   |  21<L>  |  0.87    Ca    8.4      07 Apr 2018 06:30  Phos  2.7     04-07  Mg     1.9     04-07      Creatinine Trend: 0.87<--, 0.91<--, 0.98<--, 0.95<--, 1.09<--            Microbiology:  Urine Cx:  Blood Cx:    RADIOLOGY & ADDITIONAL TESTS:  X- Ray:  CT:  Ultrasound:  [ ] imaging personally reviewed and interpreted by me    Consultant(s) Notes Reviewed:      Care Discussed with Consultants/Other Providers: Patient is a 63y old  Male who presents with a chief complaint of aflutter with RVR (06 Apr 2018 10:48)      SUBJECTIVE / OVERNIGHT EVENTS: Overnight patient was in aflutter with RVR to 140's from 1:30AM to 7:30AM. Patient was asymptomatic. Given standing diltiazem and metoprolol PO as ordered, on diltiazem drip. This morning patient seen at 8:15AM with HR in 70-80s. This morning patient denies any CP, palpitations, SOB, N/V. Reports mild but controlled pain in back.     MEDICATIONS  (STANDING):  acetaminophen   Tablet. 975 milliGRAM(s) Oral every 6 hours  apixaban 5 milliGRAM(s) Oral every 12 hours  bicalutamide 50 milliGRAM(s) Oral daily  diltiazem    Tablet 90 milliGRAM(s) Oral every 6 hours  diltiazem Infusion 2.5 mG/Hr (2.5 mL/Hr) IV Continuous <Continuous>  metoprolol tartrate 125 milliGRAM(s) Oral every 12 hours  metoprolol tartrate Injectable 5 milliGRAM(s) IV Push once  senna 2 Tablet(s) Oral at bedtime    MEDICATIONS  (PRN):  oxyCODONE    IR 5 milliGRAM(s) Oral every 4 hours PRN Moderate Pain (4 - 6)  oxyCODONE    IR 10 milliGRAM(s) Oral every 4 hours PRN Severe Pain (7 - 10)      PHYSICAL EXAM:  Vital Signs Last 24 Hrs  T(C): 36.7 (08 Apr 2018 05:50), Max: 36.9 (07 Apr 2018 21:07)  T(F): 98 (08 Apr 2018 05:50), Max: 98.5 (07 Apr 2018 21:07)  HR: 139 (08 Apr 2018 05:50) (57 - 139)  BP: 115/91 (08 Apr 2018 05:50) (103/89 - 119/79)  BP(mean): --  RR: 17 (08 Apr 2018 05:50) (16 - 18)  SpO2: 95% (08 Apr 2018 05:50) (95% - 97%)    GENERAL: NAD  EYES: conjunctiva and sclera clear  CHEST/LUNG: Clear to auscultation bilaterally; No wheeze  HEART: Irregular rate and rhythm; No murmurs, rubs, or gallops  ABDOMEN: Soft, Nontender, Nondistended; Bowel sounds present  EXTREMITIES:  2+ Peripheral Pulses, No clubbing, cyanosis, or edema  PSYCH: AAOx3  NEUROLOGY: non-focal  SKIN: No rashes or lesions, 2 small scabs at site of biosy in lower back, nontender    LABS:    WBC Trend: 9.49<--, 10.60<--, 10.28<--  Hb Trend: 14.0<--, 13.7<--, 12.9<--, 14.1<--  Plt Trend: 379<--, 375<--, 383<--, 429<--  04-07    139  |  103  |  17  ----------------------------<  93  3.7   |  21<L>  |  0.87    Ca    8.4      07 Apr 2018 06:30  Phos  2.7     04-07  Mg     1.9     04-07      Creatinine Trend: 0.87<--, 0.91<--, 0.98<--, 0.95<--, 1.09<--        RADIOLOGY & ADDITIONAL TESTS:  X- Ray:  CT:  Ultrasound:  [ ] imaging personally reviewed and interpreted by me    Consultant(s) Notes Reviewed:  Cardiology    Care Discussed with Consultants/Other Providers: Patient is a 63y old  Male who presents with a chief complaint of aflutter with RVR (06 Apr 2018 10:48)      SUBJECTIVE / OVERNIGHT EVENTS: Overnight patient was in aflutter with RVR to 140's from 1:30AM to 7:30AM. Patient was asymptomatic.. This morning patient seen at 8:15AM with HR in 70-80s. This morning patient denies any CP, palpitations, SOB, N/V. Reports mild but controlled pain in back.     MEDICATIONS  (STANDING):  acetaminophen   Tablet. 975 milliGRAM(s) Oral every 6 hours  apixaban 5 milliGRAM(s) Oral every 12 hours  bicalutamide 50 milliGRAM(s) Oral daily  diltiazem    Tablet 90 milliGRAM(s) Oral every 6 hours  diltiazem Infusion 2.5 mG/Hr (2.5 mL/Hr) IV Continuous <Continuous>  metoprolol tartrate 125 milliGRAM(s) Oral every 12 hours  metoprolol tartrate Injectable 5 milliGRAM(s) IV Push once  senna 2 Tablet(s) Oral at bedtime    MEDICATIONS  (PRN):  oxyCODONE    IR 5 milliGRAM(s) Oral every 4 hours PRN Moderate Pain (4 - 6)  oxyCODONE    IR 10 milliGRAM(s) Oral every 4 hours PRN Severe Pain (7 - 10)      PHYSICAL EXAM:  Vital Signs Last 24 Hrs  T(C): 36.7 (08 Apr 2018 05:50), Max: 36.9 (07 Apr 2018 21:07)  T(F): 98 (08 Apr 2018 05:50), Max: 98.5 (07 Apr 2018 21:07)  HR: 139 (08 Apr 2018 05:50) (57 - 139)  BP: 115/91 (08 Apr 2018 05:50) (103/89 - 119/79)  BP(mean): --  RR: 17 (08 Apr 2018 05:50) (16 - 18)  SpO2: 95% (08 Apr 2018 05:50) (95% - 97%)    GENERAL: NAD  EYES: conjunctiva and sclera clear  CHEST/LUNG: Clear to auscultation bilaterally; No wheeze  HEART: Irregular rate and rhythm; No murmurs, rubs, or gallops  ABDOMEN: Soft, Nontender, Nondistended; Bowel sounds present  EXTREMITIES:  2+ Peripheral Pulses, No clubbing, cyanosis, or edema  PSYCH: AAOx3  NEUROLOGY: non-focal  SKIN: No rashes or lesions, 2 small scabs at site of biosy in lower back, nontender    LABS:    WBC Trend: 9.49<--, 10.60<--, 10.28<--  Hb Trend: 14.0<--, 13.7<--, 12.9<--, 14.1<--  Plt Trend: 379<--, 375<--, 383<--, 429<--  04-07    139  |  103  |  17  ----------------------------<  93  3.7   |  21<L>  |  0.87    Ca    8.4      07 Apr 2018 06:30  Phos  2.7     04-07  Mg     1.9     04-07      Creatinine Trend: 0.87<--, 0.91<--, 0.98<--, 0.95<--, 1.09<--        RADIOLOGY & ADDITIONAL TESTS:  X- Ray:  CT:  Ultrasound:  [ ] imaging personally reviewed and interpreted by me    Consultant(s) Notes Reviewed:  Cardiology    Care Discussed with Consultants/Other Providers:

## 2018-04-08 NOTE — PROGRESS NOTE ADULT - ASSESSMENT
63M PMH HTN, COPD, aflutter s/p ablation (2015) on eliquis, Prostate Ca (diag ~2013) s/p radiation seed implants, no chemo w/ ?mets to spine and pelvis, who presented for IR guided iliac crest bone biopsy found to be in aflutter with rate of 150, course c/b recurrent episodes of aflutter w/ avr 63M PMH HTN, COPD, aflutter s/p ablation (2015) on eliquis, Prostate Ca (diag ~2013) s/p radiation seed implants, no chemo w/ ?mets to spine and pelvis, who presented for IR guided iliac crest bone biopsy found to be in aflutter with rate of 150, course c/b recurrent episodes of aflutter w/ rvr

## 2018-04-08 NOTE — PROGRESS NOTE ADULT - PROBLEM SELECTOR PLAN 2
- presented for outpatient bone biopsy  - outpt oncologist following - not due for lupron for a few weeks, c/w with bicalutamide here  - Bone biopsy performed 4/6, site looks c/d/i - presented for outpatient bone biopsy  - outpt oncologist following - not due for lupron for a few weeks, c/w with bicalutamide here  - Bone biopsy performed 4/6, site clean and dry with small scabs, nontender

## 2018-04-08 NOTE — PROGRESS NOTE ADULT - PROBLEM SELECTOR PLAN 5
DVT ppx: Will touch base with IR re: when we can restart eliquis after bone biopsy. In the interim encourage ambulation. DVT ppx: eliquis

## 2018-04-08 NOTE — PROGRESS NOTE ADULT - SUBJECTIVE AND OBJECTIVE BOX
Pt has been doing well, had both the biopsies done on friday, has been waiting for cardiac w/u now. [place] nauseated after drinking ensure after BF, denies any other active symptoms on detailed ROS questions. Pain is controlled.      Meds:  acetaminophen   Tablet. 975 milliGRAM(s) Oral every 6 hours  apixaban 5 milliGRAM(s) Oral every 12 hours  bicalutamide 50 milliGRAM(s) Oral daily  diltiazem    Tablet 90 milliGRAM(s) Oral every 6 hours  diltiazem Infusion 2.5 mG/Hr IV Continuous <Continuous>  metoprolol tartrate 125 milliGRAM(s) Oral every 12 hours  metoprolol tartrate Injectable 5 milliGRAM(s) IV Push once  ondansetron    Tablet 4 milliGRAM(s) Oral four times a day PRN  oxyCODONE    IR 5 milliGRAM(s) Oral every 4 hours PRN  oxyCODONE    IR 10 milliGRAM(s) Oral every 4 hours PRN  senna 2 Tablet(s) Oral at bedtime      Vital Signs Last 24 Hrs  T(C): 36.2 (08 Apr 2018 11:13), Max: 36.9 (07 Apr 2018 21:07)  T(F): 97.2 (08 Apr 2018 11:13), Max: 98.5 (07 Apr 2018 21:07)  HR: 69 (08 Apr 2018 11:13) (69 - 139)  BP: 98/71 (08 Apr 2018 11:13) (98/71 - 119/79)  BP(mean): --  RR: 18 (08 Apr 2018 11:13) (16 - 18)  SpO2: 95% (08 Apr 2018 11:13) (95% - 97%)                          14.1   8.85  )-----------( 429      ( 08 Apr 2018 07:15 )             44.5       04-08    138  |  101  |  17  ----------------------------<  110<H>  4.0   |  20<L>  |  0.90    Ca    8.6      08 Apr 2018 07:15  Phos  2.6     04-08  Mg     2.0     04-08

## 2018-04-08 NOTE — PROGRESS NOTE ADULT - PROBLEM SELECTOR PLAN 3
- BP stable   - c/w metoprolol 125 BID - BP stable   - c/w metoprolol 125 BID, diltiazem 60 q6, diltiazem gtt to be titrated off

## 2018-04-09 LAB
BUN SERPL-MCNC: 19 MG/DL — SIGNIFICANT CHANGE UP (ref 7–23)
CALCIUM SERPL-MCNC: 8.6 MG/DL — SIGNIFICANT CHANGE UP (ref 8.4–10.5)
CHLORIDE SERPL-SCNC: 103 MMOL/L — SIGNIFICANT CHANGE UP (ref 98–107)
CO2 SERPL-SCNC: 24 MMOL/L — SIGNIFICANT CHANGE UP (ref 22–31)
CREAT SERPL-MCNC: 1.01 MG/DL — SIGNIFICANT CHANGE UP (ref 0.5–1.3)
GLUCOSE SERPL-MCNC: 104 MG/DL — HIGH (ref 70–99)
HCT VFR BLD CALC: 45.4 % — SIGNIFICANT CHANGE UP (ref 39–50)
HGB BLD-MCNC: 14 G/DL — SIGNIFICANT CHANGE UP (ref 13–17)
MAGNESIUM SERPL-MCNC: 2 MG/DL — SIGNIFICANT CHANGE UP (ref 1.6–2.6)
MCHC RBC-ENTMCNC: 28 PG — SIGNIFICANT CHANGE UP (ref 27–34)
MCHC RBC-ENTMCNC: 30.8 % — LOW (ref 32–36)
MCV RBC AUTO: 90.8 FL — SIGNIFICANT CHANGE UP (ref 80–100)
NRBC # FLD: 0 — SIGNIFICANT CHANGE UP
PHOSPHATE SERPL-MCNC: 2.5 MG/DL — SIGNIFICANT CHANGE UP (ref 2.5–4.5)
PLATELET # BLD AUTO: 486 K/UL — HIGH (ref 150–400)
PMV BLD: 10.7 FL — SIGNIFICANT CHANGE UP (ref 7–13)
POTASSIUM SERPL-MCNC: 3.6 MMOL/L — SIGNIFICANT CHANGE UP (ref 3.5–5.3)
POTASSIUM SERPL-SCNC: 3.6 MMOL/L — SIGNIFICANT CHANGE UP (ref 3.5–5.3)
RBC # BLD: 5 M/UL — SIGNIFICANT CHANGE UP (ref 4.2–5.8)
RBC # FLD: 14.6 % — HIGH (ref 10.3–14.5)
SODIUM SERPL-SCNC: 140 MMOL/L — SIGNIFICANT CHANGE UP (ref 135–145)
WBC # BLD: 8.26 K/UL — SIGNIFICANT CHANGE UP (ref 3.8–10.5)
WBC # FLD AUTO: 8.26 K/UL — SIGNIFICANT CHANGE UP (ref 3.8–10.5)

## 2018-04-09 PROCEDURE — 99233 SBSQ HOSP IP/OBS HIGH 50: CPT

## 2018-04-09 PROCEDURE — 99232 SBSQ HOSP IP/OBS MODERATE 35: CPT

## 2018-04-09 RX ORDER — POTASSIUM CHLORIDE 20 MEQ
40 PACKET (EA) ORAL ONCE
Qty: 0 | Refills: 0 | Status: COMPLETED | OUTPATIENT
Start: 2018-04-09 | End: 2018-04-09

## 2018-04-09 RX ADMIN — Medication 975 MILLIGRAM(S): at 18:40

## 2018-04-09 RX ADMIN — SENNA PLUS 2 TABLET(S): 8.6 TABLET ORAL at 21:21

## 2018-04-09 RX ADMIN — OXYCODONE HYDROCHLORIDE 5 MILLIGRAM(S): 5 TABLET ORAL at 02:55

## 2018-04-09 RX ADMIN — OXYCODONE HYDROCHLORIDE 5 MILLIGRAM(S): 5 TABLET ORAL at 02:04

## 2018-04-09 RX ADMIN — Medication 40 MILLIEQUIVALENT(S): at 17:43

## 2018-04-09 RX ADMIN — Medication 125 MILLIGRAM(S): at 17:45

## 2018-04-09 RX ADMIN — Medication 975 MILLIGRAM(S): at 09:52

## 2018-04-09 RX ADMIN — Medication 975 MILLIGRAM(S): at 08:52

## 2018-04-09 RX ADMIN — OXYCODONE HYDROCHLORIDE 10 MILLIGRAM(S): 5 TABLET ORAL at 23:20

## 2018-04-09 RX ADMIN — Medication 975 MILLIGRAM(S): at 17:42

## 2018-04-09 RX ADMIN — Medication 125 MILLIGRAM(S): at 05:50

## 2018-04-09 RX ADMIN — APIXABAN 5 MILLIGRAM(S): 2.5 TABLET, FILM COATED ORAL at 17:44

## 2018-04-09 RX ADMIN — Medication 5 MG/HR: at 04:23

## 2018-04-09 RX ADMIN — BICALUTAMIDE 50 MILLIGRAM(S): 50 TABLET, FILM COATED ORAL at 11:54

## 2018-04-09 RX ADMIN — APIXABAN 5 MILLIGRAM(S): 2.5 TABLET, FILM COATED ORAL at 05:50

## 2018-04-09 RX ADMIN — OXYCODONE HYDROCHLORIDE 10 MILLIGRAM(S): 5 TABLET ORAL at 22:35

## 2018-04-09 NOTE — PROGRESS NOTE ADULT - PROBLEM SELECTOR PLAN 2
- presented for outpatient bone biopsy  - outpt oncologist following - not due for lupron for a few weeks, c/w with bicalutamide here  - Bone biopsy performed 4/6, site clean and dry with small scabs, nontender

## 2018-04-09 NOTE — PROGRESS NOTE ADULT - SUBJECTIVE AND OBJECTIVE BOX
Patient is a 63y old  Male who presents with a chief complaint of aflutter with RVR (06 Apr 2018 10:48)      SUBJECTIVE / OVERNIGHT EVENTS:    MEDICATIONS  (STANDING):  acetaminophen   Tablet. 975 milliGRAM(s) Oral every 6 hours  apixaban 5 milliGRAM(s) Oral every 12 hours  bicalutamide 50 milliGRAM(s) Oral daily  diltiazem    Tablet 90 milliGRAM(s) Oral every 6 hours  diltiazem Infusion 5 mG/Hr (5 mL/Hr) IV Continuous <Continuous>  metoprolol tartrate 125 milliGRAM(s) Oral every 12 hours  metoprolol tartrate Injectable 5 milliGRAM(s) IV Push once  senna 2 Tablet(s) Oral at bedtime    MEDICATIONS  (PRN):  ondansetron    Tablet 4 milliGRAM(s) Oral four times a day PRN Nausea and/or Vomiting  oxyCODONE    IR 5 milliGRAM(s) Oral every 4 hours PRN Moderate Pain (4 - 6)  oxyCODONE    IR 10 milliGRAM(s) Oral every 4 hours PRN Severe Pain (7 - 10)        CAPILLARY BLOOD GLUCOSE        I&O's Summary    08 Apr 2018 07:01  -  09 Apr 2018 07:00  --------------------------------------------------------  IN: 0 mL / OUT: 300 mL / NET: -300 mL        PHYSICAL EXAM:  Vital Signs Last 24 Hrs  T(C): 36.7 (09 Apr 2018 05:47), Max: 36.7 (08 Apr 2018 21:11)  T(F): 98 (09 Apr 2018 05:47), Max: 98 (08 Apr 2018 21:11)  HR: 88 (09 Apr 2018 05:47) (67 - 111)  BP: 120/83 (09 Apr 2018 05:47) (98/71 - 120/83)  BP(mean): --  RR: 18 (09 Apr 2018 05:47) (18 - 18)  SpO2: 95% (09 Apr 2018 05:47) (94% - 98%)    GENERAL: NAD  EYES: conjunctiva and sclera clear  CHEST/LUNG: Clear to auscultation bilaterally; No wheeze  HEART: Irregular rate and rhythm; No murmurs, rubs, or gallops  ABDOMEN: Soft, Nontender, Nondistended; Bowel sounds present  EXTREMITIES:  2+ Peripheral Pulses, No clubbing, cyanosis, or edema  PSYCH: AAOx3  NEUROLOGY: non-focal  SKIN: No rashes or lesions, 2 small scabs at site of biopsy in lower back, nontender    LABS:    WBC Trend: 8.85<--, 9.49<--, 10.60<--  Hb Trend: 14.1<--, 14.0<--, 13.7<--, 12.9<--, 14.1<--  Plt Trend: 429<--, 379<--, 375<--, 383<--, 429<--  04-08    138  |  101  |  17  ----------------------------<  110<H>  4.0   |  20<L>  |  0.90    Ca    8.6      08 Apr 2018 07:15  Phos  2.6     04-08  Mg     2.0     04-08      Creatinine Trend: 0.90<--, 0.87<--, 0.91<--, 0.98<--, 0.95<--, 1.09<--      RADIOLOGY & ADDITIONAL TESTS:  X- Ray:  CT:  Ultrasound:  [ ] imaging personally reviewed and interpreted by me    Consultant(s) Notes Reviewed:  oncology, cardiology    Care Discussed with Consultants/Other Providers: Patient is a 63y old  Male who presents with a chief complaint of aflutter with RVR (06 Apr 2018 10:48)      SUBJECTIVE / OVERNIGHT EVENTS: no acute events overnight. Patient rate controlled on tele with aflutter rate in 70-80s. Denies any CP, SOB, palpitations, abdominal pain, N/V this AM.     MEDICATIONS  (STANDING):  acetaminophen   Tablet. 975 milliGRAM(s) Oral every 6 hours  apixaban 5 milliGRAM(s) Oral every 12 hours  bicalutamide 50 milliGRAM(s) Oral daily  diltiazem    Tablet 90 milliGRAM(s) Oral every 6 hours  diltiazem Infusion 5 mG/Hr (5 mL/Hr) IV Continuous <Continuous>  metoprolol tartrate 125 milliGRAM(s) Oral every 12 hours  metoprolol tartrate Injectable 5 milliGRAM(s) IV Push once  senna 2 Tablet(s) Oral at bedtime    MEDICATIONS  (PRN):  ondansetron    Tablet 4 milliGRAM(s) Oral four times a day PRN Nausea and/or Vomiting  oxyCODONE    IR 5 milliGRAM(s) Oral every 4 hours PRN Moderate Pain (4 - 6)  oxyCODONE    IR 10 milliGRAM(s) Oral every 4 hours PRN Severe Pain (7 - 10)        I&O's Summary    08 Apr 2018 07:01  -  09 Apr 2018 07:00  --------------------------------------------------------  IN: 0 mL / OUT: 300 mL / NET: -300 mL        PHYSICAL EXAM:  Vital Signs Last 24 Hrs  T(C): 36.7 (09 Apr 2018 05:47), Max: 36.7 (08 Apr 2018 21:11)  T(F): 98 (09 Apr 2018 05:47), Max: 98 (08 Apr 2018 21:11)  HR: 88 (09 Apr 2018 05:47) (67 - 111)  BP: 120/83 (09 Apr 2018 05:47) (98/71 - 120/83)  BP(mean): --  RR: 18 (09 Apr 2018 05:47) (18 - 18)  SpO2: 95% (09 Apr 2018 05:47) (94% - 98%)    GENERAL: NAD  EYES: conjunctiva and sclera clear  CHEST/LUNG: Clear to auscultation bilaterally; No wheeze  HEART: Irregular rhythm and regular rate; No murmurs, rubs, or gallops  ABDOMEN: Soft, Nontender, Nondistended; Bowel sounds present  EXTREMITIES:  2+ Peripheral Pulses, No clubbing, cyanosis, or edema  PSYCH: AAOx3  NEUROLOGY: non-focal  SKIN: No rashes or lesions, 2 small scabs at site of biopsy in lower back, nontender    LABS:    WBC Trend: 8.85<--, 9.49<--, 10.60<--  Hb Trend: 14.1<--, 14.0<--, 13.7<--, 12.9<--, 14.1<--  Plt Trend: 429<--, 379<--, 375<--, 383<--, 429<--  04-08    138  |  101  |  17  ----------------------------<  110<H>  4.0   |  20<L>  |  0.90    Ca    8.6      08 Apr 2018 07:15  Phos  2.6     04-08  Mg     2.0     04-08      Creatinine Trend: 0.90<--, 0.87<--, 0.91<--, 0.98<--, 0.95<--, 1.09<--      Consultant(s) Notes Reviewed:  oncology, cardiology    Care Discussed with Consultants/Other Providers:

## 2018-04-09 NOTE — PROGRESS NOTE ADULT - SUBJECTIVE AND OBJECTIVE BOX
Patient comfortable. No chest pain, shortness of breast, palpitations or lightheadedness. S/p bone biopsy Friday. Back on Eliquis for anticoagulation and metoprolol and Cardizem for rate control of atrial flutter.    Vital Signs Last 24 Hrs  T(C): 36.2 (09 Apr 2018 13:47), Max: 36.7 (08 Apr 2018 21:11)  T(F): 97.2 (09 Apr 2018 13:47), Max: 98 (08 Apr 2018 21:11)  HR: 65 (09 Apr 2018 13:47) (65 - 111)  BP: 110/78 (09 Apr 2018 13:47) (99/64 - 120/83)  BP(mean): --  RR: 18 (09 Apr 2018 13:47) (18 - 18)  SpO2: 99% (09 Apr 2018 13:47) (92% - 99%)      EKG:  Atrial flutter (atypical) with  b/min. Variable block  Telemetry:  Atrial flutter with variable block and ventricular rates  60-80's. Yesterday had rates to 140 b/min.   MEDICATIONS  (STANDING):  acetaminophen   Tablet. 975 milliGRAM(s) Oral every 6 hours  apixaban 5 milliGRAM(s) Oral every 12 hours  bicalutamide 50 milliGRAM(s) Oral daily  diltiazem    Tablet 90 milliGRAM(s) Oral every 6 hours  diltiazem Infusion 5 mG/Hr (5 mL/Hr) IV Continuous <Continuous>  metoprolol tartrate 125 milliGRAM(s) Oral every 12 hours  metoprolol tartrate Injectable 5 milliGRAM(s) IV Push once  senna 2 Tablet(s) Oral at bedtime    MEDICATIONS  (PRN):  ondansetron    Tablet 4 milliGRAM(s) Oral four times a day PRN Nausea and/or Vomiting  oxyCODONE    IR 5 milliGRAM(s) Oral every 4 hours PRN Moderate Pain (4 - 6)  oxyCODONE    IR 10 milliGRAM(s) Oral every 4 hours PRN Severe Pain (7 - 10)          Physical exam:   Gen- Appears comfortable. NAD.   Resp- Clear to auscultation.  NO wheezing, rales or rhonchi  CV- Normal S1 and S2. irregular rate and rhythm.  ABD- soft nontender +bowel sounds.  EXT-no edema  Neuro-grossly nonfocal.                            14.0   8.26  )-----------( 486      ( 09 Apr 2018 06:00 )             45.4       04-09    140  |  103  |  19  ----------------------------<  104<H>  3.6   |  24  |  1.01    Ca    8.6      09 Apr 2018 06:00  Phos  2.5     04-09  Mg     2.0     04-09

## 2018-04-09 NOTE — PROGRESS NOTE ADULT - PROBLEM SELECTOR PLAN 1
[facility] 1- patient presented for outpatient bone biopsy and found to be in aflutter with RVR  - monitor on tele, o/n without episode of RVR  - c/w Metoprolol 125 BID, cardizem 90 q6h and on Cardizem drip at 5mg/hr currently, will uptitrate PO to titrate off drip  - PRN lopressor IVP for acute episodes however patient did not receive any overnight  - c/w eliquis  -will f/u EP today regarding further ablation  -TTE pending [facility] 1- patient presented for outpatient bone biopsy and found to be in aflutter with RVR  - monitor on tele, o/n without episode of RVR  - c/w Metoprolol 125 BID, cardizem 90 q6h and on Cardizem drip at 5mg/hr currently,  - PRN lopressor IVP for acute episodes however patient did not receive any overnight  - c/w eliquis  -will f/u EP today regarding further ablation  -TTE pending

## 2018-04-09 NOTE — PROGRESS NOTE ADULT - ASSESSMENT
63M PMH HTN, COPD, aflutter s/p ablation (2015) on eliquis, Prostate Ca (diag ~2013) s/p radiation seed implants, no chemo w/ ?mets to spine and pelvis, who presented for IR guided iliac crest bone biopsy found to be in aflutter with rate of 150, course c/b recurrent episodes of aflutter w/ rvr

## 2018-04-09 NOTE — PROGRESS NOTE ADULT - ASSESSMENT
This is a 62 yo male with past medical history of diverticulitis with partial colectomy with Orta procedure and reversal, COPD, HTN, ventral hernia with repair, atrial flutter s/p ablation 2015, on metoprolol and Eliquis, and prostate cancer with seed implant 2012 with recent MRI and and PET scan showing multiple lytic lesions admitted for outpatient bone bx.  On admission he was found to be in atrial flutter with RVR was difficult to rate control. Started on IV Cardizem and was able to have bone biopsy last Friday. Remains in atrial flutter but better rate controlled. restarted on Eliquis.  Plan: Will plan for [facility]/DCCV tomorrow if patient can be on uninterrupted anticoagulation for at least one month.          NPO after midnight.  DO NOT HOLD ANTICOAGULATION.          Patient aware of the risks, benefits and alternatives to the DCCV and has consented.

## 2018-04-10 LAB
BUN SERPL-MCNC: 17 MG/DL — SIGNIFICANT CHANGE UP (ref 7–23)
CALCIUM SERPL-MCNC: 8.4 MG/DL — SIGNIFICANT CHANGE UP (ref 8.4–10.5)
CHLORIDE SERPL-SCNC: 104 MMOL/L — SIGNIFICANT CHANGE UP (ref 98–107)
CO2 SERPL-SCNC: 23 MMOL/L — SIGNIFICANT CHANGE UP (ref 22–31)
CREAT SERPL-MCNC: 0.92 MG/DL — SIGNIFICANT CHANGE UP (ref 0.5–1.3)
GLUCOSE SERPL-MCNC: 94 MG/DL — SIGNIFICANT CHANGE UP (ref 70–99)
HCT VFR BLD CALC: 41.8 % — SIGNIFICANT CHANGE UP (ref 39–50)
HGB BLD-MCNC: 13.3 G/DL — SIGNIFICANT CHANGE UP (ref 13–17)
MAGNESIUM SERPL-MCNC: 1.9 MG/DL — SIGNIFICANT CHANGE UP (ref 1.6–2.6)
MCHC RBC-ENTMCNC: 28.4 PG — SIGNIFICANT CHANGE UP (ref 27–34)
MCHC RBC-ENTMCNC: 31.8 % — LOW (ref 32–36)
MCV RBC AUTO: 89.1 FL — SIGNIFICANT CHANGE UP (ref 80–100)
NRBC # FLD: 0 — SIGNIFICANT CHANGE UP
PHOSPHATE SERPL-MCNC: 2.5 MG/DL — SIGNIFICANT CHANGE UP (ref 2.5–4.5)
PLATELET # BLD AUTO: 431 K/UL — HIGH (ref 150–400)
PMV BLD: 10.3 FL — SIGNIFICANT CHANGE UP (ref 7–13)
POTASSIUM SERPL-MCNC: 4 MMOL/L — SIGNIFICANT CHANGE UP (ref 3.5–5.3)
POTASSIUM SERPL-SCNC: 4 MMOL/L — SIGNIFICANT CHANGE UP (ref 3.5–5.3)
RBC # BLD: 4.69 M/UL — SIGNIFICANT CHANGE UP (ref 4.2–5.8)
RBC # FLD: 14.7 % — HIGH (ref 10.3–14.5)
SODIUM SERPL-SCNC: 140 MMOL/L — SIGNIFICANT CHANGE UP (ref 135–145)
WBC # BLD: 6.39 K/UL — SIGNIFICANT CHANGE UP (ref 3.8–10.5)
WBC # FLD AUTO: 6.39 K/UL — SIGNIFICANT CHANGE UP (ref 3.8–10.5)

## 2018-04-10 PROCEDURE — 93010 ELECTROCARDIOGRAM REPORT: CPT | Mod: 76

## 2018-04-10 PROCEDURE — 93306 TTE W/DOPPLER COMPLETE: CPT | Mod: 26

## 2018-04-10 PROCEDURE — 93312 ECHO TRANSESOPHAGEAL: CPT | Mod: 26

## 2018-04-10 PROCEDURE — 76376 3D RENDER W/INTRP POSTPROCES: CPT | Mod: 26

## 2018-04-10 PROCEDURE — 99233 SBSQ HOSP IP/OBS HIGH 50: CPT

## 2018-04-10 PROCEDURE — 92960 CARDIOVERSION ELECTRIC EXT: CPT

## 2018-04-10 RX ORDER — METOPROLOL TARTRATE 50 MG
100 TABLET ORAL ONCE
Qty: 0 | Refills: 0 | Status: COMPLETED | OUTPATIENT
Start: 2018-04-10 | End: 2018-04-10

## 2018-04-10 RX ADMIN — Medication 975 MILLIGRAM(S): at 21:50

## 2018-04-10 RX ADMIN — Medication 100 MILLIGRAM(S): at 21:50

## 2018-04-10 RX ADMIN — BICALUTAMIDE 50 MILLIGRAM(S): 50 TABLET, FILM COATED ORAL at 17:35

## 2018-04-10 RX ADMIN — Medication 975 MILLIGRAM(S): at 22:25

## 2018-04-10 RX ADMIN — APIXABAN 5 MILLIGRAM(S): 2.5 TABLET, FILM COATED ORAL at 07:02

## 2018-04-10 RX ADMIN — Medication 975 MILLIGRAM(S): at 08:50

## 2018-04-10 RX ADMIN — SENNA PLUS 2 TABLET(S): 8.6 TABLET ORAL at 21:51

## 2018-04-10 RX ADMIN — Medication 5 MG/HR: at 07:08

## 2018-04-10 RX ADMIN — APIXABAN 5 MILLIGRAM(S): 2.5 TABLET, FILM COATED ORAL at 17:36

## 2018-04-10 RX ADMIN — Medication 975 MILLIGRAM(S): at 09:45

## 2018-04-10 RX ADMIN — Medication 125 MILLIGRAM(S): at 07:02

## 2018-04-10 NOTE — PROGRESS NOTE ADULT - PROBLEM SELECTOR PLAN 1
[facility] 1- patient presented for outpatient bone biopsy and found to be in aflutter with RVR  - monitor on tele, o/n without episode of RVR  - c/w Metoprolol 125 BID, cardizem 90 q6h and on Cardizem drip at 5mg/hr currently  - PRN lopressor IVP for acute episodes however patient did not receive any overnight  - c/w eliquis  - EP following - pt to go to [facility]/DCCV today

## 2018-04-10 NOTE — PROGRESS NOTE ADULT - SUBJECTIVE AND OBJECTIVE BOX
Patient comfortable.  No complaints. Denies chest pain, shortness of breath, palpitations or lightheadedness. Awaiting [facility]/DCCV today.    Vital Signs Last 24 Hrs  T(C): 36.9 (10 Apr 2018 06:50), Max: 36.9 (10 Apr 2018 06:50)  T(F): 98.4 (10 Apr 2018 06:50), Max: 98.4 (10 Apr 2018 06:50)  HR: 67 (10 Apr 2018 06:50) (65 - 83)  BP: 116/76 (10 Apr 2018 06:50) (110/78 - 124/81)  BP(mean): --  RR: 18 (10 Apr 2018 06:50) (18 - 18)  SpO2: 98% (10 Apr 2018 06:50) (93% - 99%)      EKG  Telemetry: Atrial flutter rate controlled.  MEDICATIONS  (STANDING):  acetaminophen   Tablet. 975 milliGRAM(s) Oral every 6 hours  apixaban 5 milliGRAM(s) Oral every 12 hours  bicalutamide 50 milliGRAM(s) Oral daily  diltiazem    Tablet 90 milliGRAM(s) Oral every 6 hours  diltiazem Infusion 5 mG/Hr (5 mL/Hr) IV Continuous <Continuous>  metoprolol tartrate 125 milliGRAM(s) Oral every 12 hours  metoprolol tartrate Injectable 5 milliGRAM(s) IV Push once  senna 2 Tablet(s) Oral at bedtime    MEDICATIONS  (PRN):  ondansetron    Tablet 4 milliGRAM(s) Oral four times a day PRN Nausea and/or Vomiting  oxyCODONE    IR 5 milliGRAM(s) Oral every 4 hours PRN Moderate Pain (4 - 6)  oxyCODONE    IR 10 milliGRAM(s) Oral every 4 hours PRN Severe Pain (7 - 10)          Physical exam:   Gen- well developed. Well nourished. NAD  Resp- clear to auscultation. No wheezing, rales or rhonchi  CV- S1 and S2 RRR. No murmurs, gallops or rubs.  ABD- Soft nontender +bowel sounds  EXT-no edema.  Neuro- grossly nonfocal                            13.3   6.39  )-----------( 431      ( 10 Apr 2018 06:20 )             41.8       04-10    140  |  104  |  17  ----------------------------<  94  4.0   |  23  |  0.92    Ca    8.4      10 Apr 2018 06:20  Phos  2.5     04-10  Mg     1.9     04-10

## 2018-04-10 NOTE — PROGRESS NOTE ADULT - SUBJECTIVE AND OBJECTIVE BOX
Patient is a 63y old  Male who presents with a chief complaint of aflutter with RVR (06 Apr 2018 10:48)      SUBJECTIVE / OVERNIGHT EVENTS: no acute events overnight.     MEDICATIONS  (STANDING):  acetaminophen   Tablet. 975 milliGRAM(s) Oral every 6 hours  apixaban 5 milliGRAM(s) Oral every 12 hours  bicalutamide 50 milliGRAM(s) Oral daily  diltiazem    Tablet 90 milliGRAM(s) Oral every 6 hours  diltiazem Infusion 5 mG/Hr (5 mL/Hr) IV Continuous <Continuous>  metoprolol tartrate 125 milliGRAM(s) Oral every 12 hours  metoprolol tartrate Injectable 5 milliGRAM(s) IV Push once  senna 2 Tablet(s) Oral at bedtime    MEDICATIONS  (PRN):  ondansetron    Tablet 4 milliGRAM(s) Oral four times a day PRN Nausea and/or Vomiting  oxyCODONE    IR 5 milliGRAM(s) Oral every 4 hours PRN Moderate Pain (4 - 6)  oxyCODONE    IR 10 milliGRAM(s) Oral every 4 hours PRN Severe Pain (7 - 10)      PHYSICAL EXAM:  Vital Signs Last 24 Hrs  T(C): 36.9 (10 Apr 2018 06:50), Max: 36.9 (10 Apr 2018 06:50)  T(F): 98.4 (10 Apr 2018 06:50), Max: 98.4 (10 Apr 2018 06:50)  HR: 67 (10 Apr 2018 06:50) (65 - 83)  BP: 116/76 (10 Apr 2018 06:50) (107/67 - 124/81)  BP(mean): --  RR: 18 (10 Apr 2018 06:50) (18 - 18)  SpO2: 98% (10 Apr 2018 06:50) (92% - 99%)    GENERAL: NAD, well-developed  HEAD:  Atraumatic, Normocephalic  EYES: EOMI, PERRLA, conjunctiva and sclera clear  NECK: Supple, No JVD  CHEST/LUNG: Clear to auscultation bilaterally; No wheeze  HEART: Regular rate and rhythm; No murmurs, rubs, or gallops  ABDOMEN: Soft, Nontender, Nondistended; Bowel sounds present  EXTREMITIES:  2+ Peripheral Pulses, No clubbing, cyanosis, or edema  PSYCH: AAOx3  NEUROLOGY: non-focal  SKIN: No rashes or lesions    LABS:    WBC Trend: 8.26<--, 8.85<--, 9.49<--  Hb Trend: 14.0<--, 14.1<--, 14.0<--, 13.7<--, 12.9<--  Plt Trend: 486<--, 429<--, 379<--, 375<--, 383<--  04-09    140  |  103  |  19  ----------------------------<  104<H>  3.6   |  24  |  1.01    Ca    8.6      09 Apr 2018 06:00  Phos  2.5     04-09  Mg     2.0     04-09      Creatinine Trend: 1.01<--, 0.90<--, 0.87<--, 0.91<--, 0.98<--, 0.95<--      Microbiology:  Urine Cx:  Blood Cx:    RADIOLOGY & ADDITIONAL TESTS:  X- Ray:  CT:  Ultrasound:  [ ] imaging personally reviewed and interpreted by me    Consultant(s) Notes Reviewed:      Care Discussed with Consultants/Other Providers: Patient is a 63y old  Male who presents with a chief complaint of aflutter with RVR (06 Apr 2018 10:48)      SUBJECTIVE / OVERNIGHT EVENTS: no acute events overnight. Patient reports pain controlled this AM. Denies any CP, palpitations, SOB, N/V.     MEDICATIONS  (STANDING):  acetaminophen   Tablet. 975 milliGRAM(s) Oral every 6 hours  apixaban 5 milliGRAM(s) Oral every 12 hours  bicalutamide 50 milliGRAM(s) Oral daily  diltiazem    Tablet 90 milliGRAM(s) Oral every 6 hours  diltiazem Infusion 5 mG/Hr (5 mL/Hr) IV Continuous <Continuous>  metoprolol tartrate 125 milliGRAM(s) Oral every 12 hours  metoprolol tartrate Injectable 5 milliGRAM(s) IV Push once  senna 2 Tablet(s) Oral at bedtime    MEDICATIONS  (PRN):  ondansetron    Tablet 4 milliGRAM(s) Oral four times a day PRN Nausea and/or Vomiting  oxyCODONE    IR 5 milliGRAM(s) Oral every 4 hours PRN Moderate Pain (4 - 6)  oxyCODONE    IR 10 milliGRAM(s) Oral every 4 hours PRN Severe Pain (7 - 10)      PHYSICAL EXAM:  Vital Signs Last 24 Hrs  T(C): 36.9 (10 Apr 2018 06:50), Max: 36.9 (10 Apr 2018 06:50)  T(F): 98.4 (10 Apr 2018 06:50), Max: 98.4 (10 Apr 2018 06:50)  HR: 67 (10 Apr 2018 06:50) (65 - 83)  BP: 116/76 (10 Apr 2018 06:50) (107/67 - 124/81)  BP(mean): --  RR: 18 (10 Apr 2018 06:50) (18 - 18)  SpO2: 98% (10 Apr 2018 06:50) (92% - 99%)    GENERAL: NAD  EYES: conjunctiva and sclera clear  CHEST/LUNG: Clear to auscultation bilaterally; No wheeze  HEART: Irregular rhythm and regular rate; No murmurs, rubs, or gallops  ABDOMEN: Soft, Nontender, Nondistended; Bowel sounds present  EXTREMITIES:  2+ Peripheral Pulses, No clubbing, cyanosis, or edema  PSYCH: AAOx3  NEUROLOGY: non-focal  SKIN: No rashes or lesions, 2 small scabs at site of biopsy in lower back, nontender    LABS:    WBC Trend: 8.26<--, 8.85<--, 9.49<--  Hb Trend: 14.0<--, 14.1<--, 14.0<--, 13.7<--, 12.9<--  Plt Trend: 486<--, 429<--, 379<--, 375<--, 383<--  04-09    140  |  103  |  19  ----------------------------<  104<H>  3.6   |  24  |  1.01    Ca    8.6      09 Apr 2018 06:00  Phos  2.5     04-09  Mg     2.0     04-09      Creatinine Trend: 1.01<--, 0.90<--, 0.87<--, 0.91<--, 0.98<--, 0.95<--      Microbiology:  Urine Cx:  Blood Cx:    RADIOLOGY & ADDITIONAL TESTS:  X- Ray:  CT:  Ultrasound:  [ ] imaging personally reviewed and interpreted by me    Consultant(s) Notes Reviewed:      Care Discussed with Consultants/Other Providers:

## 2018-04-10 NOTE — PROGRESS NOTE ADULT - ASSESSMENT
This is a 62 yo male with past medical history of diverticulitis with partial colectomy with Orta procedure and reversal, COPD, HTN, ventral hernia with repair, atrial flutter s/p ablation 2015, on metoprolol and Eliquis, and prostate cancer with seed implant 2012 with recent MRI and and PET scan showing multiple lytic lesions admitted for outpatient bone bx.  On admission he was found to be in atrial flutter with RVR was difficult to rate control. Started on IV Cardizem and was able to have bone biopsy last Friday. Remains in atrial flutter but better rate controlled. restarted on Eliquis. As per primary team, there is no plan for procedures that would require interruption of anticoagulation. Therefore, he is scheduled for [facility]/DCCV today.    Patient aware of the risks, benefits and alternatives to the DCCV and has consented.   Keep NPO.

## 2018-04-11 VITALS — RESPIRATION RATE: 18 BRPM | OXYGEN SATURATION: 97 %

## 2018-04-11 LAB
BUN SERPL-MCNC: 15 MG/DL — SIGNIFICANT CHANGE UP (ref 7–23)
CALCIUM SERPL-MCNC: 8.4 MG/DL — SIGNIFICANT CHANGE UP (ref 8.4–10.5)
CHLORIDE SERPL-SCNC: 105 MMOL/L — SIGNIFICANT CHANGE UP (ref 98–107)
CO2 SERPL-SCNC: 24 MMOL/L — SIGNIFICANT CHANGE UP (ref 22–31)
CREAT SERPL-MCNC: 1.04 MG/DL — SIGNIFICANT CHANGE UP (ref 0.5–1.3)
GLUCOSE SERPL-MCNC: 94 MG/DL — SIGNIFICANT CHANGE UP (ref 70–99)
HCT VFR BLD CALC: 39.1 % — SIGNIFICANT CHANGE UP (ref 39–50)
HGB BLD-MCNC: 12.2 G/DL — LOW (ref 13–17)
MAGNESIUM SERPL-MCNC: 1.9 MG/DL — SIGNIFICANT CHANGE UP (ref 1.6–2.6)
MCHC RBC-ENTMCNC: 28.4 PG — SIGNIFICANT CHANGE UP (ref 27–34)
MCHC RBC-ENTMCNC: 31.2 % — LOW (ref 32–36)
MCV RBC AUTO: 91.1 FL — SIGNIFICANT CHANGE UP (ref 80–100)
NRBC # FLD: 0 — SIGNIFICANT CHANGE UP
PHOSPHATE SERPL-MCNC: 3.1 MG/DL — SIGNIFICANT CHANGE UP (ref 2.5–4.5)
PLATELET # BLD AUTO: 400 K/UL — SIGNIFICANT CHANGE UP (ref 150–400)
PMV BLD: 10.3 FL — SIGNIFICANT CHANGE UP (ref 7–13)
POTASSIUM SERPL-MCNC: 4.3 MMOL/L — SIGNIFICANT CHANGE UP (ref 3.5–5.3)
POTASSIUM SERPL-SCNC: 4.3 MMOL/L — SIGNIFICANT CHANGE UP (ref 3.5–5.3)
RBC # BLD: 4.29 M/UL — SIGNIFICANT CHANGE UP (ref 4.2–5.8)
RBC # FLD: 14.6 % — HIGH (ref 10.3–14.5)
SODIUM SERPL-SCNC: 139 MMOL/L — SIGNIFICANT CHANGE UP (ref 135–145)
WBC # BLD: 6.57 K/UL — SIGNIFICANT CHANGE UP (ref 3.8–10.5)
WBC # FLD AUTO: 6.57 K/UL — SIGNIFICANT CHANGE UP (ref 3.8–10.5)

## 2018-04-11 PROCEDURE — 99239 HOSP IP/OBS DSCHRG MGMT >30: CPT

## 2018-04-11 RX ORDER — TAMSULOSIN HYDROCHLORIDE 0.4 MG/1
1 CAPSULE ORAL
Qty: 30 | Refills: 0 | OUTPATIENT
Start: 2018-04-11 | End: 2018-05-10

## 2018-04-11 RX ORDER — OXYCODONE HYDROCHLORIDE 5 MG/1
1 TABLET ORAL
Qty: 20 | Refills: 0 | OUTPATIENT
Start: 2018-04-11 | End: 2018-04-15

## 2018-04-11 RX ORDER — TAMSULOSIN HYDROCHLORIDE 0.4 MG/1
0.4 CAPSULE ORAL AT BEDTIME
Qty: 0 | Refills: 0 | Status: DISCONTINUED | OUTPATIENT
Start: 2018-04-11 | End: 2018-04-11

## 2018-04-11 RX ORDER — DILTIAZEM HCL 120 MG
360 CAPSULE, EXT RELEASE 24 HR ORAL DAILY
Qty: 0 | Refills: 0 | Status: DISCONTINUED | OUTPATIENT
Start: 2018-04-11 | End: 2018-04-11

## 2018-04-11 RX ORDER — DILTIAZEM HCL 120 MG
1 CAPSULE, EXT RELEASE 24 HR ORAL
Qty: 30 | Refills: 0 | OUTPATIENT
Start: 2018-04-11 | End: 2018-05-10

## 2018-04-11 RX ORDER — METOPROLOL TARTRATE 50 MG
1 TABLET ORAL
Qty: 60 | Refills: 0 | OUTPATIENT
Start: 2018-04-11 | End: 2018-05-10

## 2018-04-11 RX ORDER — METOPROLOL TARTRATE 50 MG
100 TABLET ORAL
Qty: 0 | Refills: 0 | Status: DISCONTINUED | OUTPATIENT
Start: 2018-04-11 | End: 2018-04-11

## 2018-04-11 RX ADMIN — OXYCODONE HYDROCHLORIDE 5 MILLIGRAM(S): 5 TABLET ORAL at 11:05

## 2018-04-11 RX ADMIN — BICALUTAMIDE 50 MILLIGRAM(S): 50 TABLET, FILM COATED ORAL at 12:39

## 2018-04-11 RX ADMIN — Medication 975 MILLIGRAM(S): at 04:00

## 2018-04-11 RX ADMIN — Medication 975 MILLIGRAM(S): at 14:17

## 2018-04-11 RX ADMIN — Medication 125 MILLIGRAM(S): at 05:46

## 2018-04-11 RX ADMIN — APIXABAN 5 MILLIGRAM(S): 2.5 TABLET, FILM COATED ORAL at 05:45

## 2018-04-11 RX ADMIN — Medication 975 MILLIGRAM(S): at 03:32

## 2018-04-11 RX ADMIN — OXYCODONE HYDROCHLORIDE 5 MILLIGRAM(S): 5 TABLET ORAL at 12:00

## 2018-04-11 NOTE — PROGRESS NOTE ADULT - PROBLEM SELECTOR PROBLEM 1
Atrial flutter with rapid ventricular response

## 2018-04-11 NOTE — PROGRESS NOTE ADULT - ASSESSMENT
63M PMH HTN, COPD, aflutter s/p ablation (2015) on eliquis, Prostate Ca (diag ~2013) s/p radiation seed implants, no chemo w/ ?mets to spine and pelvis, who presented for IR guided iliac crest bone biopsy found to be in aflutter with rate of 150, course c/b recurrent episodes of aflutter w/ rvr 63M PMH HTN, COPD, aflutter s/p ablation (2015) on eliquis, Prostate Ca (diag ~2013) s/p radiation seed implants, no chemo w/ ?mets to spine and pelvis, who presented for IR guided iliac crest bone biopsy found to be in aflutter with rate of 150, course c/b recurrent episodes of aflutter w/ rvr requiring [facility] DCCV

## 2018-04-11 NOTE — PROGRESS NOTE ADULT - ASSESSMENT
63M with recurrent prostate cancer, lytic bony lesions, renal mass, 1st bx was nondiagnostic, has not been able to get another bx or BM asp and Bx because of pt canceling appointments, now with possible cardiac issues, doing better and likely to go for bx tomorrow. Will recommend:  - will discuss with pathology and f/u on IHC stains and other pending results  - continue RX as per medicine, cardiology  - continue zofran 4 mg every 6 hours as needed, especially prior to ensure  - continue current pain control measures  - continue casodex 50 mg daily.  - lupron not due for a few weeks  - pt's aPTT is elevated and work up had revealed nonspecific anticoagulant, no bleeding history  - DVT prophylaxis - on eliquis for the a fib  - all other supportive Rx  - to f/u as out pt after discharge.

## 2018-04-11 NOTE — PROGRESS NOTE ADULT - ATTENDING COMMENTS
HR generally better, but agree with measures outlined above.
62 yo M w/ PMH  as described above, recent admission 1/23-1/31 for SBO but also found to have multiple osteolytic lesions on imaging s/p bone bx on 1/30 w/ atypical results, presented to IR for repeat bx of osteolytic lesion and BM bx w/ course c/b aflutter w/ RVR admitted for rate control, w/ course c/b recurrent episodes of aflutter w/ RVR despite uptitrating BB. Started on dilt ggt 4/6 and now s/p IR BM bx and bx of R illiac bone lytic lesion on 4/6.    HR better controlled. However pt is still on dilt gtt.   -c/w eliquis  -c/w metoprolol 125mg BID  -c/w dilt 90mg PO q6  -c/w dilt ggt at current rate for now  -EP recs appreciated. Plan for MARCEL/DCCV today.   -will f/u post procedure EP recs   -c/w pain control  -f/u Bone bx path
64 yo M w/ PMH  as described above, recent admission 1/23-1/31 for SBO but also found to have multiple osteolytic lesions on imaging s/p bone bx on 1/30 w/ atypical results, presented to IR for repeat bx of osteolytic lesion and BM bx w/ course c/b aflutter w/ RVR admitted for rate control, w/ course c/b recurrent episodes of aflutter w/ RVR despite uptitrating BB. Started on dilt ggt 4/6 and now s/p IR BM bx and bx of R illiac bone lytic lesion on 4/6.    HR better controlled   -c/w eliquis  -c/w metoprolol 125mg BID  -c/w dilt 90mg PO q6  -c/w dilt ggt at current rate for now  -EP recs appreciated. Plan for MARCEL/DCCV tomorrow. NPO after Midnight.   -c/w pain control  -f/u Bone bx path
64 yo M w/ PMH  as described above, recent admission 1/23-1/31 for SBO but also found to have multiple osteolytic lesions on imaging s/p bone bx on 1/30 w/ atypical results, presented to IR for repeat bx of osteolytic lesion and BM bx w/ course c/b aflutter w/ RVR admitted for rate control.     Patient w/ RVR again overnight. No signs of overt trigger such as infection or medication non-compliance. May be in the setting of pain. However, pt denies worsening pain. He states that he has chronic bony pain but it has been stable.  -c/w metoprolol now increased to 125mg BID  -cardiology recs appreciated. EP consulted to reevaluate for ablation.   -will titrate BB as needed   -c/w pain control  -Given pt not adequately rate controlled and may likely need ablation. Would resume Eliquis for now. IR bx will need to be postponed until patients HR better managed/ controlled.
62 yo M w/ PMH  as described above, recent admission 1/23-1/31 for SBO but also found to have multiple osteolytic lesions on imaging s/p bone bx on 1/30 w/ atypical results, presented w/ IR for repeat bx of osteolytic lesion and BM bx w/ course c/b aflutter w/ RVR admitted for rate control.     No signs of overt trigger such as infection or medication non-compliance. May be in the setting of pain. At bedside today patient w/ aflutter w/ RVR to 140s and asymptomatic.   -increase metoprolol to 100mg BID. Pt already received 75mg this am- would give 5mg metoprolol IV now followed by 25mg PO metoprolol then resume 100mg tonight.   -will tirtrate BB as needed   -pain control  -cards recs appreciated   -continue to hold Eliquis for now in anticipation of possible IR procedure. Hold last dose of HSQ tonight.   -Possible IR tomorrow if rate controlled
62 yo M w/ PMH  as described above, recent admission 1/23-1/31 for SBO but also found to have multiple osteolytic lesions on imaging s/p bone bx on 1/30 w/ atypical results, presented to IR for repeat bx of osteolytic lesion and BM bx w/ course c/b aflutter w/ RVR admitted for rate control, w/ course c/b recurrent episodes of aflutter w/ RVR despite uptitrating BB. Started on dilt ggt 4/6 and now s/p IR BM bx and bx of R illiac bone lytic lesion on 4/6 as well as MARCEL/DCCV yesterday. Dilt ggt was dcd yesterday after procedure     4/10 MARCEL w/ TTE reviewed and showed :EF 50%. Mild MR. Severely dilated left atrium. Mild segmental left ventricular systolic dysfunction. Hypokinesis of the inferior wall. Decreased right ventricular systolic function. and severe pulmonary hypertension.    Tele reviewed and pt w/ episode of delayed ventricular conduction. Suspect likely in setting of AV michael blockers that was uptitrated while here for better rate control of his a flutter.   -c/w eliquis  -would decrease metoprolol to 100mg BID  -discontinue dilt 90mg PO q6  -will f/u EP  -c/w pain control    D/c planning anticipated for today if no objection by EP. Patient to f/u EP as outpatient and his heme/oncologist. Spent 36 min in discharge planning and coordination.   -f/u Bone bx path
62 yo M w/ PMH  as described above, recent admission 1/23-1/31 for SBO but also found to have multiple osteolytic lesions on imaging s/p bone bx on 1/30 w/ atypical results, presented to IR for repeat bx of osteolytic lesion and BM bx w/ course c/b aflutter w/ RVR admitted for rate control, w/ course c/b recurrent episodes of aflutter w/ RVR despite uptitrating BB. Started on dilt ggt 4/6 and now s/p IR BM bx and bx of R illiac bone lytic lesion on 4/6.    Patient w/ RVR again overnight and this am s/p IV metoprolol and dilt and dilt ggt rate increased. No signs of overt trigger. May be in the setting of pain. However, pt denies worsening pain.   -c/w metoprolol now increased to 125mg BID  -agree with adding dilt 90mg PO q6  -c/w dilt ggt but will wean down rate while uptitrating oral medications   -f/u w/ IR in regards to restarting eliquis  -TTE ordered  -will f/u EP recs   -c/w pain control  -f/u Bone bx path
62 yo M w/ PMH  as described above, recent admission 1/23-1/31 for SBO but also found to have multiple osteolytic lesions on imaging s/p bone bx on 1/30 w/ atypical results, presented to IR for repeat bx of osteolytic lesion and BM bx w/ course c/b aflutter w/ RVR admitted for rate control, w/ course c/b recurrent episodes of aflutter w/ RVR despite uptitrating BB. Started on dilt ggt 4/6 and now s/p IR BM bx and bx of R illiac bone lytic lesion on 4/6.    Patient w/ RVR again overnight. At bedside this am HR in the 70s -80s.  No signs of overt trigger. May be in the setting of pain. However, pt continues to deny worsening pain.   -eliquis now resumed   -c/w metoprolol 125mg BID  -c/w dilt 90mg PO q6  -c/w dilt ggt for now  -will f/u w/ EP in re to possible ablation and rate control as pt now maxed on dilt and has recurrent episodes of RVR mostly overnight.   -TTE ordered  -c/w pain control  -f/u Bone bx path

## 2018-04-11 NOTE — PROGRESS NOTE ADULT - PROBLEM SELECTOR PROBLEM 2
Prostate cancer metastatic to bone

## 2018-04-11 NOTE — PROGRESS NOTE ADULT - ASSESSMENT
This is a 62 yo male with past medical history of diverticulitis with partial colectomy with Orta procedure and reversal, COPD, HTN, ventral hernia with repair, atrial flutter s/p ablation 2015, on metoprolol and Eliquis, and prostate cancer with seed implant 2012 with recent MRI and and PET scan showing multiple lytic lesions admitted for outpatient bone bx.  On admission he was found to be in atrial flutter with RVR.   Started on IV Cardizem for rate control during bone biopsy. After restarting Eliquis, patient had MARCEL and successful DCCV.  Remains in normal sinus rhythm. Now on po Caridizem with evidence of blocked APC's on telemetry. Patient stable and asymptomatic.  PLAN:  Patient to continue Cardizem po but would decrease dosage to CD 240mg daily. Continue metoprolol 125mg bid and Eliquis for anticoagulation.             Agree with discharge home.            Follow-up with Dr. Polk on May 1, 2018 at 11:00am                                   [address] [place] [address] Oncology [facility]   [phone].

## 2018-04-11 NOTE — PROGRESS NOTE ADULT - PROBLEM SELECTOR PLAN 3
- BP stable   - c/w metoprolol 125 BID, diltiazem 60 q6, diltiazem gtt to be titrated off - BP stable   - c/w metoprolol 100 BID

## 2018-04-11 NOTE — PROGRESS NOTE ADULT - PROBLEM SELECTOR PROBLEM 4
COPD (chronic obstructive pulmonary disease)

## 2018-04-11 NOTE — PROGRESS NOTE ADULT - PROBLEM SELECTOR PLAN 2
- presented for outpatient bone biopsy  - outpt oncologist following - not due for lupron for a few weeks, c/w with bicalutamide here  - Bone biopsy performed 4/6, site clean and dry with small scabs, nontender - presented for outpatient bone biopsy  - outpt oncologist following - not due for lupron for a few weeks, c/w with bicalutamide here, f/u next week in clinic for biopsy results  - Bone biopsy performed 4/6, site clean and dry with small scabs, nontender

## 2018-04-11 NOTE — PROGRESS NOTE ADULT - SUBJECTIVE AND OBJECTIVE BOX
Pt feels better overall, pain in the back is controlled, able to ambulate, eating OK. ROS is otherwise unremarkable.      Meds:  acetaminophen   Tablet. 975 milliGRAM(s) Oral every 6 hours  apixaban 5 milliGRAM(s) Oral every 12 hours  bicalutamide 50 milliGRAM(s) Oral daily  metoprolol tartrate 100 milliGRAM(s) Oral two times a day  ondansetron    Tablet 4 milliGRAM(s) Oral four times a day PRN  oxyCODONE    IR 5 milliGRAM(s) Oral every 4 hours PRN  oxyCODONE    IR 10 milliGRAM(s) Oral every 4 hours PRN  senna 2 Tablet(s) Oral at bedtime  tamsulosin 0.4 milliGRAM(s) Oral at bedtime      Vital Signs Last 24 Hrs  T(C): 36.3 (11 Apr 2018 03:30), Max: 36.3 (11 Apr 2018 03:30)  T(F): 97.3 (11 Apr 2018 03:30), Max: 97.3 (11 Apr 2018 03:30)  HR: 85 (11 Apr 2018 05:41) (85 - 93)  BP: 117/72 (11 Apr 2018 05:41) (105/58 - 121/83)  BP(mean): --  RR: 18 (11 Apr 2018 12:31) (18 - 18)  SpO2: 97% (11 Apr 2018 12:31) (93% - 97%)                          12.2   6.57  )-----------( 400      ( 11 Apr 2018 06:30 )             39.1       04-11    139  |  105  |  15  ----------------------------<  94  4.3   |  24  |  1.04    Ca    8.4      11 Apr 2018 06:30  Phos  3.1     04-11  Mg     1.9     04-11          BM Bx: Hematopathology Report:   ACCESSION No:  [id number]    VENKATA SMITH                        1        Hematopathology Report          Final Diagnosis  1, 2. Bone marrow biopsy and bone marrow aspirate  - Cellular marrow with involvement by metastatic neoplasm,  and residual maturing trilineage hematopoiesis    See note and description.    Diagnostic note:  The patient has a reported history of prostatic carcinoma. Please  see addendum for further details, including results of  immunohistochemical studies.  Comprehensive report with results of pending ancillary studies to  follow.    Verified by: Anamika Watters MD  (Electronic Signature)  Reported on: 04/11/18 16:10 EDT,[address] [place], [place] [id number]  _________________________________________________________________    Clinical History  Prostate cancer, suspicious for metastasis    Specimen(s) Submitted  1. Bone marrow core biopsy, left iliac  2. Bone marrow aspirate    Gross Description  1. The specimen is received in bouin's fixative and the specimen  container is labeled: Left iliac bone marrow.  It consists of a  0.4 cm in length x 0.2 cm in diameter possible bone marrow core  with a 0.4 x 0.2 x 0.2 cm possible portion of blood clot.  Entirely submitted.  One cassette.  2. The specimen is received labeled with patient's name and  consists of air dried slide(s). 2 slide(s) stained with Navarrete  Giemsa stain. 1 stained for iron stain.  In addition to other data that may appear on the specimen  container, the label has been inspected to confirm the presence  of the patient's name and date of birth.  DA 04/07/18 09:35 (04.06.18 @ 18:30)        Cytology: Cytopathology - Non Gyn Report:   ACCESSION No:  [id number]    VENKATA SMITH                        2        Cytopathology Report            Final Diagnosis  BONE,  ILIUM, RIGHT, CT GUIDED CORE BIOPSY  ATYPICAL FINDINGS.  Core biopsy shows cores of bone with reactive/regenerative  changes, stromal fibroblastic and vascular proliferation,  inflammation and necrosis.  Scattered atypical large vacuolated  cells with prominent nucleoli are seen.  The enlarged atypical  cells and necrosis are seen on the touch imprints, however they  are rare.    Immunohistochemical stains for HMWK, CAM5.2, AE1/AE3, PSA and  PSAP are all negative.    Clinical, pathologic, and radiologic correlation is essential,  with additional tissue sampling if clinically indicated.    Slide(s) with built in immunohistochemical study control(s) and  negative control associated with this case has/have been verified  by the sign out pathologist.  For slide(s) without built in controls positive control slides  has/have been reviewed and approved by immunohistochemistry lab  These immunohistochemical/ in-situ hybridization tests have been  developed and their performance characteristics determined by  [facility], Department of Pathology,  Division of Immunopathology, [phone] [place] [address].  It has not been cleared or approved by the U.[place]. Food  and Drug Administration.  The FDA has determined that such  clearance or approval is not necessary.  This test is used for  clinical purposes.  The laboratory is certified under the CLIA-88  as qualified to perform high complexity clinical  testing.    Screened by: Cherelle VENTURA(ASC)  Verified by: Christi Dubose M.D.  (Electronic Signature)  Reported on: 02/05/18 12:21 EST, [address]  _________________________________________________________________      Specimen(s) Submitted  BONE,  ILIUM, RIGHT, CT GUIDED CORE BIOPSY      Statement of Adequacy  Immediate cytologic study for adequacy of specimen using a Diff-  Quik stain was performed and, at the time,            VENKATA SMITH                        2        Cytopathology Report          deemed insufficient for interpretation.However, upon evaluation  of all additional core biopsy material, the case is now  considered to be adequate for diagnosis.      Clinical Information  Multiple osteolytic lesions, prostate cancer.      Gross Description  Core Biopsy  Received in formalin multiple pieces of  white tissue 0.2 - 0.9  cm in length. Entire specimen submitted in one cassette. Specimen  label has been inspected to confirm patient's name and date of  birth.  8 Touch Prep made from core biopsy.  Submitted: core biopsy in one cassette labeled 1B    FNA  No material submitted for cell block (1A)    Prepared:8 Touch Prep  1 core biopsy (2 slides) labeled 1B  10 slides total (01.30.18 @ 23:43)

## 2018-04-11 NOTE — PROGRESS NOTE ADULT - SUBJECTIVE AND OBJECTIVE BOX
Patient is a 63y old  Male who presents with a chief complaint of aflutter with RVR (06 Apr 2018 10:48)      SUBJECTIVE / OVERNIGHT EVENTS:    MEDICATIONS  (STANDING):  acetaminophen   Tablet. 975 milliGRAM(s) Oral every 6 hours  apixaban 5 milliGRAM(s) Oral every 12 hours  bicalutamide 50 milliGRAM(s) Oral daily  diltiazem    milliGRAM(s) Oral daily  metoprolol tartrate 125 milliGRAM(s) Oral every 12 hours  senna 2 Tablet(s) Oral at bedtime    MEDICATIONS  (PRN):  ondansetron    Tablet 4 milliGRAM(s) Oral four times a day PRN Nausea and/or Vomiting  oxyCODONE    IR 5 milliGRAM(s) Oral every 4 hours PRN Moderate Pain (4 - 6)  oxyCODONE    IR 10 milliGRAM(s) Oral every 4 hours PRN Severe Pain (7 - 10)        CAPILLARY BLOOD GLUCOSE        I&O's Summary    10 Apr 2018 07:01  -  11 Apr 2018 07:00  --------------------------------------------------------  IN: 0 mL / OUT: 500 mL / NET: -500 mL        PHYSICAL EXAM:  Vital Signs Last 24 Hrs  T(C): 36.3 (11 Apr 2018 03:30), Max: 36.8 (10 Apr 2018 10:11)  T(F): 97.3 (11 Apr 2018 03:30), Max: 98.2 (10 Apr 2018 10:11)  HR: 85 (11 Apr 2018 05:41) (66 - 93)  BP: 117/72 (11 Apr 2018 05:41) (105/58 - 121/83)  BP(mean): --  RR: 18 (11 Apr 2018 05:41) (16 - 18)  SpO2: 95% (11 Apr 2018 05:41) (93% - 95%)    GENERAL: NAD  EYES: conjunctiva and sclera clear  CHEST/LUNG: Clear to auscultation bilaterally; No wheeze  HEART: Irregular rhythm and regular rate; No murmurs, rubs, or gallops  ABDOMEN: Soft, Nontender, Nondistended; Bowel sounds present  EXTREMITIES:  2+ Peripheral Pulses, No clubbing, cyanosis, or edema  PSYCH: AAOx3  NEUROLOGY: non-focal  SKIN: No rashes or lesions, 2 small scabs at site of biopsy in lower back, nontender    LABS:  (04-11 @ 06:30)                        12.2  6.57 )-----------( 400                 39.1    Neutrophils = -- (--%)  Lymphocytes = -- (--%)  Eosinophils = -- (--%)  Basophils = -- (--%)  Monocytes = -- (--%)  Bands = --%    WBC Trend: 6.57<--, 6.39<--, 8.26<--  Hb Trend: 12.2<--, 13.3<--, 14.0<--, 14.1<--, 14.0<--  Plt Trend: 400<--, 431<--, 486<--, 429<--, 379<--  04-10    140  |  104  |  17  ----------------------------<  94  4.0   |  23  |  0.92    Ca    8.4      10 Apr 2018 06:20  Phos  2.5     04-10  Mg     1.9     04-10      Creatinine Trend: 0.92<--, 1.01<--, 0.90<--, 0.87<--, 0.91<--, 0.98<--            Microbiology:  Urine Cx:  Blood Cx:    RADIOLOGY & ADDITIONAL TESTS:  X- Ray:  CT:  Ultrasound:  [ ] imaging personally reviewed and interpreted by me    Consultant(s) Notes Reviewed:      Care Discussed with Consultants/Other Providers: Patient is a 63y old  Male who presents with a chief complaint of aflutter with RVR (06 Apr 2018 10:48)    Tamiko Osman MD  PGY1 | Internal Medicine  [phone] / [phone]      SUBJECTIVE / OVERNIGHT EVENTS:    MEDICATIONS  (STANDING):  acetaminophen   Tablet. 975 milliGRAM(s) Oral every 6 hours  apixaban 5 milliGRAM(s) Oral every 12 hours  bicalutamide 50 milliGRAM(s) Oral daily  diltiazem    milliGRAM(s) Oral daily  metoprolol tartrate 125 milliGRAM(s) Oral every 12 hours  senna 2 Tablet(s) Oral at bedtime    MEDICATIONS  (PRN):  ondansetron    Tablet 4 milliGRAM(s) Oral four times a day PRN Nausea and/or Vomiting  oxyCODONE    IR 5 milliGRAM(s) Oral every 4 hours PRN Moderate Pain (4 - 6)  oxyCODONE    IR 10 milliGRAM(s) Oral every 4 hours PRN Severe Pain (7 - 10)        CAPILLARY BLOOD GLUCOSE        I&O's Summary    10 Apr 2018 07:01  -  11 Apr 2018 07:00  --------------------------------------------------------  IN: 0 mL / OUT: 500 mL / NET: -500 mL        PHYSICAL EXAM:  Vital Signs Last 24 Hrs  T(C): 36.3 (11 Apr 2018 03:30), Max: 36.8 (10 Apr 2018 10:11)  T(F): 97.3 (11 Apr 2018 03:30), Max: 98.2 (10 Apr 2018 10:11)  HR: 85 (11 Apr 2018 05:41) (66 - 93)  BP: 117/72 (11 Apr 2018 05:41) (105/58 - 121/83)  BP(mean): --  RR: 18 (11 Apr 2018 05:41) (16 - 18)  SpO2: 95% (11 Apr 2018 05:41) (93% - 95%)    GENERAL: NAD  EYES: conjunctiva and sclera clear  CHEST/LUNG: Clear to auscultation bilaterally; No wheeze  HEART: Irregular rhythm and regular rate; No murmurs, rubs, or gallops  ABDOMEN: Soft, Nontender, Nondistended; Bowel sounds present  EXTREMITIES:  2+ Peripheral Pulses, No clubbing, cyanosis, or edema  PSYCH: AAOx3  NEUROLOGY: non-focal  SKIN: No rashes or lesions, 2 small scabs at site of biopsy in lower back, nontender    LABS:  (04-11 @ 06:30)                        12.2  6.57 )-----------( 400                 39.1    Neutrophils = -- (--%)  Lymphocytes = -- (--%)  Eosinophils = -- (--%)  Basophils = -- (--%)  Monocytes = -- (--%)  Bands = --%    WBC Trend: 6.57<--, 6.39<--, 8.26<--  Hb Trend: 12.2<--, 13.3<--, 14.0<--, 14.1<--, 14.0<--  Plt Trend: 400<--, 431<--, 486<--, 429<--, 379<--  04-10    140  |  104  |  17  ----------------------------<  94  4.0   |  23  |  0.92    Ca    8.4      10 Apr 2018 06:20  Phos  2.5     04-10  Mg     1.9     04-10      Creatinine Trend: 0.92<--, 1.01<--, 0.90<--, 0.87<--, 0.91<--, 0.98<--            Microbiology:  Urine Cx:  Blood Cx:    RADIOLOGY & ADDITIONAL TESTS:  X- Ray:  CT:  Ultrasound:  [ ] imaging personally reviewed and interpreted by me    Consultant(s) Notes Reviewed:      Care Discussed with Consultants/Other Providers: Patient is a 63y old  Male who presents with a chief complaint of aflutter with RVR (06 Apr 2018 10:48)    Tamiko Osman MD  PGY1 | Internal Medicine  [phone] / [phone]      SUBJECTIVE / OVERNIGHT EVENTS: No acute events overnight. Patient seen this morning with no complaints. Denies CP, SOB, palpitations, abdominal pain. lightheadedness, dizziness.     MEDICATIONS  (STANDING):  acetaminophen   Tablet. 975 milliGRAM(s) Oral every 6 hours  apixaban 5 milliGRAM(s) Oral every 12 hours  bicalutamide 50 milliGRAM(s) Oral daily  diltiazem    milliGRAM(s) Oral daily  metoprolol tartrate 125 milliGRAM(s) Oral every 12 hours  senna 2 Tablet(s) Oral at bedtime    MEDICATIONS  (PRN):  ondansetron    Tablet 4 milliGRAM(s) Oral four times a day PRN Nausea and/or Vomiting  oxyCODONE    IR 5 milliGRAM(s) Oral every 4 hours PRN Moderate Pain (4 - 6)  oxyCODONE    IR 10 milliGRAM(s) Oral every 4 hours PRN Severe Pain (7 - 10)        I&O's Summary    10 Apr 2018 07:01  -  11 Apr 2018 07:00  --------------------------------------------------------  IN: 0 mL / OUT: 500 mL / NET: -500 mL        PHYSICAL EXAM:  Vital Signs Last 24 Hrs  T(C): 36.3 (11 Apr 2018 03:30), Max: 36.8 (10 Apr 2018 10:11)  T(F): 97.3 (11 Apr 2018 03:30), Max: 98.2 (10 Apr 2018 10:11)  HR: 85 (11 Apr 2018 05:41) (66 - 93)  BP: 117/72 (11 Apr 2018 05:41) (105/58 - 121/83)  BP(mean): --  RR: 18 (11 Apr 2018 05:41) (16 - 18)  SpO2: 95% (11 Apr 2018 05:41) (93% - 95%)    GENERAL: NAD  EYES: conjunctiva and sclera clear  CHEST/LUNG: Clear to auscultation bilaterally; No wheeze  HEART: Irregular rhythm and regular rate; No murmurs, rubs, or gallops  ABDOMEN: Soft, Nontender, Nondistended; Bowel sounds present  EXTREMITIES:  2+ Peripheral Pulses, No clubbing, cyanosis, or edema  PSYCH: AAOx3  NEUROLOGY: non-focal  SKIN: No rashes or lesions    LABS:  (04-11 @ 06:30)                        12.2  6.57 )-----------( 400                 39.1    Neutrophils = -- (--%)  Lymphocytes = -- (--%)  Eosinophils = -- (--%)  Basophils = -- (--%)  Monocytes = -- (--%)  Bands = --%    WBC Trend: 6.57<--, 6.39<--, 8.26<--  Hb Trend: 12.2<--, 13.3<--, 14.0<--, 14.1<--, 14.0<--  Plt Trend: 400<--, 431<--, 486<--, 429<--, 379<--  04-10    140  |  104  |  17  ----------------------------<  94  4.0   |  23  |  0.92    Ca    8.4      10 Apr 2018 06:20  Phos  2.5     04-10  Mg     1.9     04-10      Creatinine Trend: 0.92<--, 1.01<--, 0.90<--, 0.87<--, 0.91<--, 0.98<--            Microbiology:  Urine Cx:  Blood Cx:    RADIOLOGY & ADDITIONAL TESTS:  MARCEL   CONCLUSIONS:  1. Mitral annular calcification, otherwise normal mitral  valve. Mild mitral regurgitation.  2. Calcified trileaflet aortic valve with normal opening.  No aortic valve regurgitation seen.  3. Normal aortic root.  Mild non-mobile atherosclerotic  plaque in the aortic arch and descending thoracic aorta.  4. Severely dilated left atrium.  LA volume index = 70  cc/m2.  No left atrium or left atrial appendage thrombus.  5. Normal left ventricular internal dimensions and wall  thicknesses.  6. Mild segmental left ventricular systolic dysfunction.  Hypokinesis of the inferior wall.  7. Right ventricular enlargement with decreased right  ventricular systolic function.  8. Estimated right ventricular systolic pressure equals 61  mm Hg, assuming right atrial pressure equals 10 mm Hg,  consistent with severe pulmonary hypertension.  9. Contrast injection demonstrates no evidence of a patent  foramen ovale.    Consultant(s) Notes Reviewed:  EP    Care Discussed with Consultants/Other Providers:

## 2018-04-11 NOTE — PROGRESS NOTE ADULT - PROBLEM SELECTOR PLAN 1
CHADSVASc 1- patient presented for outpatient bone biopsy and found to be in aflutter with RVR  - monitor on tele, o/n without episode of RVR  - c/w Metoprolol 125 BID, cardizem 90 q6h and on Cardizem drip at 5mg/hr currently  - PRN lopressor IVP for acute episodes however patient did not receive any overnight  - c/w eliquis  - EP following - pt to go to [facility] today CHADSVAS 1- patient presented for outpatient bone biopsy and found to be in aflutter with RVR  - monitor on tele, o/n without episode of RVR, patient now in NSR with rate in 60's, d/c diltiazem, decreased metoprolol to 100BID  - c/w eliquis  - EP following - [facility] with [facility] yesteryda, tolerated well, will f/u outpatient CHADSVAS 1- patient presented for outpatient bone biopsy and found to be in aflutter with RVR  - monitor on tele, o/n without episode of RVR, patient now in NSR with rate in 60's, d/c diltiazem, decreased metoprolol to 100BID  - c/w eliquis  - EP following - [facility] with [facility] yesterday, tolerated well, will f/u outpatient

## 2018-04-11 NOTE — PROGRESS NOTE ADULT - SUBJECTIVE AND OBJECTIVE BOX
Patient feels well. No chest pain, shortness of breath, palpitations or lightheadedness.    Vital Signs Last 24 Hrs  T(C): 36.3 (11 Apr 2018 03:30), Max: 36.3 (11 Apr 2018 03:30)  T(F): 97.3 (11 Apr 2018 03:30), Max: 97.3 (11 Apr 2018 03:30)  HR: 85 (11 Apr 2018 05:41) (80 - 93)  BP: 117/72 (11 Apr 2018 05:41) (105/58 - 121/83)  BP(mean): --  RR: 18 (11 Apr 2018 12:31) (18 - 18)  SpO2: 97% (11 Apr 2018 12:31) (93% - 97%)      EKG:  Normal sinus rhythm 63 b/min with APC's.  Telemetry:  Normal sinus rhythm with blocked APC's  MEDICATIONS  (STANDING):  acetaminophen   Tablet. 975 milliGRAM(s) Oral every 6 hours  apixaban 5 milliGRAM(s) Oral every 12 hours  bicalutamide 50 milliGRAM(s) Oral daily  metoprolol tartrate 100 milliGRAM(s) Oral two times a day  senna 2 Tablet(s) Oral at bedtime  tamsulosin 0.4 milliGRAM(s) Oral at bedtime    MEDICATIONS  (PRN):  ondansetron    Tablet 4 milliGRAM(s) Oral four times a day PRN Nausea and/or Vomiting  oxyCODONE    IR 5 milliGRAM(s) Oral every 4 hours PRN Moderate Pain (4 - 6)  oxyCODONE    IR 10 milliGRAM(s) Oral every 4 hours PRN Severe Pain (7 - 10)          Physical exam:   Gen- Patient comfortable. NAD.   Resp- clear to auscultation. NO wheezing, rales or rhonchi  CV- Normal S1 and S2 RRR. No murmurs, gallops or rubs.  ABD- soft nontender +bowel sounds.  EXT-No edema  Neuro- grossly nonfocal                            12.2   6.57  )-----------( 400      ( 11 Apr 2018 06:30 )             39.1       04-11    139  |  105  |  15  ----------------------------<  94  4.3   |  24  |  1.04    Ca    8.4      11 Apr 2018 06:30  Phos  3.1     04-11  Mg     1.9     04-11

## 2018-04-16 LAB — TM INTERPRETATION: SIGNIFICANT CHANGE UP

## 2018-05-01 ENCOUNTER — NON-APPOINTMENT (OUTPATIENT)
Age: 63
End: 2018-05-01

## 2018-05-01 ENCOUNTER — APPOINTMENT (OUTPATIENT)
Dept: ELECTROPHYSIOLOGY | Facility: CLINIC | Age: 63
End: 2018-05-01
Payer: COMMERCIAL

## 2018-05-01 VITALS
BODY MASS INDEX: 28.09 KG/M2 | HEIGHT: 69.5 IN | OXYGEN SATURATION: 97 % | DIASTOLIC BLOOD PRESSURE: 85 MMHG | RESPIRATION RATE: 16 BRPM | HEART RATE: 50 BPM | WEIGHT: 194 LBS | SYSTOLIC BLOOD PRESSURE: 170 MMHG

## 2018-05-01 PROCEDURE — 93000 ELECTROCARDIOGRAM COMPLETE: CPT

## 2018-05-01 PROCEDURE — 99214 OFFICE O/P EST MOD 30 MIN: CPT

## 2018-05-01 RX ORDER — ABIRATERONE ACETATE 250 MG/1
250 TABLET ORAL
Qty: 60 | Refills: 0 | Status: DISCONTINUED | COMMUNITY
Start: 2018-04-24

## 2018-05-01 RX ORDER — TAMSULOSIN HYDROCHLORIDE 0.4 MG/1
0.4 CAPSULE ORAL
Refills: 0 | Status: ACTIVE | COMMUNITY
Start: 2018-05-01

## 2018-05-12 ENCOUNTER — OTHER (OUTPATIENT)
Age: 63
End: 2018-05-12

## 2018-05-17 ENCOUNTER — EMERGENCY (EMERGENCY)
Facility: HOSPITAL | Age: 63
LOS: 1 days | Discharge: HOME | End: 2018-05-17
Attending: EMERGENCY MEDICINE | Admitting: EMERGENCY MEDICINE
Payer: COMMERCIAL

## 2018-05-17 VITALS
RESPIRATION RATE: 18 BRPM | DIASTOLIC BLOOD PRESSURE: 82 MMHG | TEMPERATURE: 98 F | HEART RATE: 65 BPM | OXYGEN SATURATION: 97 % | SYSTOLIC BLOOD PRESSURE: 153 MMHG

## 2018-05-17 DIAGNOSIS — Z98.890 OTHER SPECIFIED POSTPROCEDURAL STATES: Chronic | ICD-10-CM

## 2018-05-17 PROCEDURE — 99220: CPT

## 2018-05-17 NOTE — ED ADULT TRIAGE NOTE - CHIEF COMPLAINT QUOTE
pt c/o abdominal pain radiating to flank area since tonight. pt endorses vomitting, denies urinary symptoms. pt c/o abdominal pain radiating to flank area since tonight. pt endorses vomitting, denies urinary symptoms.    0115:  Pt actively vomiting in triage.  Pt appears uncomfortable.  Charge RN aware.  Pt uptriaged.

## 2018-05-18 LAB
ALBUMIN SERPL ELPH-MCNC: 3.3 G/DL — SIGNIFICANT CHANGE UP (ref 3.3–5)
ALBUMIN SERPL ELPH-MCNC: 4 G/DL — SIGNIFICANT CHANGE UP (ref 3.3–5)
ALP SERPL-CCNC: 518 U/L — HIGH (ref 40–120)
ALP SERPL-CCNC: 675 U/L — HIGH (ref 40–120)
ALT FLD-CCNC: 10 U/L — SIGNIFICANT CHANGE UP (ref 4–41)
ALT FLD-CCNC: 7 U/L — SIGNIFICANT CHANGE UP (ref 4–41)
APPEARANCE UR: SIGNIFICANT CHANGE UP
AST SERPL-CCNC: 12 U/L — SIGNIFICANT CHANGE UP (ref 4–40)
AST SERPL-CCNC: 25 U/L — SIGNIFICANT CHANGE UP (ref 4–40)
BACTERIA # UR AUTO: SIGNIFICANT CHANGE UP
BASE EXCESS BLDV CALC-SCNC: -2.6 MMOL/L — SIGNIFICANT CHANGE UP
BASE EXCESS BLDV CALC-SCNC: 0.1 MMOL/L — SIGNIFICANT CHANGE UP
BASE EXCESS BLDV CALC-SCNC: 1.1 MMOL/L — SIGNIFICANT CHANGE UP
BASOPHILS # BLD AUTO: 0.04 K/UL — SIGNIFICANT CHANGE UP (ref 0–0.2)
BASOPHILS # BLD AUTO: 0.04 K/UL — SIGNIFICANT CHANGE UP (ref 0–0.2)
BASOPHILS NFR BLD AUTO: 0.5 % — SIGNIFICANT CHANGE UP (ref 0–2)
BASOPHILS NFR BLD AUTO: 0.6 % — SIGNIFICANT CHANGE UP (ref 0–2)
BILIRUB SERPL-MCNC: 0.4 MG/DL — SIGNIFICANT CHANGE UP (ref 0.2–1.2)
BILIRUB SERPL-MCNC: 0.5 MG/DL — SIGNIFICANT CHANGE UP (ref 0.2–1.2)
BILIRUB UR-MCNC: NEGATIVE — SIGNIFICANT CHANGE UP
BLOOD GAS VENOUS - CREATININE: 0.75 MG/DL — SIGNIFICANT CHANGE UP (ref 0.5–1.3)
BLOOD GAS VENOUS - CREATININE: SIGNIFICANT CHANGE UP MG/DL (ref 0.5–1.3)
BLOOD GAS VENOUS - CREATININE: SIGNIFICANT CHANGE UP MG/DL (ref 0.5–1.3)
BLOOD UR QL VISUAL: NEGATIVE — SIGNIFICANT CHANGE UP
BUN SERPL-MCNC: 15 MG/DL — SIGNIFICANT CHANGE UP (ref 7–23)
BUN SERPL-MCNC: 15 MG/DL — SIGNIFICANT CHANGE UP (ref 7–23)
CALCIUM SERPL-MCNC: 9 MG/DL — SIGNIFICANT CHANGE UP (ref 8.4–10.5)
CALCIUM SERPL-MCNC: 9.8 MG/DL — SIGNIFICANT CHANGE UP (ref 8.4–10.5)
CHLORIDE BLDV-SCNC: 108 MMOL/L — SIGNIFICANT CHANGE UP (ref 96–108)
CHLORIDE BLDV-SCNC: 110 MMOL/L — HIGH (ref 96–108)
CHLORIDE BLDV-SCNC: 111 MMOL/L — HIGH (ref 96–108)
CHLORIDE SERPL-SCNC: 105 MMOL/L — SIGNIFICANT CHANGE UP (ref 98–107)
CHLORIDE SERPL-SCNC: 96 MMOL/L — LOW (ref 98–107)
CO2 SERPL-SCNC: 20 MMOL/L — LOW (ref 22–31)
CO2 SERPL-SCNC: 25 MMOL/L — SIGNIFICANT CHANGE UP (ref 22–31)
COLOR SPEC: YELLOW — SIGNIFICANT CHANGE UP
CREAT SERPL-MCNC: 0.83 MG/DL — SIGNIFICANT CHANGE UP (ref 0.5–1.3)
CREAT SERPL-MCNC: 0.93 MG/DL — SIGNIFICANT CHANGE UP (ref 0.5–1.3)
EOSINOPHIL # BLD AUTO: 0.08 K/UL — SIGNIFICANT CHANGE UP (ref 0–0.5)
EOSINOPHIL # BLD AUTO: 0.21 K/UL — SIGNIFICANT CHANGE UP (ref 0–0.5)
EOSINOPHIL NFR BLD AUTO: 0.9 % — SIGNIFICANT CHANGE UP (ref 0–6)
EOSINOPHIL NFR BLD AUTO: 2.9 % — SIGNIFICANT CHANGE UP (ref 0–6)
GAS PNL BLDV: 132 MMOL/L — LOW (ref 136–146)
GAS PNL BLDV: 136 MMOL/L — SIGNIFICANT CHANGE UP (ref 136–146)
GAS PNL BLDV: 136 MMOL/L — SIGNIFICANT CHANGE UP (ref 136–146)
GLUCOSE BLDV-MCNC: 108 — HIGH (ref 70–99)
GLUCOSE BLDV-MCNC: 125 — HIGH (ref 70–99)
GLUCOSE BLDV-MCNC: 89 — SIGNIFICANT CHANGE UP (ref 70–99)
GLUCOSE SERPL-MCNC: 120 MG/DL — HIGH (ref 70–99)
GLUCOSE SERPL-MCNC: 89 MG/DL — SIGNIFICANT CHANGE UP (ref 70–99)
GLUCOSE UR-MCNC: NEGATIVE — SIGNIFICANT CHANGE UP
HBA1C BLD-MCNC: 5.4 % — SIGNIFICANT CHANGE UP (ref 4–5.6)
HCO3 BLDV-SCNC: 22 MMOL/L — SIGNIFICANT CHANGE UP (ref 20–27)
HCO3 BLDV-SCNC: 23 MMOL/L — SIGNIFICANT CHANGE UP (ref 20–27)
HCO3 BLDV-SCNC: 24 MMOL/L — SIGNIFICANT CHANGE UP (ref 20–27)
HCT VFR BLD CALC: 41.5 % — SIGNIFICANT CHANGE UP (ref 39–50)
HCT VFR BLD CALC: 48.8 % — SIGNIFICANT CHANGE UP (ref 39–50)
HCT VFR BLDV CALC: 43.2 % — SIGNIFICANT CHANGE UP (ref 39–51)
HCT VFR BLDV CALC: 45.8 % — SIGNIFICANT CHANGE UP (ref 39–51)
HCT VFR BLDV CALC: 50.3 % — SIGNIFICANT CHANGE UP (ref 39–51)
HGB BLD-MCNC: 13.2 G/DL — SIGNIFICANT CHANGE UP (ref 13–17)
HGB BLD-MCNC: 15.9 G/DL — SIGNIFICANT CHANGE UP (ref 13–17)
HGB BLDV-MCNC: 14.1 G/DL — SIGNIFICANT CHANGE UP (ref 13–17)
HGB BLDV-MCNC: 15 G/DL — SIGNIFICANT CHANGE UP (ref 13–17)
HGB BLDV-MCNC: 16.4 G/DL — SIGNIFICANT CHANGE UP (ref 13–17)
IMM GRANULOCYTES # BLD AUTO: 0.02 # — SIGNIFICANT CHANGE UP
IMM GRANULOCYTES # BLD AUTO: 0.02 # — SIGNIFICANT CHANGE UP
IMM GRANULOCYTES NFR BLD AUTO: 0.2 % — SIGNIFICANT CHANGE UP (ref 0–1.5)
IMM GRANULOCYTES NFR BLD AUTO: 0.3 % — SIGNIFICANT CHANGE UP (ref 0–1.5)
KETONES UR-MCNC: SIGNIFICANT CHANGE UP
LACTATE BLDV-MCNC: 1 MMOL/L — SIGNIFICANT CHANGE UP (ref 0.5–2)
LACTATE BLDV-MCNC: 2.4 MMOL/L — HIGH (ref 0.5–2)
LACTATE BLDV-MCNC: 4.2 MMOL/L — CRITICAL HIGH (ref 0.5–2)
LEUKOCYTE ESTERASE UR-ACNC: HIGH
LIDOCAIN IGE QN: 25.2 U/L — SIGNIFICANT CHANGE UP (ref 7–60)
LYMPHOCYTES # BLD AUTO: 1.11 K/UL — SIGNIFICANT CHANGE UP (ref 1–3.3)
LYMPHOCYTES # BLD AUTO: 1.83 K/UL — SIGNIFICANT CHANGE UP (ref 1–3.3)
LYMPHOCYTES # BLD AUTO: 12.7 % — LOW (ref 13–44)
LYMPHOCYTES # BLD AUTO: 25.4 % — SIGNIFICANT CHANGE UP (ref 13–44)
MCHC RBC-ENTMCNC: 29.1 PG — SIGNIFICANT CHANGE UP (ref 27–34)
MCHC RBC-ENTMCNC: 29.4 PG — SIGNIFICANT CHANGE UP (ref 27–34)
MCHC RBC-ENTMCNC: 31.8 % — LOW (ref 32–36)
MCHC RBC-ENTMCNC: 32.6 % — SIGNIFICANT CHANGE UP (ref 32–36)
MCV RBC AUTO: 90.4 FL — SIGNIFICANT CHANGE UP (ref 80–100)
MCV RBC AUTO: 91.6 FL — SIGNIFICANT CHANGE UP (ref 80–100)
MONOCYTES # BLD AUTO: 0.37 K/UL — SIGNIFICANT CHANGE UP (ref 0–0.9)
MONOCYTES # BLD AUTO: 0.91 K/UL — HIGH (ref 0–0.9)
MONOCYTES NFR BLD AUTO: 12.6 % — SIGNIFICANT CHANGE UP (ref 2–14)
MONOCYTES NFR BLD AUTO: 4.2 % — SIGNIFICANT CHANGE UP (ref 2–14)
MUCOUS THREADS # UR AUTO: SIGNIFICANT CHANGE UP
NEUTROPHILS # BLD AUTO: 4.19 K/UL — SIGNIFICANT CHANGE UP (ref 1.8–7.4)
NEUTROPHILS # BLD AUTO: 7.13 K/UL — SIGNIFICANT CHANGE UP (ref 1.8–7.4)
NEUTROPHILS NFR BLD AUTO: 58.2 % — SIGNIFICANT CHANGE UP (ref 43–77)
NEUTROPHILS NFR BLD AUTO: 81.5 % — HIGH (ref 43–77)
NITRITE UR-MCNC: NEGATIVE — SIGNIFICANT CHANGE UP
NRBC # FLD: 0 — SIGNIFICANT CHANGE UP
NRBC # FLD: 0 — SIGNIFICANT CHANGE UP
PCO2 BLDV: 35 MMHG — LOW (ref 41–51)
PCO2 BLDV: 47 MMHG — SIGNIFICANT CHANGE UP (ref 41–51)
PCO2 BLDV: 48 MMHG — SIGNIFICANT CHANGE UP (ref 41–51)
PH BLDV: 7.35 PH — SIGNIFICANT CHANGE UP (ref 7.32–7.43)
PH BLDV: 7.35 PH — SIGNIFICANT CHANGE UP (ref 7.32–7.43)
PH BLDV: 7.41 PH — SIGNIFICANT CHANGE UP (ref 7.32–7.43)
PH UR: 6 — SIGNIFICANT CHANGE UP (ref 4.6–8)
PLATELET # BLD AUTO: 234 K/UL — SIGNIFICANT CHANGE UP (ref 150–400)
PLATELET # BLD AUTO: 272 K/UL — SIGNIFICANT CHANGE UP (ref 150–400)
PMV BLD: 10.6 FL — SIGNIFICANT CHANGE UP (ref 7–13)
PMV BLD: 10.9 FL — SIGNIFICANT CHANGE UP (ref 7–13)
PO2 BLDV: 24 MMHG — LOW (ref 35–40)
PO2 BLDV: 36 MMHG — SIGNIFICANT CHANGE UP (ref 35–40)
PO2 BLDV: 45 MMHG — HIGH (ref 35–40)
POTASSIUM BLDV-SCNC: 3.5 MMOL/L — SIGNIFICANT CHANGE UP (ref 3.4–4.5)
POTASSIUM BLDV-SCNC: 3.9 MMOL/L — SIGNIFICANT CHANGE UP (ref 3.4–4.5)
POTASSIUM BLDV-SCNC: 4.7 MMOL/L — HIGH (ref 3.4–4.5)
POTASSIUM SERPL-MCNC: 3.8 MMOL/L — SIGNIFICANT CHANGE UP (ref 3.5–5.3)
POTASSIUM SERPL-MCNC: 4.8 MMOL/L — SIGNIFICANT CHANGE UP (ref 3.5–5.3)
POTASSIUM SERPL-SCNC: 3.8 MMOL/L — SIGNIFICANT CHANGE UP (ref 3.5–5.3)
POTASSIUM SERPL-SCNC: 4.8 MMOL/L — SIGNIFICANT CHANGE UP (ref 3.5–5.3)
PROT SERPL-MCNC: 6.4 G/DL — SIGNIFICANT CHANGE UP (ref 6–8.3)
PROT SERPL-MCNC: 8.4 G/DL — HIGH (ref 6–8.3)
PROT UR-MCNC: 30 MG/DL — HIGH
RBC # BLD: 4.53 M/UL — SIGNIFICANT CHANGE UP (ref 4.2–5.8)
RBC # BLD: 5.4 M/UL — SIGNIFICANT CHANGE UP (ref 4.2–5.8)
RBC # FLD: 15 % — HIGH (ref 10.3–14.5)
RBC # FLD: 15.2 % — HIGH (ref 10.3–14.5)
RBC CASTS # UR COMP ASSIST: SIGNIFICANT CHANGE UP (ref 0–?)
SAO2 % BLDV: 36.6 % — LOW (ref 60–85)
SAO2 % BLDV: 63 % — SIGNIFICANT CHANGE UP (ref 60–85)
SAO2 % BLDV: 83.4 % — SIGNIFICANT CHANGE UP (ref 60–85)
SODIUM SERPL-SCNC: 135 MMOL/L — SIGNIFICANT CHANGE UP (ref 135–145)
SODIUM SERPL-SCNC: 142 MMOL/L — SIGNIFICANT CHANGE UP (ref 135–145)
SP GR SPEC: 1.02 — SIGNIFICANT CHANGE UP (ref 1–1.04)
SQUAMOUS # UR AUTO: SIGNIFICANT CHANGE UP
UROBILINOGEN FLD QL: NORMAL MG/DL — SIGNIFICANT CHANGE UP
WBC # BLD: 7.2 K/UL — SIGNIFICANT CHANGE UP (ref 3.8–10.5)
WBC # BLD: 8.75 K/UL — SIGNIFICANT CHANGE UP (ref 3.8–10.5)
WBC # FLD AUTO: 7.2 K/UL — SIGNIFICANT CHANGE UP (ref 3.8–10.5)
WBC # FLD AUTO: 8.75 K/UL — SIGNIFICANT CHANGE UP (ref 3.8–10.5)
WBC UR QL: SIGNIFICANT CHANGE UP (ref 0–?)

## 2018-05-18 PROCEDURE — 99217: CPT

## 2018-05-18 PROCEDURE — 74177 CT ABD & PELVIS W/CONTRAST: CPT | Mod: 26

## 2018-05-18 RX ORDER — SODIUM CHLORIDE 9 MG/ML
1000 INJECTION INTRAMUSCULAR; INTRAVENOUS; SUBCUTANEOUS ONCE
Qty: 0 | Refills: 0 | Status: COMPLETED | OUTPATIENT
Start: 2018-05-18 | End: 2018-05-18

## 2018-05-18 RX ORDER — METOPROLOL TARTRATE 50 MG
100 TABLET ORAL EVERY 12 HOURS
Qty: 0 | Refills: 0 | Status: DISCONTINUED | OUTPATIENT
Start: 2018-05-18 | End: 2018-05-21

## 2018-05-18 RX ORDER — MORPHINE SULFATE 50 MG/1
4 CAPSULE, EXTENDED RELEASE ORAL ONCE
Qty: 0 | Refills: 0 | Status: DISCONTINUED | OUTPATIENT
Start: 2018-05-18 | End: 2018-05-18

## 2018-05-18 RX ORDER — KETOROLAC TROMETHAMINE 30 MG/ML
15 SYRINGE (ML) INJECTION ONCE
Qty: 0 | Refills: 0 | Status: DISCONTINUED | OUTPATIENT
Start: 2018-05-18 | End: 2018-05-18

## 2018-05-18 RX ORDER — DILTIAZEM HCL 120 MG
180 CAPSULE, EXT RELEASE 24 HR ORAL DAILY
Qty: 0 | Refills: 0 | Status: DISCONTINUED | OUTPATIENT
Start: 2018-05-18 | End: 2018-05-21

## 2018-05-18 RX ORDER — ONDANSETRON 8 MG/1
4 TABLET, FILM COATED ORAL ONCE
Qty: 0 | Refills: 0 | Status: COMPLETED | OUTPATIENT
Start: 2018-05-18 | End: 2018-05-18

## 2018-05-18 RX ORDER — METOPROLOL TARTRATE 50 MG
100 TABLET ORAL EVERY 12 HOURS
Qty: 0 | Refills: 0 | Status: DISCONTINUED | OUTPATIENT
Start: 2018-05-18 | End: 2018-05-18

## 2018-05-18 RX ADMIN — SODIUM CHLORIDE 1000 MILLILITER(S): 9 INJECTION INTRAMUSCULAR; INTRAVENOUS; SUBCUTANEOUS at 04:37

## 2018-05-18 RX ADMIN — Medication 15 MILLIGRAM(S): at 21:46

## 2018-05-18 RX ADMIN — MORPHINE SULFATE 4 MILLIGRAM(S): 50 CAPSULE, EXTENDED RELEASE ORAL at 02:48

## 2018-05-18 RX ADMIN — SODIUM CHLORIDE 1000 MILLILITER(S): 9 INJECTION INTRAMUSCULAR; INTRAVENOUS; SUBCUTANEOUS at 02:48

## 2018-05-18 RX ADMIN — ONDANSETRON 4 MILLIGRAM(S): 8 TABLET, FILM COATED ORAL at 02:31

## 2018-05-18 RX ADMIN — Medication 180 MILLIGRAM(S): at 18:42

## 2018-05-18 RX ADMIN — SODIUM CHLORIDE 1000 MILLILITER(S): 9 INJECTION INTRAMUSCULAR; INTRAVENOUS; SUBCUTANEOUS at 12:43

## 2018-05-18 RX ADMIN — MORPHINE SULFATE 4 MILLIGRAM(S): 50 CAPSULE, EXTENDED RELEASE ORAL at 02:31

## 2018-05-18 RX ADMIN — Medication 15 MILLIGRAM(S): at 08:13

## 2018-05-18 RX ADMIN — Medication 100 MILLIGRAM(S): at 18:01

## 2018-05-18 RX ADMIN — Medication 15 MILLIGRAM(S): at 22:01

## 2018-05-18 NOTE — CONSULT NOTE ADULT - ASSESSMENT
63 year old man with ventral hernia presents with nausea, vomiting, abdominal and back pain.  Hernia reduced at bedside.  Lactate improved from 4.2>2.4.    Plan:  Monitor in CDU  continue IVF administration  check lactate in afternoon  advance diet slowly today  Please call General Surgery at #[phone] with any acute changes    Patient advised to f/u with Dr. Ring as an outpatient for consideration of elective repair of symptomatic ventral hernia.  Patient verbalizes that he understands and that he would prefer to go home.    - d/w Dr. Ring    - JOVANY Jason, PGY2  # [phone]

## 2018-05-18 NOTE — ED ADULT NURSE NOTE - CHIEF COMPLAINT QUOTE
pt c/o abdominal pain radiating to flank area since tonight. pt endorses vomitting, denies urinary symptoms.    0115:  Pt actively vomiting in triage.  Pt appears uncomfortable.  Charge RN aware.  Pt uptriaged.

## 2018-05-18 NOTE — ED PROVIDER NOTE - MEDICAL DECISION MAKING DETAILS
62yo M pmhx prostate ca, htn p/w CC abdominal pain/flank pain - will send labs, ua, ct abd/pelvis, pain control, reassess. 64yo M pmhx prostate ca, htn, SBO, p/w CC abdominal pain/flank pain - will send labs, ua, ct abd/pelvis, pain control, reassess. Consider SBO neoley, vs other bowel path.

## 2018-05-18 NOTE — ED PROVIDER NOTE - ATTENDING CONTRIBUTION TO CARE
Attending Attestation: Dr. Alcantar  I have personally performed a history and physical examination of the patient and discussed management with the resident as well as the patient.  I reviewed the resident's note and agree with the documented findings and plan of care.  I have authored and modified critical sections of the Provider Note, including but not limited to HPI, Physical Exam and MDM. 64yo M pmhx prostate ca, htn, SBO, p/w CC abdominal pain/flank pain - will send labs, ua, ct abd/pelvis, pain control, reassess. Consider SBO umer, vs other bowel path.

## 2018-05-18 NOTE — ED CDU PROVIDER INITIAL DAY NOTE - PROGRESS NOTE DETAILS
This patient was signed out to me by day CDU JOSE GUADALUPE Collado and attending Dr. Valero at 1900 hrs.  In the interim, pt has been objectively noted to be resting comfortably.  At ~2135 hrs, pt was observed to be informing RN that he was having some abdominal pain; I went to eval the patient who objectively appeared comfortable and stable; pt was stating some recurrence of pain consistent to pain felt earlier, but not as severe as initial arrival in the ED.  Pt. points to region just below hernia located to RUQ as site of pain.  Pt not having any nausea or vomiting. This patient was signed out to me by day CDU JOSE GUADALUPE Collado and attending Dr. Valero at 1900 hrs.  In the interim, pt has been objectively noted to be resting comfortably.  At ~2135 hrs, pt was observed to be informing RN that he was having some abdominal pain; I went to eval the patient who objectively appeared comfortable and stable; pt was stating some recurrence of pain consistent to pain felt earlier, but not as severe as initial arrival in the ED.  Pt. points to region just below hernia located to RUQ as site of pain.  Pt not having any nausea or vomiting.  On my exam, pt has benign cardio/pulm/abd exam with no objective TTP noted.  Pt has moderate sized hernia to RUQ region that is soft and NTTP; non-pulsatile.  Surgery team notified; will be coming to reassess pt.  Pt. given Toradol for pain.  Will reassess. In the interim, Surgical resident came to eval pt; states exam benign at this time; continue supportive care and advance diet as tolerated.  Pt currently pain free s/p Toradol and has been objectively noted to be resting comfortably in the interim.  Will continue to monitor.

## 2018-05-18 NOTE — ED CDU PROVIDER INITIAL DAY NOTE - OBJECTIVE STATEMENT
64yo M pmhx prostate ca, HTN p/w CC R flank and abdominal pain, started today, gradually worsened throughout the day, now w/ nausea and vomiting. No similar episodes in the past. No fever chills CP SOB diarrhea urinary symptoms.    CDU JOSE GUADALUPE Collado note: I agree with above. 64yo M pmhx prostate ca, HTN p/w CC R flank and abdominal pain, started today, gradually worsened throughout the day, now w/ nausea and vomiting. No similar episodes in the past. No fever chills CP SOB diarrhea urinary symptoms. Pt is here for serial abdominal exams and repeat vbg.

## 2018-05-18 NOTE — CONSULT NOTE ADULT - SUBJECTIVE AND OBJECTIVE BOX
CC: Patient is a 63y old  Male who presents with a chief complaint of abd pain.      Patient is a 63y year old male with PMHx of Kamini's and reversal for diverticulitis, hernia repair, cholecystectomy, Afib on Eliquis, prostate Ca diagnosed in  s/p prostate seeds presents with abdominal pain and back that was 10/10 in severity. It started yesterday morning at 9 AM. He describes nausea and vomiting twice. He has had no fevers, no chills. He has been passing flatus and having regular bowel movements.  At the time of interview, he is not currently having abdominal pain or nausea. He is not vomiting.  He has a history of SBO's in the past, last admission a few months ago.     PMH  Atrial fibrillation and flutter  Ventral hernia  Inguinal hernia  Atrial fibrillation  Prostate Cancer  Diverticulitis of Colon  Emphysema  HTN (Hypertension)    PSH  History of ventral hernia repair  S/P Kamini's procedure  History of Hernia Surgery  History of Cholecystectomy  History of Appendectomy    MEDS    Allergies    No Known Drug Allergies  shellfish (Unknown)    Intolerances    azithromycin (Vomiting)      Social Patient was a smoker until 9 years ago, less than 1 ppd for 25 years. 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 He is accompanied by his daughter        Physical Exam  T(C): 36.8 (18 @ 06:00), Max: 37.3 (18 @ 00:27)  HR: 66 (18 @ 06:00) (64 - 78)  BP: 155/89 (1818 @ 06:00) (144/89 - 158/100)  RR: 19 (18 @ 06:00) (17 - 19)  SpO2: 97% (18 @ 06:00) (95% - 98%)  Wt(kg): --  Tmax: T(C): , Max: 37.3 (1818 @ 00:27)  Wt(kg): --    Gen: NAD  HEENT: normocephalic, atraumatic, no scleral icterus  CV: S1, S2, RRR  Pulm: CTA B/L  Abd: Soft, nontender, nondistended, multiple well healed surgical scars, epigastric hernia soft and nontender, reduced with relative ease at the bedside and with no discomfort to patient.  Ext: warm, no edema      Labs:                        15.9   8.75  )-----------( 272      ( 18 May 2018 02:22 )             48.8     -    135  |  96<L>  |  15  ----------------------------<  120<H>  4.8   |  20<L>  |  0.83    Ca    9.8      18 May 2018 02:22    TPro  8.4<H>  /  Alb  4.0  /  TBili  0.5  /  DBili  x   /  AST  25  /  ALT  10  /  AlkPhos  675<H>        Urinalysis Basic - ( 18 May 2018 02:22 )    Color: YELLOW / Appearance: HAZY / S.025 / pH: 6.0  Gluc: NEGATIVE / Ketone: TRACE  / Bili: NEGATIVE / Urobili: NORMAL mg/dL   Blood: NEGATIVE / Protein: 30 mg/dL / Nitrite: NEGATIVE   Leuk Esterase: LARGE / RBC: 2-5 / WBC 25-50   Sq Epi: OCC / Non Sq Epi: x / Bacteria: FEW            Imaging    EXAM:  CT ABDOMEN AND PELVIS IC        PROCEDURE DATE:  May 18 2018         INTERPRETATION:  CLINICAL INFORMATION: Abdominal pain, nausea, and   vomiting.    COMPARISON: CT abdomen and pelvis 2018    PROCEDURE:   CT of the Abdomen and Pelvis was performed with intravenous contrast.   Intravenous contrast: 90 ml Omnipaque 350. 10 ml discarded.  Oral contrast: None.  Sagittal and coronal reformats were performed.    FINDINGS:    LOWER CHEST: Bullous emphysematous changes and bibasilar interstitial   scarring and linear atelectasis with few scattered subpleural cysts,   unchanged.    LIVER: Multiple subcentimeter hypoenhancing structures, too small to   characterize, unchanged.  BILE DUCTS: Normal caliber.  GALLBLADDER: Cholecystectomy  SPLEEN: Within normal limits.  PANCREAS: Within normal limits.  ADRENALS: Within normal limits.    KIDNEYS/URETERS: Innumerable bilateral renal cysts, unchanged. Right   upper pole nonobstructing calculus, unchanged. Rotated right kidney,   unchanged.    BLADDER: Within normal limits.  REPRODUCTIVE ORGANS: Prostatic brachytherapy seeds, unchanged.    BOWEL/ABDOMINAL WALL: Small bowel containing ventral abdominal wall   hernia. The herniated bowel fluid content is increased from prior and now   contains foci of intraluminal gas. New surrounding inflammatory change.   Multiple dilated loops of small bowel with incomplete collapse of the   distal small bowel and colon suggesting partial obstruction, likely due   to the ventral hernia. Diverticulosis without diverticulitis, unchanged.   Appendix not seen.    VESSELS:  Within normal limits.  RETROPERITONEUM: No lymphadenopathy.    BONES: Mixed sclerotic and lytic lucencies in the pelvis, vertebral   bodies, and ribs, unchanged.    IMPRESSION:     Small bowel obstruction is similar in extent and distribution compared to   prior CT of 2018 with transition in the ventral abdominal wall   hernia. Increased fluid content, intraluminal gas, and new surrounding   inflammatory change suggestingstrangulation. Correlate clinically for   incarceration.    Osseous metastatic disease.                      OSEI UNGER M.D., RADIOLOGY RESIDENT  This document has been electronically signed.  ROSELIA LINTON M.D., ATTENDING RADIOLOGIST  This document has been electronically signed. May 18 2018  5:46AM

## 2018-05-18 NOTE — ED ADULT NURSE NOTE - OBJECTIVE STATEMENT
pt received to room 17. complains of lower abdominal pain and intermittent n/v since this afternoon. vomited 5 times. denies any diarrhea, constipation. states had a bm today, unsure what time. no bloody or coffee ground vomit. abdomen soft, not distended. tender on palpation in lower quadrants. pain radiates to back. denies any dizziness, weakness, sob, chest pain, or urinary symptoms. pt has hx prostate ca, not currently on iv chemo, (receives a "a shot in the butt".) labs sent. 22 g iv placed in left forearm. medicated as ordered. awaits ct scan.

## 2018-05-18 NOTE — ED PROVIDER NOTE - OBJECTIVE STATEMENT
62yo M pmhx prostate ca, HTN p/w CC R flank and abdominal pain, started today, gradually worsened throughout the day, now w/ nausea and vomiting. No similar episodes in the past. No fever chills CP SOB diarrhea urinary symptoms. 64yo M pmhx prostate ca, HTN p/w CC R flank and abdominal pain, started today, gradually worsened throughout the day, now w/ nausea and vomiting. No similar episodes in the past. No fever chills CP SOB diarrhea urinary symptoms. Otherwise has been in USOH.

## 2018-05-18 NOTE — CHART NOTE - NSCHARTNOTEFT_GEN_A_CORE
Patient seen and examined at bedside. Called by Clinical Decision Unit Physician Assistant for change in abdominal pain. The patient states he has no complaints at this time. On review of his laboratory studies his hyperlactatemia has resolved. When prompted about his abdominal pain he states that the pain now feels "deep," and is more of a dull pain than a sharp pain. On examination he is resting in bed in NAD. His respirations are of normal effort and there is no evidence of acute respiratory distress. His peripheral pulses are a normal rate and regular rhythm. His abdomen is moderately obese. There are previous incision scars that are well healed (appendectomy, cholecystectomy, Kamini's/reversal). The abdomen is soft and nondistended. He denies pain throughout during palpation. There is a palpable ventral hernia defect. The hernia is easily reducible without discomfort.    Recommendations:  - Monitor overnight in CDU  - Continue IV fluid administration  - Cont regular diet and monitor for change in abdominal exam and bowel function  - Dispo: CDU, likely home in AM if continues to do well. Outpatient hernia repair    [id number]

## 2018-05-18 NOTE — ED ADULT NURSE REASSESSMENT NOTE - NS ED NURSE REASSESS COMMENT FT1
Patient received AA&Ox3. VSS on RA. Patient denies chest pain, N/V, SOB, fever, chills, dyspnea, abdominal pain at this time. NAD noted - will continue to monitor.

## 2018-05-18 NOTE — ED PROVIDER NOTE - PROGRESS NOTE DETAILS
Seen by surgery who recc CDU obs stay with serial abd exams and po trial given partial SBO and pt grossly asymptomatic at present.

## 2018-05-19 VITALS
HEART RATE: 64 BPM | OXYGEN SATURATION: 95 % | TEMPERATURE: 98 F | SYSTOLIC BLOOD PRESSURE: 136 MMHG | RESPIRATION RATE: 16 BRPM | DIASTOLIC BLOOD PRESSURE: 70 MMHG

## 2018-05-19 RX ORDER — KETOROLAC TROMETHAMINE 30 MG/ML
15 SYRINGE (ML) INJECTION ONCE
Qty: 0 | Refills: 0 | Status: DISCONTINUED | OUTPATIENT
Start: 2018-05-19 | End: 2018-05-19

## 2018-05-19 RX ADMIN — Medication 15 MILLIGRAM(S): at 09:25

## 2018-05-19 RX ADMIN — Medication 15 MILLIGRAM(S): at 09:40

## 2018-05-19 RX ADMIN — Medication 100 MILLIGRAM(S): at 09:22

## 2018-05-19 NOTE — ED CDU PROVIDER SUBSEQUENT DAY NOTE - HISTORY
ED Provider HPI: "64yo M pmhx prostate ca, HTN p/w CC R flank and abdominal pain, started today, gradually worsened throughout the day, now w/ nausea and vomiting. No similar episodes in the past. No fever chills CP SOB diarrhea urinary symptoms."  Pt was evaluated in the ED; dx with SBO containing ventral abdominal wall hernia; Surgery team was consulted and hernia manually reduced with improvement of symptoms; pt sent to CDU for observation, serial abdominal exams, and lactate trending (initially was elevated).  In the interim, lactate down-trended and ultimately normalized; pt has had benign abdominal exams in the interim with no other issues.  Surgery team re-assessed pt last night and agreed on current care plan; no acute surgical intervention felt warranted.

## 2018-05-19 NOTE — ED CDU PROVIDER SUBSEQUENT DAY NOTE - PROGRESS NOTE DETAILS
Pt objectively noted to be resting comfortably in the interim; no c/o or issues in the interim.  Will continue to monitor.  Pt. will be signed out to the CDU day PA and attending at 0700 hrs. Corey: Supervised JOSE GUADALUPE Alaniz 5/19/18

## 2018-05-19 NOTE — ED CDU PROVIDER SUBSEQUENT DAY NOTE - GASTROINTESTINAL, MLM
Abdomen soft, flat, non-tender, no rebound, no guarding.  Moderate-sized hernia noted to RUQ, just to right of midline; soft, NTTP with no skin changes noted as compared to surrounding abdominal skin.

## 2018-05-19 NOTE — ED CDU PROVIDER SUBSEQUENT DAY NOTE - MEDICAL DECISION MAKING DETAILS
Serial abdominal exams, PO challenge, Surgical team reassessment, supportive care PRN, general observation and reassessment.

## 2018-05-19 NOTE — ED CDU PROVIDER SUBSEQUENT DAY NOTE - ATTENDING CONTRIBUTION TO CARE
I performed a face-to-face evaluation of the patient and performed a history and physical examination. I agree with the history and physical examination.    Abd pain, N/V. Dx'd with SBO containing ventral abdominal wall hernia. Surgery team reduced hernia with improvement of symptoms. Admitted to CDU for observation. [place] well overnight and today. Tolerated PO. Discharge home w/ Gen Surg f/u.

## 2018-05-19 NOTE — ED CDU PROVIDER SUBSEQUENT DAY NOTE - MUSCULOSKELETAL NEGATIVE STATEMENT, MLM
no back pain, no acute or different/worsening musculoskeletal pain from chronic baseline, no neck pain, and no weakness.

## 2018-05-25 ENCOUNTER — OUTPATIENT (OUTPATIENT)
Dept: OUTPATIENT SERVICES | Facility: HOSPITAL | Age: 63
LOS: 1 days | End: 2018-05-25

## 2018-05-25 VITALS
DIASTOLIC BLOOD PRESSURE: 86 MMHG | HEART RATE: 68 BPM | SYSTOLIC BLOOD PRESSURE: 120 MMHG | OXYGEN SATURATION: 98 % | RESPIRATION RATE: 16 BRPM | TEMPERATURE: 97 F | HEIGHT: 69 IN | WEIGHT: 182.98 LBS

## 2018-05-25 DIAGNOSIS — K43.2 INCISIONAL HERNIA WITHOUT OBSTRUCTION OR GANGRENE: ICD-10-CM

## 2018-05-25 DIAGNOSIS — I10 ESSENTIAL (PRIMARY) HYPERTENSION: ICD-10-CM

## 2018-05-25 DIAGNOSIS — G47.33 OBSTRUCTIVE SLEEP APNEA (ADULT) (PEDIATRIC): ICD-10-CM

## 2018-05-25 DIAGNOSIS — Z98.890 OTHER SPECIFIED POSTPROCEDURAL STATES: Chronic | ICD-10-CM

## 2018-05-25 DIAGNOSIS — T78.40XS ALLERGY, UNSPECIFIED, SEQUELA: ICD-10-CM

## 2018-05-25 DIAGNOSIS — I48.91 UNSPECIFIED ATRIAL FIBRILLATION: ICD-10-CM

## 2018-05-25 NOTE — H&P PST ADULT - GASTROINTESTINAL DETAILS
no masses palpable/bowel sounds normal/no organomegaly/no distention/soft/large incisional hernia noted , soft

## 2018-05-25 NOTE — H&P PST ADULT - MUSCULOSKELETAL
details… detailed exam no joint swelling/no joint erythema/ROM intact/no joint warmth/no calf tenderness

## 2018-05-25 NOTE — H&P PST ADULT - PROBLEM SELECTOR PLAN 3
pt instructed to take diltiazem day of surgery . Cardiac evaluation with management of eliquis prior to OR.

## 2018-05-25 NOTE — H&P PST ADULT - RS GEN PE MLT RESP DETAILS PC
clear to auscultation bilaterally/no wheezes/respirations non-labored/no rhonchi/breath sounds equal/good air movement/no rales

## 2018-05-25 NOTE — H&P PST ADULT - NSANTHOSAYNRD_GEN_A_CORE
No. TI screening performed.  STOP BANG Legend: 0-2 = LOW Risk; 3-4 = INTERMEDIATE Risk; 5-8 = HIGH Risk

## 2018-05-25 NOTE — H&P PST ADULT - HISTORY OF PRESENT ILLNESS
This is a 63 y.o. male recently discharged from [facility] , approximately 2 weeks ago with bowel obstruction secondary to hernia . Pt had CT of abdomen, now for  surgery . This is a 63 y.o. male recently discharged from [facility] , approximately 2 weeks ago with bowel obstruction secondary to hernia . Pt had CT of abdomen, incisional hernia without obstruction of gangrene ,  now for surgery .

## 2018-05-25 NOTE — H&P PST ADULT - PSH
History of Appendectomy    History of Cholecystectomy    History of electrophysiologic study  3/18 s/p ablation [facility]  History of Hernia Surgery  BL inguinal  History of ventral hernia repair    S/P Kamini's procedure  with reversal

## 2018-05-25 NOTE — H&P PST ADULT - PMH
Atrial fibrillation and flutter    Diverticulitis of Colon  with Hx of colostomy bag  Emphysema    History of anxiety    HTN (Hypertension)    Inguinal hernia    Prostate Cancer  with seeds implant    Ventral hernia

## 2018-05-25 NOTE — H&P PST ADULT - TEACHING/LEARNING LEARNING PREFERENCES
individual instruction/pictorial/audio/skill demonstration/computer/internet/group instruction/written material/video/verbal instruction

## 2018-05-30 ENCOUNTER — TRANSCRIPTION ENCOUNTER (OUTPATIENT)
Age: 63
End: 2018-05-30

## 2018-05-31 ENCOUNTER — INPATIENT (INPATIENT)
Facility: HOSPITAL | Age: 63
LOS: 5 days | Discharge: HOME | End: 2018-06-06
Attending: SPECIALIST | Admitting: SPECIALIST
Payer: COMMERCIAL

## 2018-05-31 VITALS
DIASTOLIC BLOOD PRESSURE: 97 MMHG | TEMPERATURE: 98 F | RESPIRATION RATE: 16 BRPM | HEART RATE: 62 BPM | HEIGHT: 69 IN | WEIGHT: 182.98 LBS | SYSTOLIC BLOOD PRESSURE: 152 MMHG | OXYGEN SATURATION: 98 %

## 2018-05-31 DIAGNOSIS — Z98.890 OTHER SPECIFIED POSTPROCEDURAL STATES: Chronic | ICD-10-CM

## 2018-05-31 DIAGNOSIS — K43.2 INCISIONAL HERNIA WITHOUT OBSTRUCTION OR GANGRENE: ICD-10-CM

## 2018-05-31 RX ORDER — OXYCODONE HYDROCHLORIDE 5 MG/1
10 TABLET ORAL EVERY 4 HOURS
Qty: 0 | Refills: 0 | Status: DISCONTINUED | OUTPATIENT
Start: 2018-05-31 | End: 2018-06-01

## 2018-05-31 RX ORDER — HYDROMORPHONE HYDROCHLORIDE 2 MG/ML
0.5 INJECTION INTRAMUSCULAR; INTRAVENOUS; SUBCUTANEOUS
Qty: 0 | Refills: 0 | Status: DISCONTINUED | OUTPATIENT
Start: 2018-05-31 | End: 2018-05-31

## 2018-05-31 RX ORDER — SODIUM CHLORIDE 9 MG/ML
1000 INJECTION, SOLUTION INTRAVENOUS
Qty: 0 | Refills: 0 | Status: DISCONTINUED | OUTPATIENT
Start: 2018-05-31 | End: 2018-05-31

## 2018-05-31 RX ORDER — MORPHINE SULFATE 50 MG/1
2 CAPSULE, EXTENDED RELEASE ORAL ONCE
Qty: 0 | Refills: 0 | Status: DISCONTINUED | OUTPATIENT
Start: 2018-05-31 | End: 2018-05-31

## 2018-05-31 RX ORDER — HYDROMORPHONE HYDROCHLORIDE 2 MG/ML
1 INJECTION INTRAMUSCULAR; INTRAVENOUS; SUBCUTANEOUS ONCE
Qty: 0 | Refills: 0 | Status: DISCONTINUED | OUTPATIENT
Start: 2018-05-31 | End: 2018-05-31

## 2018-05-31 RX ORDER — CYCLOBENZAPRINE HYDROCHLORIDE 10 MG/1
5 TABLET, FILM COATED ORAL ONCE
Qty: 0 | Refills: 0 | Status: COMPLETED | OUTPATIENT
Start: 2018-05-31 | End: 2018-05-31

## 2018-05-31 RX ORDER — ONDANSETRON 8 MG/1
4 TABLET, FILM COATED ORAL ONCE
Qty: 0 | Refills: 0 | Status: DISCONTINUED | OUTPATIENT
Start: 2018-05-31 | End: 2018-05-31

## 2018-05-31 RX ORDER — SODIUM CHLORIDE 9 MG/ML
1000 INJECTION, SOLUTION INTRAVENOUS
Qty: 0 | Refills: 0 | Status: DISCONTINUED | OUTPATIENT
Start: 2018-05-31 | End: 2018-06-02

## 2018-05-31 RX ORDER — OXYCODONE HYDROCHLORIDE 5 MG/1
5 TABLET ORAL EVERY 4 HOURS
Qty: 0 | Refills: 0 | Status: DISCONTINUED | OUTPATIENT
Start: 2018-05-31 | End: 2018-06-01

## 2018-05-31 RX ORDER — FENTANYL CITRATE 50 UG/ML
25 INJECTION INTRAVENOUS
Qty: 0 | Refills: 0 | Status: DISCONTINUED | OUTPATIENT
Start: 2018-05-31 | End: 2018-05-31

## 2018-05-31 RX ORDER — ONDANSETRON 8 MG/1
4 TABLET, FILM COATED ORAL EVERY 4 HOURS
Qty: 0 | Refills: 0 | Status: DISCONTINUED | OUTPATIENT
Start: 2018-05-31 | End: 2018-06-01

## 2018-05-31 RX ORDER — ACETAMINOPHEN 500 MG
650 TABLET ORAL EVERY 6 HOURS
Qty: 0 | Refills: 0 | Status: DISCONTINUED | OUTPATIENT
Start: 2018-05-31 | End: 2018-06-01

## 2018-05-31 RX ADMIN — HYDROMORPHONE HYDROCHLORIDE 0.5 MILLIGRAM(S): 2 INJECTION INTRAMUSCULAR; INTRAVENOUS; SUBCUTANEOUS at 19:25

## 2018-05-31 RX ADMIN — CYCLOBENZAPRINE HYDROCHLORIDE 5 MILLIGRAM(S): 10 TABLET, FILM COATED ORAL at 23:46

## 2018-05-31 RX ADMIN — HYDROMORPHONE HYDROCHLORIDE 0.5 MILLIGRAM(S): 2 INJECTION INTRAMUSCULAR; INTRAVENOUS; SUBCUTANEOUS at 19:40

## 2018-05-31 RX ADMIN — MORPHINE SULFATE 2 MILLIGRAM(S): 50 CAPSULE, EXTENDED RELEASE ORAL at 21:35

## 2018-05-31 RX ADMIN — MORPHINE SULFATE 2 MILLIGRAM(S): 50 CAPSULE, EXTENDED RELEASE ORAL at 21:50

## 2018-05-31 RX ADMIN — SODIUM CHLORIDE 100 MILLILITER(S): 9 INJECTION, SOLUTION INTRAVENOUS at 19:56

## 2018-05-31 NOTE — BRIEF OPERATIVE NOTE - PROCEDURE
<<-----Click on this checkbox to enter Procedure Component separation of abdominal wall  05/31/2018    Active  [id number]

## 2018-05-31 NOTE — PROGRESS NOTE ADULT - SUBJECTIVE AND OBJECTIVE BOX
Patient transferred from the PACU to the floor without issue. He is s/p bilateral component separation of abdominal wall. No acute events. Denies pain. Pain management orders adjusted on the floor.    Cont current therapy Patient transferred from the PACU to the floor without issue. He is s/p bilateral component separation of abdominal wall. No acute events. Denies pain. Pain management orders adjusted on the floor. His abdomen is softly distended. Incision clean. Dermabond in place.	    Cont current therapy

## 2018-06-01 DIAGNOSIS — C61 MALIGNANT NEOPLASM OF PROSTATE: ICD-10-CM

## 2018-06-01 DIAGNOSIS — I48.91 UNSPECIFIED ATRIAL FIBRILLATION: ICD-10-CM

## 2018-06-01 DIAGNOSIS — Z86.59 PERSONAL HISTORY OF OTHER MENTAL AND BEHAVIORAL DISORDERS: ICD-10-CM

## 2018-06-01 DIAGNOSIS — Z98.890 OTHER SPECIFIED POSTPROCEDURAL STATES: ICD-10-CM

## 2018-06-01 DIAGNOSIS — K43.9 VENTRAL HERNIA WITHOUT OBSTRUCTION OR GANGRENE: ICD-10-CM

## 2018-06-01 LAB
APTT BLD: 43.4 SEC — HIGH (ref 27.5–37.4)
BASE EXCESS BLDA CALC-SCNC: -0.4 MMOL/L — SIGNIFICANT CHANGE UP
BASE EXCESS BLDA CALC-SCNC: -6 MMOL/L — SIGNIFICANT CHANGE UP
BUN SERPL-MCNC: 15 MG/DL — SIGNIFICANT CHANGE UP (ref 7–23)
BUN SERPL-MCNC: 18 MG/DL — SIGNIFICANT CHANGE UP (ref 7–23)
CA-I BLDA-SCNC: 1.18 MMOL/L — SIGNIFICANT CHANGE UP (ref 1.15–1.29)
CALCIUM SERPL-MCNC: 8.7 MG/DL — SIGNIFICANT CHANGE UP (ref 8.4–10.5)
CALCIUM SERPL-MCNC: 9 MG/DL — SIGNIFICANT CHANGE UP (ref 8.4–10.5)
CHLORIDE BLDA-SCNC: 107 MMOL/L — SIGNIFICANT CHANGE UP (ref 96–108)
CHLORIDE SERPL-SCNC: 102 MMOL/L — SIGNIFICANT CHANGE UP (ref 98–107)
CHLORIDE SERPL-SCNC: 99 MMOL/L — SIGNIFICANT CHANGE UP (ref 98–107)
CK MB BLD-MCNC: 1.94 NG/ML — SIGNIFICANT CHANGE UP (ref 1–6.6)
CK MB BLD-MCNC: 41.51 NG/ML — HIGH (ref 1–6.6)
CK MB BLD-MCNC: 42.51 NG/ML — HIGH (ref 1–6.6)
CK SERPL-CCNC: 1024 U/L — HIGH (ref 30–200)
CK SERPL-CCNC: 1034 U/L — HIGH (ref 30–200)
CK SERPL-CCNC: 337 U/L — HIGH (ref 30–200)
CO2 SERPL-SCNC: 18 MMOL/L — LOW (ref 22–31)
CO2 SERPL-SCNC: 24 MMOL/L — SIGNIFICANT CHANGE UP (ref 22–31)
CREAT SERPL-MCNC: 1.1 MG/DL — SIGNIFICANT CHANGE UP (ref 0.5–1.3)
CREAT SERPL-MCNC: 1.26 MG/DL — SIGNIFICANT CHANGE UP (ref 0.5–1.3)
GLUCOSE BLDA-MCNC: 121 MG/DL — HIGH (ref 70–99)
GLUCOSE BLDA-MCNC: 229 MG/DL — HIGH (ref 70–99)
GLUCOSE SERPL-MCNC: 114 MG/DL — HIGH (ref 70–99)
GLUCOSE SERPL-MCNC: 165 MG/DL — HIGH (ref 70–99)
HCO3 BLDA-SCNC: 20 MMOL/L — LOW (ref 22–26)
HCO3 BLDA-SCNC: 24 MMOL/L — SIGNIFICANT CHANGE UP (ref 22–26)
HCT VFR BLD CALC: 40.7 % — SIGNIFICANT CHANGE UP (ref 39–50)
HCT VFR BLD CALC: 47.9 % — SIGNIFICANT CHANGE UP (ref 39–50)
HCT VFR BLDA CALC: 41.4 % — SIGNIFICANT CHANGE UP (ref 39–51)
HCT VFR BLDA CALC: 42.1 % — SIGNIFICANT CHANGE UP (ref 39–51)
HGB BLD-MCNC: 13.4 G/DL — SIGNIFICANT CHANGE UP (ref 13–17)
HGB BLD-MCNC: 14.5 G/DL — SIGNIFICANT CHANGE UP (ref 13–17)
HGB BLDA-MCNC: 13.5 G/DL — SIGNIFICANT CHANGE UP (ref 13–17)
HGB BLDA-MCNC: 13.7 G/DL — SIGNIFICANT CHANGE UP (ref 13–17)
INR BLD: 1.22 — HIGH (ref 0.88–1.17)
LACTATE BLDA-SCNC: 1.6 MMOL/L — SIGNIFICANT CHANGE UP (ref 0.5–2)
LACTATE BLDA-SCNC: 5.3 MMOL/L — CRITICAL HIGH (ref 0.5–2)
MAGNESIUM SERPL-MCNC: 1.8 MG/DL — SIGNIFICANT CHANGE UP (ref 1.6–2.6)
MCHC RBC-ENTMCNC: 29.5 PG — SIGNIFICANT CHANGE UP (ref 27–34)
MCHC RBC-ENTMCNC: 30 PG — SIGNIFICANT CHANGE UP (ref 27–34)
MCHC RBC-ENTMCNC: 30.3 % — LOW (ref 32–36)
MCHC RBC-ENTMCNC: 32.9 % — SIGNIFICANT CHANGE UP (ref 32–36)
MCV RBC AUTO: 91.1 FL — SIGNIFICANT CHANGE UP (ref 80–100)
MCV RBC AUTO: 97.6 FL — SIGNIFICANT CHANGE UP (ref 80–100)
NRBC # FLD: 0 — SIGNIFICANT CHANGE UP
NRBC # FLD: 0.02 — SIGNIFICANT CHANGE UP
PCO2 BLDA: 34 MMHG — LOW (ref 35–48)
PCO2 BLDA: 37 MMHG — SIGNIFICANT CHANGE UP (ref 35–48)
PH BLDA: 7.36 PH — SIGNIFICANT CHANGE UP (ref 7.35–7.45)
PH BLDA: 7.42 PH — SIGNIFICANT CHANGE UP (ref 7.35–7.45)
PHOSPHATE SERPL-MCNC: 3.6 MG/DL — SIGNIFICANT CHANGE UP (ref 2.5–4.5)
PLATELET # BLD AUTO: 247 K/UL — SIGNIFICANT CHANGE UP (ref 150–400)
PLATELET # BLD AUTO: 290 K/UL — SIGNIFICANT CHANGE UP (ref 150–400)
PMV BLD: 11.4 FL — SIGNIFICANT CHANGE UP (ref 7–13)
PMV BLD: 11.5 FL — SIGNIFICANT CHANGE UP (ref 7–13)
PO2 BLDA: 52 MMHG — LOW (ref 83–108)
PO2 BLDA: 63 MMHG — LOW (ref 83–108)
POTASSIUM BLDA-SCNC: 4 MMOL/L — SIGNIFICANT CHANGE UP (ref 3.4–4.5)
POTASSIUM BLDA-SCNC: 4.4 MMOL/L — SIGNIFICANT CHANGE UP (ref 3.4–4.5)
POTASSIUM SERPL-MCNC: 3.7 MMOL/L — SIGNIFICANT CHANGE UP (ref 3.5–5.3)
POTASSIUM SERPL-MCNC: 4.7 MMOL/L — SIGNIFICANT CHANGE UP (ref 3.5–5.3)
POTASSIUM SERPL-SCNC: 3.7 MMOL/L — SIGNIFICANT CHANGE UP (ref 3.5–5.3)
POTASSIUM SERPL-SCNC: 4.7 MMOL/L — SIGNIFICANT CHANGE UP (ref 3.5–5.3)
PROTHROM AB SERPL-ACNC: 13.6 SEC — HIGH (ref 9.8–13.1)
RBC # BLD: 4.47 M/UL — SIGNIFICANT CHANGE UP (ref 4.2–5.8)
RBC # BLD: 4.91 M/UL — SIGNIFICANT CHANGE UP (ref 4.2–5.8)
RBC # FLD: 14.8 % — HIGH (ref 10.3–14.5)
RBC # FLD: 14.9 % — HIGH (ref 10.3–14.5)
SAO2 % BLDA: 83.5 % — LOW (ref 95–99)
SAO2 % BLDA: 91.4 % — LOW (ref 95–99)
SODIUM BLDA-SCNC: 134 MMOL/L — LOW (ref 136–146)
SODIUM BLDA-SCNC: 135 MMOL/L — LOW (ref 136–146)
SODIUM SERPL-SCNC: 137 MMOL/L — SIGNIFICANT CHANGE UP (ref 135–145)
SODIUM SERPL-SCNC: 139 MMOL/L — SIGNIFICANT CHANGE UP (ref 135–145)
TROPONIN T SERPL-MCNC: 0.08 NG/ML — HIGH (ref 0–0.06)
TROPONIN T SERPL-MCNC: 1.54 NG/ML — HIGH (ref 0–0.06)
TROPONIN T SERPL-MCNC: 1.55 NG/ML — HIGH (ref 0–0.06)
WBC # BLD: 14.69 K/UL — HIGH (ref 3.8–10.5)
WBC # BLD: 17.15 K/UL — HIGH (ref 3.8–10.5)
WBC # FLD AUTO: 14.69 K/UL — HIGH (ref 3.8–10.5)
WBC # FLD AUTO: 17.15 K/UL — HIGH (ref 3.8–10.5)

## 2018-06-01 PROCEDURE — 93010 ELECTROCARDIOGRAM REPORT: CPT

## 2018-06-01 PROCEDURE — 74177 CT ABD & PELVIS W/CONTRAST: CPT | Mod: 26

## 2018-06-01 PROCEDURE — 93010 ELECTROCARDIOGRAM REPORT: CPT | Mod: 77

## 2018-06-01 PROCEDURE — 71045 X-RAY EXAM CHEST 1 VIEW: CPT | Mod: 26

## 2018-06-01 PROCEDURE — 71275 CT ANGIOGRAPHY CHEST: CPT | Mod: 26

## 2018-06-01 RX ORDER — HYDROMORPHONE HYDROCHLORIDE 2 MG/ML
0.5 INJECTION INTRAMUSCULAR; INTRAVENOUS; SUBCUTANEOUS
Qty: 0 | Refills: 0 | Status: DISCONTINUED | OUTPATIENT
Start: 2018-06-01 | End: 2018-06-03

## 2018-06-01 RX ORDER — ASPIRIN/CALCIUM CARB/MAGNESIUM 324 MG
300 TABLET ORAL ONCE
Qty: 0 | Refills: 0 | Status: COMPLETED | OUTPATIENT
Start: 2018-06-01 | End: 2018-06-01

## 2018-06-01 RX ORDER — SODIUM CHLORIDE 9 MG/ML
1000 INJECTION, SOLUTION INTRAVENOUS ONCE
Qty: 0 | Refills: 0 | Status: DISCONTINUED | OUTPATIENT
Start: 2018-06-01 | End: 2018-06-01

## 2018-06-01 RX ORDER — MORPHINE SULFATE 50 MG/1
2 CAPSULE, EXTENDED RELEASE ORAL EVERY 4 HOURS
Qty: 0 | Refills: 0 | Status: DISCONTINUED | OUTPATIENT
Start: 2018-06-01 | End: 2018-06-01

## 2018-06-01 RX ORDER — ONDANSETRON 8 MG/1
4 TABLET, FILM COATED ORAL EVERY 6 HOURS
Qty: 0 | Refills: 0 | Status: DISCONTINUED | OUTPATIENT
Start: 2018-06-01 | End: 2018-06-03

## 2018-06-01 RX ORDER — ONDANSETRON 8 MG/1
8 TABLET, FILM COATED ORAL
Qty: 0 | Refills: 0 | Status: DISCONTINUED | OUTPATIENT
Start: 2018-06-01 | End: 2018-06-01

## 2018-06-01 RX ORDER — SODIUM CHLORIDE 9 MG/ML
1000 INJECTION, SOLUTION INTRAVENOUS ONCE
Qty: 0 | Refills: 0 | Status: COMPLETED | OUTPATIENT
Start: 2018-06-01 | End: 2018-06-01

## 2018-06-01 RX ORDER — SIMVASTATIN 20 MG/1
80 TABLET, FILM COATED ORAL ONCE
Qty: 0 | Refills: 0 | Status: COMPLETED | OUTPATIENT
Start: 2018-06-01 | End: 2018-06-01

## 2018-06-01 RX ORDER — HEPARIN SODIUM 5000 [USP'U]/ML
5000 INJECTION INTRAVENOUS; SUBCUTANEOUS EVERY 6 HOURS
Qty: 0 | Refills: 0 | Status: DISCONTINUED | OUTPATIENT
Start: 2018-06-01 | End: 2018-06-01

## 2018-06-01 RX ORDER — ASPIRIN/CALCIUM CARB/MAGNESIUM 324 MG
81 TABLET ORAL DAILY
Qty: 0 | Refills: 0 | Status: DISCONTINUED | OUTPATIENT
Start: 2018-06-01 | End: 2018-06-06

## 2018-06-01 RX ORDER — SIMVASTATIN 20 MG/1
80 TABLET, FILM COATED ORAL DAILY
Qty: 0 | Refills: 0 | Status: DISCONTINUED | OUTPATIENT
Start: 2018-06-02 | End: 2018-06-04

## 2018-06-01 RX ORDER — CANGRELOR 50 MG/1
0.75 INJECTION, POWDER, LYOPHILIZED, FOR SOLUTION INTRAVENOUS
Qty: 50 | Refills: 0 | Status: DISCONTINUED | OUTPATIENT
Start: 2018-06-01 | End: 2018-06-01

## 2018-06-01 RX ORDER — HEPARIN SODIUM 5000 [USP'U]/ML
5000 INJECTION INTRAVENOUS; SUBCUTANEOUS EVERY 8 HOURS
Qty: 0 | Refills: 0 | Status: DISCONTINUED | OUTPATIENT
Start: 2018-06-01 | End: 2018-06-02

## 2018-06-01 RX ORDER — ACETAMINOPHEN 500 MG
1000 TABLET ORAL ONCE
Qty: 0 | Refills: 0 | Status: COMPLETED | OUTPATIENT
Start: 2018-06-01 | End: 2018-06-01

## 2018-06-01 RX ORDER — PHENYLEPHRINE HYDROCHLORIDE 10 MG/ML
0.1 INJECTION INTRAVENOUS
Qty: 40 | Refills: 0 | Status: DISCONTINUED | OUTPATIENT
Start: 2018-06-01 | End: 2018-06-01

## 2018-06-01 RX ORDER — HEPARIN SODIUM 5000 [USP'U]/ML
5000 INJECTION INTRAVENOUS; SUBCUTANEOUS EVERY 8 HOURS
Qty: 0 | Refills: 0 | Status: DISCONTINUED | OUTPATIENT
Start: 2018-06-01 | End: 2018-06-01

## 2018-06-01 RX ORDER — POTASSIUM CHLORIDE 20 MEQ
10 PACKET (EA) ORAL
Qty: 0 | Refills: 0 | Status: COMPLETED | OUTPATIENT
Start: 2018-06-01 | End: 2018-06-01

## 2018-06-01 RX ORDER — MORPHINE SULFATE 50 MG/1
4 CAPSULE, EXTENDED RELEASE ORAL EVERY 4 HOURS
Qty: 0 | Refills: 0 | Status: DISCONTINUED | OUTPATIENT
Start: 2018-06-01 | End: 2018-06-01

## 2018-06-01 RX ORDER — NALOXONE HYDROCHLORIDE 4 MG/.1ML
0.1 SPRAY NASAL
Qty: 0 | Refills: 0 | Status: DISCONTINUED | OUTPATIENT
Start: 2018-06-01 | End: 2018-06-03

## 2018-06-01 RX ORDER — HEPARIN SODIUM 5000 [USP'U]/ML
800 INJECTION INTRAVENOUS; SUBCUTANEOUS
Qty: 25000 | Refills: 0 | Status: DISCONTINUED | OUTPATIENT
Start: 2018-06-01 | End: 2018-06-01

## 2018-06-01 RX ORDER — CLOPIDOGREL BISULFATE 75 MG/1
300 TABLET, FILM COATED ORAL ONCE
Qty: 0 | Refills: 0 | Status: DISCONTINUED | OUTPATIENT
Start: 2018-06-01 | End: 2018-06-01

## 2018-06-01 RX ORDER — HYDROMORPHONE HYDROCHLORIDE 2 MG/ML
30 INJECTION INTRAMUSCULAR; INTRAVENOUS; SUBCUTANEOUS
Qty: 0 | Refills: 0 | Status: DISCONTINUED | OUTPATIENT
Start: 2018-06-01 | End: 2018-06-03

## 2018-06-01 RX ADMIN — SODIUM CHLORIDE 125 MILLILITER(S): 9 INJECTION, SOLUTION INTRAVENOUS at 20:00

## 2018-06-01 RX ADMIN — Medication 100 MILLIEQUIVALENT(S): at 10:00

## 2018-06-01 RX ADMIN — Medication 25 MILLIGRAM(S): at 03:27

## 2018-06-01 RX ADMIN — ONDANSETRON 4 MILLIGRAM(S): 8 TABLET, FILM COATED ORAL at 01:56

## 2018-06-01 RX ADMIN — HYDROMORPHONE HYDROCHLORIDE 30 MILLILITER(S): 2 INJECTION INTRAMUSCULAR; INTRAVENOUS; SUBCUTANEOUS at 18:02

## 2018-06-01 RX ADMIN — SIMVASTATIN 80 MILLIGRAM(S): 20 TABLET, FILM COATED ORAL at 23:28

## 2018-06-01 RX ADMIN — Medication 400 MILLIGRAM(S): at 16:45

## 2018-06-01 RX ADMIN — SODIUM CHLORIDE 1000 MILLILITER(S): 9 INJECTION, SOLUTION INTRAVENOUS at 05:25

## 2018-06-01 RX ADMIN — SODIUM CHLORIDE 1000 MILLILITER(S): 9 INJECTION, SOLUTION INTRAVENOUS at 06:47

## 2018-06-01 RX ADMIN — Medication 100 MILLIEQUIVALENT(S): at 12:03

## 2018-06-01 RX ADMIN — SODIUM CHLORIDE 1000 MILLILITER(S): 9 INJECTION, SOLUTION INTRAVENOUS at 07:40

## 2018-06-01 RX ADMIN — Medication 100 MILLIEQUIVALENT(S): at 08:18

## 2018-06-01 RX ADMIN — OXYCODONE HYDROCHLORIDE 10 MILLIGRAM(S): 5 TABLET ORAL at 00:49

## 2018-06-01 RX ADMIN — SODIUM CHLORIDE 100 MILLILITER(S): 9 INJECTION, SOLUTION INTRAVENOUS at 05:26

## 2018-06-01 RX ADMIN — ONDANSETRON 8 MILLIGRAM(S): 8 TABLET, FILM COATED ORAL at 05:25

## 2018-06-01 RX ADMIN — OXYCODONE HYDROCHLORIDE 10 MILLIGRAM(S): 5 TABLET ORAL at 01:31

## 2018-06-01 RX ADMIN — HYDROMORPHONE HYDROCHLORIDE 30 MILLILITER(S): 2 INJECTION INTRAMUSCULAR; INTRAVENOUS; SUBCUTANEOUS at 19:30

## 2018-06-01 RX ADMIN — Medication 300 MILLIGRAM(S): at 22:08

## 2018-06-01 RX ADMIN — HEPARIN SODIUM 800 UNIT(S)/HR: 5000 INJECTION INTRAVENOUS; SUBCUTANEOUS at 22:50

## 2018-06-01 RX ADMIN — Medication 1000 MILLIGRAM(S): at 17:30

## 2018-06-01 NOTE — PROGRESS NOTE ADULT - SUBJECTIVE AND OBJECTIVE BOX
POD# 1  Subjective: patient was seen this am with hypotension and tachycardia as well as hypoxemia. Lactate level was 5.    Objective:   Vital Signs Last 24 Hrs  T(C): 37.2 (01 Jun 2018 12:00), Max: 37.2 (01 Jun 2018 12:00)  T(F): 98.9 (01 Jun 2018 12:00), Max: 98.9 (01 Jun 2018 12:00)  HR: 109 (01 Jun 2018 13:21) (55 - 122)  BP: 121/81 (01 Jun 2018 13:00) (80/50 - 155/87)  BP(mean): 89 (01 Jun 2018 13:00) (67 - 95)  RR: 27 (01 Jun 2018 13:21) (10 - 36)  SpO2: 95% (01 Jun 2018 13:21) (90% - 100%)    Daily     Daily     Heent: N/C, AT PER   Neckno scleral icterus, No JVD  Pul: decreased breath sounds  Cor: RRR but tachycardic  Abdomen: soft, normal BS. Wound - clean, no hematoma  Extremities: without edema, motor/sensory intact    I&O's Detail    31 May 2018 07:01  -  01 Jun 2018 07:00  --------------------------------------------------------  IN:    lactated ringers.: 1000 mL  Total IN: 1000 mL    OUT:    Voided: 650 mL  Total OUT: 650 mL    Total NET: 350 mL      01 Jun 2018 07:01  -  01 Jun 2018 16:08  --------------------------------------------------------  IN:    IV PiggyBack: 200 mL    lactated ringers.: 625 mL  Total IN: 825 mL    OUT:    Voided: 225 mL  Total OUT: 225 mL    Total NET: 600 mL          MEDICATIONS  (STANDING):  heparin  Injectable 5000 Unit(s) SubCutaneous every 8 hours  HYDROmorphone PCA (1 mG/mL) 30 milliLiter(s) PCA Continuous PCA Continuous  lactated ringers. 1000 milliLiter(s) (125 mL/Hr) IV Continuous <Continuous>    MEDICATIONS  (PRN):  HYDROmorphone PCA (1 mG/mL) Rescue Clinician Bolus 0.5 milliGRAM(s) IV Push every 15 minutes PRN for Pain Scale GREATER THAN 6  naloxone Injectable 0.1 milliGRAM(s) IV Push every 3 minutes PRN For ANY of the following changes in patient status:  A. RR LESS THAN 10 breaths per minute, B. Oxygen saturation LESS THAN 90%, C. Sedation score of 6  ondansetron Injectable 4 milliGRAM(s) IV Push every 6 hours PRN Nausea                            14.5   14.69 )-----------( 290      ( 01 Jun 2018 05:30 )             47.9     06-01    139  |  99  |  15  ----------------------------<  165<H>  3.7   |  18<L>  |  1.10    Ca    9.0      01 Jun 2018 05:30          Radiologic Studies:< from: CT Angio Chest w/ IV Cont (06.01.18 @ 09:22) >  IMPRESSION:     No pulmonary embolus.    Severe emphysema.    Right middle lobe collapse.    Diffuse sclerotic and lytic osseous metastases.    Focus of postoperative edema versus omental infarct versus fat necrosis   subjacent to the surgical site.        < end of copied text >

## 2018-06-01 NOTE — PROGRESS NOTE ADULT - SUBJECTIVE AND OBJECTIVE BOX
A TEAM SURGERY PROGRESS NOTE    SUBJECTIVE: Pt seen at bedside. Hypotensive to 80/60s this morning with -120s. Patient feels lightheaded and nausea. Last dose of Eliquis 1 week prior to surgery. Took Lopressor and Diltiazem the morning of surgery. Denies chest pain or SOB. Did not have anything to drink after surgery.     Vital Signs Last 24 Hrs  T(C): 36.3 (01 Jun 2018 07:40), Max: 36.9 (01 Jun 2018 05:00)  T(F): 97.4 (01 Jun 2018 07:40), Max: 98.4 (01 Jun 2018 05:00)  HR: 110 (01 Jun 2018 08:19) (55 - 117)  BP: 96/64 (01 Jun 2018 08:19) (80/50 - 155/87)  BP(mean): 75 (01 Jun 2018 08:19) (67 - 84)  RR: 28 (01 Jun 2018 08:19) (10 - 36)  SpO2: 99% (01 Jun 2018 08:19) (90% - 100%)    Physical Exam  General: awake, alert, lethargic-appearing  Pulm: respirations unlabored, no increased WOB  Abdomen: soft, mildly distended, mild justyna-incisional tenderness, no guarding, incision c/d/i w/o drainage  Extremities: Grossly symmetric    I&O's Summary    31 May 2018 07:01  -  01 Jun 2018 07:00  --------------------------------------------------------  IN: 1000 mL / OUT: 650 mL / NET: 350 mL      I&O's Detail    31 May 2018 07:01  -  01 Jun 2018 07:00  --------------------------------------------------------  IN:    lactated ringers.: 1000 mL  Total IN: 1000 mL    OUT:    Voided: 650 mL  Total OUT: 650 mL    Total NET: 350 mL          MEDICATIONS  (STANDING):  acetaminophen   Tablet. 650 milliGRAM(s) Oral every 6 hours  lactated ringers. 1000 milliLiter(s) (125 mL/Hr) IV Continuous <Continuous>  phenylephrine    Infusion 0.1 MICROgram(s)/kG/Min (3.113 mL/Hr) IV Continuous <Continuous>  potassium chloride  10 mEq/100 mL IVPB 10 milliEquivalent(s) IV Intermittent every 1 hour    MEDICATIONS  (PRN):  ondansetron Injectable 8 milliGRAM(s) IV Push every 1 hour PRN Nausea and/or Vomiting  oxyCODONE    IR 5 milliGRAM(s) Oral every 4 hours PRN Moderate Pain (4 - 6)  oxyCODONE    IR 10 milliGRAM(s) Oral every 4 hours PRN Severe Pain (7 - 10)      LABS:                        14.5   14.69 )-----------( 290      ( 01 Jun 2018 05:30 )             47.9     06-01    139  |  99  |  15  ----------------------------<  165<H>  3.7   |  18<L>  |  1.10    Ca    9.0      01 Jun 2018 05:30 A TEAM SURGERY PROGRESS NOTE    SUBJECTIVE: Pt seen at bedside. Hypotensive to 80/60s this morning with -120s. Patient feels lightheaded and nausea. Last dose of Eliquis 1 week prior to surgery. Took Lopressor and Diltiazem the morning of surgery. Denies chest pain or SOB. Did not have anything to drink after surgery. Received 2L of LR this morning, increased IVF rate to 125 cc/hr.    Vital Signs Last 24 Hrs  T(C): 36.3 (01 Jun 2018 07:40), Max: 36.9 (01 Jun 2018 05:00)  T(F): 97.4 (01 Jun 2018 07:40), Max: 98.4 (01 Jun 2018 05:00)  HR: 110 (01 Jun 2018 08:19) (55 - 117)  BP: 96/64 (01 Jun 2018 08:19) (80/50 - 155/87)  BP(mean): 75 (01 Jun 2018 08:19) (67 - 84)  RR: 28 (01 Jun 2018 08:19) (10 - 36)  SpO2: 99% (01 Jun 2018 08:19) (90% - 100%)    Physical Exam  General: awake, alert, lethargic-appearing  Pulm: respirations unlabored, no increased WOB  Abdomen: soft, mildly distended, mild justyna-incisional tenderness, no guarding, incision c/d/i w/o drainage  Extremities: Grossly symmetric    I&O's Summary    31 May 2018 07:01  -  01 Jun 2018 07:00  --------------------------------------------------------  IN: 1000 mL / OUT: 650 mL / NET: 350 mL      I&O's Detail    31 May 2018 07:01  -  01 Jun 2018 07:00  --------------------------------------------------------  IN:    lactated ringers.: 1000 mL  Total IN: 1000 mL    OUT:    Voided: 650 mL  Total OUT: 650 mL    Total NET: 350 mL          MEDICATIONS  (STANDING):  acetaminophen   Tablet. 650 milliGRAM(s) Oral every 6 hours  lactated ringers. 1000 milliLiter(s) (125 mL/Hr) IV Continuous <Continuous>  phenylephrine    Infusion 0.1 MICROgram(s)/kG/Min (3.113 mL/Hr) IV Continuous <Continuous>  potassium chloride  10 mEq/100 mL IVPB 10 milliEquivalent(s) IV Intermittent every 1 hour    MEDICATIONS  (PRN):  ondansetron Injectable 8 milliGRAM(s) IV Push every 1 hour PRN Nausea and/or Vomiting  oxyCODONE    IR 5 milliGRAM(s) Oral every 4 hours PRN Moderate Pain (4 - 6)  oxyCODONE    IR 10 milliGRAM(s) Oral every 4 hours PRN Severe Pain (7 - 10)      LABS:                        14.5   14.69 )-----------( 290      ( 01 Jun 2018 05:30 )             47.9     06-01    139  |  99  |  15  ----------------------------<  165<H>  3.7   |  18<L>  |  1.10    Ca    9.0      01 Jun 2018 05:30

## 2018-06-01 NOTE — PROGRESS NOTE ADULT - SUBJECTIVE AND OBJECTIVE BOX
Pt in bed in mild distress  Events from this morning reviewed with the surgical PA  Pt tachypnic, low BP and low o2 sats  Pt currently on 100% NRB mask    Afebrile  Current /77  Hr 117  RR 28  Abd-softly distended, tympanic to percussion, non tender other than justyna-incisional, closure intact, no collection noted    A/P- POD#1 s/p Abd wall reconstrcution  -pt is moving to SICU  -follow up Ct scan chest, abd  -Ngt placed earlier  -Care per primary team

## 2018-06-01 NOTE — PROGRESS NOTE ADULT - ASSESSMENT
63M s/p hernia and components separation POD1. Hypotensive, tachycardic, H/H stable.    Plan:  - NPO with IVF, bolus of LR as needed  - ABG, lactate 5.3  - Cardiac enzymes, EKG  - CXR  - CTA chest/abd/pelvis rule out PE  - Holding Eliquis, holding dvt ppx

## 2018-06-01 NOTE — PROGRESS NOTE ADULT - ASSESSMENT
A/P: 63M s/p hernia and components separation POD1. Hypotensive  -f/u hypotension workup  -continue to monitor incision  -awaiting return of bowel function  management per general surgery

## 2018-06-01 NOTE — PROGRESS NOTE ADULT - SUBJECTIVE AND OBJECTIVE BOX
POST ANESTHESIA EVALUATION    63y Male POSTOP DAY 1     MENTAL STATUS: Patient participation [x  ] Awake     [  ] Arousable     [  ] Sedated    AIRWAY PATENCY: [  x] Satisfactory  [  ] Other:     Vital Signs Last 24 Hrs  T(C): 37.2 (01 Jun 2018 12:00), Max: 37.2 (01 Jun 2018 12:00)  T(F): 98.9 (01 Jun 2018 12:00), Max: 98.9 (01 Jun 2018 12:00)  HR: 109 (01 Jun 2018 13:21) (55 - 122)  BP: 121/81 (01 Jun 2018 13:00) (80/50 - 155/87)  BP(mean): 89 (01 Jun 2018 13:00) (67 - 95)  RR: 27 (01 Jun 2018 13:21) (10 - 36)  SpO2: 95% (01 Jun 2018 13:21) (90% - 100%)  I&O's Summary    31 May 2018 07:01  -  01 Jun 2018 07:00  --------------------------------------------------------  IN: 1000 mL / OUT: 650 mL / NET: 350 mL    01 Jun 2018 07:01  -  01 Jun 2018 15:23  --------------------------------------------------------  IN: 825 mL / OUT: 225 mL / NET: 600 mL          NAUSEA/ VOMITTING:  [ x ] NONE  [  ] CONTROLLED [  ] OTHER     PAIN: [ x ] CONTROLLED WITH CURRENT REGIMEN  [  ] OTHER    [ x ] NO APPARENT ANESTHESIA COMPLICATIONS      Comments:

## 2018-06-01 NOTE — CONSULT NOTE ADULT - SUBJECTIVE AND OBJECTIVE BOX
Date of Admission: 5/31/2018    CHIEF COMPLAINT: elective epigastric hernia repair    HISTORY OF PRESENT ILLNESS:  This is a 63yoM w/ PMHx HTN, COPD, aflutter s/p ablation on eliquis, prostate Ca s/p radiation seed implants presents to [facility] for elective epigastric hernia repair following an admission for SBO.  Post-op course c/o hypotension, tachycardia and hypoxia, elevated lactate of 5.3 - was given 2L LR bolus and was transferred to SICU for further management.  Cardiology consulted for elevated cardiac enzymes CK 1024 CKMB 54.51 Troponin 1.55.  Upon examination, patient is chest pain free and denies history of angina.  Lactate improved after IVF (1.6).       Allergies  No Known Drug Allergies  shellfish (Unknown)    Intolerances  azithromycin (Vomiting; Diarrhea)    MEDICATIONS:  heparin  Infusion. 800 Unit(s)/Hr IV Continuous <Continuous>  aspirin Suppository 300 milliGRAM(s) Rectal once  HYDROmorphone PCA (1 mG/mL) 30 milliLiter(s) PCA Continuous PCA Continuous  HYDROmorphone PCA (1 mG/mL) Rescue Clinician Bolus 0.5 milliGRAM(s) IV Push every 15 minutes PRN  ondansetron Injectable 4 milliGRAM(s) IV Push every 6 hours PRN  lactated ringers. 1000 milliLiter(s) IV Continuous <Continuous>    PAST MEDICAL & SURGICAL HISTORY:  History of anxiety  Atrial fibrillation and flutter  Ventral hernia  Inguinal hernia  Prostate Cancer: with seeds implant    Diverticulitis of Colon: with Hx of colostomy bag  Emphysema  HTN (Hypertension)  History of electrophysiologic study: 3/18 s/p ablation [facility]  History of ventral hernia repair  S/P Kamini's procedure: with reversal  History of Hernia Surgery: BL inguinal  History of Cholecystectomy  History of Appendectomy    FAMILY HISTORY:  Family history of hypertension  Family history of heart attack    SOCIAL HISTORY:    Denies alcohol, cigarette or illicit drug use    REVIEW OF SYSTEMS:  See HPI. Otherwise, 10 point ROS done and otherwise negative.    PHYSICAL EXAM:  T(C): 36.8 (06-01-18 @ 20:00), Max: 37.2 (06-01-18 @ 12:00)  HR: 98 (06-01-18 @ 20:00) (85 - 122)  BP: 127/85 (06-01-18 @ 20:00) (80/50 - 154/94)  RR: 20 (06-01-18 @ 20:00) (16 - 36)  SpO2: 99% (06-01-18 @ 20:00) (90% - 100%)  Wt(kg): --  I&O's Summary    31 May 2018 07:01  -  01 Jun 2018 07:00  --------------------------------------------------------  IN: 1000 mL / OUT: 650 mL / NET: 350 mL    01 Jun 2018 07:01  -  01 Jun 2018 21:57  --------------------------------------------------------  IN: 1575 mL / OUT: 380 mL / NET: 1195 mL    Appearance: Normal	  HEENT:   Normal oral mucosa, PERRL, EOMI	  Lymphatic: No lymphadenopathy  Cardiovascular: Normal S1 S2, No JVD, No murmurs, No edema  Respiratory: Lungs clear to auscultation	  Psychiatry: A & O x 3, Mood & affect appropriate  Gastrointestinal: distended  Skin: No rashes, No ecchymoses, No cyanosis	  Neurologic: Non-focal  Extremities: Normal range of motion, No clubbing, cyanosis or edema  Vascular: Peripheral pulses palpable 2+ bilaterally    LABS:	 	  CBC Full  -  ( 01 Jun 2018 17:40 )  WBC Count : 17.15 K/uL  Hemoglobin : 13.4 g/dL  Hematocrit : 40.7 %  Platelet Count - Automated : 247 K/uL  Mean Cell Volume : 91.1 fL  Mean Cell Hemoglobin : 30.0 pg  Mean Cell Hemoglobin Concentration : 32.9 %  Auto Neutrophil # : x  Auto Lymphocyte # : x  Auto Monocyte # : x  Auto Eosinophil # : x  Auto Basophil # : x  Auto Neutrophil % : x  Auto Lymphocyte % : x  Auto Monocyte % : x  Auto Eosinophil % : x  Auto Basophil % : x    06-01    137  |  102  |  18  ----------------------------<  114<H>  4.7   |  24  |  1.26  06-01    139  |  99  |  15  ----------------------------<  165<H>  3.7   |  18<L>  |  1.10    Ca    8.7      01 Jun 2018 17:40  < from: CT Angio Chest w/ IV Cont (06.01.18 @ 09:22) >  Ca    9.0      01 Jun 2018 05:30  Phos  3.6     06-01  Mg     1.8     06-01      < from: Transthoracic Echocardiogram w/Doppler (02.08.13 @ 08:45) >  CONCLUSIONS:  1. Calcified trileaflet aortic valve with normal opening.  2. Mild segmental left ventricular systolic dysfunction  with hypokinesis of the basal inferior wall.  Overall LV  function is preserved.  3. Mild diastolic dysfunction (Stage I).  4. Moderate right atrial enlargement.  5. Right ventricular enlargement with decreased right  ventricular systolic function.  6. Estimated pulmonary artery systolic pressure equals 55  mm Hg, assuming right atrial pressure equals 10  mm Hg,  consistent with moderate pulmonary hypertension.    < from: CT Angio Chest w/ IV Cont (06.01.18 @ 09:22) >  IMPRESSION:     No pulmonary embolus.    Severe emphysema.    Right middle lobe collapse.    Diffuse sclerotic and lytic osseous metastases.    Focus of postoperative edema versus omental infarct versus fat necrosis   subjacent to the surgical site.    < end of copied text >

## 2018-06-01 NOTE — PROGRESS NOTE ADULT - PROBLEM SELECTOR PLAN 1
S/P Extra-peritoneal repair with intraperitoneal reduction of hernia sac  Abdominal wall component separation and retro rectal placement of marlex mesh

## 2018-06-01 NOTE — CONSULT NOTE ADULT - ASSESSMENT
63yoM w/ PMHx HTN, COPD, aflutter s/p ablation on eliquis, prostate Ca s/p radiation seed implants presents to [facility] for elective epigastric hernia repair following an admission for SBO.  Cardiology consulted for elevated cardiac enzymes CK 1024 CKMB 54.51 Troponin 1.55.  Upon examination, patient is chest pain free and denies history of angina.  Lactate improved after IVF (1.6).

## 2018-06-01 NOTE — PROGRESS NOTE ADULT - PROBLEM SELECTOR PLAN 5
May be dehydrated with hypotension and tachycardia  Responded to IV bolus of LR  To repeat Lactate level

## 2018-06-01 NOTE — CONSULT NOTE ADULT - ATTENDING COMMENTS
Patient seen and examined. Agree with above assessment and plan. Patient with NSTEMI in post operative setting however remains chest pain free. Cardiac enzymes currently down trending.  -would recommend 48 hours of heparin infusion   -agree with ASA but would add plavix to regimen  -will need TTE  -eventual ischemic eval once stable from GI perspective

## 2018-06-01 NOTE — PROGRESS NOTE ADULT - SUBJECTIVE AND OBJECTIVE BOX
S: pt POD1 from hernia repair with components separation. Pt hypotensive this AM 80s/60s    O:  Vital Signs Last 24 Hrs  T(C): 36.9 (01 Jun 2018 05:00), Max: 36.9 (01 Jun 2018 05:00)  T(F): 98.4 (01 Jun 2018 05:00), Max: 98.4 (01 Jun 2018 05:00)  HR: 112 (01 Jun 2018 07:07) (55 - 112)  BP: 88/61 (01 Jun 2018 07:07) (80/50 - 155/87)  BP(mean): --  RR: 22 (01 Jun 2018 07:07) (10 - 22)  SpO2: 100% (01 Jun 2018 07:07) (90% - 100%)    Gen: Sleeping  Abdomen: soft nondistended, incision c/d/i

## 2018-06-01 NOTE — CONSULT NOTE ADULT - SUBJECTIVE AND OBJECTIVE BOX
SICU Consultation Note  =====================================================  HPI: 63y male PMH HTN, COPD, aflutter s/p ablation (2015) on eliquis, Prostate Ca (diag ~2013) s/p radiation seed implants, no chemo w/ ?mets to spine and pelvis, who presented to [facility] for elective epigastric hernia repair following an admission for SBO (managed conservatively).     Post operatively patient was transferred to the floor and was hemodynamically stable. On POD1 patient was noted to be hypotensive, then became tachycardic and hypoxic on the floor. Labs were significant for hypoxemia and an elevated lactate to 5.3. Patient was given 2u LR bolus and SICU was consulted. He subsequently received a third liter of LR with improvement of his blood pressure. A nasogastric tube was placed for increased distension, pain and nausea.     Surgery Information:  Epigastric hernia repair with bilateral component separation     HISTORY  63y Male  HPI: 63y male PMH HTN, COPD, aflutter s/p ablation (2015) on eliquis, Prostate Ca (diag ~2013) s/p radiation seed implants, no chemo w/ ?mets to spine and pelvis, who presented to [facility] for elective epigastric hernia repair following an admission for SBO (managed conservatively).     Allergies: azithromycin (Vomiting; Diarrhea)  shellfish (Unknown)    PAST MEDICAL & SURGICAL HISTORY:  History of anxiety  Atrial fibrillation and flutter  Ventral hernia  Inguinal hernia  Prostate Cancer: with seeds implant    Diverticulitis of Colon: with Hx of colostomy bag  Emphysema  HTN (Hypertension)  History of electrophysiologic study: 3/18 s/p ablation [facility]  History of ventral hernia repair  S/P Kamini's procedure: with reversal  History of Hernia Surgery: BL inguinal  History of Cholecystectomy  History of Appendectomy    FAMILY HISTORY:  Family history of hypertension  Family history of heart attack      SOCIAL HISTORY:  Former smoker, 30 pack year history, quit in 2009. No chewing tobacco use. Denies EtOH.    ADVANCE DIRECTIVES: Full Code      REVIEW OF SYSTEMS:    General: +dizzy, +light headed   Skin/Breast: Non-Contributory  Ophthalmologic: Non-Contributory  ENMT: Non-Contributory  Respiratory and Thorax: Non-Contributory  Cardiovascular: Non-Contributory  Gastrointestinal: +abdominal pain, +nausea, +belching   Genitourinary: Non-Contributory  Musculoskeletal: Non-Contributory  Neurological: Non-Contributory  Psychiatric: Non-Contributory  Hematology/Lymphatics: Non-Contributory  Endocrine: Non-Contributory  Allergic/Immunologic: Non-Contributory    HOME MEDICATIONS:  Diltiazem, Eliquis, Tamsulosin, Acetaminophen, Metoprolol     CURRENT MEDICATIONS:   --------------------------------------------------------------------------------------  Neurologic Medications  HYDROmorphone PCA (1 mG/mL) 30 milliLiter(s) PCA Continuous PCA Continuous  HYDROmorphone PCA (1 mG/mL) Rescue Clinician Bolus 0.5 milliGRAM(s) IV Push every 15 minutes PRN for Pain Scale GREATER THAN 6  ondansetron Injectable 4 milliGRAM(s) IV Push every 6 hours PRN Nausea    Respiratory Medications    Cardiovascular Medications    Gastrointestinal Medications  lactated ringers. 1000 milliLiter(s) IV Continuous <Continuous>    Genitourinary Medications    Hematologic/Oncologic Medications  heparin  Injectable 5000 Unit(s) SubCutaneous every 8 hours    Antimicrobial/Immunologic Medications    Endocrine/Metabolic Medications    Topical/Other Medications  naloxone Injectable 0.1 milliGRAM(s) IV Push every 3 minutes PRN For ANY of the following changes in patient status:  A. RR LESS THAN 10 breaths per minute, B. Oxygen saturation LESS THAN 90%, C. Sedation score of 6    --------------------------------------------------------------------------------------  VITAL SIGNS, INS/OUTS (last 24 hours):  --------------------------------------------------------------------------------------  ICU Vital Signs Last 24 Hrs  T(C): 37.2 (01 Jun 2018 12:00), Max: 37.2 (01 Jun 2018 12:00)  T(F): 98.9 (01 Jun 2018 12:00), Max: 98.9 (01 Jun 2018 12:00)  HR: 109 (01 Jun 2018 13:21) (55 - 122)  BP: 121/81 (01 Jun 2018 13:00) (80/50 - 155/87)  BP(mean): 89 (01 Jun 2018 13:00) (67 - 95)  ABP: --  ABP(mean): --  RR: 27 (01 Jun 2018 13:21) (10 - 36)  SpO2: 95% (01 Jun 2018 13:21) (90% - 100%)  --------------------------------------------------------------------------------------    EXAM  NEUROLOGY  Exam: Normal, NAD, alert, oriented x 3, lethargic     HEENT  Exam: Normocephalic, atraumatic.  EOMI    RESPIRATORY  Exam: Lungs clear to auscultation, mildly tachypneic      CARDIOVASCULAR  Exam: S1, S2.  Tachycardic rate, regular rhythm     GI/NUTRITION  Exam: Abdomen softly distended, appropriately tender   Wound: Open to air, incision C/D/I   Current Diet:  NPO / NGT placed with bilious output     VASCULAR  Exam: Extremities warm, pink, well-perfused.     MUSCULOSKELETAL  Exam: All extremities moving spontaneously without limitations.     SKIN:  Exam: Good skin turgor, no skin breakdown.     METABOLIC/FLUIDS/ELECTROLYTES  lactated ringers. 1000 milliLiter(s) IV Continuous <Continuous>    HEMATOLOGIC  [x] DVT Prophylaxis: heparin  Injectable 5000 Unit(s) SubCutaneous every 8 hours    Transfusions:	[] PRBC	[] Platelets		[] FFP	[] Cryoprecipitate    INFECTIOUS DISEASE  Antimicrobials/Immunologic Medications:    Tubes/Lines/Drains   [x] Peripheral IV  [] Central Venous Line     	[] R	[] L	[] IJ	[] Fem	[] SC	Date Placed:   [] Arterial Line		[] R	[] L	[] Fem	[] Rad	[] Ax	Date Placed:   [] PICC:         	[] Midline		[] Mediport  [x] Urinary Catheter		Date Placed:     LABS  --------------------------------------------------------------------------------------  CBC (06-01 @ 05:30)                          14.5                     14.69<H>  )--------------(  290        --    % Neuts, --    % Lymphs, ANC: --                              47.9      BMP (06-01 @ 05:30)       139     |  99      |  15    			Ca++ --      Ca 9.0          ---------------------------------( 165<H>		Mg --           3.7     |  18<L>   |  1.10  			Ph --            Cardiac Markers (06-01 @ 07:00)     Trop: 0.08 -- / CKMB: 1.94 / CK: 337    ABG (06-01 @ 06:55)     7.36 / 34<L> / 52<L> / 20<L> / -6.0 / 83.5<L>%     Lactate: 5.3<HH>       --------------------------------------------------------------------------------------    OTHER LABS    IMAGING RESULTS  CT: No pulmonary embolus.  Severe emphysema.  Right middle lobe collapse.  Diffuse sclerotic and lytic osseous metastases.  Focus of postoperative edema versus omental infarct versus fat necrosis   subjacent to the surgical site.    ASSESSMENT:  63y Male PMH HTN, COPD, aflutter s/p ablation (2015) on eliquis, Prostate Ca (diag ~2013) s/p radiation seed implants, no chemo w/ ?mets to spine and pelvis, who presented to [facility] for elective epigastric hernia repair following an admission for SBO. SICU consulted for postoperative hypotension, tachycardia and hypoxia    PLAN:    Neurologic:   - Pain control PRN  - Will consider PCA, splinting secondary to pain likely a contributing factor   Respiratory:   - Repeat ABG  - BiPAP PRN if hypoxia not improving with oxygen supplementation and pain control  - CTAngio negative for PE, +RML collapse  Cardiovascular:   - Sinus rhythm, has history of afib  - Eliquis held 2/2 procedure  Gastrointestinal/Nutrition:   - NPO  - NGT inserted secondary to increased distension and persistent nausea  - Follow up GI function   Renal/Genitourinary:   - Monitor intake & output closely   - Replete electrolytes PRN  - LR @ 125/hr   Hematologic:   - H/H stable  - VTE ppx SQH  Infectious Disease:  - No active issues   Tubes/Lines/Drains: PIV, Barber   Endocrine:   - No active issues   Disposition: SICU care     --------------------------------------------------------------------------------------    Critical Care Diagnoses: Hypoxia, Respiratory Distress, Post Operative Ileus

## 2018-06-02 DIAGNOSIS — I10 ESSENTIAL (PRIMARY) HYPERTENSION: ICD-10-CM

## 2018-06-02 LAB
ALBUMIN SERPL ELPH-MCNC: 2.9 G/DL — LOW (ref 3.3–5)
ALP SERPL-CCNC: 270 U/L — HIGH (ref 40–120)
ALT FLD-CCNC: 39 U/L — SIGNIFICANT CHANGE UP (ref 4–41)
APTT BLD: 89.5 SEC — HIGH (ref 27.5–37.4)
AST SERPL-CCNC: 76 U/L — HIGH (ref 4–40)
BASE EXCESS BLDA CALC-SCNC: 2 MMOL/L — SIGNIFICANT CHANGE UP
BASOPHILS # BLD AUTO: 0.06 K/UL — SIGNIFICANT CHANGE UP (ref 0–0.2)
BASOPHILS NFR BLD AUTO: 0.4 % — SIGNIFICANT CHANGE UP (ref 0–2)
BILIRUB SERPL-MCNC: 0.5 MG/DL — SIGNIFICANT CHANGE UP (ref 0.2–1.2)
BUN SERPL-MCNC: 16 MG/DL — SIGNIFICANT CHANGE UP (ref 7–23)
CALCIUM SERPL-MCNC: 8.8 MG/DL — SIGNIFICANT CHANGE UP (ref 8.4–10.5)
CHLORIDE BLDA-SCNC: 108 MMOL/L — SIGNIFICANT CHANGE UP (ref 96–108)
CHLORIDE SERPL-SCNC: 101 MMOL/L — SIGNIFICANT CHANGE UP (ref 98–107)
CK MB BLD-MCNC: 39.21 NG/ML — HIGH (ref 1–6.6)
CK MB BLD-MCNC: 42.08 NG/ML — HIGH (ref 1–6.6)
CK SERPL-CCNC: 1008 U/L — HIGH (ref 30–200)
CK SERPL-CCNC: 966 U/L — HIGH (ref 30–200)
CO2 SERPL-SCNC: 24 MMOL/L — SIGNIFICANT CHANGE UP (ref 22–31)
CREAT SERPL-MCNC: 1.02 MG/DL — SIGNIFICANT CHANGE UP (ref 0.5–1.3)
EOSINOPHIL # BLD AUTO: 0.11 K/UL — SIGNIFICANT CHANGE UP (ref 0–0.5)
EOSINOPHIL NFR BLD AUTO: 0.8 % — SIGNIFICANT CHANGE UP (ref 0–6)
GLUCOSE BLDA-MCNC: 107 MG/DL — HIGH (ref 70–99)
GLUCOSE SERPL-MCNC: 98 MG/DL — SIGNIFICANT CHANGE UP (ref 70–99)
HCO3 BLDA-SCNC: 26 MMOL/L — SIGNIFICANT CHANGE UP (ref 22–26)
HCT VFR BLD CALC: 38.1 % — LOW (ref 39–50)
HCT VFR BLDA CALC: 45.8 % — SIGNIFICANT CHANGE UP (ref 39–51)
HGB BLD-MCNC: 12.9 G/DL — LOW (ref 13–17)
HGB BLDA-MCNC: 14.9 G/DL — SIGNIFICANT CHANGE UP (ref 13–17)
IMM GRANULOCYTES # BLD AUTO: 0.08 # — SIGNIFICANT CHANGE UP
IMM GRANULOCYTES NFR BLD AUTO: 0.6 % — SIGNIFICANT CHANGE UP (ref 0–1.5)
LACTATE BLDA-SCNC: 1.2 MMOL/L — SIGNIFICANT CHANGE UP (ref 0.5–2)
LYMPHOCYTES # BLD AUTO: 1.67 K/UL — SIGNIFICANT CHANGE UP (ref 1–3.3)
LYMPHOCYTES # BLD AUTO: 11.8 % — LOW (ref 13–44)
MAGNESIUM SERPL-MCNC: 1.7 MG/DL — SIGNIFICANT CHANGE UP (ref 1.6–2.6)
MCHC RBC-ENTMCNC: 29.7 PG — SIGNIFICANT CHANGE UP (ref 27–34)
MCHC RBC-ENTMCNC: 33.9 % — SIGNIFICANT CHANGE UP (ref 32–36)
MCV RBC AUTO: 87.6 FL — SIGNIFICANT CHANGE UP (ref 80–100)
MONOCYTES # BLD AUTO: 1.67 K/UL — HIGH (ref 0–0.9)
MONOCYTES NFR BLD AUTO: 11.8 % — SIGNIFICANT CHANGE UP (ref 2–14)
NEUTROPHILS # BLD AUTO: 10.58 K/UL — HIGH (ref 1.8–7.4)
NEUTROPHILS NFR BLD AUTO: 74.6 % — SIGNIFICANT CHANGE UP (ref 43–77)
NRBC # FLD: 0 — SIGNIFICANT CHANGE UP
PCO2 BLDA: 40 MMHG — SIGNIFICANT CHANGE UP (ref 35–48)
PH BLDA: 7.43 PH — SIGNIFICANT CHANGE UP (ref 7.35–7.45)
PHOSPHATE SERPL-MCNC: 3.2 MG/DL — SIGNIFICANT CHANGE UP (ref 2.5–4.5)
PLATELET # BLD AUTO: 194 K/UL — SIGNIFICANT CHANGE UP (ref 150–400)
PMV BLD: 11.2 FL — SIGNIFICANT CHANGE UP (ref 7–13)
PO2 BLDA: 66 MMHG — LOW (ref 83–108)
POTASSIUM BLDA-SCNC: 3.9 MMOL/L — SIGNIFICANT CHANGE UP (ref 3.4–4.5)
POTASSIUM SERPL-MCNC: 4.3 MMOL/L — SIGNIFICANT CHANGE UP (ref 3.5–5.3)
POTASSIUM SERPL-SCNC: 4.3 MMOL/L — SIGNIFICANT CHANGE UP (ref 3.5–5.3)
PROT SERPL-MCNC: 6.3 G/DL — SIGNIFICANT CHANGE UP (ref 6–8.3)
RBC # BLD: 4.35 M/UL — SIGNIFICANT CHANGE UP (ref 4.2–5.8)
RBC # FLD: 14.7 % — HIGH (ref 10.3–14.5)
SAO2 % BLDA: 92 % — LOW (ref 95–99)
SODIUM BLDA-SCNC: 134 MMOL/L — LOW (ref 136–146)
SODIUM SERPL-SCNC: 138 MMOL/L — SIGNIFICANT CHANGE UP (ref 135–145)
TROPONIN T SERPL-MCNC: 1.22 NG/ML — HIGH (ref 0–0.06)
TROPONIN T SERPL-MCNC: 1.35 NG/ML — HIGH (ref 0–0.06)
WBC # BLD: 14.17 K/UL — HIGH (ref 3.8–10.5)
WBC # FLD AUTO: 14.17 K/UL — HIGH (ref 3.8–10.5)

## 2018-06-02 PROCEDURE — 93306 TTE W/DOPPLER COMPLETE: CPT | Mod: 26

## 2018-06-02 PROCEDURE — 93010 ELECTROCARDIOGRAM REPORT: CPT

## 2018-06-02 PROCEDURE — 99233 SBSQ HOSP IP/OBS HIGH 50: CPT

## 2018-06-02 PROCEDURE — 71045 X-RAY EXAM CHEST 1 VIEW: CPT | Mod: 26

## 2018-06-02 RX ORDER — ALBUTEROL 90 UG/1
2 AEROSOL, METERED ORAL EVERY 6 HOURS
Qty: 0 | Refills: 0 | Status: DISCONTINUED | OUTPATIENT
Start: 2018-06-02 | End: 2018-06-06

## 2018-06-02 RX ORDER — ENOXAPARIN SODIUM 100 MG/ML
40 INJECTION SUBCUTANEOUS DAILY
Qty: 0 | Refills: 0 | Status: DISCONTINUED | OUTPATIENT
Start: 2018-06-02 | End: 2018-06-02

## 2018-06-02 RX ORDER — SODIUM CHLORIDE 9 MG/ML
1000 INJECTION, SOLUTION INTRAVENOUS
Qty: 0 | Refills: 0 | Status: DISCONTINUED | OUTPATIENT
Start: 2018-06-02 | End: 2018-06-02

## 2018-06-02 RX ORDER — BUDESONIDE AND FORMOTEROL FUMARATE DIHYDRATE 160; 4.5 UG/1; UG/1
2 AEROSOL RESPIRATORY (INHALATION)
Qty: 0 | Refills: 0 | Status: DISCONTINUED | OUTPATIENT
Start: 2018-06-02 | End: 2018-06-06

## 2018-06-02 RX ORDER — CHLORHEXIDINE GLUCONATE 213 G/1000ML
1 SOLUTION TOPICAL
Qty: 0 | Refills: 0 | Status: DISCONTINUED | OUTPATIENT
Start: 2018-06-02 | End: 2018-06-05

## 2018-06-02 RX ORDER — HEPARIN SODIUM 5000 [USP'U]/ML
900 INJECTION INTRAVENOUS; SUBCUTANEOUS
Qty: 25000 | Refills: 0 | Status: DISCONTINUED | OUTPATIENT
Start: 2018-06-02 | End: 2018-06-03

## 2018-06-02 RX ORDER — TIOTROPIUM BROMIDE 18 UG/1
1 CAPSULE ORAL; RESPIRATORY (INHALATION) AT BEDTIME
Qty: 0 | Refills: 0 | Status: DISCONTINUED | OUTPATIENT
Start: 2018-06-02 | End: 2018-06-06

## 2018-06-02 RX ORDER — ACETAMINOPHEN 500 MG
1000 TABLET ORAL ONCE
Qty: 0 | Refills: 0 | Status: COMPLETED | OUTPATIENT
Start: 2018-06-02 | End: 2018-06-02

## 2018-06-02 RX ADMIN — HYDROMORPHONE HYDROCHLORIDE 30 MILLILITER(S): 2 INJECTION INTRAMUSCULAR; INTRAVENOUS; SUBCUTANEOUS at 19:13

## 2018-06-02 RX ADMIN — Medication 400 MILLIGRAM(S): at 14:00

## 2018-06-02 RX ADMIN — Medication 81 MILLIGRAM(S): at 14:16

## 2018-06-02 RX ADMIN — CHLORHEXIDINE GLUCONATE 1 APPLICATION(S): 213 SOLUTION TOPICAL at 09:40

## 2018-06-02 RX ADMIN — SIMVASTATIN 80 MILLIGRAM(S): 20 TABLET, FILM COATED ORAL at 14:16

## 2018-06-02 RX ADMIN — Medication 1000 MILLIGRAM(S): at 14:30

## 2018-06-02 RX ADMIN — ENOXAPARIN SODIUM 40 MILLIGRAM(S): 100 INJECTION SUBCUTANEOUS at 14:16

## 2018-06-02 RX ADMIN — HEPARIN SODIUM 900 UNIT(S)/HR: 5000 INJECTION INTRAVENOUS; SUBCUTANEOUS at 16:36

## 2018-06-02 RX ADMIN — HYDROMORPHONE HYDROCHLORIDE 30 MILLILITER(S): 2 INJECTION INTRAMUSCULAR; INTRAVENOUS; SUBCUTANEOUS at 07:09

## 2018-06-02 RX ADMIN — HEPARIN SODIUM 5000 UNIT(S): 5000 INJECTION INTRAVENOUS; SUBCUTANEOUS at 06:07

## 2018-06-02 RX ADMIN — BUDESONIDE AND FORMOTEROL FUMARATE DIHYDRATE 2 PUFF(S): 160; 4.5 AEROSOL RESPIRATORY (INHALATION) at 23:12

## 2018-06-02 RX ADMIN — TIOTROPIUM BROMIDE 1 CAPSULE(S): 18 CAPSULE ORAL; RESPIRATORY (INHALATION) at 23:11

## 2018-06-02 NOTE — PROGRESS NOTE ADULT - SUBJECTIVE AND OBJECTIVE BOX
S: Patient had enzyme leak yesterday consistent with NSTEMI, accompanied by hypotension, transferred to SICU.    O:  T(C): 37.2 (06-02-18 @ 04:00), Max: 37.2 (06-01-18 @ 12:00)  HR: 111 (06-02-18 @ 08:00) (90 - 122)  BP: 139/90 (06-02-18 @ 08:00) (97/76 - 146/90)  RR: 25 (06-02-18 @ 08:00) (16 - 30)  SpO2: 95% (06-02-18 @ 08:00) (92% - 100%)  Wt(kg): --  06-01 @ 07:01  -  06-02 @ 07:00  --------------------------------------------------------  IN:    heparin  Infusion.: 8 mL    IV PiggyBack: 200 mL    lactated ringers.: 2750 mL  Total IN: 2958 mL    OUT:    Voided: 1430 mL  Total OUT: 1430 mL    Total NET: 1528 mL      06-02 @ 07:01  -  06-02 @ 08:19  --------------------------------------------------------  IN:    lactated ringers.: 250 mL  Total IN: 250 mL    OUT:    Voided: 200 mL  Total OUT: 200 mL    Total NET: 50 mL        Gen: NAD  Abdomen: incision c/d/i, soft approp ttp

## 2018-06-02 NOTE — PROGRESS NOTE ADULT - SUBJECTIVE AND OBJECTIVE BOX
POD#2  Subjective: I feel well    Objective:   Vital Signs Last 24 Hrs  T(C): 37.2 (2018 08:00), Max: 37.2 (2018 12:00)  T(F): 99 (2018 08:00), Max: 99 (2018 08:00)  HR: 106 (2018 09:00) (90 - 114)  BP: 158/100 (2018 09:00) (97/76 - 158/100)  BP(mean): 115 (2018 09:00) (79 - 115)  RR: 25 (2018 09:00) (16 - 30)  SpO2: 96% (2018 09:00) (92% - 100%)      Daily Weight in k.7 (2018 04:00)    Heent: N/C, AT PER no scleral icterus,   Neck: No JVD  Pul: clear  Cor: RRR  Abdomen: soft, non tender, normal BS. Wound - clean, No hematoma  Extremities: without edema, motor/sensory intact    I&O's Detail    2018 07:01  -  2018 07:00  --------------------------------------------------------  IN:    heparin  Infusion.: 8 mL    IV PiggyBack: 200 mL    lactated ringers.: 2750 mL  Total IN: 2958 mL    OUT:    Voided: 1430 mL  Total OUT: 1430 mL    Total NET: 1528 mL      2018 07:01  -  2018 11:09  --------------------------------------------------------  IN:    lactated ringers.: 375 mL  Total IN: 375 mL    OUT:    Voided: 200 mL  Total OUT: 200 mL    Total NET: 175 mL          MEDICATIONS  (STANDING):  acetaminophen  IVPB. 1000 milliGRAM(s) IV Intermittent once  aspirin  chewable 81 milliGRAM(s) Oral daily  buDESOnide 160 MICROgram(s)/formoterol 4.5 MICROgram(s) Inhaler 2 Puff(s) Inhalation two times a day  chlorhexidine 4% Liquid 1 Application(s) Topical <User Schedule>  enoxaparin Injectable 40 milliGRAM(s) SubCutaneous daily  HYDROmorphone PCA (1 mG/mL) 30 milliLiter(s) PCA Continuous PCA Continuous  lactated ringers. 1000 milliLiter(s) (125 mL/Hr) IV Continuous <Continuous>  simvastatin 80 milliGRAM(s) Oral daily  tiotropium 18 MICROgram(s) Capsule 1 Capsule(s) Inhalation at bedtime    MEDICATIONS  (PRN):  ALBUTerol    90 MICROgram(s) HFA Inhaler 2 Puff(s) Inhalation every 6 hours PRN Shortness of Breath and/or Wheezing  HYDROmorphone PCA (1 mG/mL) Rescue Clinician Bolus 0.5 milliGRAM(s) IV Push every 15 minutes PRN for Pain Scale GREATER THAN 6  naloxone Injectable 0.1 milliGRAM(s) IV Push every 3 minutes PRN For ANY of the following changes in patient status:  A. RR LESS THAN 10 breaths per minute, B. Oxygen saturation LESS THAN 90%, C. Sedation score of 6  ondansetron Injectable 4 milliGRAM(s) IV Push every 6 hours PRN Nausea                            12.9   14.17 )-----------( 194      ( 2018 03:05 )             38.1     06-02    138  |  101  |  16  ----------------------------<  98  4.3   |  24  |  1.02    Ca    8.8      2018 03:05  Phos  3.2     06-02  Mg     1.7     06-02    TPro  6.3  /  Alb  2.9<L>  /  TBili  0.5  /  DBili  x   /  AST  76<H>  /  ALT  39  /  AlkPhos  270<H>  06-02    PT/INR - ( 2018 21:46 )   PT: 13.6 SEC;   INR: 1.22          PTT - ( 2018 21:46 )  PTT:43.4 SEC    Radiologic Studies:

## 2018-06-02 NOTE — PROGRESS NOTE ADULT - ATTENDING COMMENTS
I have personally interviewed and examined this patient, reviewed pertinent labs and imaging, and discussed the case with colleagues, residents, physician assistants, and nurses on SICU rounds.     35   minutes in total were spent in providing direct critical care for the diagnoses, assessment and plan outlined below.  These diagnoses are unrelated to the surgical procedure.  Additionally, time spent in the performance of separately billable procedures was not counted toward the critical care time.  There is no overlap.    The active critical care issues are:  1.    Plan  neuro: pain control with pca  cards: NSTEMI, troponins downtrending, on asa, statin will dw his primary cardiologist, echo today  pulm: on hi flow, will wean as tolerated, restart home COPD inhalers  GI: currently NPO, will dc ngt and start clears per primary team  : monitor urine output, trend cr, dc ivf if tolerating clears  heme/ID: leukocytosis improving, no abx  endo: monitor blood glucose,  will make sure has onc fu for prostate ca  proph: prophylactic lovenox - will d/w primary team when to restart therapeutic anticoagulation

## 2018-06-02 NOTE — PROGRESS NOTE ADULT - SUBJECTIVE AND OBJECTIVE BOX
SICU Daily Progress Note  =====================================================  Interval/Overnight Events:    Pt with uptrending cardiac enzymes overnight with inverted Twaves in lateral leads. No preserved chest pain. Cardiology consulted. Received rectal aspirin. Considered hep drip and IV antiplatelet, but opted not to given no chest pain, no hypotension, and concern for post op bleeding. ALso started on high dose statin.    POD #   1       	SICU Day #   1    HPI:   63y male PMH HTN, COPD, aflutter s/p ablation (2015) on eliquis, Prostate Ca (diag ~2013) s/p radiation seed implants, no chemo w/ ?mets to spine and pelvis, who presented to [facility] for elective epigastric hernia repair following an admission for SBO (managed conservatively).     Post operatively patient was transferred to the floor and was hemodynamically stable. On POD1 patient was noted to be hypotensive, then became tachycardic and hypoxic on the floor. Labs were significant for hypoxemia and an elevated lactate to 5.3. Patient was given 2u LR bolus and SICU was consulted. He subsequently received a third liter of LR with improvement of his blood pressure. A nasogastric tube was placed for increased distension, pain and nausea.     Surgery Information:  Epigastric hernia repair with bilateral component separation       PAST MEDICAL & SURGICAL HISTORY:  History of anxiety  Atrial fibrillation and flutter  Ventral hernia  Inguinal hernia  Prostate Cancer: with seeds implant    Diverticulitis of Colon: with Hx of colostomy bag  Emphysema  HTN (Hypertension)  History of electrophysiologic study: 3/18 s/p ablation [facility]  History of ventral hernia repair  S/P Kamini's procedure: with reversal  History of Hernia Surgery: BL inguinal  History of Cholecystectomy  History of Appendectomy      Allergies: azithromycin (Vomiting; Diarrhea)  No Known Drug Allergies  shellfish (Unknown)      MEDICATIONS:   --------------------------------------------------------------------------------------  Neurologic Medications  HYDROmorphone PCA (1 mG/mL) 30 milliLiter(s) PCA Continuous PCA Continuous  HYDROmorphone PCA (1 mG/mL) Rescue Clinician Bolus 0.5 milliGRAM(s) IV Push every 15 minutes PRN for Pain Scale GREATER THAN 6  ondansetron Injectable 4 milliGRAM(s) IV Push every 6 hours PRN Nausea    Respiratory Medications    Cardiovascular Medications    Gastrointestinal Medications  lactated ringers. 1000 milliLiter(s) IV Continuous <Continuous>    Genitourinary Medications    Hematologic/Oncologic Medications  aspirin  chewable 81 milliGRAM(s) Oral daily  heparin  Injectable 5000 Unit(s) SubCutaneous every 8 hours    Antimicrobial/Immunologic Medications    Endocrine/Metabolic Medications  simvastatin 80 milliGRAM(s) Oral daily    Topical/Other Medications  chlorhexidine 4% Liquid 1 Application(s) Topical <User Schedule>  naloxone Injectable 0.1 milliGRAM(s) IV Push every 3 minutes PRN For ANY of the following changes in patient status:  A. RR LESS THAN 10 breaths per minute, B. Oxygen saturation LESS THAN 90%, C. Sedation score of 6    --------------------------------------------------------------------------------------    VITAL SIGNS, INS/OUTS (last 24 hours):  --------------------------------------------------------------------------------------  T(C): 37.2 (06-02-18 @ 04:00), Max: 37.2 (06-01-18 @ 12:00)  HR: 105 (06-02-18 @ 04:00) (90 - 122)  BP: 132/88 (06-02-18 @ 04:00) (80/50 - 146/90)  BP(mean): 98 (06-02-18 @ 04:00) (67 - 104)  ABP: --  ABP(mean): --  RR: 24 (06-02-18 @ 04:00) (16 - 36)  SpO2: 94% (06-02-18 @ 04:00) (92% - 100%)  Wt(kg): --  CVP(mm Hg): --  CI: --  CAPILLARY BLOOD GLUCOSE       N/A      05-31 @ 07:01  -  06-01 @ 07:00  --------------------------------------------------------  IN:    lactated ringers.: 1000 mL  Total IN: 1000 mL    OUT:    Voided: 650 mL  Total OUT: 650 mL    Total NET: 350 mL      06-01 @ 07:01  -  06-02 @ 05:29  --------------------------------------------------------  IN:    heparin  Infusion.: 8 mL    IV PiggyBack: 200 mL    lactated ringers.: 2500 mL  Total IN: 2708 mL    OUT:    Voided: 1230 mL  Total OUT: 1230 mL    Total NET: 1478 mL        --------------------------------------------------------------------------------------    EXAM  NEUROLOGY  RASS: 0  	GCS:  15  Exam: Normal, NAD, alert, oriented x3, no focal deficits.     HEENT  Exam: Normocephalic, atraumatic, EOMI.     RESPIRATORY  Exam: Lungs clear to auscultation, Normal expansion/effort.   Mechanical Ventilation: Venti mask    CARDIOVASCULAR  Exam: S1, S2.  Regular rate and rhythm.  Peripheral edema absent    GI/NUTRITION  Exam: Abdomen soft, Non-tender, Non-distended.    Wound:  midline abdominal wound dressing c/d/i  Current Diet:  NPO except meds    VASCULAR  Exam: Extremities warm, pink, well-perfused.    MUSCULOSKELETAL  Exam: All extremities moving spontaneously without limitations.     SKIN  Exam: Good skin turgor, no skin breakdown.     METABOLIC/FLUIDS/ELECTROLYTES  lactated ringers. 1000 milliLiter(s) IV Continuous <Continuous>      HEMATOLOGIC  [x] VTE Prophylaxis: aspirin  chewable 81 milliGRAM(s) Oral daily  heparin  Injectable 5000 Unit(s) SubCutaneous every 8 hours    Transfusions:	[] PRBC	[] Platelets		[] FFP	[] Cryoprecipitate    INFECTIOUS DISEASE  Antimicrobials/Immunologic Medications:      Tubes/Lines/Drains  ***  [x] Peripheral IV  [] Central Venous Line     	[] R	[] L	[] IJ	[] Fem	[] SC	Date Placed:   [] Arterial Line		[] R	[] L	[] Fem	[] Rad	[] Ax	Date Placed:   [] PICC		[] Midline		[] Mediport  [x] Urinary Catheter		Date Placed:   [x] Necessity of urinary, arterial, and venous catheters discussed    LABS  --------------------------------------------------------------------------------------  ((Insert SICU Labs here))***  --------------------------------------------------------------------------------------    OTHER LABORATORY:     IMAGING STUDIES:   CXR:     ASSESSMENT:  63y Male PMH HTN, COPD, aflutter s/p ablation (2015) on eliquis, Prostate Ca (diag ~2013) s/p radiation seed implants, no chemo w/ ?mets to spine and pelvis, who presented to [facility] for elective epigastric hernia repair following an admission for SBO. SICU consulted for postoperative hypotension, tachycardia and hypoxia    PLAN:    Neurologic:   - Pain control PRN  - Currently on PCA, splinting secondary to pain likely a contributing factor   Respiratory:   - Repeat ABG  - BiPAP PRN if hypoxia not improving with oxygen supplementation and pain control  - CTAngio negative for PE, +RML collapse  Cardiovascular:   - Sinus rhythm, has history of afib  - Eliquis held 2/2 procedure  - NSTEMI overnight; ASA, no hep or antiplatelet gtt; CE now downtrending; echo this AM; f/u cards recs  Gastrointestinal/Nutrition:   - NPO  - NGT inserted secondary to increased distension and persistent nausea  - Follow up GI function   Renal/Genitourinary:   - Monitor intake & output closely   - Replete electrolytes PRN  - LR @ 125/hr   Hematologic:   - H/H stable  - VTE ppx SQH  Infectious Disease:  - No active issues   Tubes/Lines/Drains: Sunil MALLOY   Endocrine:   - No active issues   Disposition: SICU care     --------------------------------------------------------------------------------------    Critical Care Diagnoses: Hypoxia, Respiratory Distress, Post Operative Ileus

## 2018-06-02 NOTE — PROGRESS NOTE ADULT - SUBJECTIVE AND OBJECTIVE BOX
Surgery Progress Note    S:   Uptrending cardiac enzymes overnight with Inverted Twaves in lateral leads  Reports no chest pain  Received rectal aspirin, and high dose statin  Hep gtt not started given no chest pain, hypotension, and concern for post op bleeding    T(C): 37.2 (06-02-18 @ 04:00), Max: 37.2 (06-01-18 @ 12:00)  HR: 111 (06-02-18 @ 08:00) (90 - 122)  BP: 139/90 (06-02-18 @ 08:00) (96/64 - 146/90)  RR: 25 (06-02-18 @ 08:00) (16 - 30)  SpO2: 95% (06-02-18 @ 08:00) (92% - 100%)  Wt(kg): --    Physical Exam  General: awake, alert  HEENT NGT in place  Pulm: respirations unlabored, no increased WOB, on NC  Abdomen: soft, mildly distended, mild justyna-incisional tenderness, no guarding, incision c/d/i w/o drainage  Extremities: Grossly symmetric    06-01 @ 07:01  -  06-02 @ 07:00  --------------------------------------------------------  IN:    heparin  Infusion.: 8 mL    IV PiggyBack: 200 mL    lactated ringers.: 2750 mL  Total IN: 2958 mL    OUT:    Voided: 1430 mL  Total OUT: 1430 mL    Total NET: 1528 mL      06-02 @ 07:01  -  06-02 @ 08:15  --------------------------------------------------------  IN:    lactated ringers.: 250 mL  Total IN: 250 mL    OUT:    Voided: 200 mL  Total OUT: 200 mL    Total NET: 50 mL                        12.9   14.17 )-----------( 194      ( 02 Jun 2018 03:05 )             38.1     CAPILLARY BLOOD GLUCOSE

## 2018-06-02 NOTE — PROGRESS NOTE ADULT - ASSESSMENT
63M s/p extraperitoneal hernia repair and components separation, now POD 2.  POD 1 found to be Hypotensive, tachycardic, w/ H/H stable on the floor.  CT scan showed no PE.  Troponins peaked at 1.55, trending downwards.  In SICU for resuscitation    Plan:  - Remove NGT  - f/u cardiology  - Reach out to Dr. Grace regarding records  - ASA / SQH / Statin.  Holding hep gtt currently  - NPO / LR @ 125  - Echo today  - May advance diet  - Pain control with PCA    Surgery A  [id number]

## 2018-06-02 NOTE — PROGRESS NOTE ADULT - SUBJECTIVE AND OBJECTIVE BOX
Anesthesia Pain Management Service    SUBJECTIVE: Patient is doing well with IV PCA and no significant problems reported.    Pain Scale Score	At rest: ___ 	With Activity: ___ 	[X ] Refer to charted pain scores    THERAPY:    [ ] IV PCA Morphine		[ ] 5 mg/mL	[ ] 1 mg/mL  [X ] IV PCA Hydromorphone	[ ] 5 mg/mL	[X ] 1 mg/mL  [ ] IV PCA Fentanyl		[ ] 50 micrograms/mL    Demand dose __0.2_ lockout __6_ (minutes) Continuous Rate _0__ Total: ___1.6   mg used (in past 24 hours)      MEDICATIONS  (STANDING):  acetaminophen  IVPB. 1000 milliGRAM(s) IV Intermittent once  aspirin  chewable 81 milliGRAM(s) Oral daily  buDESOnide 160 MICROgram(s)/formoterol 4.5 MICROgram(s) Inhaler 2 Puff(s) Inhalation two times a day  chlorhexidine 4% Liquid 1 Application(s) Topical <User Schedule>  enoxaparin Injectable 40 milliGRAM(s) SubCutaneous daily  HYDROmorphone PCA (1 mG/mL) 30 milliLiter(s) PCA Continuous PCA Continuous  lactated ringers. 1000 milliLiter(s) (125 mL/Hr) IV Continuous <Continuous>  simvastatin 80 milliGRAM(s) Oral daily  tiotropium 18 MICROgram(s) Capsule 1 Capsule(s) Inhalation at bedtime    MEDICATIONS  (PRN):  ALBUTerol    90 MICROgram(s) HFA Inhaler 2 Puff(s) Inhalation every 6 hours PRN Shortness of Breath and/or Wheezing  HYDROmorphone PCA (1 mG/mL) Rescue Clinician Bolus 0.5 milliGRAM(s) IV Push every 15 minutes PRN for Pain Scale GREATER THAN 6  naloxone Injectable 0.1 milliGRAM(s) IV Push every 3 minutes PRN For ANY of the following changes in patient status:  A. RR LESS THAN 10 breaths per minute, B. Oxygen saturation LESS THAN 90%, C. Sedation score of 6  ondansetron Injectable 4 milliGRAM(s) IV Push every 6 hours PRN Nausea      OBJECTIVE:    Sedation Score:	[ X] Alert	[ ] Drowsy 	[ ] Arousable	[ ] Asleep	[ ] Unresponsive    Side Effects:	[X ] None	[ ] Nausea	[ ] Vomiting	[ ] Pruritus  		[ ] Other:    Vital Signs Last 24 Hrs  T(C): 37.2 (02 Jun 2018 08:00), Max: 37.2 (01 Jun 2018 12:00)  T(F): 99 (02 Jun 2018 08:00), Max: 99 (02 Jun 2018 08:00)  HR: 107 (02 Jun 2018 11:00) (90 - 114)  BP: 136/92 (02 Jun 2018 11:00) (97/76 - 158/100)  BP(mean): 103 (02 Jun 2018 11:00) (79 - 115)  RR: 28 (02 Jun 2018 11:00) (16 - 30)  SpO2: 95% (02 Jun 2018 11:00) (92% - 100%)    ASSESSMENT/ PLAN    Therapy to  be:	[ X] Continue   [ ] Discontinued   [ ] Change to prn Analgesics    Documentation and Verification of current medications:   [X] Done	[ ] Not done, not elligible    Comments: patient counseled on frequency of use of PCA, continue NPO and PCA    Progress Note written now but Patient was seen earlier.

## 2018-06-02 NOTE — PROGRESS NOTE ADULT - ATTENDING COMMENTS
I will be away from 6/3/18 to 6/10/18. In my absence, Yue Chester and Vlad will follow this patient for me.

## 2018-06-02 NOTE — PROGRESS NOTE ADULT - PROBLEM SELECTOR PLAN 2
Passing flatus and no nausea or vomiting  No evidence of hematoma  Should be able to start oral diet

## 2018-06-02 NOTE — PROGRESS NOTE ADULT - ASSESSMENT
A/P: 63M s/p hernia repair with component separation reconstruction POD2, recovery complicated by NSTEMI requiring transfer to SICU.    -f/u cards  -f/u ECHO  -continue to monitor incision  -care per SICU, general surgery

## 2018-06-03 LAB
ALBUMIN SERPL ELPH-MCNC: 2.7 G/DL — LOW (ref 3.3–5)
ALP SERPL-CCNC: 240 U/L — HIGH (ref 40–120)
ALT FLD-CCNC: 29 U/L — SIGNIFICANT CHANGE UP (ref 4–41)
APTT BLD: 48.7 SEC — HIGH (ref 27.5–37.4)
APTT BLD: 56.5 SEC — HIGH (ref 27.5–37.4)
APTT BLD: 57.8 SEC — HIGH (ref 27.5–37.4)
AST SERPL-CCNC: 41 U/L — HIGH (ref 4–40)
BILIRUB SERPL-MCNC: 0.7 MG/DL — SIGNIFICANT CHANGE UP (ref 0.2–1.2)
BUN SERPL-MCNC: 14 MG/DL — SIGNIFICANT CHANGE UP (ref 7–23)
CA-I BLD-SCNC: 1.04 MMOL/L — SIGNIFICANT CHANGE UP (ref 1.03–1.23)
CALCIUM SERPL-MCNC: 8.7 MG/DL — SIGNIFICANT CHANGE UP (ref 8.4–10.5)
CHLORIDE SERPL-SCNC: 99 MMOL/L — SIGNIFICANT CHANGE UP (ref 98–107)
CO2 SERPL-SCNC: 23 MMOL/L — SIGNIFICANT CHANGE UP (ref 22–31)
CREAT SERPL-MCNC: 0.89 MG/DL — SIGNIFICANT CHANGE UP (ref 0.5–1.3)
GLUCOSE SERPL-MCNC: 120 MG/DL — HIGH (ref 70–99)
HCT VFR BLD CALC: 39.4 % — SIGNIFICANT CHANGE UP (ref 39–50)
HGB BLD-MCNC: 12.7 G/DL — LOW (ref 13–17)
MAGNESIUM SERPL-MCNC: 1.9 MG/DL — SIGNIFICANT CHANGE UP (ref 1.6–2.6)
MCHC RBC-ENTMCNC: 29.5 PG — SIGNIFICANT CHANGE UP (ref 27–34)
MCHC RBC-ENTMCNC: 32.2 % — SIGNIFICANT CHANGE UP (ref 32–36)
MCV RBC AUTO: 91.4 FL — SIGNIFICANT CHANGE UP (ref 80–100)
NRBC # FLD: 0 — SIGNIFICANT CHANGE UP
PHOSPHATE SERPL-MCNC: 3 MG/DL — SIGNIFICANT CHANGE UP (ref 2.5–4.5)
PLATELET # BLD AUTO: 240 K/UL — SIGNIFICANT CHANGE UP (ref 150–400)
PMV BLD: 11.3 FL — SIGNIFICANT CHANGE UP (ref 7–13)
POTASSIUM SERPL-MCNC: 4.2 MMOL/L — SIGNIFICANT CHANGE UP (ref 3.5–5.3)
POTASSIUM SERPL-SCNC: 4.2 MMOL/L — SIGNIFICANT CHANGE UP (ref 3.5–5.3)
PROT SERPL-MCNC: 6.6 G/DL — SIGNIFICANT CHANGE UP (ref 6–8.3)
RBC # BLD: 4.31 M/UL — SIGNIFICANT CHANGE UP (ref 4.2–5.8)
RBC # FLD: 14.6 % — HIGH (ref 10.3–14.5)
SODIUM SERPL-SCNC: 135 MMOL/L — SIGNIFICANT CHANGE UP (ref 135–145)
WBC # BLD: 14.7 K/UL — HIGH (ref 3.8–10.5)
WBC # FLD AUTO: 14.7 K/UL — HIGH (ref 3.8–10.5)

## 2018-06-03 PROCEDURE — 99233 SBSQ HOSP IP/OBS HIGH 50: CPT

## 2018-06-03 PROCEDURE — 71045 X-RAY EXAM CHEST 1 VIEW: CPT | Mod: 26

## 2018-06-03 RX ORDER — OXYCODONE HYDROCHLORIDE 5 MG/1
5 TABLET ORAL EVERY 6 HOURS
Qty: 0 | Refills: 0 | Status: DISCONTINUED | OUTPATIENT
Start: 2018-06-03 | End: 2018-06-06

## 2018-06-03 RX ORDER — ACETAMINOPHEN 500 MG
1000 TABLET ORAL ONCE
Qty: 0 | Refills: 0 | Status: COMPLETED | OUTPATIENT
Start: 2018-06-03 | End: 2018-06-03

## 2018-06-03 RX ORDER — HEPARIN SODIUM 5000 [USP'U]/ML
850 INJECTION INTRAVENOUS; SUBCUTANEOUS
Qty: 25000 | Refills: 0 | Status: DISCONTINUED | OUTPATIENT
Start: 2018-06-03 | End: 2018-06-03

## 2018-06-03 RX ORDER — OXYCODONE HYDROCHLORIDE 5 MG/1
10 TABLET ORAL EVERY 6 HOURS
Qty: 0 | Refills: 0 | Status: DISCONTINUED | OUTPATIENT
Start: 2018-06-03 | End: 2018-06-06

## 2018-06-03 RX ORDER — ACETAMINOPHEN 500 MG
650 TABLET ORAL EVERY 6 HOURS
Qty: 0 | Refills: 0 | Status: DISCONTINUED | OUTPATIENT
Start: 2018-06-03 | End: 2018-06-06

## 2018-06-03 RX ORDER — HYDROMORPHONE HYDROCHLORIDE 2 MG/ML
0.5 INJECTION INTRAMUSCULAR; INTRAVENOUS; SUBCUTANEOUS
Qty: 0 | Refills: 0 | Status: DISCONTINUED | OUTPATIENT
Start: 2018-06-03 | End: 2018-06-06

## 2018-06-03 RX ORDER — HEPARIN SODIUM 5000 [USP'U]/ML
850 INJECTION INTRAVENOUS; SUBCUTANEOUS
Qty: 25000 | Refills: 0 | Status: DISCONTINUED | OUTPATIENT
Start: 2018-06-03 | End: 2018-06-04

## 2018-06-03 RX ORDER — CLOPIDOGREL BISULFATE 75 MG/1
75 TABLET, FILM COATED ORAL DAILY
Qty: 0 | Refills: 0 | Status: DISCONTINUED | OUTPATIENT
Start: 2018-06-03 | End: 2018-06-06

## 2018-06-03 RX ORDER — METOPROLOL TARTRATE 50 MG
5 TABLET ORAL ONCE
Qty: 0 | Refills: 0 | Status: COMPLETED | OUTPATIENT
Start: 2018-06-03 | End: 2018-06-03

## 2018-06-03 RX ORDER — METOPROLOL TARTRATE 50 MG
100 TABLET ORAL
Qty: 0 | Refills: 0 | Status: DISCONTINUED | OUTPATIENT
Start: 2018-06-03 | End: 2018-06-06

## 2018-06-03 RX ORDER — MAGNESIUM SULFATE 500 MG/ML
1 VIAL (ML) INJECTION ONCE
Qty: 0 | Refills: 0 | Status: COMPLETED | OUTPATIENT
Start: 2018-06-03 | End: 2018-06-03

## 2018-06-03 RX ADMIN — CHLORHEXIDINE GLUCONATE 1 APPLICATION(S): 213 SOLUTION TOPICAL at 11:58

## 2018-06-03 RX ADMIN — Medication 400 MILLIGRAM(S): at 02:53

## 2018-06-03 RX ADMIN — HEPARIN SODIUM 1000 UNIT(S)/HR: 5000 INJECTION INTRAVENOUS; SUBCUTANEOUS at 19:26

## 2018-06-03 RX ADMIN — HEPARIN SODIUM 850 UNIT(S)/HR: 5000 INJECTION INTRAVENOUS; SUBCUTANEOUS at 12:39

## 2018-06-03 RX ADMIN — TIOTROPIUM BROMIDE 1 CAPSULE(S): 18 CAPSULE ORAL; RESPIRATORY (INHALATION) at 22:48

## 2018-06-03 RX ADMIN — Medication 100 GRAM(S): at 06:46

## 2018-06-03 RX ADMIN — Medication 100 MILLIGRAM(S): at 06:46

## 2018-06-03 RX ADMIN — CLOPIDOGREL BISULFATE 75 MILLIGRAM(S): 75 TABLET, FILM COATED ORAL at 11:59

## 2018-06-03 RX ADMIN — Medication 81 MILLIGRAM(S): at 11:59

## 2018-06-03 RX ADMIN — BUDESONIDE AND FORMOTEROL FUMARATE DIHYDRATE 2 PUFF(S): 160; 4.5 AEROSOL RESPIRATORY (INHALATION) at 08:39

## 2018-06-03 RX ADMIN — BUDESONIDE AND FORMOTEROL FUMARATE DIHYDRATE 2 PUFF(S): 160; 4.5 AEROSOL RESPIRATORY (INHALATION) at 22:50

## 2018-06-03 RX ADMIN — HEPARIN SODIUM 8.5 UNIT(S)/HR: 5000 INJECTION INTRAVENOUS; SUBCUTANEOUS at 02:54

## 2018-06-03 RX ADMIN — OXYCODONE HYDROCHLORIDE 5 MILLIGRAM(S): 5 TABLET ORAL at 13:53

## 2018-06-03 RX ADMIN — HYDROMORPHONE HYDROCHLORIDE 30 MILLILITER(S): 2 INJECTION INTRAMUSCULAR; INTRAVENOUS; SUBCUTANEOUS at 07:11

## 2018-06-03 RX ADMIN — SIMVASTATIN 80 MILLIGRAM(S): 20 TABLET, FILM COATED ORAL at 12:00

## 2018-06-03 RX ADMIN — OXYCODONE HYDROCHLORIDE 5 MILLIGRAM(S): 5 TABLET ORAL at 14:30

## 2018-06-03 RX ADMIN — Medication 100 MILLIGRAM(S): at 18:05

## 2018-06-03 RX ADMIN — Medication 5 MILLIGRAM(S): at 00:27

## 2018-06-03 RX ADMIN — Medication 1000 MILLIGRAM(S): at 03:08

## 2018-06-03 NOTE — PROGRESS NOTE ADULT - SUBJECTIVE AND OBJECTIVE BOX
24H hour events: No acute events. CE trending down. Remains on tx for ACS. TTE on 6/2 with normal LV function.    MEDICATIONS:  aspirin  chewable 81 milliGRAM(s) Oral daily  clopidogrel Tablet 75 milliGRAM(s) Oral daily  diltiazem    Tablet 30 milliGRAM(s) Oral four times a day  heparin  Infusion 850 Unit(s)/Hr IV Continuous <Continuous>  metoprolol tartrate 100 milliGRAM(s) Oral two times a day  ALBUTerol    90 MICROgram(s) HFA Inhaler 2 Puff(s) Inhalation every 6 hours PRN  buDESOnide 160 MICROgram(s)/formoterol 4.5 MICROgram(s) Inhaler 2 Puff(s) Inhalation two times a day  tiotropium 18 MICROgram(s) Capsule 1 Capsule(s) Inhalation at bedtime  acetaminophen   Tablet. 650 milliGRAM(s) Oral every 6 hours PRN  HYDROmorphone  Injectable 0.5 milliGRAM(s) IV Push every 3 hours PRN  oxyCODONE    IR 5 milliGRAM(s) Oral every 6 hours PRN  oxyCODONE    IR 10 milliGRAM(s) Oral every 6 hours PRN      simvastatin 80 milliGRAM(s) Oral daily    chlorhexidine 4% Liquid 1 Application(s) Topical <User Schedule>      REVIEW OF SYSTEMS:  Complete 10point ROS negative except as documented above.    PHYSICAL EXAM:  T(C): 36.1 (06-03-18 @ 08:00), Max: 36.9 (06-02-18 @ 16:00)  HR: 107 (06-03-18 @ 09:00) (101 - 130)  BP: 139/93 (06-03-18 @ 08:00) (100/80 - 155/94)  RR: 21 (06-03-18 @ 09:00) (20 - 30)  SpO2: 97% (06-03-18 @ 09:00) (95% - 100%)  Wt(kg): --  I&O's Summary    02 Jun 2018 07:01  -  03 Jun 2018 07:00  --------------------------------------------------------  IN: 1940 mL / OUT: 1650 mL / NET: 290 mL    03 Jun 2018 07:01  -  03 Jun 2018 10:05  --------------------------------------------------------  IN: 17 mL / OUT: 100 mL / NET: -83 mL        Appearance: Normal	  HEENT:   Normal oral mucosa, PERRL, EOMI	  Lymphatic: No lymphadenopathy  Cardiovascular: Normal S1 S2, No JVD, No murmurs, No edema  Respiratory: Lungs clear to auscultation	  Psychiatry: A & O x 3, Mood & affect appropriate  Gastrointestinal:  Soft, Non-tender, + BS	  Skin: No rashes, No ecchymoses, No cyanosis	  Neurologic: Non-focal  Extremities: Normal range of motion, No clubbing, cyanosis or edema  Vascular: Peripheral pulses palpable 2+ bilaterally        LABS:	 	    CBC Full  -  ( 03 Jun 2018 05:00 )  WBC Count : 14.70 K/uL  Hemoglobin : 12.7 g/dL  Hematocrit : 39.4 %  Platelet Count - Automated : 240 K/uL      06-03    135  |  99  |  14  ----------------------------<  120<H>  4.2   |  23  |  0.89      TELEMETRY: 	  ST    	  ASSESSMENT/PLAN:   63yoM w/ PMHx HTN, COPD, aflutter s/p ablation on eliquis, prostate Ca s/p radiation seed implants presents to [facility] for elective epigastric hernia repair following an admission for SBO.  Cardiology consulted for elevated cardiac enzymes CK 1024 CKMB 54.51 Troponin 1.55.  Upon examination, patient is chest pain free and denies history of angina.  Lactate improved after IVF (1.6).   CE downtrending. New Q waves and ST changes in anteroseptal leads    1) Significant elevation in CE post-operatively with EKG changes raises suspicion for ACS event  -agree with asa, plavix and hep gtt  -will need definitive ischemic workup pending risks/benefits given prostate Ca and recent surgery  -patient on 100mg bid metoprolol and 30mg q6 of cardizem. would not try to suppress sinus tach as could be compensatory to maintain his SV. In addition, these medications were recently introduced and would be careful to bombard his AV node in the setting of a recent infarct, as could potentiate bradyarrhythmias. Would d/c the cardizem for now  -would start lipitor 80mg if no contraindication  -monitor on rogelio Falcon

## 2018-06-03 NOTE — PROGRESS NOTE ADULT - SUBJECTIVE AND OBJECTIVE BOX
Surgery Progress Note     Patient is a 63y old  Male who presents with a chief complaint of     HPI:      PAST MEDICAL & SURGICAL HISTORY:  History of anxiety  Atrial fibrillation and flutter  Ventral hernia  Inguinal hernia  Prostate Cancer: with seeds implant    Diverticulitis of Colon: with Hx of colostomy bag  Emphysema  HTN (Hypertension)  History of electrophysiologic study: 3/18 s/p ablation [facility]  History of ventral hernia repair  S/P Kamini's procedure: with reversal  History of Hernia Surgery: BL inguinal  History of Cholecystectomy  History of Appendectomy      Physical Exam:    Vital Signs Last 24 Hrs  T(C): 36.1 (03 Jun 2018 12:00), Max: 36.9 (02 Jun 2018 16:00)  T(F): 96.9 (03 Jun 2018 12:00), Max: 98.5 (02 Jun 2018 16:00)  HR: 117 (03 Jun 2018 12:00) (101 - 130)  BP: 114/79 (03 Jun 2018 12:00) (100/80 - 139/93)  BP(mean): 87 (03 Jun 2018 12:00) (83 - 103)  RR: 22 (03 Jun 2018 12:00) (20 - 27)  SpO2: 98% (03 Jun 2018 12:00) (95% - 100%)    General appearance:  comfortable.  No new c/o        Drainage    Labs:                          12.7   14.70 )-----------( 240      ( 03 Jun 2018 05:00 )             39.4     06-03    135  |  99  |  14  ----------------------------<  120<H>  4.2   |  23  |  0.89    Ca    8.7      03 Jun 2018 05:00  Phos  3.0     06-03  Mg     1.9     06-03    TPro  6.6  /  Alb  2.7<L>  /  TBili  0.7  /  DBili  x   /  AST  41<H>  /  ALT  29  /  AlkPhos  240<H>  06-03          Assessment and Plan:    Stable. Recovering slowly. Tolerating diet, moving bowels.  Abd....Soft. Wound clean.

## 2018-06-03 NOTE — PROGRESS NOTE ADULT - ASSESSMENT
63M s/p extraperitoneal hernia repair and components separation, now POD 2.  POD 1 found to be Hypotensive, tachycardic, w/ H/H stable on the floor.  CT scan showed no PE. Troponin peaked at 1.55, trending downwards. In SICU for resuscitation.    Plan:  - Heparin gtt, monitor PTTs  - Discussing timeline to restart Plavix  - Cardiology following  - Clear liquid diet  - Remove PCA, transition to PO pain meds  - Encourage OOB, ambulation  - Appreciate SICU care.

## 2018-06-03 NOTE — PROGRESS NOTE ADULT - SUBJECTIVE AND OBJECTIVE BOX
SICU Daily Progress Note  =====================================================  Interval/Overnight Events:  Patient therapeutic on heparin drip. Had three episodes of SVT which spontaneously resolved. Patient on metoprolol and diltiazem outpatient. Third episode of SVT did not spontaneously break, patient received 5mg IV lopressor. Home metoprolol restarted as pressure tolerates. Chest pain free overnight.     POD #  2      	SICU Day #   2    HPI:   63y male PMH HTN, COPD, aflutter s/p ablation (2015) on eliquis, Prostate Ca (diag ~2013) s/p radiation seed implants, no chemo w/ ?mets to spine and pelvis, who presented to [facility] for elective epigastric hernia repair following an admission for SBO (managed conservatively).     Post operatively patient was transferred to the floor and was hemodynamically stable. On POD1 patient was noted to be hypotensive, then became tachycardic and hypoxic on the floor. Labs were significant for hypoxemia and an elevated lactate to 5.3. Patient was given 2u LR bolus and SICU was consulted. He subsequently received a third liter of LR with improvement of his blood pressure. A nasogastric tube was placed for increased distension, pain and nausea.     Surgery Information:  Epigastric hernia repair with bilateral component separation   Allergies: azithromycin (Vomiting; Diarrhea)  No Known Drug Allergies  shellfish (Unknown)      MEDICATIONS:   --------------------------------------------------------------------------------------  Neurologic Medications  HYDROmorphone PCA (1 mG/mL) 30 milliLiter(s) PCA Continuous PCA Continuous  HYDROmorphone PCA (1 mG/mL) Rescue Clinician Bolus 0.5 milliGRAM(s) IV Push every 15 minutes PRN for Pain Scale GREATER THAN 6  ondansetron Injectable 4 milliGRAM(s) IV Push every 6 hours PRN Nausea    Respiratory Medications  ALBUTerol    90 MICROgram(s) HFA Inhaler 2 Puff(s) Inhalation every 6 hours PRN Shortness of Breath and/or Wheezing  buDESOnide 160 MICROgram(s)/formoterol 4.5 MICROgram(s) Inhaler 2 Puff(s) Inhalation two times a day  tiotropium 18 MICROgram(s) Capsule 1 Capsule(s) Inhalation at bedtime    Cardiovascular Medications  metoprolol tartrate 100 milliGRAM(s) Oral two times a day    Gastrointestinal Medications    Genitourinary Medications    Hematologic/Oncologic Medications  aspirin  chewable 81 milliGRAM(s) Oral daily  heparin  Infusion. 900 Unit(s)/Hr IV Continuous <Continuous>    Antimicrobial/Immunologic Medications    Endocrine/Metabolic Medications  simvastatin 80 milliGRAM(s) Oral daily    Topical/Other Medications  chlorhexidine 4% Liquid 1 Application(s) Topical <User Schedule>  naloxone Injectable 0.1 milliGRAM(s) IV Push every 3 minutes PRN For ANY of the following changes in patient status:  A. RR LESS THAN 10 breaths per minute, B. Oxygen saturation LESS THAN 90%, C. Sedation score of 6    --------------------------------------------------------------------------------------    VITAL SIGNS, INS/OUTS (last 24 hours):  --------------------------------------------------------------------------------------  ((Insert SICU Vitals/Is+Os here))***  --------------------------------------------------------------------------------------    EXAM  NEUROLOGY  RASS: 0  	GCS:  15  Exam: Normal, NAD, alert, oriented x3, no focal deficits.     HEENT  Exam: Normocephalic, atraumatic, EOMI.     RESPIRATORY  Exam: Lungs clear to auscultation, Normal expansion/effort.   Mechanical Ventilation: Venti mask    CARDIOVASCULAR  Exam: S1, S2.  Regular rate and rhythm.  Peripheral edema absent    GI/NUTRITION  Exam: Abdomen soft, Non-tender, Non-distended.    Wound: C/D/I  Current Diet:  CLD    VASCULAR  Exam: Extremities warm, pink, well-perfused.    MUSCULOSKELETAL  Exam: All extremities moving spontaneously without limitations.     SKIN  Exam: Good skin turgor, no skin breakdown.     METABOLIC/FLUIDS/ELECTROLYTES      HEMATOLOGIC  [x] VTE Prophylaxis: aspirin  chewable 81 milliGRAM(s) Oral daily  heparin  Infusion. 900 Unit(s)/Hr IV Continuous <Continuous>    Transfusions:	[] PRBC	[] Platelets		[] FFP	[] Cryoprecipitate    INFECTIOUS DISEASE  Antimicrobials/Immunologic Medications:    Tubes/Lines/Drains   [x] Peripheral IV  [] Central Venous Line     	[] R	[] L	[] IJ	[] Fem	[] SC	Date Placed:   [] Arterial Line		[] R	[] L	[] Fem	[] Rad	[] Ax	Date Placed:   [] PICC		[] Midline		[] Mediport  [] Urinary Catheter		Date Placed:   [x] Necessity of urinary, arterial, and venous catheters discussed    LABS  --------------------------------------------------------------------------------------  ((Insert SICU Labs here))***  --------------------------------------------------------------------------------------    OTHER LABORATORY:     IMAGING STUDIES:   CXR:     ASSESSMENT:  63y Male PMH HTN, COPD, aflutter s/p ablation (2015) on eliquis, Prostate Ca (diag ~2013) s/p radiation seed implants, no chemo w/ ?mets to spine and pelvis, who presented to [facility] for elective epigastric hernia repair following an admission for SBO. SICU consulted for postoperative hypotension, tachycardia and hypoxia    PLAN:    Neurologic:   - Pain control PRN  - Currently on PCA, will change to PO since tolerating CLD   Respiratory:   - Saturating well on nasal cannula  - CTAngio negative for PE, +RML collapse  - OOB/IS  Cardiovascular:   - Sinus rhythm, has history of afib  - NSTEMI overnight; ASA, continue heparin drip, will need eventual ischemic workup  - Metoprolol restarted   Gastrointestinal/Nutrition:   - CLD as tolerated  - Follow up GI function   Renal/Genitourinary:   - Monitor intake & output closely   - Replete electrolytes PRN  Hematologic:   - H/H stable  - Heparin drip   Infectious Disease:  - No active issues   Tubes/Lines/Drains: Sunil MALLOY   Endocrine:   - No active issues   Disposition: SICU care     --------------------------------------------------------------------------------------    Critical Care Diagnoses: Hypoxia, Respiratory Distress, Post Operative Ileus

## 2018-06-03 NOTE — PROGRESS NOTE ADULT - ATTENDING COMMENTS
Patient seen and examined. Agree with above assessment and plan  CE now downtrending, remains stable  TTE normal LV function  Cont medical mgmt for now - may need eventual ischemic eval for risk stratification once stabilizes from surgical standpoint

## 2018-06-03 NOTE — PROGRESS NOTE ADULT - ASSESSMENT
Surgery...recovering well. no s/o on going bleeding.  Anti coagulation management as per Cardiology  D /W MELISSA

## 2018-06-03 NOTE — PROGRESS NOTE ADULT - SUBJECTIVE AND OBJECTIVE BOX
VENKATA ARTEAGAR  [id number]    Subjective:  Patient is a 63y old  Male s/p incisional hernia repair, doing well, no acute events overnight. Echo performed yesterday    Objective:  T(C): 36.8 (06-03-18 @ 04:00), Max: 36.9 (06-02-18 @ 16:00)  HR: 105 (06-03-18 @ 07:00) (103 - 130)  BP: 123/93 (06-03-18 @ 07:00) (100/80 - 158/100)  RR: 26 (06-03-18 @ 07:00) (20 - 30)  SpO2: 97% (06-03-18 @ 07:00) (95% - 100%)  Wt(kg): --   06-03    135  |  99  |  14  ----------------------------<  120<H>  4.2   |  23  |  0.89    Ca    8.7      03 Jun 2018 05:00  Phos  3.0     06-03  Mg     1.9     06-03    TPro  6.6  /  Alb  2.7<L>  /  TBili  0.7  /  DBili  x   /  AST  41<H>  /  ALT  29  /  AlkPhos  240<H>  06-03                        12.7   14.70 )-----------( 240      ( 03 Jun 2018 05:00 )             39.4       06-02 @ 07:01  -  06-03 @ 07:00  --------------------------------------------------------  IN: 1940 mL / OUT: 1650 mL / NET: 290 mL      PHYSICAL EXAM:    General: NAD, resting comfortably in bed  Abd: Soft, nontender, nondistended, incision CDI      MEDICATIONS  (STANDING):  aspirin  chewable 81 milliGRAM(s) Oral daily  buDESOnide 160 MICROgram(s)/formoterol 4.5 MICROgram(s) Inhaler 2 Puff(s) Inhalation two times a day  chlorhexidine 4% Liquid 1 Application(s) Topical <User Schedule>  clopidogrel Tablet 75 milliGRAM(s) Oral daily  diltiazem    Tablet 30 milliGRAM(s) Oral four times a day  heparin  Infusion 850 Unit(s)/Hr (8.5 mL/Hr) IV Continuous <Continuous>  metoprolol tartrate 100 milliGRAM(s) Oral two times a day  simvastatin 80 milliGRAM(s) Oral daily  tiotropium 18 MICROgram(s) Capsule 1 Capsule(s) Inhalation at bedtime    MEDICATIONS  (PRN):  acetaminophen   Tablet. 650 milliGRAM(s) Oral every 6 hours PRN Mild Pain (1 - 3)  ALBUTerol    90 MICROgram(s) HFA Inhaler 2 Puff(s) Inhalation every 6 hours PRN Shortness of Breath and/or Wheezing  HYDROmorphone  Injectable 0.5 milliGRAM(s) IV Push every 3 hours PRN Breakthrough pain  oxyCODONE    IR 5 milliGRAM(s) Oral every 6 hours PRN Moderate Pain (4 - 6)  oxyCODONE    IR 10 milliGRAM(s) Oral every 6 hours PRN Severe Pain (7 - 10)      Assessment/Plan:  Patient is a 63y old  Male POD3 from incisional hernia repair, doing well, troponin trending down  - Encourage ambulation  - Diet per general surgery  - Await cardiology clearance  - Dispo planning

## 2018-06-03 NOTE — PROGRESS NOTE ADULT - SUBJECTIVE AND OBJECTIVE BOX
A TEAM SURGERY PROGRESS NOTE    Interval events: Patient therapeutic on heparin drip. Had three episodes of SVT which spontaneously resolved. Patient on metoprolol and diltiazem outpatient. Third episode of SVT did not spontaneously break, patient received 5mg IV lopressor.    Vital Signs Last 24 Hrs  T(C): 36.8 (03 Jun 2018 04:00), Max: 37.2 (02 Jun 2018 08:00)  T(F): 98.2 (03 Jun 2018 04:00), Max: 99 (02 Jun 2018 08:00)  HR: 105 (03 Jun 2018 07:00) (103 - 130)  BP: 123/93 (03 Jun 2018 07:00) (100/80 - 158/100)  BP(mean): 100 (03 Jun 2018 07:00) (83 - 115)  RR: 26 (03 Jun 2018 07:00) (20 - 30)  SpO2: 97% (03 Jun 2018 07:00) (95% - 100%)    Physical Exam  General: awake, alert, in NAD  Pulm: respirations unlabored, no increased WOB, on NC  Abdomen: soft, mildly distended, mild justyna-incisional tenderness, no guarding, incision c/d/i w/o drainage  Extremities: Grossly symmetric      I&O's Summary    02 Jun 2018 07:01  -  03 Jun 2018 07:00  --------------------------------------------------------  IN: 1940 mL / OUT: 1650 mL / NET: 290 mL      I&O's Detail    02 Jun 2018 07:01  -  03 Jun 2018 07:00  --------------------------------------------------------  IN:    dextrose 5% + sodium chloride 0.45%.: 450 mL    heparin  Infusion.: 97.5 mL    heparin Infusion: 42.5 mL    IV PiggyBack: 200 mL    lactated ringers.: 875 mL    Oral Fluid: 275 mL  Total IN: 1940 mL    OUT:    Voided: 1650 mL  Total OUT: 1650 mL    Total NET: 290 mL          MEDICATIONS  (STANDING):  aspirin  chewable 81 milliGRAM(s) Oral daily  buDESOnide 160 MICROgram(s)/formoterol 4.5 MICROgram(s) Inhaler 2 Puff(s) Inhalation two times a day  chlorhexidine 4% Liquid 1 Application(s) Topical <User Schedule>  diltiazem    Tablet 30 milliGRAM(s) Oral four times a day  heparin  Infusion 850 Unit(s)/Hr (8.5 mL/Hr) IV Continuous <Continuous>  HYDROmorphone PCA (1 mG/mL) 30 milliLiter(s) PCA Continuous PCA Continuous  metoprolol tartrate 100 milliGRAM(s) Oral two times a day  simvastatin 80 milliGRAM(s) Oral daily  tiotropium 18 MICROgram(s) Capsule 1 Capsule(s) Inhalation at bedtime    MEDICATIONS  (PRN):  ALBUTerol    90 MICROgram(s) HFA Inhaler 2 Puff(s) Inhalation every 6 hours PRN Shortness of Breath and/or Wheezing  HYDROmorphone PCA (1 mG/mL) Rescue Clinician Bolus 0.5 milliGRAM(s) IV Push every 15 minutes PRN for Pain Scale GREATER THAN 6  naloxone Injectable 0.1 milliGRAM(s) IV Push every 3 minutes PRN For ANY of the following changes in patient status:  A. RR LESS THAN 10 breaths per minute, B. Oxygen saturation LESS THAN 90%, C. Sedation score of 6  ondansetron Injectable 4 milliGRAM(s) IV Push every 6 hours PRN Nausea      LABS:                        12.7   14.70 )-----------( 240      ( 03 Jun 2018 05:00 )             39.4     06-03    135  |  99  |  14  ----------------------------<  120<H>  4.2   |  23  |  0.89    Ca    8.7      03 Jun 2018 05:00  Phos  3.0     06-03  Mg     1.9     06-03    TPro  6.6  /  Alb  2.7<L>  /  TBili  0.7  /  DBili  x   /  AST  41<H>  /  ALT  29  /  AlkPhos  240<H>  06-03    PT/INR - ( 01 Jun 2018 21:46 )   PT: 13.6 SEC;   INR: 1.22          PTT - ( 03 Jun 2018 05:00 )  PTT:56.5 SEC

## 2018-06-03 NOTE — PROGRESS NOTE ADULT - ATTENDING COMMENTS
I have personally interviewed and examined this patient, reviewed pertinent labs and imaging, and discussed the case with colleagues, residents, physician assistants, and nurses on SICU rounds.     35   minutes in total were spent in providing direct critical care for the diagnoses, assessment and plan outlined below.  These diagnoses are unrelated to the surgical procedure.  Additionally, time spent in the performance of separately billable procedures was not counted toward the critical care time.  There is no overlap.    The active critical care issues are:  1.    Plan  neuro: dc pca transition to po pain meds  cards: NSTEMI, troponins downtrending, on asa, plavix statin, episode of svt overnight treated with iv metoprolol  pulm: wean nasal cannula,   home COPD inhalers  GI: clears  : monitor urine output, trend cr  heme/ID: leukocytosis improving, no abx  endo: monitor blood glucose,  will make sure has onc fu for prostate ca  proph: therapeutic heparin gtt I have personally interviewed and examined this patient, reviewed pertinent labs and imaging, and discussed the case with colleagues, residents, physician assistants, and nurses on SICU rounds.     35   minutes in total were spent in providing direct critical care for the diagnoses, assessment and plan outlined below.  These diagnoses are unrelated to the surgical procedure.  Additionally, time spent in the performance of separately billable procedures was not counted toward the critical care time.  There is no overlap.    The active critical care issues are:  1.    Plan  neuro: dc pca transition to po pain meds  cards: NSTEMI, troponins downtrending, on asa, plavix statin, episode of svt overnight treated with iv metoprolol  pulm: wean nasal cannula,   home COPD inhalers  GI: clears  : monitor urine output, trend cr  heme/ID: leukocytosis stable, no abx  endo: monitor blood glucose,  will make sure has onc fu for prostate ca  proph: therapeutic heparin gtt goal 60-80

## 2018-06-04 LAB
ALBUMIN SERPL ELPH-MCNC: 3 G/DL — LOW (ref 3.3–5)
ALP SERPL-CCNC: 214 U/L — HIGH (ref 40–120)
ALT FLD-CCNC: 27 U/L — SIGNIFICANT CHANGE UP (ref 4–41)
APTT BLD: 57.8 SEC — HIGH (ref 27.5–37.4)
AST SERPL-CCNC: 26 U/L — SIGNIFICANT CHANGE UP (ref 4–40)
BILIRUB DIRECT SERPL-MCNC: 0.3 MG/DL — HIGH (ref 0.1–0.2)
BILIRUB SERPL-MCNC: 0.8 MG/DL — SIGNIFICANT CHANGE UP (ref 0.2–1.2)
BUN SERPL-MCNC: 15 MG/DL — SIGNIFICANT CHANGE UP (ref 7–23)
CALCIUM SERPL-MCNC: 8.7 MG/DL — SIGNIFICANT CHANGE UP (ref 8.4–10.5)
CHLORIDE SERPL-SCNC: 98 MMOL/L — SIGNIFICANT CHANGE UP (ref 98–107)
CO2 SERPL-SCNC: 27 MMOL/L — SIGNIFICANT CHANGE UP (ref 22–31)
CREAT SERPL-MCNC: 1.04 MG/DL — SIGNIFICANT CHANGE UP (ref 0.5–1.3)
GLUCOSE SERPL-MCNC: 99 MG/DL — SIGNIFICANT CHANGE UP (ref 70–99)
HCT VFR BLD CALC: 39.4 % — SIGNIFICANT CHANGE UP (ref 39–50)
HGB BLD-MCNC: 13.1 G/DL — SIGNIFICANT CHANGE UP (ref 13–17)
MAGNESIUM SERPL-MCNC: 2.2 MG/DL — SIGNIFICANT CHANGE UP (ref 1.6–2.6)
MCHC RBC-ENTMCNC: 29.4 PG — SIGNIFICANT CHANGE UP (ref 27–34)
MCHC RBC-ENTMCNC: 33.2 % — SIGNIFICANT CHANGE UP (ref 32–36)
MCV RBC AUTO: 88.3 FL — SIGNIFICANT CHANGE UP (ref 80–100)
NRBC # FLD: 0 — SIGNIFICANT CHANGE UP
PHOSPHATE SERPL-MCNC: 2.5 MG/DL — SIGNIFICANT CHANGE UP (ref 2.5–4.5)
PLATELET # BLD AUTO: 257 K/UL — SIGNIFICANT CHANGE UP (ref 150–400)
PMV BLD: 11.2 FL — SIGNIFICANT CHANGE UP (ref 7–13)
POTASSIUM SERPL-MCNC: 3.6 MMOL/L — SIGNIFICANT CHANGE UP (ref 3.5–5.3)
POTASSIUM SERPL-SCNC: 3.6 MMOL/L — SIGNIFICANT CHANGE UP (ref 3.5–5.3)
PROT SERPL-MCNC: 6.9 G/DL — SIGNIFICANT CHANGE UP (ref 6–8.3)
RBC # BLD: 4.46 M/UL — SIGNIFICANT CHANGE UP (ref 4.2–5.8)
RBC # FLD: 14.7 % — HIGH (ref 10.3–14.5)
SODIUM SERPL-SCNC: 138 MMOL/L — SIGNIFICANT CHANGE UP (ref 135–145)
WBC # BLD: 12 K/UL — HIGH (ref 3.8–10.5)
WBC # FLD AUTO: 12 K/UL — HIGH (ref 3.8–10.5)

## 2018-06-04 PROCEDURE — 99232 SBSQ HOSP IP/OBS MODERATE 35: CPT

## 2018-06-04 PROCEDURE — 99233 SBSQ HOSP IP/OBS HIGH 50: CPT

## 2018-06-04 RX ORDER — APIXABAN 2.5 MG/1
5 TABLET, FILM COATED ORAL EVERY 12 HOURS
Qty: 0 | Refills: 0 | Status: DISCONTINUED | OUTPATIENT
Start: 2018-06-04 | End: 2018-06-06

## 2018-06-04 RX ORDER — POTASSIUM CHLORIDE 20 MEQ
10 PACKET (EA) ORAL
Qty: 0 | Refills: 0 | Status: DISCONTINUED | OUTPATIENT
Start: 2018-06-04 | End: 2018-06-04

## 2018-06-04 RX ORDER — PANTOPRAZOLE SODIUM 20 MG/1
40 TABLET, DELAYED RELEASE ORAL ONCE
Qty: 0 | Refills: 0 | Status: COMPLETED | OUTPATIENT
Start: 2018-06-04 | End: 2018-06-04

## 2018-06-04 RX ORDER — FAMOTIDINE 10 MG/ML
20 INJECTION INTRAVENOUS ONCE
Qty: 0 | Refills: 0 | Status: DISCONTINUED | OUTPATIENT
Start: 2018-06-04 | End: 2018-06-04

## 2018-06-04 RX ORDER — POTASSIUM CHLORIDE 20 MEQ
40 PACKET (EA) ORAL ONCE
Qty: 0 | Refills: 0 | Status: COMPLETED | OUTPATIENT
Start: 2018-06-04 | End: 2018-06-04

## 2018-06-04 RX ORDER — ATORVASTATIN CALCIUM 80 MG/1
80 TABLET, FILM COATED ORAL AT BEDTIME
Qty: 0 | Refills: 0 | Status: DISCONTINUED | OUTPATIENT
Start: 2018-06-05 | End: 2018-06-06

## 2018-06-04 RX ORDER — CALCIUM CARBONATE 500(1250)
1 TABLET ORAL ONCE
Qty: 0 | Refills: 0 | Status: COMPLETED | OUTPATIENT
Start: 2018-06-04 | End: 2018-06-04

## 2018-06-04 RX ADMIN — Medication 650 MILLIGRAM(S): at 19:35

## 2018-06-04 RX ADMIN — BUDESONIDE AND FORMOTEROL FUMARATE DIHYDRATE 2 PUFF(S): 160; 4.5 AEROSOL RESPIRATORY (INHALATION) at 22:45

## 2018-06-04 RX ADMIN — CHLORHEXIDINE GLUCONATE 1 APPLICATION(S): 213 SOLUTION TOPICAL at 11:06

## 2018-06-04 RX ADMIN — TIOTROPIUM BROMIDE 1 CAPSULE(S): 18 CAPSULE ORAL; RESPIRATORY (INHALATION) at 22:44

## 2018-06-04 RX ADMIN — Medication 100 MILLIGRAM(S): at 05:41

## 2018-06-04 RX ADMIN — Medication 81 MILLIGRAM(S): at 11:08

## 2018-06-04 RX ADMIN — Medication 650 MILLIGRAM(S): at 18:34

## 2018-06-04 RX ADMIN — BUDESONIDE AND FORMOTEROL FUMARATE DIHYDRATE 2 PUFF(S): 160; 4.5 AEROSOL RESPIRATORY (INHALATION) at 09:42

## 2018-06-04 RX ADMIN — APIXABAN 5 MILLIGRAM(S): 2.5 TABLET, FILM COATED ORAL at 19:48

## 2018-06-04 RX ADMIN — SIMVASTATIN 80 MILLIGRAM(S): 20 TABLET, FILM COATED ORAL at 11:08

## 2018-06-04 RX ADMIN — Medication 650 MILLIGRAM(S): at 11:35

## 2018-06-04 RX ADMIN — Medication 650 MILLIGRAM(S): at 03:50

## 2018-06-04 RX ADMIN — PANTOPRAZOLE SODIUM 40 MILLIGRAM(S): 20 TABLET, DELAYED RELEASE ORAL at 21:09

## 2018-06-04 RX ADMIN — CLOPIDOGREL BISULFATE 75 MILLIGRAM(S): 75 TABLET, FILM COATED ORAL at 11:08

## 2018-06-04 RX ADMIN — HEPARIN SODIUM 1000 UNIT(S)/HR: 5000 INJECTION INTRAVENOUS; SUBCUTANEOUS at 03:00

## 2018-06-04 RX ADMIN — Medication 650 MILLIGRAM(S): at 11:05

## 2018-06-04 RX ADMIN — Medication 40 MILLIEQUIVALENT(S): at 06:25

## 2018-06-04 RX ADMIN — Medication 650 MILLIGRAM(S): at 04:09

## 2018-06-04 NOTE — PROGRESS NOTE ADULT - SUBJECTIVE AND OBJECTIVE BOX
SICU Daily Progress Note  =====================================================  Interval/Overnight Events:        Pt has been off high flow nasal cannula . On Hep gtt @ 8.5 . Tolerating Diet . Voiding av ml / hr .     POD #  3     	SICU Day #   3    HPI:   63y male PMH HTN, COPD, aflutter s/p ablation () on eliquis, Prostate Ca (diag ~) s/p radiation seed implants, no chemo w/ ?mets to spine and pelvis, who presented to [facility] for elective epigastric hernia repair following an admission for SBO (managed conservatively).     Post operatively patient was transferred to the floor and was hemodynamically stable. On POD1 patient was noted to be hypotensive, then became tachycardic and hypoxic on the floor. Labs were significant for hypoxemia and an elevated lactate to 5.3. Patient was given 2u LR bolus and SICU was consulted. He subsequently received a third liter of LR with improvement of his blood pressure. A nasogastric tube was placed for increased distension, pain and nausea.     Surgery Information:  Epigastric hernia repair with bilateral component separation   Allergies: azithromycin (Vomiting; Diarrhea)  No Known Drug Allergies  shellfish (Unknown)      MEDICATIONS:   --------------------------------------------------------------------------------------  Neurologic Medications:  acetaminophen   Tablet. 650 milliGRAM(s) Oral every 6 hours PRN  HYDROmorphone  Injectable 0.5 milliGRAM(s) IV Push every 3 hours PRN  oxyCODONE    IR 5 milliGRAM(s) Oral every 6 hours PRN  oxyCODONE    IR 10 milliGRAM(s) Oral every 6 hours PRN    Respiratory Medications:  ALBUTerol    90 MICROgram(s) HFA Inhaler 2 Puff(s) Inhalation every 6 hours PRN  buDESOnide 160 MICROgram(s)/formoterol 4.5 MICROgram(s) Inhaler 2 Puff(s) Inhalation two times a day  tiotropium 18 MICROgram(s) Capsule 1 Capsule(s) Inhalation at bedtime    Cardiovascular Medications:  diltiazem    Tablet 30 milliGRAM(s) Oral four times a day  metoprolol tartrate 100 milliGRAM(s) Oral two times a day    Gastrointestinal Medications:    Genitourinary Medications:    Hematologic/Oncologic Medications:  aspirin  chewable 81 milliGRAM(s) Oral daily  clopidogrel Tablet 75 milliGRAM(s) Oral daily  heparin  Infusion. 850 Unit(s)/Hr IV Continuous <Continuous>    Antimicrobials/Immunologic Medications:    Endocrine/Metabolic Medications:  simvastatin 80 milliGRAM(s) Oral daily    Topical/Other Medications:  chlorhexidine 4% Liquid 1 Application(s) Topical <User Schedule>      --------------------------------------------------------------------------------------    VITAL SIGNS, INS/OUTS (last 24 hours):  --------------------------------------------------------------------------------------  T(C): 36.9 (18 @ 00:00), Max: 36.9 (18 @ 00:00)  HR: 84 (18 @ 00:00) (82 - 130)  BP: 130/63 (18 @ 00:00) (102/70 - 143/99)  BP(mean): 100 (18 @ 00:00) (77 - 106)  ABP: --  ABP(mean): --  RR: 21 (18 @ 00:00) (20 - 27)  SpO2: 96% (18 @ 00:00) (95% - 99%)  Wt(kg): --  CVP(mm Hg): --  CI: --  CAPILLARY BLOOD GLUCOSE       N/A       @ 07:01  -  03 @ 07:00  --------------------------------------------------------  IN:    dextrose 5% + sodium chloride 0.45%.: 450 mL    heparin  Infusion.: 97.5 mL    heparin Infusion: 42.5 mL    IV PiggyBack: 200 mL    lactated ringers.: 875 mL    Oral Fluid: 275 mL  Total IN: 1940 mL    OUT:    Voided: 1650 mL  Total OUT: 1650 mL    Total NET: 290 mL      03 @ 07:01  -  04 @ 01:21  --------------------------------------------------------  IN:    heparin  Infusion.: 119.5 mL    heparin Infusion: 34 mL    Oral Fluid: 720 mL  Total IN: 873.5 mL    OUT:    Voided: 900 mL  Total OUT: 900 mL    Total NET: -26.5 mL        --------------------------------------------------------------------------------------    EXAM  NEUROLOGY  RASS: 0  	GCS:  15  Exam: Normal, NAD, alert, oriented x3, no focal deficits.     HEENT  Exam: Normocephalic, atraumatic, EOMI.     RESPIRATORY  Exam: Lungs clear to auscultation, Normal expansion/effort.       CARDIOVASCULAR  Exam: S1, S2.  Regular rate and rhythm.  Peripheral edema absent    GI/NUTRITION  Exam: Abdomen soft, Non-tender, Non-distended.    Wound: C/D/I  Current Diet:  regular     VASCULAR  Exam: Extremities warm, pink, well-perfused.    MUSCULOSKELETAL  Exam: All extremities moving spontaneously without limitations.     SKIN  Exam: Good skin turgor, no skin breakdown.     METABOLIC/FLUIDS/ELECTROLYTES      HEMATOLOGIC  [x] VTE Prophylaxis: aspirin  chewable 81 milliGRAM(s) Oral daily  heparin  Infusion. 900 Unit(s)/Hr IV Continuous <Continuous>    Transfusions:	[] PRBC	[] Platelets		[] FFP	[] Cryoprecipitate    INFECTIOUS DISEASE  Antimicrobials/Immunologic Medications:    Tubes/Lines/Drains   [x] Peripheral IV  [] Central Venous Line     	[] R	[] L	[] IJ	[] Fem	[] SC	Date Placed:   [] Arterial Line		[] R	[] L	[] Fem	[] Rad	[] Ax	Date Placed:   [] PICC		[] Midline		[] Mediport  [] Urinary Catheter		Date Placed:   [x] Necessity of urinary, arterial, and venous catheters discussed    LABS  --------------------------------------------------------------------------------------  CBC ( @ 05:00)                              12.7<L>                       14.70<H>  )--------------(  240        --    % Neuts, --    % Lymphs, ANC: --                                  39.4      BMP ( @ 05:00)             135     |  99      |  14    		Ca++ 1.04    Ca 8.7                ---------------------------------( 120<H>		Mg 1.9                4.2     |  23      |  0.89  			Ph 3.0       LFTs ( @ 05:00)      TPro 6.6 / Alb 2.7<L> / TBili 0.7 / DBili -- / AST 41<H> / ALT 29 / AlkPhos 240<H>    Coags ( @ 18:20)  aPTT 48.7<H> / INR -- / PT --  Coags ( @ 11:45)  aPTT 57.8<H> / INR -- / PT --              --------------------------------------------------------------------------------------    OTHER LABORATORY:     IMAGING STUDIES:   CXR:     A

## 2018-06-04 NOTE — PROGRESS NOTE ADULT - ASSESSMENT
63yoM w/ PMHx HTN, COPD, aflutter s/p ablation on eliquis, prostate Ca s/p radiation seed implants presents to [facility] for elective epigastric hernia repair following an admission for SBO.  Cardiology consulted for elevated cardiac enzymes CK 1024 CKMB 54.51 Troponin 1.55.  Upon examination, patient is chest pain free and denies history of angina.  Lactate improved after IVF (1.6).   CE downtrending. New Q waves and ST changes in anteroseptal leads    Significant elevation in CE post-operatively with EKG changes raises suspicion for ACS event  CE trending in normal direction, although still elevated, however no symptoms.   -agree with asa, plavix. No need for heparin gtt at this time.   -will need definitive ischemic workup pending risks/benefits given prostate Ca and recent surgery. Given his comorbidities, post operative state, will opt for nuclear stress stress to determine burden of ischemic disease.   -patient  was on 100mg bid metoprolol and 30mg q6 of cardizem but now just on cardizem. Would not try to suppress sinus tach as could be compensatory to maintain his SV. In addition, these medications were recently introduced and would be careful to bombard his AV node in the setting of a recent infarct, as could potentiate bradyarrhythmias. Would d/c the cardizem for now and stick with BB.   -would start lipitor 80mg if no contraindication  -monitor on tele    Please call on call cardiology team at [phone] with any further questions.

## 2018-06-04 NOTE — PROGRESS NOTE ADULT - SUBJECTIVE AND OBJECTIVE BOX
Pt doing well this morning. No complaints. Vitals stable    On exam, abdominal incision intact. No signs of infection or collection.    Plan  Ambulate  Pain control  Dc per primary team  Follow up with Dr Chester within 1 week

## 2018-06-04 NOTE — PROGRESS NOTE ADULT - SUBJECTIVE AND OBJECTIVE BOX
24H hour events: No acute events overnight. Patient feels well, sitting comfortably in chair.     MEDICATIONS:  apixaban 5 milliGRAM(s) Oral every 12 hours  aspirin  chewable 81 milliGRAM(s) Oral daily  clopidogrel Tablet 75 milliGRAM(s) Oral daily  diltiazem    Tablet 30 milliGRAM(s) Oral four times a day  metoprolol tartrate 100 milliGRAM(s) Oral two times a day  ALBUTerol    90 MICROgram(s) HFA Inhaler 2 Puff(s) Inhalation every 6 hours PRN  buDESOnide 160 MICROgram(s)/formoterol 4.5 MICROgram(s) Inhaler 2 Puff(s) Inhalation two times a day  tiotropium 18 MICROgram(s) Capsule 1 Capsule(s) Inhalation at bedtime  acetaminophen   Tablet. 650 milliGRAM(s) Oral every 6 hours PRN  HYDROmorphone  Injectable 0.5 milliGRAM(s) IV Push every 3 hours PRN  oxyCODONE    IR 5 milliGRAM(s) Oral every 6 hours PRN  oxyCODONE    IR 10 milliGRAM(s) Oral every 6 hours PRN  simvastatin 80 milliGRAM(s) Oral daily  chlorhexidine 4% Liquid 1 Application(s) Topical <User Schedule>    REVIEW OF SYSTEMS:  Complete 10point ROS negative except as documented above.    PHYSICAL EXAM:  T(C): 36.6 (06-04-18 @ 12:00), Max: 36.9 (06-04-18 @ 00:00)  HR: 86 (06-04-18 @ 12:00) (70 - 103)  BP: 110/86 (06-04-18 @ 12:00) (101/70 - 143/99)  RR: 19 (06-04-18 @ 12:00) (12 - 31)  SpO2: 90% (06-04-18 @ 12:00) (89% - 100%)  Wt(kg): --  I&O's Summary    03 Jun 2018 07:01  -  04 Jun 2018 07:00  --------------------------------------------------------  IN: 943.5 mL / OUT: 1500 mL / NET: -556.5 mL    04 Jun 2018 07:01  -  04 Jun 2018 14:41  --------------------------------------------------------  IN: 50 mL / OUT: 325 mL / NET: -275 mL    Appearance: Normal	  HEENT:   Normal oral mucosa, PERRL, EOMI	  Lymphatic: No lymphadenopathy  Cardiovascular: Normal S1 S2, No JVD, No murmurs, No edema  Respiratory: Lungs clear to auscultation	  Psychiatry: A & O x 3, Mood & affect appropriate  Gastrointestinal:  Soft, Non-tender, + BS	  Skin: No rashes, No ecchymoses, No cyanosis	  Neurologic: Non-focal  Extremities: Normal range of motion, No clubbing, cyanosis or edema  Vascular: Peripheral pulses palpable 2+ bilaterally    LABS:	 	    CBC Full  -  ( 04 Jun 2018 02:44 )  WBC Count : 12.00 K/uL  Hemoglobin : 13.1 g/dL  Hematocrit : 39.4 %  Platelet Count - Automated : 257 K/uL  Mean Cell Volume : 88.3 fL  Mean Cell Hemoglobin : 29.4 pg  Mean Cell Hemoglobin Concentration : 33.2 %  Auto Neutrophil # : x  Auto Lymphocyte # : x  Auto Monocyte # : x  Auto Eosinophil # : x  Auto Basophil # : x  Auto Neutrophil % : x  Auto Lymphocyte % : x  Auto Monocyte % : x  Auto Eosinophil % : x  Auto Basophil % : x    06-04    138  |  98  |  15  ----------------------------<  99  3.6   |  27  |  1.04  06-03    135  |  99  |  14  ----------------------------<  120<H>  4.2   |  23  |  0.89    Ca    8.7      04 Jun 2018 02:44  Ca    8.7      03 Jun 2018 05:00  Phos  2.5     06-04  Phos  3.0     06-03  Mg     2.2     06-04  Mg     1.9     06-03    TPro  6.9  /  Alb  3.0<L>  /  TBili  0.8  /  DBili  0.3<H>  /  AST  26  /  ALT  27  /  AlkPhos  214<H>  06-04  TPro  6.6  /  Alb  2.7<L>  /  TBili  0.7  /  DBili  x   /  AST  41<H>  /  ALT  29  /  AlkPhos  240<H>  06-03    TELEMETRY: sinus 	    ECG:  	  RADIOLOGY:  OTHER: 	    ASSESSMENT/PLAN:

## 2018-06-04 NOTE — PROGRESS NOTE ADULT - ASSESSMENT
63M s/p extraperitoneal hernia repair and components separation, now POD 2.  POD 1 found to be Hypotensive, tachycardic, w/ H/H stable on the floor.  CT scan showed no PE. Troponin peaked at 1.55, trending downwards. In SICU for resuscitation, soon to be transferred to the floor:  - Tolerating PO intake  - Pain control  - Appreciate Cards reccs; cont ASA, hep gtt  - Discussing timeline to restart Plavix  - Encourage OOB, ambulation  - Full support in place per SICU

## 2018-06-04 NOTE — PROGRESS NOTE ADULT - ATTENDING COMMENTS
Seen and examined.  Chart and note reviewed.  Case discussed with SICU team    NSTEMI  a.  Continue ASA. plavix, statin    Atrial flutter  a.  On metoprolol  b.  Transition heparin gtt to eliquis    At risk for malnutrition  a.  Diet as tolerated

## 2018-06-04 NOTE — PROGRESS NOTE ADULT - SUBJECTIVE AND OBJECTIVE BOX
A TEAM SURGERY PROGRESS NOTE    SUBJECTIVE: Pt seen at bedside. Overnight: Pt off high flow NC and doing well on RA. Cont on Hep gtt. Remains afebrile.       Vital Signs Last 24 Hrs  T(C): 36.4 (04 Jun 2018 04:00), Max: 36.9 (04 Jun 2018 00:00)  T(F): 97.5 (04 Jun 2018 04:00), Max: 98.5 (04 Jun 2018 00:00)  HR: 77 (04 Jun 2018 06:00) (77 - 117)  BP: 112/73 (04 Jun 2018 06:00) (102/70 - 143/99)  BP(mean): 83 (04 Jun 2018 06:00) (77 - 106)  RR: 17 (04 Jun 2018 06:00) (17 - 31)  SpO2: 98% (04 Jun 2018 06:00) (95% - 99%)    Physical Exam  General: NAD  Pulm: respirations unlabored, no increased WOB, on NC  Abdomen: soft, mildly distended, mild justyna-incisional tenderness, no guarding, incision c/d/i w/o drainage  Extremities: Grossly symmetric    I&O's Summary    02 Jun 2018 07:01  -  03 Jun 2018 07:00  --------------------------------------------------------  IN: 1940 mL / OUT: 1650 mL / NET: 290 mL    03 Jun 2018 07:01  -  04 Jun 2018 06:54  --------------------------------------------------------  IN: 943.5 mL / OUT: 1500 mL / NET: -556.5 mL      I&O's Detail    02 Jun 2018 07:01  -  03 Jun 2018 07:00  --------------------------------------------------------  IN:    dextrose 5% + sodium chloride 0.45%.: 450 mL    heparin  Infusion.: 97.5 mL    heparin Infusion: 42.5 mL    IV PiggyBack: 200 mL    lactated ringers.: 875 mL    Oral Fluid: 275 mL  Total IN: 1940 mL    OUT:    Voided: 1650 mL  Total OUT: 1650 mL    Total NET: 290 mL      03 Jun 2018 07:01  -  04 Jun 2018 06:54  --------------------------------------------------------  IN:    heparin  Infusion.: 189.5 mL    heparin Infusion: 34 mL    Oral Fluid: 720 mL  Total IN: 943.5 mL    OUT:    Voided: 1500 mL  Total OUT: 1500 mL    Total NET: -556.5 mL          MEDICATIONS  (STANDING):  aspirin  chewable 81 milliGRAM(s) Oral daily  buDESOnide 160 MICROgram(s)/formoterol 4.5 MICROgram(s) Inhaler 2 Puff(s) Inhalation two times a day  chlorhexidine 4% Liquid 1 Application(s) Topical <User Schedule>  clopidogrel Tablet 75 milliGRAM(s) Oral daily  diltiazem    Tablet 30 milliGRAM(s) Oral four times a day  heparin  Infusion. 850 Unit(s)/Hr (8.5 mL/Hr) IV Continuous <Continuous>  metoprolol tartrate 100 milliGRAM(s) Oral two times a day  simvastatin 80 milliGRAM(s) Oral daily  tiotropium 18 MICROgram(s) Capsule 1 Capsule(s) Inhalation at bedtime    MEDICATIONS  (PRN):  acetaminophen   Tablet. 650 milliGRAM(s) Oral every 6 hours PRN Mild Pain (1 - 3)  ALBUTerol    90 MICROgram(s) HFA Inhaler 2 Puff(s) Inhalation every 6 hours PRN Shortness of Breath and/or Wheezing  HYDROmorphone  Injectable 0.5 milliGRAM(s) IV Push every 3 hours PRN Breakthrough pain  oxyCODONE    IR 5 milliGRAM(s) Oral every 6 hours PRN Moderate Pain (4 - 6)  oxyCODONE    IR 10 milliGRAM(s) Oral every 6 hours PRN Severe Pain (7 - 10)      LABS:                        13.1   12.00 )-----------( 257      ( 04 Jun 2018 02:44 )             39.4     06-04    138  |  98  |  15  ----------------------------<  99  3.6   |  27  |  1.04    Ca    8.7      04 Jun 2018 02:44  Phos  2.5     06-04  Mg     2.2     06-04    TPro  6.9  /  Alb  3.0<L>  /  TBili  0.8  /  DBili  0.3<H>  /  AST  26  /  ALT  27  /  AlkPhos  214<H>  06-04    PTT - ( 04 Jun 2018 02:44 )  PTT:57.8 SEC      RADIOLOGY & ADDITIONAL STUDIES:

## 2018-06-04 NOTE — PROGRESS NOTE ADULT - SUBJECTIVE AND OBJECTIVE BOX
VENKATA ARTEAGAR  [id number]    Subjective:  Patient is a 63y old  Male who presents with a chief complaint of incisional hernia. No acute events overnight, tolerating diet, having flatus    Objective:  T(C): 36.4 (06-04-18 @ 04:00), Max: 36.9 (06-04-18 @ 00:00)  HR: 77 (06-04-18 @ 06:00) (77 - 117)  BP: 112/73 (06-04-18 @ 06:00) (102/70 - 143/99)  RR: 17 (06-04-18 @ 06:00) (17 - 31)  SpO2: 98% (06-04-18 @ 06:00) (95% - 99%)  Wt(kg): --   06-04    138  |  98  |  15  ----------------------------<  99  3.6   |  27  |  1.04    Ca    8.7      04 Jun 2018 02:44  Phos  2.5     06-04  Mg     2.2     06-04    TPro  6.9  /  Alb  3.0<L>  /  TBili  0.8  /  DBili  0.3<H>  /  AST  26  /  ALT  27  /  AlkPhos  214<H>  06-04                        13.1   12.00 )-----------( 257      ( 04 Jun 2018 02:44 )             39.4       06-03 @ 07:01  -  06-04 @ 07:00  --------------------------------------------------------  IN: 943.5 mL / OUT: 1500 mL / NET: -556.5 mL      PHYSICAL EXAM:    General: NAD, resting comfortably in bed  Abd: Incision CDI, abdomen soft, nontender, nondistended      MEDICATIONS  (STANDING):  aspirin  chewable 81 milliGRAM(s) Oral daily  buDESOnide 160 MICROgram(s)/formoterol 4.5 MICROgram(s) Inhaler 2 Puff(s) Inhalation two times a day  chlorhexidine 4% Liquid 1 Application(s) Topical <User Schedule>  clopidogrel Tablet 75 milliGRAM(s) Oral daily  diltiazem    Tablet 30 milliGRAM(s) Oral four times a day  heparin  Infusion. 850 Unit(s)/Hr (8.5 mL/Hr) IV Continuous <Continuous>  metoprolol tartrate 100 milliGRAM(s) Oral two times a day  simvastatin 80 milliGRAM(s) Oral daily  tiotropium 18 MICROgram(s) Capsule 1 Capsule(s) Inhalation at bedtime    MEDICATIONS  (PRN):  acetaminophen   Tablet. 650 milliGRAM(s) Oral every 6 hours PRN Mild Pain (1 - 3)  ALBUTerol    90 MICROgram(s) HFA Inhaler 2 Puff(s) Inhalation every 6 hours PRN Shortness of Breath and/or Wheezing  HYDROmorphone  Injectable 0.5 milliGRAM(s) IV Push every 3 hours PRN Breakthrough pain  oxyCODONE    IR 5 milliGRAM(s) Oral every 6 hours PRN Moderate Pain (4 - 6)  oxyCODONE    IR 10 milliGRAM(s) Oral every 6 hours PRN Severe Pain (7 - 10)      Assessment/Plan:  Patient is a 63y old  Male who presents with a chief complaint of incisional hernia. Now s/p repair, doing well.  - Continue diet  - Encourage ambulation  - Follow up cardiology recommendations  - Dispo planning per primary team

## 2018-06-05 ENCOUNTER — TRANSCRIPTION ENCOUNTER (OUTPATIENT)
Age: 63
End: 2018-06-05

## 2018-06-05 LAB
APTT BLD: 44.5 SEC — HIGH (ref 27.5–37.4)
BUN SERPL-MCNC: 17 MG/DL — SIGNIFICANT CHANGE UP (ref 7–23)
CALCIUM SERPL-MCNC: 8.8 MG/DL — SIGNIFICANT CHANGE UP (ref 8.4–10.5)
CHLORIDE SERPL-SCNC: 101 MMOL/L — SIGNIFICANT CHANGE UP (ref 98–107)
CO2 SERPL-SCNC: 28 MMOL/L — SIGNIFICANT CHANGE UP (ref 22–31)
CREAT SERPL-MCNC: 0.94 MG/DL — SIGNIFICANT CHANGE UP (ref 0.5–1.3)
GLUCOSE SERPL-MCNC: 102 MG/DL — HIGH (ref 70–99)
HCT VFR BLD CALC: 36.3 % — LOW (ref 39–50)
HGB BLD-MCNC: 11.9 G/DL — LOW (ref 13–17)
INR BLD: 1.18 — HIGH (ref 0.88–1.17)
MAGNESIUM SERPL-MCNC: 2 MG/DL — SIGNIFICANT CHANGE UP (ref 1.6–2.6)
MCHC RBC-ENTMCNC: 29.2 PG — SIGNIFICANT CHANGE UP (ref 27–34)
MCHC RBC-ENTMCNC: 32.8 % — SIGNIFICANT CHANGE UP (ref 32–36)
MCV RBC AUTO: 89.2 FL — SIGNIFICANT CHANGE UP (ref 80–100)
NRBC # FLD: 0 — SIGNIFICANT CHANGE UP
PHOSPHATE SERPL-MCNC: 2.3 MG/DL — LOW (ref 2.5–4.5)
PLATELET # BLD AUTO: 249 K/UL — SIGNIFICANT CHANGE UP (ref 150–400)
PMV BLD: 10.8 FL — SIGNIFICANT CHANGE UP (ref 7–13)
POTASSIUM SERPL-MCNC: 3.8 MMOL/L — SIGNIFICANT CHANGE UP (ref 3.5–5.3)
POTASSIUM SERPL-SCNC: 3.8 MMOL/L — SIGNIFICANT CHANGE UP (ref 3.5–5.3)
PROTHROM AB SERPL-ACNC: 13.1 SEC — SIGNIFICANT CHANGE UP (ref 9.8–13.1)
RBC # BLD: 4.07 M/UL — LOW (ref 4.2–5.8)
RBC # FLD: 14.3 % — SIGNIFICANT CHANGE UP (ref 10.3–14.5)
SODIUM SERPL-SCNC: 142 MMOL/L — SIGNIFICANT CHANGE UP (ref 135–145)
WBC # BLD: 8.68 K/UL — SIGNIFICANT CHANGE UP (ref 3.8–10.5)
WBC # FLD AUTO: 8.68 K/UL — SIGNIFICANT CHANGE UP (ref 3.8–10.5)

## 2018-06-05 PROCEDURE — 78452 HT MUSCLE IMAGE SPECT MULT: CPT | Mod: 26

## 2018-06-05 PROCEDURE — 93016 CV STRESS TEST SUPVJ ONLY: CPT | Mod: GC

## 2018-06-05 PROCEDURE — 93018 CV STRESS TEST I&R ONLY: CPT | Mod: GC

## 2018-06-05 RX ORDER — ALBUTEROL 90 UG/1
2 AEROSOL, METERED ORAL
Qty: 1 | Refills: 0 | OUTPATIENT
Start: 2018-06-05 | End: 2018-07-04

## 2018-06-05 RX ORDER — CLOPIDOGREL BISULFATE 75 MG/1
1 TABLET, FILM COATED ORAL
Qty: 30 | Refills: 0 | OUTPATIENT
Start: 2018-06-05 | End: 2018-07-04

## 2018-06-05 RX ORDER — ATORVASTATIN CALCIUM 80 MG/1
1 TABLET, FILM COATED ORAL
Qty: 30 | Refills: 0 | OUTPATIENT
Start: 2018-06-05 | End: 2018-07-04

## 2018-06-05 RX ORDER — POTASSIUM PHOSPHATE, MONOBASIC POTASSIUM PHOSPHATE, DIBASIC 236; 224 MG/ML; MG/ML
15 INJECTION, SOLUTION INTRAVENOUS ONCE
Qty: 0 | Refills: 0 | Status: DISCONTINUED | OUTPATIENT
Start: 2018-06-05 | End: 2018-06-05

## 2018-06-05 RX ORDER — POTASSIUM PHOSPHATE, MONOBASIC POTASSIUM PHOSPHATE, DIBASIC 236; 224 MG/ML; MG/ML
15 INJECTION, SOLUTION INTRAVENOUS ONCE
Qty: 0 | Refills: 0 | Status: COMPLETED | OUTPATIENT
Start: 2018-06-05 | End: 2018-06-05

## 2018-06-05 RX ORDER — APIXABAN 2.5 MG/1
1 TABLET, FILM COATED ORAL
Qty: 60 | Refills: 0 | OUTPATIENT
Start: 2018-06-05 | End: 2018-07-04

## 2018-06-05 RX ORDER — TAMSULOSIN HYDROCHLORIDE 0.4 MG/1
0.4 CAPSULE ORAL AT BEDTIME
Qty: 0 | Refills: 0 | Status: DISCONTINUED | OUTPATIENT
Start: 2018-06-05 | End: 2018-06-06

## 2018-06-05 RX ORDER — TIOTROPIUM BROMIDE 18 UG/1
1 CAPSULE ORAL; RESPIRATORY (INHALATION)
Qty: 30 | Refills: 0 | OUTPATIENT
Start: 2018-06-05 | End: 2018-07-04

## 2018-06-05 RX ORDER — BUDESONIDE AND FORMOTEROL FUMARATE DIHYDRATE 160; 4.5 UG/1; UG/1
2 AEROSOL RESPIRATORY (INHALATION)
Qty: 1 | Refills: 0 | OUTPATIENT
Start: 2018-06-05 | End: 2018-07-04

## 2018-06-05 RX ORDER — OXYCODONE HYDROCHLORIDE 5 MG/1
1 TABLET ORAL
Qty: 12 | Refills: 0 | OUTPATIENT
Start: 2018-06-05 | End: 2018-06-07

## 2018-06-05 RX ORDER — ASPIRIN/CALCIUM CARB/MAGNESIUM 324 MG
1 TABLET ORAL
Qty: 0 | Refills: 0 | COMMUNITY
Start: 2018-06-05

## 2018-06-05 RX ADMIN — POTASSIUM PHOSPHATE, MONOBASIC POTASSIUM PHOSPHATE, DIBASIC 62.5 MILLIMOLE(S): 236; 224 INJECTION, SOLUTION INTRAVENOUS at 08:55

## 2018-06-05 RX ADMIN — OXYCODONE HYDROCHLORIDE 5 MILLIGRAM(S): 5 TABLET ORAL at 16:55

## 2018-06-05 RX ADMIN — ATORVASTATIN CALCIUM 80 MILLIGRAM(S): 80 TABLET, FILM COATED ORAL at 21:07

## 2018-06-05 RX ADMIN — Medication 100 MILLIGRAM(S): at 05:07

## 2018-06-05 RX ADMIN — CLOPIDOGREL BISULFATE 75 MILLIGRAM(S): 75 TABLET, FILM COATED ORAL at 12:01

## 2018-06-05 RX ADMIN — OXYCODONE HYDROCHLORIDE 5 MILLIGRAM(S): 5 TABLET ORAL at 09:50

## 2018-06-05 RX ADMIN — OXYCODONE HYDROCHLORIDE 5 MILLIGRAM(S): 5 TABLET ORAL at 08:55

## 2018-06-05 RX ADMIN — TAMSULOSIN HYDROCHLORIDE 0.4 MILLIGRAM(S): 0.4 CAPSULE ORAL at 21:07

## 2018-06-05 RX ADMIN — APIXABAN 5 MILLIGRAM(S): 2.5 TABLET, FILM COATED ORAL at 17:33

## 2018-06-05 RX ADMIN — Medication 100 MILLIGRAM(S): at 17:33

## 2018-06-05 RX ADMIN — TIOTROPIUM BROMIDE 1 CAPSULE(S): 18 CAPSULE ORAL; RESPIRATORY (INHALATION) at 21:07

## 2018-06-05 RX ADMIN — BUDESONIDE AND FORMOTEROL FUMARATE DIHYDRATE 2 PUFF(S): 160; 4.5 AEROSOL RESPIRATORY (INHALATION) at 08:55

## 2018-06-05 RX ADMIN — Medication 81 MILLIGRAM(S): at 12:01

## 2018-06-05 RX ADMIN — OXYCODONE HYDROCHLORIDE 5 MILLIGRAM(S): 5 TABLET ORAL at 17:35

## 2018-06-05 RX ADMIN — BUDESONIDE AND FORMOTEROL FUMARATE DIHYDRATE 2 PUFF(S): 160; 4.5 AEROSOL RESPIRATORY (INHALATION) at 21:07

## 2018-06-05 RX ADMIN — APIXABAN 5 MILLIGRAM(S): 2.5 TABLET, FILM COATED ORAL at 05:07

## 2018-06-05 NOTE — DISCHARGE NOTE ADULT - CARE PLAN
Principal Discharge DX:	Ventral hernia  Goal:	s/p incisional hernia repair with mesh  Assessment and plan of treatment:	WOUND CARE:  Please keep incisions clean and dry. Please do not Scrub or rub incisions. Do not use lotion or powder on incisions.   BATHING: You may shower and/or sponge bathe. You may use warm soapy water in the shower and rinse, pat dry.  ACTIVITY: No heavy lifting or straining. Otherwise, you may return to your usual level of physical activity. If you are taking narcotic pain medication DO NOT drive a car, operate machinery or make important decisions.  DIET: Return to your usual diet.  NOTIFY YOUR SURGEON IF: You have any bleeding that does not stop, any pus draining from your wound(s), any fever (over 100.4 F) persistent nausea/vomiting, or if your pain is not controlled on your discharge pain medications, unable to urinate.  Please follow up with your primary care physician in one week regarding your hospitalization, bring copies of your discharge paperwork.  Please follow up with your surgeon, Dr. Ring  Secondary Diagnosis:	Ventral hernia without obstruction or gangrene  Goal:	s/p component separation  Assessment and plan of treatment:	follow up with Dr Chester  Secondary Diagnosis:	ACS (acute coronary syndrome)  Goal:	NSTEMI  Assessment and plan of treatment:	Follow up with Dr JONH Howell  Continue taking aspirin, plavix, lipitor, metoprolol 100 mg twice a day Principal Discharge DX:	Ventral hernia  Goal:	s/p incisional hernia repair with mesh  Assessment and plan of treatment:	WOUND CARE:  Please keep incisions clean and dry. Please do not Scrub or rub incisions. Do not use lotion or powder on incisions.   BATHING: You may shower and/or sponge bathe. You may use warm soapy water in the shower and rinse, pat dry.  ACTIVITY: No heavy lifting or straining. Otherwise, you may return to your usual level of physical activity. If you are taking narcotic pain medication DO NOT drive a car, operate machinery or make important decisions.  DIET: Return to your usual diet.  NOTIFY YOUR SURGEON IF: You have any bleeding that does not stop, any pus draining from your wound(s), any fever (over 100.4 F) persistent nausea/vomiting, or if your pain is not controlled on your discharge pain medications, unable to urinate.  Please follow up with your primary care physician in one week regarding your hospitalization, bring copies of your discharge paperwork.  Please follow up with your surgeon, Dr. Ring  Secondary Diagnosis:	Ventral hernia without obstruction or gangrene  Goal:	s/p component separation  Assessment and plan of treatment:	follow up with Dr Chester  Secondary Diagnosis:	ACS (acute coronary syndrome)  Goal:	NSTEMI  Assessment and plan of treatment:	Follow up with Dr JONH Howell  Continue taking aspirin, plavix, lipitor, metoprolol 100 mg twice a day  Your left ventriclar ejection fraction is 47% Principal Discharge DX:	Ventral hernia  Goal:	s/p incisional hernia repair with mesh  Assessment and plan of treatment:	WOUND CARE:  Please keep incisions clean and dry. Please do not Scrub or rub incisions. Do not use lotion or powder on incisions.   BATHING: You may shower and/or sponge bathe. You may use warm soapy water in the shower and rinse, pat dry.  ACTIVITY: No heavy lifting or straining. Otherwise, you may return to your usual level of physical activity. If you are taking narcotic pain medication DO NOT drive a car, operate machinery or make important decisions.  DIET: Return to your usual diet.  NOTIFY YOUR SURGEON IF: You have any bleeding that does not stop, any pus draining from your wound(s), any fever (over 100.4 F) persistent nausea/vomiting, or if your pain is not controlled on your discharge pain medications, unable to urinate.  Please follow up with your primary care physician in one week regarding your hospitalization, bring copies of your discharge paperwork.  Please follow up with your surgeon, Dr. Ring  Secondary Diagnosis:	Ventral hernia without obstruction or gangrene  Goal:	s/p component separation  Assessment and plan of treatment:	follow up with Dr Chester  Secondary Diagnosis:	ACS (acute coronary syndrome)  Goal:	NSTEMI  Assessment and plan of treatment:	Follow up with Dr JONH Howell next week  Continue taking aspirin, plavix, lipitor, metoprolol 100 mg twice a day  Your left ventriclar ejection fraction is 47%

## 2018-06-05 NOTE — DISCHARGE NOTE ADULT - MEDICATION SUMMARY - MEDICATIONS TO STOP TAKING
I will STOP taking the medications listed below when I get home from the hospital:  None I will STOP taking the medications listed below when I get home from the hospital:    DilTIAZem Hydrochloride  mg/24 hours oral capsule, extended release  -- 1 cap(s) by mouth once a day  in morning  -- It is very important that you take or use this exactly as directed.  Do not skip doses or discontinue unless directed by your doctor.  Some non-prescription drugs may aggravate your condition.  Read all labels carefully.  If a warning appears, check with your doctor before taking.  Swallow whole.  Do not crush.

## 2018-06-05 NOTE — PROVIDER CONTACT NOTE (OTHER) - ASSESSMENT
No swelling, extremity is cool to touch
Pt. is a&oX4, BP 82/50, other VSS. Pt. intermittantly c/o of nausea, finds temporary relief with Zofran. IVF continued, voiding adequately throughout night. No SOB assessed, pt denies pain at this time.

## 2018-06-05 NOTE — PROGRESS NOTE ADULT - SUBJECTIVE AND OBJECTIVE BOX
VENKATA ARTEAGAR  [id number]    Subjective:  Patient is a 63y old  Male, no acute events overnight, transferred out of SICU without issue. Pain controlled, tolerating diet and having bowel function.    Objective:  T(C): 36.7 (06-05-18 @ 05:05), Max: 37 (06-04-18 @ 23:36)  HR: 103 (06-05-18 @ 05:05) (70 - 103)  BP: 143/95 (06-05-18 @ 05:05) (101/70 - 143/95)  RR: 18 (06-05-18 @ 05:05) (12 - 21)  SpO2: 94% (06-05-18 @ 05:05) (89% - 100%)  Wt(kg): --   06-04    138  |  98  |  15  ----------------------------<  99  3.6   |  27  |  1.04    Ca    8.7      04 Jun 2018 02:44  Phos  2.5     06-04  Mg     2.2     06-04    TPro  6.9  /  Alb  3.0<L>  /  TBili  0.8  /  DBili  0.3<H>  /  AST  26  /  ALT  27  /  AlkPhos  214<H>  06-04                        11.9   8.68  )-----------( 249      ( 05 Jun 2018 05:30 )             36.3       06-03 @ 07:01  -  06-04 @ 07:00  --------------------------------------------------------  IN: 943.5 mL / OUT: 1500 mL / NET: -556.5 mL    06-04 @ 07:01  -  06-05 @ 06:36  --------------------------------------------------------  IN: 50 mL / OUT: 875 mL / NET: -825 mL      PHYSICAL EXAM:    General: NAD, resting comfortably in bed  Abd: Incision CDI, abdomen soft, nontender, nondistended    MEDICATIONS  (STANDING):  apixaban 5 milliGRAM(s) Oral every 12 hours  aspirin  chewable 81 milliGRAM(s) Oral daily  atorvastatin 80 milliGRAM(s) Oral at bedtime  buDESOnide 160 MICROgram(s)/formoterol 4.5 MICROgram(s) Inhaler 2 Puff(s) Inhalation two times a day  chlorhexidine 4% Liquid 1 Application(s) Topical <User Schedule>  clopidogrel Tablet 75 milliGRAM(s) Oral daily  metoprolol tartrate 100 milliGRAM(s) Oral two times a day  tiotropium 18 MICROgram(s) Capsule 1 Capsule(s) Inhalation at bedtime    MEDICATIONS  (PRN):  acetaminophen   Tablet. 650 milliGRAM(s) Oral every 6 hours PRN Mild Pain (1 - 3)  ALBUTerol    90 MICROgram(s) HFA Inhaler 2 Puff(s) Inhalation every 6 hours PRN Shortness of Breath and/or Wheezing  HYDROmorphone  Injectable 0.5 milliGRAM(s) IV Push every 3 hours PRN Breakthrough pain  oxyCODONE    IR 5 milliGRAM(s) Oral every 6 hours PRN Moderate Pain (4 - 6)  oxyCODONE    IR 10 milliGRAM(s) Oral every 6 hours PRN Severe Pain (7 - 10)      Assessment/Plan:  Patient is a 63y old  Male who presents with a chief complaint of incisional hernia. s/p Hernia repair, cardiac enzyme leak  - Encourage ambulation  - Await stress test

## 2018-06-05 NOTE — PROGRESS NOTE ADULT - SUBJECTIVE AND OBJECTIVE BOX
NAD  PE: Abd incision C/D/I non distended  Plan: closure stable          d/w dr. Chester          Await stress test

## 2018-06-05 NOTE — DISCHARGE NOTE ADULT - CARE PROVIDERS DIRECT ADDRESSES
,[email or web address],DirectAddress_Unknown,DirectAddress_Unknown

## 2018-06-05 NOTE — PROVIDER CONTACT NOTE (OTHER) - ACTION/TREATMENT ORDERED:
As per provider, pt will have a duplex US ordered. Will continue to monitor.
MD RIVERA notified, ordering LR Bolus and labs. Will continue to closely monitor.

## 2018-06-05 NOTE — PROGRESS NOTE ADULT - SUBJECTIVE AND OBJECTIVE BOX
Morning Surgical Progress Note    SUBJECTIVE: Patient seen and examined at bedside with surgical team, patient without complaints this am. he denies nausea and vomiting, denies shortness of breath and abdominal pain. He is passing gas and having bowel movements.    Vital Signs Last 24 Hrs  T(C): 36.5 (05 Jun 2018 08:40), Max: 37 (04 Jun 2018 23:36)  T(F): 97.7 (05 Jun 2018 08:40), Max: 98.6 (04 Jun 2018 23:36)  HR: 90 (05 Jun 2018 08:40) (73 - 103)  BP: 146/87 (05 Jun 2018 08:40) (107/74 - 146/87)  BP(mean): 82 (04 Jun 2018 18:00) (82 - 96)  RR: 18 (05 Jun 2018 08:40) (18 - 21)  SpO2: 92% (05 Jun 2018 08:40) (89% - 97%)I&O's Detail    04 Jun 2018 07:01  -  05 Jun 2018 07:00  --------------------------------------------------------  IN:    heparin  Infusion.: 50 mL  Total IN: 50 mL  OUT:    Voided: 875 mL  Total OUT: 875 mL    Total NET: -825 mL  05 Jun 2018 07:01  -  05 Jun 2018 09:30  --------------------------------------------------------  IN:  Total IN: 0 mL    OUT:    Voided: 220 mL  Total OUT: 220 mL  Total NET: -220 mL      MEDICATIONS  (STANDING):  apixaban 5 milliGRAM(s) Oral every 12 hours  aspirin  chewable 81 milliGRAM(s) Oral daily  atorvastatin 80 milliGRAM(s) Oral at bedtime  buDESOnide 160 MICROgram(s)/formoterol 4.5 MICROgram(s) Inhaler 2 Puff(s) Inhalation two times a day  chlorhexidine 4% Liquid 1 Application(s) Topical <User Schedule>  clopidogrel Tablet 75 milliGRAM(s) Oral daily  metoprolol tartrate 100 milliGRAM(s) Oral two times a day  potassium phosphate IVPB 15 milliMole(s) IV Intermittent once  tiotropium 18 MICROgram(s) Capsule 1 Capsule(s) Inhalation at bedtime    MEDICATIONS  (PRN):  acetaminophen   Tablet. 650 milliGRAM(s) Oral every 6 hours PRN Mild Pain (1 - 3)  ALBUTerol    90 MICROgram(s) HFA Inhaler 2 Puff(s) Inhalation every 6 hours PRN Shortness of Breath and/or Wheezing  HYDROmorphone  Injectable 0.5 milliGRAM(s) IV Push every 3 hours PRN Breakthrough pain  oxyCODONE    IR 5 milliGRAM(s) Oral every 6 hours PRN Moderate Pain (4 - 6)  oxyCODONE    IR 10 milliGRAM(s) Oral every 6 hours PRN Severe Pain (7 - 10)      Physical Exam  General: A&Ox3, NAD, off of NC  Abdominal: soft nontender nondistended     LABS:                        11.9   8.68  )-----------( 249      ( 05 Jun 2018 05:30 )             36.3     06-05    142  |  101  |  17  ----------------------------<  102<H>  3.8   |  28  |  0.94    Ca    8.8      05 Jun 2018 05:30  Phos  2.3     06-05  Mg     2.0     06-05    TPro  6.9  /  Alb  3.0<L>  /  TBili  0.8  /  DBili  0.3<H>  /  AST  26  /  ALT  27  /  AlkPhos  214<H>  06-04    PT/INR - ( 05 Jun 2018 05:30 )   PT: 13.1 SEC;   INR: 1.18          PTT - ( 05 Jun 2018 05:30 )  PTT:44.5 SEC  LIVER FUNCTIONS - ( 04 Jun 2018 02:44 )  Alb: 3.0 g/dL / Pro: 6.9 g/dL / ALK PHOS: 214 u/L / ALT: 27 u/L / AST: 26 u/L / GGT: x

## 2018-06-05 NOTE — DISCHARGE NOTE ADULT - PLAN OF CARE
s/p incisional hernia repair with mesh WOUND CARE:  Please keep incisions clean and dry. Please do not Scrub or rub incisions. Do not use lotion or powder on incisions.   BATHING: You may shower and/or sponge bathe. You may use warm soapy water in the shower and rinse, pat dry.  ACTIVITY: No heavy lifting or straining. Otherwise, you may return to your usual level of physical activity. If you are taking narcotic pain medication DO NOT drive a car, operate machinery or make important decisions.  DIET: Return to your usual diet.  NOTIFY YOUR SURGEON IF: You have any bleeding that does not stop, any pus draining from your wound(s), any fever (over 100.4 F) persistent nausea/vomiting, or if your pain is not controlled on your discharge pain medications, unable to urinate.  Please follow up with your primary care physician in one week regarding your hospitalization, bring copies of your discharge paperwork.  Please follow up with your surgeon, Dr. Ring s/p component separation follow up with Dr Chester NSTEMI Follow up with Dr JONH Howell  Continue taking aspirin, plavix, lipitor, metoprolol 100 mg twice a day Follow up with Dr JONH Howell  Continue taking aspirin, plavix, lipitor, metoprolol 100 mg twice a day  Your left ventriclar ejection fraction is 47% Follow up with Dr JONH Howell next week  Continue taking aspirin, plavix, lipitor, metoprolol 100 mg twice a day  Your left ventriclar ejection fraction is 47%

## 2018-06-05 NOTE — PROGRESS NOTE ADULT - ASSESSMENT
Patient is a 63y old  Male who presents with a chief complaint of incisional hernia troponin elevated post op. For stress test today.  - Continue diet  - Encourage ambulation  - Off of cardizem, on metoprolol 100 mg bid, on aspirin, plavix, and Eliquis, lipitor 80 mg  - D/c after echo if ok with cardiology, follow up outpatient cardiac medications  - If needs additional cardiac workup inpatient transfer to medication  - D/c after echo if ok with cardiology

## 2018-06-05 NOTE — DISCHARGE NOTE ADULT - MEDICATION SUMMARY - MEDICATIONS TO TAKE
I will START or STAY ON the medications listed below when I get home from the hospital:    acetaminophen 325 mg oral tablet  -- 2 tab(s) by mouth every 4 hours, as needed for pain  -- Indication: For Mild pain    oxyCODONE 5 mg oral tablet  -- 1 tab(s) by mouth every 6 hours as needed for moderate to severe pain MDD:4   -- Caution federal law prohibits the transfer of this drug to any person other  than the person for whom it was prescribed.  It is very important that you take or use this exactly as directed.  Do not skip doses or discontinue unless directed by your doctor.  May cause drowsiness.  Alcohol may intensify this effect.  Use care when operating dangerous machinery.  This prescription cannot be refilled.  Using more of this medication than prescribed may cause serious breathing problems.    -- Indication: For Moderate to severe pain    aspirin 81 mg oral tablet, chewable  -- 1 tab(s) by mouth once a day  -- Indication: For Coronary artery disease, antiplatelet, blood thinner    tamsulosin 0.4 mg oral capsule  -- 1 cap(s) by mouth once a day (at bedtime)  -- Indication: For BPH    Eliquis 5 mg oral tablet  -- 1 tab(s) by mouth 2 times a day MDD:2  -- Check with your doctor before becoming pregnant.  It is very important that you take or use this exactly as directed.  Do not skip doses or discontinue unless directed by your doctor.  Obtain medical advice before taking any non-prescription drugs as some may affect the action of this medication.    -- Indication: For Blood thinner    atorvastatin 80 mg oral tablet  -- 1 tab(s) by mouth once a day MDD:1  -- Avoid grapefruit and grapefruit juice while taking this medication.  Do not take this drug if you are pregnant.  It is very important that you take or use this exactly as directed.  Do not skip doses or discontinue unless directed by your doctor.  Obtain medical advice before taking any non-prescription drugs as some may affect the action of this medication.  Take with food or milk.    -- Indication: For Coronary artery disease    clopidogrel 75 mg oral tablet  -- 1 tab(s) by mouth once a day MDD:1  -- Indication: For Antiplatelet    Metoprolol Tartrate 100 mg oral tablet  -- 1 tab(s) by mouth 2 times a day  -- Indication: For Hypertension, unspecified type    albuterol 90 mcg/inh inhalation aerosol  -- 2 puff(s) inhaled every 6 hours, As needed, Shortness of Breath and/or Wheezing MDD:8  -- Indication: For For shortness of breath as needed    tiotropium 18 mcg inhalation capsule  -- 1 cap(s) inhaled once a day (at bedtime) MDD:1  -- Indication: For Emphysema     budesonide-formoterol 160 mcg-4.5 mcg/inh inhalation aerosol  -- 2 puff(s) inhaled 2 times a day   -- Indication: For Emphysema

## 2018-06-05 NOTE — DISCHARGE NOTE ADULT - CARE PROVIDER_API CALL
Esther Howell), Cardiovascular Disease  [facility], [place] [facility] Card[facility]  [id number] [address] [place] [address] [address] [address]  [place] [address]  Phone: [phone]  Fax: [phone]    Lori Ring (MD), Surgery  [address] [facility]  [address]  [place] [address]  Phone: [phone]  Fax: [phone]    Rome Chester), Plastic Surgery; Surgery  [address] [facility]  [address]  [place] [address]  Phone: [phone]  Fax: [phone]

## 2018-06-05 NOTE — DISCHARGE NOTE ADULT - PATIENT PORTAL LINK FT
You can access the [facility] Patient Portal, offered by [facility], by registering with the following website: [email or web address]

## 2018-06-06 VITALS — SYSTOLIC BLOOD PRESSURE: 121 MMHG | HEART RATE: 90 BPM | DIASTOLIC BLOOD PRESSURE: 79 MMHG

## 2018-06-06 LAB
BUN SERPL-MCNC: 13 MG/DL — SIGNIFICANT CHANGE UP (ref 7–23)
CALCIUM SERPL-MCNC: 8.7 MG/DL — SIGNIFICANT CHANGE UP (ref 8.4–10.5)
CHLORIDE SERPL-SCNC: 98 MMOL/L — SIGNIFICANT CHANGE UP (ref 98–107)
CO2 SERPL-SCNC: 25 MMOL/L — SIGNIFICANT CHANGE UP (ref 22–31)
CREAT SERPL-MCNC: 0.85 MG/DL — SIGNIFICANT CHANGE UP (ref 0.5–1.3)
GLUCOSE SERPL-MCNC: 110 MG/DL — HIGH (ref 70–99)
HCT VFR BLD CALC: 35.9 % — LOW (ref 39–50)
HGB BLD-MCNC: 11.7 G/DL — LOW (ref 13–17)
MAGNESIUM SERPL-MCNC: 2 MG/DL — SIGNIFICANT CHANGE UP (ref 1.6–2.6)
MCHC RBC-ENTMCNC: 29 PG — SIGNIFICANT CHANGE UP (ref 27–34)
MCHC RBC-ENTMCNC: 32.6 % — SIGNIFICANT CHANGE UP (ref 32–36)
MCV RBC AUTO: 88.9 FL — SIGNIFICANT CHANGE UP (ref 80–100)
NRBC # FLD: 0 — SIGNIFICANT CHANGE UP
PHOSPHATE SERPL-MCNC: 2.6 MG/DL — SIGNIFICANT CHANGE UP (ref 2.5–4.5)
PLATELET # BLD AUTO: 304 K/UL — SIGNIFICANT CHANGE UP (ref 150–400)
PMV BLD: 10.8 FL — SIGNIFICANT CHANGE UP (ref 7–13)
POTASSIUM SERPL-MCNC: 3.8 MMOL/L — SIGNIFICANT CHANGE UP (ref 3.5–5.3)
POTASSIUM SERPL-SCNC: 3.8 MMOL/L — SIGNIFICANT CHANGE UP (ref 3.5–5.3)
RBC # BLD: 4.04 M/UL — LOW (ref 4.2–5.8)
RBC # FLD: 14.1 % — SIGNIFICANT CHANGE UP (ref 10.3–14.5)
SODIUM SERPL-SCNC: 137 MMOL/L — SIGNIFICANT CHANGE UP (ref 135–145)
WBC # BLD: 8.24 K/UL — SIGNIFICANT CHANGE UP (ref 3.8–10.5)
WBC # FLD AUTO: 8.24 K/UL — SIGNIFICANT CHANGE UP (ref 3.8–10.5)

## 2018-06-06 PROCEDURE — 93971 EXTREMITY STUDY: CPT | Mod: 26

## 2018-06-06 RX ADMIN — APIXABAN 5 MILLIGRAM(S): 2.5 TABLET, FILM COATED ORAL at 17:07

## 2018-06-06 RX ADMIN — OXYCODONE HYDROCHLORIDE 10 MILLIGRAM(S): 5 TABLET ORAL at 16:24

## 2018-06-06 RX ADMIN — Medication 100 MILLIGRAM(S): at 06:46

## 2018-06-06 RX ADMIN — CLOPIDOGREL BISULFATE 75 MILLIGRAM(S): 75 TABLET, FILM COATED ORAL at 11:17

## 2018-06-06 RX ADMIN — APIXABAN 5 MILLIGRAM(S): 2.5 TABLET, FILM COATED ORAL at 06:46

## 2018-06-06 RX ADMIN — OXYCODONE HYDROCHLORIDE 10 MILLIGRAM(S): 5 TABLET ORAL at 08:30

## 2018-06-06 RX ADMIN — Medication 100 MILLIGRAM(S): at 17:07

## 2018-06-06 RX ADMIN — OXYCODONE HYDROCHLORIDE 10 MILLIGRAM(S): 5 TABLET ORAL at 15:41

## 2018-06-06 RX ADMIN — Medication 81 MILLIGRAM(S): at 11:17

## 2018-06-06 RX ADMIN — OXYCODONE HYDROCHLORIDE 10 MILLIGRAM(S): 5 TABLET ORAL at 07:41

## 2018-06-06 NOTE — PROGRESS NOTE ADULT - ASSESSMENT
63M s/p hernia repair / component separation  -- Encourage ambulation  -- f/u cards recommendations    [id number]

## 2018-06-06 NOTE — PROGRESS NOTE ADULT - SUBJECTIVE AND OBJECTIVE BOX
Morning Surgical Progress Note    SUBJECTIVE: Patient seen and examined at bedside with surgical team, patient complained of rle calf pain overnight. Currently denies chest pain, shortness of breath, he is tolerating his diet. He went for a stress test yesterday, awaiting cardiology recommendations    Vital Signs Last 24 Hrs  T(C): 36.8 (06 Jun 2018 04:55), Max: 37.3 (05 Jun 2018 16:31)  T(F): 98.2 (06 Jun 2018 04:55), Max: 99.1 (05 Jun 2018 16:31)  HR: 93 (06 Jun 2018 04:55) (90 - 101)  BP: 137/79 (06 Jun 2018 04:55) (125/88 - 146/87)  RR: 18 (06 Jun 2018 04:55) (18 - 18)  SpO2: 99% (06 Jun 2018 04:55) (90% - 99%)I&O's Detail    04 Jun 2018 07:01  -  05 Jun 2018 07:00  --------------------------------------------------------  IN:    heparin  Infusion.: 50 mL  Total IN: 50 mL    OUT:    Voided: 875 mL  Total OUT: 875 mL  Total NET: -825 mL    05 Jun 2018 07:01  -  06 Jun 2018 06:06  --------------------------------------------------------  IN:  Total IN: 0 mL    OUT:    Voided: 1290 mL  Total OUT: 1290 mL  Total NET: -1290 mL      MEDICATIONS  (STANDING):  apixaban 5 milliGRAM(s) Oral every 12 hours  aspirin  chewable 81 milliGRAM(s) Oral daily  atorvastatin 80 milliGRAM(s) Oral at bedtime  buDESOnide 160 MICROgram(s)/formoterol 4.5 MICROgram(s) Inhaler 2 Puff(s) Inhalation two times a day  clopidogrel Tablet 75 milliGRAM(s) Oral daily  metoprolol tartrate 100 milliGRAM(s) Oral two times a day  tamsulosin 0.4 milliGRAM(s) Oral at bedtime  tiotropium 18 MICROgram(s) Capsule 1 Capsule(s) Inhalation at bedtime  MEDICATIONS  (PRN):  acetaminophen   Tablet. 650 milliGRAM(s) Oral every 6 hours PRN Mild Pain (1 - 3)  ALBUTerol    90 MICROgram(s) HFA Inhaler 2 Puff(s) Inhalation every 6 hours PRN Shortness of Breath and/or Wheezing  HYDROmorphone  Injectable 0.5 milliGRAM(s) IV Push every 3 hours PRN Breakthrough pain  oxyCODONE    IR 5 milliGRAM(s) Oral every 6 hours PRN Moderate Pain (4 - 6)  oxyCODONE    IR 10 milliGRAM(s) Oral every 6 hours PRN Severe Pain (7 - 10)      Physical Exam  General: A&Ox3, NAD  Abdominal: soft nontender  Ext: B/L LE no edema, rle soft tender posterior lower leg, warm, no rashes or lesions    LABS:                        11.9   8.68  )-----------( 249      ( 05 Jun 2018 05:30 )             36.3     06-05    142  |  101  |  17  ----------------------------<  102<H>  3.8   |  28  |  0.94    Ca    8.8      05 Jun 2018 05:30  Phos  2.3     06-05  Mg     2.0     06-05      PT/INR - ( 05 Jun 2018 05:30 )   PT: 13.1 SEC;   INR: 1.18          PTT - ( 05 Jun 2018 05:30 )  PTT:44.5 SEC Morning Surgical Progress Note    SUBJECTIVE: Patient seen and examined at bedside with surgical team, patient complained of rle calf pain overnight. Currently denies chest pain, shortness of breath, he is tolerating his diet. He went for a stress test yesterday, awaiting cardiology recommendations    Vital Signs Last 24 Hrs  T(C): 36.8 (06 Jun 2018 04:55), Max: 37.3 (05 Jun 2018 16:31)  T(F): 98.2 (06 Jun 2018 04:55), Max: 99.1 (05 Jun 2018 16:31)  HR: 93 (06 Jun 2018 04:55) (90 - 101)  BP: 137/79 (06 Jun 2018 04:55) (125/88 - 146/87)  RR: 18 (06 Jun 2018 04:55) (18 - 18)  SpO2: 99% (06 Jun 2018 04:55) (90% - 99%)I&O's Detail    04 Jun 2018 07:01  -  05 Jun 2018 07:00  --------------------------------------------------------  IN:    heparin  Infusion.: 50 mL  Total IN: 50 mL    OUT:    Voided: 875 mL  Total OUT: 875 mL  Total NET: -825 mL    05 Jun 2018 07:01  -  06 Jun 2018 06:06  --------------------------------------------------------  IN:  Total IN: 0 mL    OUT:    Voided: 1290 mL  Total OUT: 1290 mL  Total NET: -1290 mL      MEDICATIONS  (STANDING):  apixaban 5 milliGRAM(s) Oral every 12 hours  aspirin  chewable 81 milliGRAM(s) Oral daily  atorvastatin 80 milliGRAM(s) Oral at bedtime  buDESOnide 160 MICROgram(s)/formoterol 4.5 MICROgram(s) Inhaler 2 Puff(s) Inhalation two times a day  clopidogrel Tablet 75 milliGRAM(s) Oral daily  metoprolol tartrate 100 milliGRAM(s) Oral two times a day  tamsulosin 0.4 milliGRAM(s) Oral at bedtime  tiotropium 18 MICROgram(s) Capsule 1 Capsule(s) Inhalation at bedtime  MEDICATIONS  (PRN):  acetaminophen   Tablet. 650 milliGRAM(s) Oral every 6 hours PRN Mild Pain (1 - 3)  ALBUTerol    90 MICROgram(s) HFA Inhaler 2 Puff(s) Inhalation every 6 hours PRN Shortness of Breath and/or Wheezing  HYDROmorphone  Injectable 0.5 milliGRAM(s) IV Push every 3 hours PRN Breakthrough pain  oxyCODONE    IR 5 milliGRAM(s) Oral every 6 hours PRN Moderate Pain (4 - 6)  oxyCODONE    IR 10 milliGRAM(s) Oral every 6 hours PRN Severe Pain (7 - 10)      Physical Exam  General: A&Ox3, NAD  Abdominal: soft nontender  Ext: B/L LE no edema, rle soft tender posterior lower leg, warm, no rashes or lesions    LABS:                        11.9   8.68  )-----------( 249      ( 05 Jun 2018 05:30 )             36.3     06-05    142  |  101  |  17  ----------------------------<  102<H>  3.8   |  28  |  0.94    Ca    8.8      05 Jun 2018 05:30  Phos  2.3     06-05  Mg     2.0     06-05    PT/INR - ( 05 Jun 2018 05:30 )   PT: 13.1 SEC;   INR: 1.18     PTT - ( 05 Jun 2018 05:30 )  PTT:44.5 SEC    STRESS  Abnormal Study  * Myocardial Perfusion SPECT results are abnormal.  * Review of raw data shows: The study is of good technical  quality.  * The left ventricle was normal in size. There are medium  sized, moderate to severe defects in inferior and  inferoseptal walls that are fixed, suggestive of infarct.  * Post-stress gated wall motion analysis was performed  (LVEF = 47 %;LVEDV = 94 ml.), revealing mild overall  hypokinesis, due to segmental wall motion abnormality.  There was mild inferior / inferoseptal hypokinesis with  severely diminished systolic thickening.  * No clear evidence of ischemia.

## 2018-06-06 NOTE — PROGRESS NOTE ADULT - SUBJECTIVE AND OBJECTIVE BOX
Plastic Surgery Progress Note    S: Patient seen and examined.  stress test abnormal yesterday.    Vital Signs Last 24 Hrs  T(C): 36.8 (06 Jun 2018 04:55), Max: 37.3 (05 Jun 2018 16:31)  T(F): 98.2 (06 Jun 2018 04:55), Max: 99.1 (05 Jun 2018 16:31)  HR: 93 (06 Jun 2018 04:55) (90 - 101)  BP: 137/79 (06 Jun 2018 04:55) (125/88 - 146/87)  BP(mean): --  RR: 18 (06 Jun 2018 04:55) (18 - 18)  SpO2: 99% (06 Jun 2018 04:55) (90% - 99%)  I&O's Detail    05 Jun 2018 07:01  -  06 Jun 2018 07:00  --------------------------------------------------------  IN:  Total IN: 0 mL    OUT:    Voided: 1290 mL  Total OUT: 1290 mL    Total NET: -1290 mL          Physical Exam:  General: Awake and alert, NAD  Abd: soft, NT, ND, incision healing well

## 2018-06-06 NOTE — PROGRESS NOTE ADULT - ASSESSMENT
63yoM w/ PMHx HTN, COPD, aflutter s/p ablation on eliquis, prostate Ca s/p radiation seed implants presents to [facility] for elective epigastric hernia repair following an admission for SBO.  Cardiology consulted for elevated cardiac enzymes CK 1024 CKMB 54.51 Troponin 1.55.  Upon examination, patient is chest pain free and denies history of angina.  Lactate improved after IVF (1.6).   CE downtrending. New Q waves and ST changes in anteroseptal leads. NST reviewed and patient with mod to severe inferior/inferoseptal FIXED defects, significant for scar and NO ischemia. CE trending down and no symptoms. Likely happened during recent course but currently he is stable.   -given likely recent cardiac injury, he should be on aspirin and clopidogrel along with apixaban Can likely be transitioned to 2 agents as outpatient.   -c/w metoprolol 100mg bid metoprolol and atorvastatin 80 mg  -ACE can be added as outpatient to prevent remodeling   -needs follow up with Dr. Cooley within 1 week, his cardiologist, at which point further decision for invasive assessment at that time. otherwise, stable from cardiac standpoint for dc  -please call with any further questions     Please call on [place] cardiology [place] at [phone] with any further questions.

## 2018-06-06 NOTE — PROGRESS NOTE ADULT - PROVIDER SPECIALTY LIST ADULT
Anesthesia
Cardiology
Cardiology
Pain Medicine
Plastic Surgery
SICU
Surgery
Cardiology
SICU
SICU

## 2018-06-06 NOTE — PROGRESS NOTE ADULT - SUBJECTIVE AND OBJECTIVE BOX
24H hour events: No acute events overnight. Had NST yesterday, with results reviewed. Patient stable without chest pain, or SOB.     MEDICATIONS:  apixaban 5 milliGRAM(s) Oral every 12 hours  aspirin  chewable 81 milliGRAM(s) Oral daily  clopidogrel Tablet 75 milliGRAM(s) Oral daily  metoprolol tartrate 100 milliGRAM(s) Oral two times a day  tamsulosin 0.4 milliGRAM(s) Oral at bedtime  ALBUTerol    90 MICROgram(s) HFA Inhaler 2 Puff(s) Inhalation every 6 hours PRN  buDESOnide 160 MICROgram(s)/formoterol 4.5 MICROgram(s) Inhaler 2 Puff(s) Inhalation two times a day  tiotropium 18 MICROgram(s) Capsule 1 Capsule(s) Inhalation at bedtime  acetaminophen   Tablet. 650 milliGRAM(s) Oral every 6 hours PRN  HYDROmorphone  Injectable 0.5 milliGRAM(s) IV Push every 3 hours PRN  oxyCODONE    IR 5 milliGRAM(s) Oral every 6 hours PRN  oxyCODONE    IR 10 milliGRAM(s) Oral every 6 hours PRN  atorvastatin 80 milliGRAM(s) Oral at bedtime    REVIEW OF SYSTEMS:  Complete 10point ROS negative except as documented above.    PHYSICAL EXAM:  T(C): 37 (06-06-18 @ 12:50), Max: 37.3 (06-05-18 @ 16:31)  HR: 92 (06-06-18 @ 12:50) (60 - 97)  BP: 125/92 (06-06-18 @ 12:50) (125/88 - 141/95)  RR: 18 (06-06-18 @ 12:50) (18 - 19)  SpO2: 94% (06-06-18 @ 12:50) (90% - 99%)  Wt(kg): --  I&O's Summary    05 Jun 2018 07:01  -  06 Jun 2018 07:00  --------------------------------------------------------  IN: 0 mL / OUT: 1290 mL / NET: -1290 mL    06 Jun 2018 07:01 - 06 Jun 2018 12:56  --------------------------------------------------------  IN: 0 mL / OUT: 200 mL / NET: -200 mL    Appearance: Normal	  HEENT:   Normal oral mucosa, PERRL, EOMI	  Lymphatic: No lymphadenopathy  Cardiovascular: Normal S1 S2, No JVD, No murmurs, No edema  Respiratory: Lungs clear to auscultation	  Psychiatry: A & O x 3, Mood & affect appropriate  Gastrointestinal:  Soft, Non-tender, + BS	  Skin: No rashes, No ecchymoses, No cyanosis	  Neurologic: Non-focal  Extremities: Normal range of motion, No clubbing, cyanosis or edema  Vascular: Peripheral pulses palpable 2+ bilaterally    LABS:	 	    CBC Full  -  ( 06 Jun 2018 10:14 )  WBC Count : 8.24 K/uL  Hemoglobin : 11.7 g/dL  Hematocrit : 35.9 %  Platelet Count - Automated : 304 K/uL  Mean Cell Volume : 88.9 fL  Mean Cell Hemoglobin : 29.0 pg  Mean Cell Hemoglobin Concentration : 32.6 %  Auto Neutrophil # : x  Auto Lymphocyte # : x  Auto Monocyte # : x  Auto Eosinophil # : x  Auto Basophil # : x  Auto Neutrophil % : x  Auto Lymphocyte % : x  Auto Monocyte % : x  Auto Eosinophil % : x  Auto Basophil % : x    06-06    137  |  98  |  13  ----------------------------<  110<H>  3.8   |  25  |  0.85  06-05    142  |  101  |  17  ----------------------------<  102<H>  3.8   |  28  |  0.94    Ca    8.7      06 Jun 2018 10:14  Ca    8.8      05 Jun 2018 05:30  Phos  2.6     06-06  Phos  2.3     06-05  Mg     2.0     06-06  Mg     2.0     06-05    TELEMETRY: sinus 	    ECG:  	  RADIOLOGY:  OTHER: 	    PREVIOUS DIAGNOSTIC TESTING:    [ ] Echocardiogram:  [ ]  Catheterization:  [ ] Stress Test:  	  	  ASSESSMENT/PLAN:

## 2018-06-06 NOTE — PROGRESS NOTE ADULT - SUBJECTIVE AND OBJECTIVE BOX
Surgery Progress Note     Patient is a 63y old  Male who presents with a chief complaint of     HPI:      PAST MEDICAL & SURGICAL HISTORY:  History of anxiety  Atrial fibrillation and flutter  Ventral hernia  Inguinal hernia  Prostate Cancer: with seeds implant    Diverticulitis of Colon: with Hx of colostomy bag  Emphysema  HTN (Hypertension)  History of electrophysiologic study: 3/18 s/p ablation [facility]  History of ventral hernia repair  S/P Kamini's procedure: with reversal  History of Hernia Surgery: BL inguinal  History of Cholecystectomy  History of Appendectomy      Physical Exam:    Vital Signs Last 24 Hrs  T(C): 37 (06 Jun 2018 12:50), Max: 37.3 (05 Jun 2018 16:31)  T(F): 98.6 (06 Jun 2018 12:50), Max: 99.1 (05 Jun 2018 16:31)  HR: 92 (06 Jun 2018 12:50) (60 - 97)  BP: 125/92 (06 Jun 2018 12:50) (125/88 - 141/95)  BP(mean): --  RR: 18 (06 Jun 2018 12:50) (18 - 19)  SpO2: 94% (06 Jun 2018 12:50) (90% - 99%)    General appearance:  Appears comfortable.  c/o leg pains. had  dopler  study. on  anti coagulants already.        Drainage    Labs:                          11.7   8.24  )-----------( 304      ( 06 Jun 2018 10:14 )             35.9     06-06    137  |  98  |  13  ----------------------------<  110<H>  3.8   |  25  |  0.85    Ca    8.7      06 Jun 2018 10:14  Phos  2.6     06-06  Mg     2.0     06-06            Assessment and Plan:

## 2018-06-06 NOTE — PROGRESS NOTE ADULT - ASSESSMENT
Patient is a 63y old  Male who presents with a chief complaint of incisional hernia troponin elevated post op, s/p stress test  - Continue diet  - Encourage ambulation  - Off of cardizem, on metoprolol 100 mg bid, on aspirin, plavix, and Eliquis, lipitor 80 mg  - F/u with cards to see if patient can be discharged, stable from surgery standpoint  - If needs additional cardiac workup inpatient transfer to medication  - duplex rle  - D/w A team Patient is a 63y old  Male who presents with a chief complaint of incisional hernia troponin elevated post op, s/p stress test with abdominal finding awaiting cardiac recommendations.  - Continue diet  - Encourage ambulation  - Off of cardizem, on metoprolol 100 mg bid, on aspirin, plavix, and Eliquis, lipitor 80 mg  - F/u with cards to see what plan is given stress results  - If needs additional cardiac workup inpatient transfer to medication as he is stable from "surgical" standpoint  - duplex rle for rle pain  - D/w A team and Dr. Chu

## 2018-06-07 ENCOUNTER — INPATIENT (INPATIENT)
Facility: HOSPITAL | Age: 63
LOS: 2 days | Discharge: HOME | End: 2018-06-10
Attending: STUDENT IN AN ORGANIZED HEALTH CARE EDUCATION/TRAINING PROGRAM | Admitting: STUDENT IN AN ORGANIZED HEALTH CARE EDUCATION/TRAINING PROGRAM
Payer: COMMERCIAL

## 2018-06-07 VITALS
OXYGEN SATURATION: 95 % | SYSTOLIC BLOOD PRESSURE: 147 MMHG | TEMPERATURE: 98 F | DIASTOLIC BLOOD PRESSURE: 86 MMHG | RESPIRATION RATE: 18 BRPM | HEART RATE: 106 BPM

## 2018-06-07 DIAGNOSIS — R04.2 HEMOPTYSIS: ICD-10-CM

## 2018-06-07 DIAGNOSIS — Z98.890 OTHER SPECIFIED POSTPROCEDURAL STATES: Chronic | ICD-10-CM

## 2018-06-07 LAB
ALBUMIN SERPL ELPH-MCNC: 3 G/DL — LOW (ref 3.3–5)
ALP SERPL-CCNC: 212 U/L — HIGH (ref 40–120)
ALT FLD-CCNC: 12 U/L — SIGNIFICANT CHANGE UP (ref 4–41)
APTT BLD: 51.5 SEC — HIGH (ref 27.5–37.4)
AST SERPL-CCNC: 15 U/L — SIGNIFICANT CHANGE UP (ref 4–40)
B PERT DNA SPEC QL NAA+PROBE: SIGNIFICANT CHANGE UP
BASE EXCESS BLDV CALC-SCNC: 3.4 MMOL/L — SIGNIFICANT CHANGE UP
BASOPHILS # BLD AUTO: 0.08 K/UL — SIGNIFICANT CHANGE UP (ref 0–0.2)
BASOPHILS # BLD AUTO: 0.08 K/UL — SIGNIFICANT CHANGE UP (ref 0–0.2)
BASOPHILS NFR BLD AUTO: 0.8 % — SIGNIFICANT CHANGE UP (ref 0–2)
BASOPHILS NFR BLD AUTO: 0.8 % — SIGNIFICANT CHANGE UP (ref 0–2)
BILIRUB SERPL-MCNC: 0.8 MG/DL — SIGNIFICANT CHANGE UP (ref 0.2–1.2)
BLD GP AB SCN SERPL QL: NEGATIVE — SIGNIFICANT CHANGE UP
BLOOD GAS VENOUS - CREATININE: 0.82 MG/DL — SIGNIFICANT CHANGE UP (ref 0.5–1.3)
BUN SERPL-MCNC: 15 MG/DL — SIGNIFICANT CHANGE UP (ref 7–23)
C PNEUM DNA SPEC QL NAA+PROBE: NOT DETECTED — SIGNIFICANT CHANGE UP
CALCIUM SERPL-MCNC: 8.8 MG/DL — SIGNIFICANT CHANGE UP (ref 8.4–10.5)
CHLORIDE BLDV-SCNC: 107 MMOL/L — SIGNIFICANT CHANGE UP (ref 96–108)
CHLORIDE SERPL-SCNC: 102 MMOL/L — SIGNIFICANT CHANGE UP (ref 98–107)
CK MB BLD-MCNC: 1.73 NG/ML — SIGNIFICANT CHANGE UP (ref 1–6.6)
CK MB BLD-MCNC: SIGNIFICANT CHANGE UP (ref 0–2.5)
CK SERPL-CCNC: 98 U/L — SIGNIFICANT CHANGE UP (ref 30–200)
CO2 SERPL-SCNC: 25 MMOL/L — SIGNIFICANT CHANGE UP (ref 22–31)
CREAT SERPL-MCNC: 0.87 MG/DL — SIGNIFICANT CHANGE UP (ref 0.5–1.3)
EOSINOPHIL # BLD AUTO: 0.5 K/UL — SIGNIFICANT CHANGE UP (ref 0–0.5)
EOSINOPHIL # BLD AUTO: 0.54 K/UL — HIGH (ref 0–0.5)
EOSINOPHIL NFR BLD AUTO: 5.3 % — SIGNIFICANT CHANGE UP (ref 0–6)
EOSINOPHIL NFR BLD AUTO: 5.3 % — SIGNIFICANT CHANGE UP (ref 0–6)
FLUAV H1 2009 PAND RNA SPEC QL NAA+PROBE: NOT DETECTED — SIGNIFICANT CHANGE UP
FLUAV H1 RNA SPEC QL NAA+PROBE: NOT DETECTED — SIGNIFICANT CHANGE UP
FLUAV H3 RNA SPEC QL NAA+PROBE: NOT DETECTED — SIGNIFICANT CHANGE UP
FLUAV SUBTYP SPEC NAA+PROBE: SIGNIFICANT CHANGE UP
FLUBV RNA SPEC QL NAA+PROBE: NOT DETECTED — SIGNIFICANT CHANGE UP
GAS PNL BLDV: 136 MMOL/L — SIGNIFICANT CHANGE UP (ref 136–146)
GLUCOSE BLDV-MCNC: 107 — HIGH (ref 70–99)
GLUCOSE SERPL-MCNC: 107 MG/DL — HIGH (ref 70–99)
HADV DNA SPEC QL NAA+PROBE: NOT DETECTED — SIGNIFICANT CHANGE UP
HCO3 BLDV-SCNC: 26 MMOL/L — SIGNIFICANT CHANGE UP (ref 20–27)
HCOV 229E RNA SPEC QL NAA+PROBE: NOT DETECTED — SIGNIFICANT CHANGE UP
HCOV HKU1 RNA SPEC QL NAA+PROBE: NOT DETECTED — SIGNIFICANT CHANGE UP
HCOV NL63 RNA SPEC QL NAA+PROBE: NOT DETECTED — SIGNIFICANT CHANGE UP
HCOV OC43 RNA SPEC QL NAA+PROBE: NOT DETECTED — SIGNIFICANT CHANGE UP
HCT VFR BLD CALC: 31.7 % — LOW (ref 39–50)
HCT VFR BLD CALC: 34.2 % — LOW (ref 39–50)
HCT VFR BLDV CALC: 35.7 % — LOW (ref 39–51)
HGB BLD-MCNC: 10.3 G/DL — LOW (ref 13–17)
HGB BLD-MCNC: 11.1 G/DL — LOW (ref 13–17)
HGB BLDV-MCNC: 11.6 G/DL — LOW (ref 13–17)
HMPV RNA SPEC QL NAA+PROBE: NOT DETECTED — SIGNIFICANT CHANGE UP
HPIV1 RNA SPEC QL NAA+PROBE: NOT DETECTED — SIGNIFICANT CHANGE UP
HPIV2 RNA SPEC QL NAA+PROBE: NOT DETECTED — SIGNIFICANT CHANGE UP
HPIV3 RNA SPEC QL NAA+PROBE: NOT DETECTED — SIGNIFICANT CHANGE UP
HPIV4 RNA SPEC QL NAA+PROBE: NOT DETECTED — SIGNIFICANT CHANGE UP
IMM GRANULOCYTES # BLD AUTO: 0.14 # — SIGNIFICANT CHANGE UP
IMM GRANULOCYTES # BLD AUTO: 0.18 # — SIGNIFICANT CHANGE UP
IMM GRANULOCYTES NFR BLD AUTO: 1.5 % — SIGNIFICANT CHANGE UP (ref 0–1.5)
IMM GRANULOCYTES NFR BLD AUTO: 1.8 % — HIGH (ref 0–1.5)
INR BLD: 1.34 — HIGH (ref 0.88–1.17)
LACTATE BLDV-MCNC: 2 MMOL/L — SIGNIFICANT CHANGE UP (ref 0.5–2)
LYMPHOCYTES # BLD AUTO: 1.85 K/UL — SIGNIFICANT CHANGE UP (ref 1–3.3)
LYMPHOCYTES # BLD AUTO: 1.97 K/UL — SIGNIFICANT CHANGE UP (ref 1–3.3)
LYMPHOCYTES # BLD AUTO: 19.4 % — SIGNIFICANT CHANGE UP (ref 13–44)
LYMPHOCYTES # BLD AUTO: 19.6 % — SIGNIFICANT CHANGE UP (ref 13–44)
M PNEUMO DNA SPEC QL NAA+PROBE: NOT DETECTED — SIGNIFICANT CHANGE UP
MCHC RBC-ENTMCNC: 29.4 PG — SIGNIFICANT CHANGE UP (ref 27–34)
MCHC RBC-ENTMCNC: 29.6 PG — SIGNIFICANT CHANGE UP (ref 27–34)
MCHC RBC-ENTMCNC: 32.5 % — SIGNIFICANT CHANGE UP (ref 32–36)
MCHC RBC-ENTMCNC: 32.5 % — SIGNIFICANT CHANGE UP (ref 32–36)
MCV RBC AUTO: 90.6 FL — SIGNIFICANT CHANGE UP (ref 80–100)
MCV RBC AUTO: 91.2 FL — SIGNIFICANT CHANGE UP (ref 80–100)
MONOCYTES # BLD AUTO: 1.03 K/UL — HIGH (ref 0–0.9)
MONOCYTES # BLD AUTO: 1.09 K/UL — HIGH (ref 0–0.9)
MONOCYTES NFR BLD AUTO: 10.8 % — SIGNIFICANT CHANGE UP (ref 2–14)
MONOCYTES NFR BLD AUTO: 10.9 % — SIGNIFICANT CHANGE UP (ref 2–14)
NEUTROPHILS # BLD AUTO: 5.82 K/UL — SIGNIFICANT CHANGE UP (ref 1.8–7.4)
NEUTROPHILS # BLD AUTO: 6.27 K/UL — SIGNIFICANT CHANGE UP (ref 1.8–7.4)
NEUTROPHILS NFR BLD AUTO: 61.9 % — SIGNIFICANT CHANGE UP (ref 43–77)
NEUTROPHILS NFR BLD AUTO: 61.9 % — SIGNIFICANT CHANGE UP (ref 43–77)
NRBC # FLD: 0 — SIGNIFICANT CHANGE UP
NRBC # FLD: 0.02 — SIGNIFICANT CHANGE UP
PCO2 BLDV: 40 MMHG — LOW (ref 41–51)
PH BLDV: 7.45 PH — HIGH (ref 7.32–7.43)
PLATELET # BLD AUTO: 297 K/UL — SIGNIFICANT CHANGE UP (ref 150–400)
PLATELET # BLD AUTO: 314 K/UL — SIGNIFICANT CHANGE UP (ref 150–400)
PMV BLD: 10.1 FL — SIGNIFICANT CHANGE UP (ref 7–13)
PMV BLD: 10.4 FL — SIGNIFICANT CHANGE UP (ref 7–13)
PO2 BLDV: < 24 MMHG — LOW (ref 35–40)
POTASSIUM BLDV-SCNC: 3.5 MMOL/L — SIGNIFICANT CHANGE UP (ref 3.4–4.5)
POTASSIUM SERPL-MCNC: 4.3 MMOL/L — SIGNIFICANT CHANGE UP (ref 3.5–5.3)
POTASSIUM SERPL-SCNC: 4.3 MMOL/L — SIGNIFICANT CHANGE UP (ref 3.5–5.3)
PROT SERPL-MCNC: 6.6 G/DL — SIGNIFICANT CHANGE UP (ref 6–8.3)
PROTHROM AB SERPL-ACNC: 15 SEC — HIGH (ref 9.8–13.1)
RBC # BLD: 3.5 M/UL — LOW (ref 4.2–5.8)
RBC # BLD: 3.75 M/UL — LOW (ref 4.2–5.8)
RBC # FLD: 13.9 % — SIGNIFICANT CHANGE UP (ref 10.3–14.5)
RBC # FLD: 14 % — SIGNIFICANT CHANGE UP (ref 10.3–14.5)
RH IG SCN BLD-IMP: POSITIVE — SIGNIFICANT CHANGE UP
RSV RNA SPEC QL NAA+PROBE: NOT DETECTED — SIGNIFICANT CHANGE UP
RV+EV RNA SPEC QL NAA+PROBE: NOT DETECTED — SIGNIFICANT CHANGE UP
SAO2 % BLDV: 37.7 % — LOW (ref 60–85)
SODIUM SERPL-SCNC: 142 MMOL/L — SIGNIFICANT CHANGE UP (ref 135–145)
TROPONIN T SERPL-MCNC: 0.14 NG/ML — HIGH (ref 0–0.06)
WBC # BLD: 10.13 K/UL — SIGNIFICANT CHANGE UP (ref 3.8–10.5)
WBC # BLD: 9.42 K/UL — SIGNIFICANT CHANGE UP (ref 3.8–10.5)
WBC # FLD AUTO: 10.13 K/UL — SIGNIFICANT CHANGE UP (ref 3.8–10.5)
WBC # FLD AUTO: 9.42 K/UL — SIGNIFICANT CHANGE UP (ref 3.8–10.5)

## 2018-06-07 PROCEDURE — 71275 CT ANGIOGRAPHY CHEST: CPT | Mod: 26

## 2018-06-07 PROCEDURE — 71045 X-RAY EXAM CHEST 1 VIEW: CPT | Mod: 26

## 2018-06-07 PROCEDURE — 99223 1ST HOSP IP/OBS HIGH 75: CPT

## 2018-06-07 RX ORDER — TAMSULOSIN HYDROCHLORIDE 0.4 MG/1
0.4 CAPSULE ORAL AT BEDTIME
Qty: 0 | Refills: 0 | Status: DISCONTINUED | OUTPATIENT
Start: 2018-06-07 | End: 2018-06-10

## 2018-06-07 RX ORDER — DESMOPRESSIN ACETATE 0.1 MG/1
25 TABLET ORAL ONCE
Qty: 0 | Refills: 0 | Status: COMPLETED | OUTPATIENT
Start: 2018-06-07 | End: 2018-06-07

## 2018-06-07 RX ORDER — ATORVASTATIN CALCIUM 80 MG/1
80 TABLET, FILM COATED ORAL AT BEDTIME
Qty: 0 | Refills: 0 | Status: DISCONTINUED | OUTPATIENT
Start: 2018-06-07 | End: 2018-06-10

## 2018-06-07 RX ORDER — ALBUTEROL 90 UG/1
2 AEROSOL, METERED ORAL EVERY 6 HOURS
Qty: 0 | Refills: 0 | Status: DISCONTINUED | OUTPATIENT
Start: 2018-06-07 | End: 2018-06-10

## 2018-06-07 RX ORDER — TIOTROPIUM BROMIDE 18 UG/1
1 CAPSULE ORAL; RESPIRATORY (INHALATION) AT BEDTIME
Qty: 0 | Refills: 0 | Status: DISCONTINUED | OUTPATIENT
Start: 2018-06-07 | End: 2018-06-10

## 2018-06-07 RX ORDER — MORPHINE SULFATE 50 MG/1
2 CAPSULE, EXTENDED RELEASE ORAL ONCE
Qty: 0 | Refills: 0 | Status: DISCONTINUED | OUTPATIENT
Start: 2018-06-07 | End: 2018-06-07

## 2018-06-07 RX ORDER — BUDESONIDE AND FORMOTEROL FUMARATE DIHYDRATE 160; 4.5 UG/1; UG/1
2 AEROSOL RESPIRATORY (INHALATION)
Qty: 0 | Refills: 0 | Status: DISCONTINUED | OUTPATIENT
Start: 2018-06-07 | End: 2018-06-10

## 2018-06-07 RX ORDER — SODIUM CHLORIDE 9 MG/ML
1000 INJECTION INTRAMUSCULAR; INTRAVENOUS; SUBCUTANEOUS
Qty: 0 | Refills: 0 | Status: DISCONTINUED | OUTPATIENT
Start: 2018-06-07 | End: 2018-06-08

## 2018-06-07 RX ORDER — METOPROLOL TARTRATE 50 MG
50 TABLET ORAL
Qty: 0 | Refills: 0 | Status: DISCONTINUED | OUTPATIENT
Start: 2018-06-07 | End: 2018-06-09

## 2018-06-07 RX ORDER — METOPROLOL TARTRATE 50 MG
5 TABLET ORAL EVERY 6 HOURS
Qty: 0 | Refills: 0 | Status: DISCONTINUED | OUTPATIENT
Start: 2018-06-07 | End: 2018-06-07

## 2018-06-07 RX ADMIN — DESMOPRESSIN ACETATE 225 MICROGRAM(S): 0.1 TABLET ORAL at 20:56

## 2018-06-07 RX ADMIN — MORPHINE SULFATE 2 MILLIGRAM(S): 50 CAPSULE, EXTENDED RELEASE ORAL at 20:27

## 2018-06-07 RX ADMIN — MORPHINE SULFATE 2 MILLIGRAM(S): 50 CAPSULE, EXTENDED RELEASE ORAL at 20:57

## 2018-06-07 RX ADMIN — SODIUM CHLORIDE 75 MILLILITER(S): 9 INJECTION INTRAMUSCULAR; INTRAVENOUS; SUBCUTANEOUS at 23:56

## 2018-06-07 NOTE — H&P ADULT - HISTORY OF PRESENT ILLNESS
63M PMH HTN, COPD (not on home O2), moderate pulmonary HTN (on echo from 2/8/13), aflutter (s/p ablation in 2015, currently on Eliquis), metastatic prostate CA (to bone, not on treatment, s/p radiation seed implants), d/c'd yesterday, now p/w cynthia hemoptysis x 1 day. States this morning noted some coughing followed by cynthia hemoptysis (approx 1/2 cup bright red blood). No NVD, chest pain or SOB. No hx of hemoptysis in the past. Last dose of Eliquis at 8am, Plavix at 3pm. States worsening throughout afternoon, could not get in touch with his surgeon so came to ED for re-evaluation.     Pt d/c'd yesterday after complicated hospitali course. Pt initially admitted 4/2018 for afib with RVR and wad cardioverted back to sinus rhythm. Pt then presented on 5/18/18 to ED for partial SBO 2/2 ventral hernia, reduced by surgery and kept in CDU for observation and serial exams. D/c;d on 5/19/18 after stable exam and tolerating PO. Presented to hospital again on 5/25/18 for elective repair of incisional hernia without obstruction. Post-op course c/b POD#1 hypotension, tachycardia and hypoxia, lactate 5.3, given 2L LR and brought to SICU at that time. BP improved after further IVF. CT at that time showed right middle lobe collapse and severe emphysema. NGT placed for post-op ileus. Labs at that time then showed elevated troponin (1.55) with elevated CK (1024) and CKMB (54.51). Thought to be due to NSTEMI intra-operatively given changes all post-op, started on heparin gtt initially, then d/c'd on aspirin and Plavix (in addition to Eliquis).     In ED, pt comfortable with stable vital signs, O2 sat 94-98% on room air, but tachycardic to low 100's, noted to have bright red hemopytsis. CTA chest showing small to moderate hyperdense material in RUL concerning for possible pulmonary hemorrhage, as well as increased size of abdominal hematoma. MICU consulted for hemoptysis. Given DDAVP for reversal and MICU consulted. Seen by CT surgery and General Surgery in ED as well. Per CT surgery no intervention they would perform. Regarding abd wall hematoma, general surgery recommends monitoring and reversal of anticoagulation, no interventions.

## 2018-06-07 NOTE — ED ADULT NURSE REASSESSMENT NOTE - NS ED NURSE REASSESS COMMENT FT1
Patient awake and alert, IV placed, labs sent. No vomiting of blood at this time, spitting blood into tissue in small amounts. A fib on monitor, no complaints st this time.

## 2018-06-07 NOTE — CONSULT NOTE ADULT - ASSESSMENT
ASSESSMENT: Patient is a 63y old m with recent hernia repair c/b NSTEMI, now presenting with hemoptysis    PLAN:    - No acute general surgical intervention  - No intervention indicated for abdominal wall hematoma in setting of recent surgery, surgical site appears to be healing well  - Care per medicine / cardiology regarding anticoagulation + antiplatelets in setting of recent NSTEMI and new hemoptysis.  - Per surgery, okay for regular diet, okay for anticoagulation or antiplatelet therapy as needed by medical teams  - Patient and plan to be discussed with Attending, Dr. Chu, covering for Dr. Ring.      Juan Ndiaye MD PGY3  A Team Surgery, #[id number] ASSESSMENT: Patient is a 63y old m with recent hernia repair c/b NSTEMI, now presenting with hemoptysis    PLAN:    - No acute general surgical intervention  - No intervention indicated for abdominal wall hematoma in setting of recent surgery, surgical site appears to be healing well.  Can place abdominal binder for comfort, and would reverse anticoagulation if okay per medicine/cardiology.  - Care per medicine / cardiology regarding anticoagulation + antiplatelets in setting of recent NSTEMI and new hemoptysis.  - Per surgery, okay for regular diet, okay for anticoagulation or antiplatelet therapy as needed by medical teams  - Patient and plan to be discussed with Attending, Dr. Chu, covering for Dr. Ring.      Juan Ndiaye MD PGY3  A Team Surgery, #[id number]

## 2018-06-07 NOTE — ED PROVIDER NOTE - PROGRESS NOTE DETAILS
Bhargav: Pt having active continued hemoptysis although hemodynamically stable and h/h at baseline. Last dose of eliquis was at 8am and unlikely to be significantly affecting his bleeding status. will give desmopressin to mitigate the anti platelet meds. will continue to reassess. Bhargav: Active pulmonary hemorrhage seen on CTA, discussed results withj patient and family, MICU consulted and at the bedside. Bhargav: Pt also has an abdominal hematoma from previous surgery now larger then previous. surgery consulted.

## 2018-06-07 NOTE — CONSULT NOTE ADULT - ASSESSMENT
63M with hemoptysis while on anticoagulation, currently hemodynamically stable but with potential for decompensation.    Neuro:  -alert and oriented, no active issues    Cardiovascular:  -EKG unchanged from prior given recent NSTEMI  -trop downtrending  -Monitor on tele  -hold aspirin/plavix in setting of active bleeding given risk/benefit    Respiratory:  -Probable pulmonary hemorrhage on CTA  -Per Radiology, no bleeding off vessel amenable to IR embolization  -Monitor respiratory status  -Supplemental O2 if needed to maintain O2 sat 94%    GI:  -NPO at this time    Renal:  -No active issues    Heme:  -Given DDAVP  -Consider platelet transfusion  -repeat coags  -rpt cbc    ID:  -no active issues, monitor off abx    DVT PPX:  -hold in setting of active bleeding    Code Status: Pt deciding whether he wants to be full code or not, discussing with family. OK with intubation for procedural purposes.     Kandace Tolentino, PGY3  Emergency Medicine  [id number]

## 2018-06-07 NOTE — H&P ADULT - NSHPLABSRESULTS_GEN_ALL_CORE
OBJECTIVE:  ICU Vital Signs Last 24 Hrs  T(C): 36.8 (07 Jun 2018 17:29), Max: 36.8 (07 Jun 2018 17:29)  T(F): 98.3 (07 Jun 2018 17:29), Max: 98.3 (07 Jun 2018 17:29)  HR: 105 (07 Jun 2018 20:14) (105 - 106)  BP: 146/80 (07 Jun 2018 20:14) (146/80 - 147/86)  BP(mean): --  ABP: --  ABP(mean): --  RR: 16 (07 Jun 2018 20:14) (16 - 18)  SpO2: 95% (07 Jun 2018 20:14) (95% - 95%)    LABS:                        10.3   10.13 )-----------( 297      ( 07 Jun 2018 21:19 )             31.7     Hgb Trend: 10.3<--, 11.1<--, 11.7<--, 11.9<--, 13.1<--  06-07    142  |  102  |  15  ----------------------------<  107<H>  4.3   |  25  |  0.87    Ca    8.8      07 Jun 2018 18:47  Phos  2.6     06-06  Mg     2.0     06-06    TPro  6.6  /  Alb  3.0<L>  /  TBili  0.8  /  DBili  x   /  AST  15  /  ALT  12  /  AlkPhos  212<H>  06-07    Creatinine Trend: 0.87<--, 0.85<--, 0.94<--, 1.04<--, 0.89<--, 1.02<--  PT/INR - ( 07 Jun 2018 18:47 )   PT: 15.0 SEC;   INR: 1.34          PTT - ( 07 Jun 2018 18:47 )  PTT:51.5 SEC      Venous Blood Gas:  06-07 @ 18:40  7.45/40/< 24/26/37.7  VBG Lactate: 2.0      MICROBIOLOGY:       RADIOLOGY:  [ ] Reviewed and interpreted by me    EKG:

## 2018-06-07 NOTE — ED PROVIDER NOTE - OBJECTIVE STATEMENT
64 y/o M with PMHx HTN, COPD, aflutter s/p ablation on eliquis, prostate Ca s/p radiation seed implants recent epigastric hernia repair following an admission for SBO found to have an NSTEMI w/ elevated cardiac enzymes CK 1024 CKMB 54.51 Troponin 1.55 p/w hemoptysis since earlier today. Pt. states he began having cough earlier today w/ + blood. Pt. states he been having a cough productive for cynthia blood. he was recently started on plavix and aspirin by cardiology. Pt. denies chest pain, SOB, abdominal pain, diarrhea, dysuria. he states he is on his eliquis. Denies fever, chills. denies hx of TB

## 2018-06-07 NOTE — H&P ADULT - NSHPPHYSICALEXAM_GEN_ALL_CORE
PHYSICAL EXAM:  General: well appearing, no acute distress, speaking in full sentences  HEENT: moist mucous membranes  Lymph Nodes: no lymphadenopathy  Neck: supple  Respiratory: clear b/l  Cardiovascular: tachycardic, regular rhythm  Abdomen: soft, midline vertical incision clean and dry, no bleeding or drainage  Extremities: warm, cap refill <2sec  Skin: warm, well perfused  Neurological: alert and oriented, no deficits  Psychiatry: none

## 2018-06-07 NOTE — ED ADULT TRIAGE NOTE - CHIEF COMPLAINT QUOTE
Pt was discharged yesterday after abdominal hernia surgical repair. States he has been coughing up blood since this morning. On Eliquis and Plavix for atrial fibrillation.

## 2018-06-07 NOTE — H&P ADULT - NSHPREVIEWOFSYSTEMS_GEN_ALL_CORE
REVIEW OF SYSTEMS:  Constitutional: [x ] negative [ ] fevers [ ] chills [ ] weight loss [ ] weight gain  HEENT: [ ] negative [ ] dry eyes [ ] eye irritation [ ] postnasal drip [ ] nasal congestion  CV: [ ] negative  [ ] chest pain [ ] orthopnea [ ] palpitations [ ] murmur  Resp: [ ] negative [ ] cough [ ] shortness of breath [ ] dyspnea [ ] wheezing [ ] sputum [ x] hemoptysis  GI: [ ] negative [ ] nausea [ ] vomiting [ ] diarrhea [ ] constipation [ ] abd pain [ ] dysphagia   : [ ] negative [ ] dysuria [ ] nocturia [ ] hematuria [ ] increased urinary frequency  Musculoskeletal: [ ] negative [ ] back pain [ ] myalgias [ ] arthralgias [ ] fracture  Skin: [ ] negative [ ] rash [ ] itch  Neurological: [ ] negative [ ] headache [ ] dizziness [ ] syncope [ ] weakness [ ] numbness  Psychiatric: [ ] negative [ ] anxiety [ ] depression  Endocrine: [ ] negative [ ] diabetes [ ] thyroid problem  Hematologic/Lymphatic: [ ] negative [ ] anemia [ ] bleeding problem  Allergic/Immunologic: [ ] negative [ ] itchy eyes [ ] nasal discharge [ ] hives [ ] angioedema  [x ] All other systems negative  [ ] Unable to assess ROS because ________

## 2018-06-07 NOTE — H&P ADULT - NSHPSOCIALHISTORY_GEN_ALL_CORE
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

## 2018-06-07 NOTE — PATIENT PROFILE ADULT. - NS TRANSFER PATIENT BELONGINGS
Clothing/Jewelry/Money (specify)/Cell Phone/PDA (specify)/chain, rings, keys 1 silver color chain,2 silver color rings, keys/Cell Phone/PDA (specify)/Clothing/Jewelry/Money (specify)

## 2018-06-07 NOTE — CONSULT NOTE ADULT - PROBLEM SELECTOR RECOMMENDATION 9
No thoracic surgery intervention for hemoptysis in this case.  Medical management for anticoagulation to prevent further bleeding

## 2018-06-07 NOTE — ED PROVIDER NOTE - CARE PLAN
Principal Discharge DX:	Hemoptysis  Secondary Diagnosis:	Drug toxicity  Secondary Diagnosis:	Abdominal hematoma

## 2018-06-07 NOTE — ED PROVIDER NOTE - CRITICAL CARE PROVIDED
direct patient care (not related to procedure)/additional history taking/consult w/ pt's family directly relating to pts condition/interpretation of diagnostic studies/consultation with other physicians/documentation

## 2018-06-07 NOTE — ED PROVIDER NOTE - MEDICAL DECISION MAKING DETAILS
64 y/o M with PMHx HTN, COPD, aflutter s/p ablation on eliquis, prostate Ca s/p radiation seed implants recent epigastric hernia repair following an admission for SBO found to have an NSTEMI w/ elevated cardiac enzymes CK 1024 CKMB 54.51 Troponin 1.55 p/w hemoptysis since earlier today. likely 2/2 COPD in setting of AC  w/ plavix, aspirin and eliquis, no cath w/ stenting , tele, cxr, ct chest, vbg, pt/inr, type and screen, ekg, trop, ckmb

## 2018-06-07 NOTE — H&P ADULT - ASSESSMENT
63M with hemoptysis while on anticoagulation, currently hemodynamically stable but with potential for decompensation.    Neuro:  -alert and oriented, no active issues    Cardiovascular:  -EKG unchanged from prior given recent NSTEMI  -trop downtrending  -Monitor on tele  -hold aspirin/plavix in setting of active bleeding given risk/benefit    Respiratory:  -Probable pulmonary hemorrhage on CTA  -Per Radiology, no bleeding off vessel amenable to IR embolization  -Monitor respiratory status  -Supplemental O2 if needed to maintain O2 sat 94%    GI:  -NPO at this time    Renal:  -No active issues    Heme:  -Given DDAVP  -Consider platelet transfusion  -repeat coags  -rpt cbc    ID:  -no active issues, monitor off abx    DVT PPX:  -hold in setting of active bleeding    Code Status: Pt deciding whether he wants to be full code or not, discussing with family. OK with intubation for procedural purposes.     Kandace Tolentino, PGY3  Emergency Medicine  [id number] 63M with hemoptysis while on anticoagulation, currently hemodynamically stable but with potential for decompensation.    Neuro:  -alert and oriented, no active issues    Cardiovascular:  -EKG unchanged from prior given recent NSTEMI  -trop downtrending  -Monitor on tele  -hold aspirin/plavix in setting of active bleeding given risk/benefit    Respiratory:  -Probable pulmonary hemorrhage on CTA  -Per Radiology, no bleeding off vessel amenable to IR embolization  -Monitor respiratory status  -Supplemental O2 if needed to maintain O2 sat 94%  -Likely bronch tomorrow    GI:  -NPO at this time    Renal:  -No active issues    Heme:  -Given DDAVP  -Consider platelet transfusion  -repeat coags  -rpt cbc    ID:  -no active issues, monitor off abx    DVT PPX:  -hold in setting of active bleeding    Code Status: Pt deciding whether he wants to be full code or not, discussing with family. OK with intubation for procedural purposes.     Kandace Tolentino, PGY3  Emergency Medicine  [phone]

## 2018-06-07 NOTE — ED ADULT NURSE REASSESSMENT NOTE - NS ED NURSE REASSESS COMMENT FT1
2030 Patient awake and alert. No pain at this time. -109, stable. No coughing for about the past hour, as per patient states. No blood in urine.  2200 Pending admission to hospital

## 2018-06-07 NOTE — PATIENT PROFILE ADULT. - HEALTH/HEALTHCARE ANXIETIES, PROFILE
Hypertensive Medications  Protocol Criteria:  · Appointment scheduled in the past 6 months or in the next 3 months  · BMP or CMP in the past 12 months  · Creatinine result < 2  Recent Outpatient Visits            Yesterday Encounter for annual health exami
none

## 2018-06-07 NOTE — ED PROVIDER NOTE - ATTENDING CONTRIBUTION TO CARE
Radhaangeliquewicelia: 62 yo male with a h/o HTN, COPD, aflutter on eliquis, prostate Ca s/p recent epigastric hernia repair following an admission for SBO found to have an NSTEMI c/o hemptysis that began this afternoon. Pt currently on eliquis, asa, and plavix for anticoagulation. Denies bloody stools and hematuria. Denies abdominal pain, nausea and vomiting. GENERAL: well appearing, NAD, HEENT: MMM, PERRLA, pink conjunctiva, + blood in oropharynx but no active bleeding, no blood in nares CARDIO: +S1/S2, no murmurs, rubs or gallops, LUNGS: scattered coarse breath sounds b/lABD: soft, nontender, BSx4 quadrants, no guarding or rigidity. EXT: 2+ distal pulses x 4 extremities. NEURO: AxOx3, SKIN: no rashes or lesions, well perfused A/P- 62 yo male with hemoptysis with multiple long term anticoagulants currently being used. Last dose of ASA and eliquis at 8am. Plavix taken at 1pm. will obtain labs, CTA chest, and reassess. Bhargav: 64 yo male with a h/o HTN, COPD, aflutter on eliquis, prostate Ca s/p recent epigastric hernia repair following an admission for SBO found to have an NSTEMI c/o hemptysis that began this afternoon. Pt currently on eliquis, asa, and plavix for anticoagulation. Denies bloody stools and hematuria. Denies abdominal pain, nausea and vomiting. GENERAL: well appearing, NAD, HEENT: MMM, PERRLA, pink conjunctiva, + blood in oropharynx but no active bleeding, no blood in nares CARDIO: +S1/S2, no murmurs, rubs or gallops, LUNGS: scattered coarse breath sounds on right, left CTA b/lABD: soft, nontender, BSx4 quadrants, no guarding or rigidity. EXT: 2+ distal pulses x 4 extremities. NEURO: AxOx3, SKIN: no rashes or lesions, well perfused A/P- 64 yo male with hemoptysis with multiple long term anticoagulants currently being used. Last dose of ASA and eliquis at 8am. Plavix taken at 1pm. will obtain labs, CTA chest, and reassess.

## 2018-06-08 LAB
ALBUMIN SERPL ELPH-MCNC: 2.8 G/DL — LOW (ref 3.3–5)
ALP SERPL-CCNC: 179 U/L — HIGH (ref 40–120)
ALT FLD-CCNC: 9 U/L — SIGNIFICANT CHANGE UP (ref 4–41)
APTT BLD: 42.2 SEC — HIGH (ref 27.5–37.4)
AST SERPL-CCNC: 12 U/L — SIGNIFICANT CHANGE UP (ref 4–40)
BASOPHILS # BLD AUTO: 0.08 K/UL — SIGNIFICANT CHANGE UP (ref 0–0.2)
BASOPHILS NFR BLD AUTO: 0.9 % — SIGNIFICANT CHANGE UP (ref 0–2)
BILIRUB SERPL-MCNC: 0.8 MG/DL — SIGNIFICANT CHANGE UP (ref 0.2–1.2)
BUN SERPL-MCNC: 13 MG/DL — SIGNIFICANT CHANGE UP (ref 7–23)
CALCIUM SERPL-MCNC: 8.2 MG/DL — LOW (ref 8.4–10.5)
CHLORIDE SERPL-SCNC: 103 MMOL/L — SIGNIFICANT CHANGE UP (ref 98–107)
CO2 SERPL-SCNC: 23 MMOL/L — SIGNIFICANT CHANGE UP (ref 22–31)
CREAT SERPL-MCNC: 0.77 MG/DL — SIGNIFICANT CHANGE UP (ref 0.5–1.3)
EOSINOPHIL # BLD AUTO: 0.59 K/UL — HIGH (ref 0–0.5)
EOSINOPHIL NFR BLD AUTO: 6.9 % — HIGH (ref 0–6)
GLUCOSE SERPL-MCNC: 96 MG/DL — SIGNIFICANT CHANGE UP (ref 70–99)
HCT VFR BLD CALC: 28.7 % — LOW (ref 39–50)
HGB BLD-MCNC: 9.3 G/DL — LOW (ref 13–17)
IMM GRANULOCYTES # BLD AUTO: 0.17 # — SIGNIFICANT CHANGE UP
IMM GRANULOCYTES NFR BLD AUTO: 2 % — HIGH (ref 0–1.5)
INR BLD: 1.2 — HIGH (ref 0.88–1.17)
LYMPHOCYTES # BLD AUTO: 1.8 K/UL — SIGNIFICANT CHANGE UP (ref 1–3.3)
LYMPHOCYTES # BLD AUTO: 21 % — SIGNIFICANT CHANGE UP (ref 13–44)
MAGNESIUM SERPL-MCNC: 1.8 MG/DL — SIGNIFICANT CHANGE UP (ref 1.6–2.6)
MCHC RBC-ENTMCNC: 29.3 PG — SIGNIFICANT CHANGE UP (ref 27–34)
MCHC RBC-ENTMCNC: 32.4 % — SIGNIFICANT CHANGE UP (ref 32–36)
MCV RBC AUTO: 90.5 FL — SIGNIFICANT CHANGE UP (ref 80–100)
MONOCYTES # BLD AUTO: 1.03 K/UL — HIGH (ref 0–0.9)
MONOCYTES NFR BLD AUTO: 12 % — SIGNIFICANT CHANGE UP (ref 2–14)
NEUTROPHILS # BLD AUTO: 4.92 K/UL — SIGNIFICANT CHANGE UP (ref 1.8–7.4)
NEUTROPHILS NFR BLD AUTO: 57.2 % — SIGNIFICANT CHANGE UP (ref 43–77)
NRBC # FLD: 0 — SIGNIFICANT CHANGE UP
PLATELET # BLD AUTO: 279 K/UL — SIGNIFICANT CHANGE UP (ref 150–400)
PMV BLD: 10 FL — SIGNIFICANT CHANGE UP (ref 7–13)
POTASSIUM SERPL-MCNC: 3.5 MMOL/L — SIGNIFICANT CHANGE UP (ref 3.5–5.3)
POTASSIUM SERPL-SCNC: 3.5 MMOL/L — SIGNIFICANT CHANGE UP (ref 3.5–5.3)
PROT SERPL-MCNC: 5.9 G/DL — LOW (ref 6–8.3)
PROTHROM AB SERPL-ACNC: 13.8 SEC — HIGH (ref 9.8–13.1)
RBC # BLD: 3.17 M/UL — LOW (ref 4.2–5.8)
RBC # FLD: 14 % — SIGNIFICANT CHANGE UP (ref 10.3–14.5)
SODIUM SERPL-SCNC: 140 MMOL/L — SIGNIFICANT CHANGE UP (ref 135–145)
WBC # BLD: 8.59 K/UL — SIGNIFICANT CHANGE UP (ref 3.8–10.5)
WBC # FLD AUTO: 8.59 K/UL — SIGNIFICANT CHANGE UP (ref 3.8–10.5)

## 2018-06-08 PROCEDURE — 99291 CRITICAL CARE FIRST HOUR: CPT

## 2018-06-08 PROCEDURE — 99223 1ST HOSP IP/OBS HIGH 75: CPT

## 2018-06-08 RX ORDER — ASPIRIN/CALCIUM CARB/MAGNESIUM 324 MG
81 TABLET ORAL DAILY
Qty: 0 | Refills: 0 | Status: DISCONTINUED | OUTPATIENT
Start: 2018-06-08 | End: 2018-06-10

## 2018-06-08 RX ORDER — CLOPIDOGREL BISULFATE 75 MG/1
75 TABLET, FILM COATED ORAL DAILY
Qty: 0 | Refills: 0 | Status: DISCONTINUED | OUTPATIENT
Start: 2018-06-08 | End: 2018-06-10

## 2018-06-08 RX ORDER — ACETAMINOPHEN 500 MG
650 TABLET ORAL ONCE
Qty: 0 | Refills: 0 | Status: COMPLETED | OUTPATIENT
Start: 2018-06-08 | End: 2018-06-08

## 2018-06-08 RX ORDER — ACETAMINOPHEN 500 MG
1000 TABLET ORAL ONCE
Qty: 0 | Refills: 0 | Status: DISCONTINUED | OUTPATIENT
Start: 2018-06-08 | End: 2018-06-08

## 2018-06-08 RX ORDER — OXYCODONE HYDROCHLORIDE 5 MG/1
5 TABLET ORAL EVERY 6 HOURS
Qty: 0 | Refills: 0 | Status: DISCONTINUED | OUTPATIENT
Start: 2018-06-08 | End: 2018-06-10

## 2018-06-08 RX ORDER — MORPHINE SULFATE 50 MG/1
2 CAPSULE, EXTENDED RELEASE ORAL ONCE
Qty: 0 | Refills: 0 | Status: DISCONTINUED | OUTPATIENT
Start: 2018-06-08 | End: 2018-06-08

## 2018-06-08 RX ADMIN — OXYCODONE HYDROCHLORIDE 5 MILLIGRAM(S): 5 TABLET ORAL at 22:21

## 2018-06-08 RX ADMIN — TAMSULOSIN HYDROCHLORIDE 0.4 MILLIGRAM(S): 0.4 CAPSULE ORAL at 22:21

## 2018-06-08 RX ADMIN — MORPHINE SULFATE 2 MILLIGRAM(S): 50 CAPSULE, EXTENDED RELEASE ORAL at 04:30

## 2018-06-08 RX ADMIN — Medication 50 MILLIGRAM(S): at 06:26

## 2018-06-08 RX ADMIN — Medication 650 MILLIGRAM(S): at 15:10

## 2018-06-08 RX ADMIN — ATORVASTATIN CALCIUM 80 MILLIGRAM(S): 80 TABLET, FILM COATED ORAL at 22:21

## 2018-06-08 RX ADMIN — TIOTROPIUM BROMIDE 1 CAPSULE(S): 18 CAPSULE ORAL; RESPIRATORY (INHALATION) at 21:45

## 2018-06-08 RX ADMIN — Medication 650 MILLIGRAM(S): at 14:50

## 2018-06-08 RX ADMIN — BUDESONIDE AND FORMOTEROL FUMARATE DIHYDRATE 2 PUFF(S): 160; 4.5 AEROSOL RESPIRATORY (INHALATION) at 21:39

## 2018-06-08 RX ADMIN — SODIUM CHLORIDE 75 MILLILITER(S): 9 INJECTION INTRAMUSCULAR; INTRAVENOUS; SUBCUTANEOUS at 08:10

## 2018-06-08 RX ADMIN — Medication 50 MILLIGRAM(S): at 17:02

## 2018-06-08 RX ADMIN — MORPHINE SULFATE 2 MILLIGRAM(S): 50 CAPSULE, EXTENDED RELEASE ORAL at 04:14

## 2018-06-08 RX ADMIN — OXYCODONE HYDROCHLORIDE 5 MILLIGRAM(S): 5 TABLET ORAL at 22:50

## 2018-06-08 NOTE — PROGRESS NOTE ADULT - SUBJECTIVE AND OBJECTIVE BOX
*******************************  Kristian Melecio, PGY-1  Pager [phone]  *******************************    INTERVAL HPI/OVERNIGHT EVENTS: Patient with continued downtrending Hgb. Transfused 1u PRBCs.     SUBJECTIVE: Patient seen and examined at bedside.     CONSTITUTIONAL: No weakness, fevers or chills  EYES/ENT: No visual changes;  No vertigo or throat pain   NECK: No pain or stiffness  RESPIRATORY: No cough, wheezing; No shortness of breath  CARDIOVASCULAR: No chest pain or palpitations  GASTROINTESTINAL: Epigastric abdominal pain with radiation to the back. No nausea, vomiting, or hematemesis; No diarrhea or constipation. No melena or hematochezia.  GENITOURINARY: No dysuria, frequency or hematuria  NEUROLOGICAL: No numbness or weakness  SKIN: No itching, rashes    OBJECTIVE:    VITAL SIGNS:  ICU Vital Signs Last 24 Hrs  T(C): 36.8 (08 Jun 2018 05:00), Max: 37.3 (07 Jun 2018 22:47)  T(F): 98.3 (08 Jun 2018 05:00), Max: 99.1 (07 Jun 2018 22:47)  HR: 92 (08 Jun 2018 05:00) (92 - 109)  BP: 117/62 (08 Jun 2018 05:00) (111/75 - 155/89)  BP(mean): 76 (08 Jun 2018 05:00) (76 - 107)  ABP: --  ABP(mean): --  RR: 23 (08 Jun 2018 05:00) (14 - 24)  SpO2: 95% (08 Jun 2018 05:00) (94% - 99%)        06-07 @ 07:01  -  06-08 @ 05:59  --------------------------------------------------------  IN: 525 mL / OUT: 400 mL / NET: 125 mL      CAPILLARY BLOOD GLUCOSE          PHYSICAL EXAM:    General: NAD  HEENT: NC/AT; PERRL, clear conjunctiva  Neck: supple  Respiratory: CTA b/l  Cardiovascular: +S1/S2; RRR  Abdomen: soft, NT/ND; +BS x4  Extremities: WWP, 2+ peripheral pulses b/l; no LE edema  Skin: normal color and turgor; no rash  Neurological:    MEDICATIONS:  MEDICATIONS  (STANDING):  atorvastatin 80 milliGRAM(s) Oral at bedtime  buDESOnide 160 MICROgram(s)/formoterol 4.5 MICROgram(s) Inhaler 2 Puff(s) Inhalation two times a day  metoprolol tartrate 50 milliGRAM(s) Oral two times a day  sodium chloride 0.9%. 1000 milliLiter(s) (75 mL/Hr) IV Continuous <Continuous>  tamsulosin 0.4 milliGRAM(s) Oral at bedtime  tiotropium 18 MICROgram(s) Capsule 1 Capsule(s) Inhalation at bedtime    MEDICATIONS  (PRN):  ALBUTerol    90 MICROgram(s) HFA Inhaler 2 Puff(s) Inhalation every 6 hours PRN Shortness of Breath and/or Wheezing      ALLERGIES:  Allergies    No Known Drug Allergies  shellfish (Unknown)    Intolerances    azithromycin (Vomiting; Diarrhea)      LABS:                        9.3    8.59  )-----------( 279      ( 08 Jun 2018 04:10 )             28.7     06-08    140  |  103  |  13  ----------------------------<  96  3.5   |  23  |  0.77    Ca    8.2<L>      08 Jun 2018 04:10  Phos  2.6     06-06  Mg     1.8     06-08    TPro  5.9<L>  /  Alb  2.8<L>  /  TBili  0.8  /  DBili  x   /  AST  12  /  ALT  9   /  AlkPhos  179<H>  06-08    PT/INR - ( 08 Jun 2018 04:10 )   PT: 13.8 SEC;   INR: 1.20          PTT - ( 08 Jun 2018 04:10 )  PTT:42.2 SEC      RADIOLOGY & ADDITIONAL TESTS: Reviewed. *******************************  Kristian TylorsNataly, PGY-1  Pager [phone]  *******************************    INTERVAL HPI/OVERNIGHT EVENTS: Patient with continued downtrending Hgb.     SUBJECTIVE: Patient seen and examined at bedside.     CONSTITUTIONAL: No weakness, fevers or chills  EYES/ENT: No visual changes;  No vertigo or throat pain   NECK: No pain or stiffness  RESPIRATORY: No cough, wheezing; No shortness of breath  CARDIOVASCULAR: No chest pain or palpitations  GASTROINTESTINAL: Epigastric abdominal pain with radiation to the back. No nausea, vomiting, or hematemesis; No diarrhea or constipation. No melena or hematochezia.  GENITOURINARY: No dysuria, frequency or hematuria  NEUROLOGICAL: No numbness or weakness  SKIN: No itching, rashes    OBJECTIVE:    VITAL SIGNS:  ICU Vital Signs Last 24 Hrs  T(C): 36.8 (08 Jun 2018 05:00), Max: 37.3 (07 Jun 2018 22:47)  T(F): 98.3 (08 Jun 2018 05:00), Max: 99.1 (07 Jun 2018 22:47)  HR: 92 (08 Jun 2018 05:00) (92 - 109)  BP: 117/62 (08 Jun 2018 05:00) (111/75 - 155/89)  BP(mean): 76 (08 Jun 2018 05:00) (76 - 107)  ABP: --  ABP(mean): --  RR: 23 (08 Jun 2018 05:00) (14 - 24)  SpO2: 95% (08 Jun 2018 05:00) (94% - 99%)        06-07 @ 07:01  -  06-08 @ 05:59  --------------------------------------------------------  IN: 525 mL / OUT: 400 mL / NET: 125 mL      CAPILLARY BLOOD GLUCOSE        PHYSICAL EXAM:  GENERAL: NAD, well-developed  HEAD:  Atraumatic, Normocephalic  EYES: EOMI, PERRLA, conjunctiva and sclera clear  NECK: Supple, No JVD  CHEST/LUNG: Clear to auscultation bilaterally; No wheeze  HEART: Regular rate and rhythm; No murmurs, rubs, or gallops  ABDOMEN: Soft, TTP in the epigastrium, no rebound or gaurding, Nondistended; Bowel sounds present midline incision   EXTREMITIES:  2+ Peripheral Pulses, No clubbing, cyanosis, or edema  PSYCH: AAOx3  NEUROLOGY: non-focal  SKIN: No rashes or lesions        MEDICATIONS:  MEDICATIONS  (STANDING):  atorvastatin 80 milliGRAM(s) Oral at bedtime  buDESOnide 160 MICROgram(s)/formoterol 4.5 MICROgram(s) Inhaler 2 Puff(s) Inhalation two times a day  metoprolol tartrate 50 milliGRAM(s) Oral two times a day  sodium chloride 0.9%. 1000 milliLiter(s) (75 mL/Hr) IV Continuous <Continuous>  tamsulosin 0.4 milliGRAM(s) Oral at bedtime  tiotropium 18 MICROgram(s) Capsule 1 Capsule(s) Inhalation at bedtime    MEDICATIONS  (PRN):  ALBUTerol    90 MICROgram(s) HFA Inhaler 2 Puff(s) Inhalation every 6 hours PRN Shortness of Breath and/or Wheezing      ALLERGIES:  Allergies    No Known Drug Allergies  shellfish (Unknown)    Intolerances    azithromycin (Vomiting; Diarrhea)      LABS:                        9.3    8.59  )-----------( 279      ( 08 Jun 2018 04:10 )             28.7     06-08    140  |  103  |  13  ----------------------------<  96  3.5   |  23  |  0.77    Ca    8.2<L>      08 Jun 2018 04:10  Phos  2.6     06-06  Mg     1.8     06-08    TPro  5.9<L>  /  Alb  2.8<L>  /  TBili  0.8  /  DBili  x   /  AST  12  /  ALT  9   /  AlkPhos  179<H>  06-08    PT/INR - ( 08 Jun 2018 04:10 )   PT: 13.8 SEC;   INR: 1.20          PTT - ( 08 Jun 2018 04:10 )  PTT:42.2 SEC      RADIOLOGY & ADDITIONAL TESTS: Reviewed.

## 2018-06-08 NOTE — CONSULT NOTE ADULT - ASSESSMENT
63yoM w/ PMHx HTN, COPD, aflutter s/p ablation on eliquis, prostate Ca s/p radiation seed implants, recently here for [facility] for elective epigastric hernia repair following an admission for SBO, complicated by NSTEMI, now presenting with hemoptysis.    -will monitor Hb and course to make sure no further bleeding or decompensation  -when hemostasis is achieved, reccomend reinstating aspirin, clopidogrel. Continue to hold apixaban discussed with Ep Dr. Polk that is reasonable to keep him on DAPT given recent ACS and hold apixaban with close follow up with EP as outpatient    -c/w metoprolol 100mg bid and atorvastatin 80 mg  -appreciate MICU and thoracic reccs  -will follow    Please call on call cardiology team at [phone] with any further questions.

## 2018-06-08 NOTE — CHART NOTE - NSCHARTNOTEFT_GEN_A_CORE
MICU Transfer Note    Transfer from: MICU    Transfer to: (  ) Medicine    (  ) Telemetry     (   ) RCU        (    ) Palliative         (   ) Stroke Unit          (   ) __________________    Accepting Physician:  Signout given to:     MICU COURSE:  Patient admitted to the MICU for close monitoring in the setting of hemoptysis and potential bronch. Home DAPT and Eliquis held. Hemoptysis is likely in the setting of triple therapy use. Cards consulted and recommended restarting the DAPT in the setting of recent NSTEMI, and continuing to hold his Eliquis. Patient remained hemodynamically stable throughout his MICU course, and did not have any further episodes of hemoptysis. Patient is stable for transfer to the general medicine floors.         ASSESSMENT & PLAN:     63M PMH HTN, COPD (not on home O2), moderate pulmonary HTN (on echo from 2/8/13), aflutter (s/p ablation in 2015, currently on Eliquis), metastatic prostate CA (to bone, not on treatment, s/p radiation seed implants), d/c'd yesterday, p/w cynthia hemoptysis x 1 day admitted to MICU for further monitoring    Neuro:  -alert and oriented, no active issues    Cardiovascular:  -EKG unchanged from prior given recent NSTEMI  -trop downtrending  -Monitor on tele  -Per cards, restarted aspirin/plavix in setting of recent NSTEMI, however holding home Eliquis     Respiratory:  -Probable pulmonary hemorrhage on CTA  -Per Radiology, no bleeding off vessel amenable to IR embolization  -Monitor respiratory status  -Supplemental O2 if needed to maintain O2 sat 94%    GI:  -DASH diet  -no active issues    Renal:  -No active issues    Heme:  -S/p DDAVP  -Consider platelet transfusion  -repeat coags  -rpt cbc    ID:  -no active issues, monitor off abx    DVT PPX:  -hold in setting of active bleeding    Code Status: Pt deciding whether he wants to be full code or not, discussing with family. OK with intubation for procedural purposes.           FOR FOLLOW UP:  - continue to closely monitor hemodynamics. Uptitrate Metoprolol to home dose of 100 BID if patient tolerates  - monitor CBC. Transfuse as necessary for goal Hgb of 7  - continue to wean O2 (patient is not on home supplemental O2)  - Patient will require close follow up with EP once discharged.

## 2018-06-08 NOTE — PROGRESS NOTE ADULT - ATTENDING COMMENTS
hemoptysis with bad emphasematous/bullous lungs and major anti-platelets/AC  likely secaondary to that  guarded

## 2018-06-08 NOTE — CONSULT NOTE ADULT - SUBJECTIVE AND OBJECTIVE BOX
Date of Consult: 6/8/2018    CHIEF COMPLAINT:    HISTORY OF PRESENT ILLNESS:  63M PMH HTN, COPD (not on home O2), moderate pulmonary HTN (on echo from 2/8/13), aflutter (s/p ablation in 2015, currently on Eliquis), metastatic prostate CA (to bone, not on treatment, s/p radiation seed implants), d/c'd yesterday, now p/w cynthia hemoptysis x 1 day. Of note, patient recently here 5/25/18 for elective repair of incisional hernia without obstruction. Post-op course complicated by NSTEMI intra-operatively given changes all post-op, started on heparin gtt initially, then d/c'd on aspirin and Plavix (in addition to Eliquis). Patient found to have CT with bleb, with infiltrate of blood. He has not had further bleeding since here. He denies any sob, chest pain.     Allergies    No Known Drug Allergies  shellfish (Unknown)    Intolerances    azithromycin (Vomiting; Diarrhea)    MEDICATIONS:  metoprolol tartrate 50 milliGRAM(s) Oral two times a day  tamsulosin 0.4 milliGRAM(s) Oral at bedtime  ALBUTerol    90 MICROgram(s) HFA Inhaler 2 Puff(s) Inhalation every 6 hours PRN  buDESOnide 160 MICROgram(s)/formoterol 4.5 MICROgram(s) Inhaler 2 Puff(s) Inhalation two times a day  tiotropium 18 MICROgram(s) Capsule 1 Capsule(s) Inhalation at bedtime  atorvastatin 80 milliGRAM(s) Oral at bedtime      PAST MEDICAL & SURGICAL HISTORY:  History of anxiety  Atrial fibrillation and flutter  Ventral hernia  Inguinal hernia  Prostate Cancer: with seeds implant    Diverticulitis of Colon: with Hx of colostomy bag  Emphysema  HTN (Hypertension)  History of electrophysiologic study: 3/18 s/p ablation [facility]  History of ventral hernia repair  S/P Kamini's procedure: with reversal  History of Hernia Surgery: BL inguinal  History of Cholecystectomy  History of Appendectomy      FAMILY HISTORY:      SOCIAL HISTORY:    [ ] Non-smoker  [ ] Smoker  [ ] Alcohol      REVIEW OF SYSTEMS:  See HPI. Otherwise, 10 point ROS done and otherwise negative.    PHYSICAL EXAM:  T(C): 36.6 (06-08-18 @ 16:00), Max: 37.3 (06-07-18 @ 22:47)  HR: 95 (06-08-18 @ 17:00) (85 - 109)  BP: 126/100 (06-08-18 @ 17:00) (106/79 - 155/89)  RR: 21 (06-08-18 @ 17:00) (10 - 24)  SpO2: 96% (06-08-18 @ 17:00) (94% - 99%)  Wt(kg): --  I&O's Summary    07 Jun 2018 07:01  -  08 Jun 2018 07:00  --------------------------------------------------------  IN: 600 mL / OUT: 600 mL / NET: 0 mL    08 Jun 2018 07:01  -  08 Jun 2018 18:11  --------------------------------------------------------  IN: 805 mL / OUT: 600 mL / NET: 205 mL    Appearance: Normal	  HEENT:   Normal oral mucosa, PERRL, EOMI	  Lymphatic: No lymphadenopathy  Cardiovascular: Normal S1 S2, No JVD, No murmurs, No edema  Respiratory: Lungs clear to auscultation	  Psychiatry: A & O x 3, Mood & affect appropriate  Gastrointestinal:  Soft, Non-tender, + BS	  Skin: No rashes, No ecchymoses, No cyanosis	  Neurologic: Non-focal  Extremities: Normal range of motion, No clubbing, cyanosis or edema  Vascular: Peripheral pulses palpable 2+ bilaterally    LABS:	 	    CBC Full  -  ( 08 Jun 2018 04:10 )  WBC Count : 8.59 K/uL  Hemoglobin : 9.3 g/dL  Hematocrit : 28.7 %  Platelet Count - Automated : 279 K/uL  Mean Cell Volume : 90.5 fL  Mean Cell Hemoglobin : 29.3 pg  Mean Cell Hemoglobin Concentration : 32.4 %  Auto Neutrophil # : 4.92 K/uL  Auto Lymphocyte # : 1.80 K/uL  Auto Monocyte # : 1.03 K/uL  Auto Eosinophil # : 0.59 K/uL  Auto Basophil # : 0.08 K/uL  Auto Neutrophil % : 57.2 %  Auto Lymphocyte % : 21.0 %  Auto Monocyte % : 12.0 %  Auto Eosinophil % : 6.9 %  Auto Basophil % : 0.9 %    06-08    140  |  103  |  13  ----------------------------<  96  3.5   |  23  |  0.77  06-07    142  |  102  |  15  ----------------------------<  107<H>  4.3   |  25  |  0.87    Ca    8.2<L>      08 Jun 2018 04:10  Ca    8.8      07 Jun 2018 18:47  Mg     1.8     06-08    TPro  5.9<L>  /  Alb  2.8<L>  /  TBili  0.8  /  DBili  x   /  AST  12  /  ALT  9   /  AlkPhos  179<H>  06-08  TPro  6.6  /  Alb  3.0<L>  /  TBili  0.8  /  DBili  x   /  AST  15  /  ALT  12  /  AlkPhos  212<H>  06-07      proBNP:   Lipid Profile:   HgA1c:   TSH:     CARDIAC MARKERS:    CKMB: 1.73 ng/mL (06-07 @ 18:47)    CKMB Relative Index: Test not performed (06-07 @ 18:47)    TELEMETRY: NSR    ECG:  	  RADIOLOGY:  OTHER: 	    PREVIOUS DIAGNOSTIC TESTING:    [ ] Echocardiogram:  [ ]  Catheterization:  [ ] Stress Test:  	  	  ASSESSMENT/PLAN:
HPI:  This 63 year old male presented to [facility] ER with hemoptysis  x 1 day.  He states that he had been discharged yesterday from [facility] after having been treated for FRANCHESKA and then having had a ventral hernia repair (5/31/18) that was complicated by a NSTEMI.  He was discharged on ASA, Plavix, and Eliquis.  He stated that he first noticed the bright red blood this morning after coughing.  In total, he feels that he coughed up about a half a cup.  He has no h/o previous hemoptysis.  His last dose of Eliquis was at 8 AM, and his last Plavix was at 3 PM.   He came to the ER because he felt that the hemoptysis was worsening.  He had some further hemoptysis in the ER but that improved, especially after being given DDAVP.  A CTA showed bullae and small to moderate hyperdense material in RUL and opacities in the RML and RLL, all concerning for possible pulmonary hemorrhage.      PAST MEDICAL & SURGICAL HISTORY:  History of anxiety  HTN  Emphysema  COPD  Diverticulitis; h/o Kamini's procedure with colostomy & subsequent reversal  Atrial fibrillation and flutter; Ablation 3/2018  Ventral hernia repair  Inguinal hernia repair B/L  Prostate Cancer: with seeds implant  s/p Cholecystectomy  s/p Appendectomy    REVIEW OF SYSTEMS  General: No Weight change/ Fatigue/ HA/ Dizzy	  Skin/Breast: No Rashes/ Lesions/ Masses  Ophthalmologic: No Blurry vision/ Glaucoma/ Blindness  ENMT: No Hearing loss/ Drainage/ Lesions	  Respiratory and Thorax: No Cough/ Wheezing/ SOB/ Positive Hemoptysis/ Sputum production  Cardiovascular: No Chest pain/ Palpitations/ Diaphoresis	  Gastrointestinal: No Nausea/ Vomiting/ Constipation/ Appetite Change	  Genitourinary: No Hematuria/ Dysuria/ Frequency change  Musculoskeletal: No Pain/ Weakness/ Claudication	  Neurological: No Seizures/ TIA/ CVA/ Paresthesia	  Psychiatric: No Dementia/ Depression/ SI/HI	  Hematology/Lymphatics: No hx of bleeding/ Edema	  Endocrine: No Hyperglycemia/ Hypoglycemia  Allergic/Immunologic:	 No Anaphylaxis/ Intolerance/ Recent illnesses    MEDICATIONS:  atorvastatin 80 milliGRAM(s) Oral at bedtime  buDESOnide 160 MICROgram(s)/formoterol 4.5 MICROgram(s) Inhaler 2 Puff(s) Inhalation two times a day  metoprolol tartrate Injectable 5 milliGRAM(s) IV Push every 6 hours  sodium chloride 0.9%. 1000 milliLiter(s) (75 mL/Hr) IV Continuous <Continuous>  tamsulosin 0.4 milliGRAM(s) Oral at bedtime  tiotropium 18 MICROgram(s) Capsule 1 Capsule(s) Inhalation at bedtime  ALBUTerol    90 MICROgram(s) HFA Inhaler 2 Puff(s) Inhalation every 6 hours PRN Shortness of Breath and/or Wheezing      Allergies:  No Known Drug Allergies  shellfish (Unknown)    Intolerances  azithromycin (Vomiting; Diarrhea)        Vital Signs Last 24 Hrs  T(C): 37.3 (07 Jun 2018 22:47), Max: 37.3 (07 Jun 2018 22:47)  T(F): 99.1 (07 Jun 2018 22:47), Max: 99.1 (07 Jun 2018 22:47)  HR: 108 (07 Jun 2018 23:00) (105 - 109)  BP: 131/97 (07 Jun 2018 23:00) (131/97 - 154/93)  BP(mean): 105 (07 Jun 2018 23:00) (105 - 107)  RR: 21 (07 Jun 2018 23:00) (14 - 21)  SpO2: 94% (07 Jun 2018 23:00) (94% - 97%)    General: WN/ WD NAD  Neurology: Awake, nonfocal, DIMAS x 4  Eyes: Scleras clear, PERRLA/ EOMI, Gross vision intact  ENT: Gross hearing intact, grossly patent pharynx, no stridor  Neck: Neck supple, trachea midline, No JVD,   Respiratory: CTA B/L, No wheezing, rales, rhonchi; No current hemoptysis  CV:  S1S2  Abdominal: Soft, NT, ND +BS, Well-healing abdominal incision  Extremities: No edema, + peripheral pulses  Skin: No Rashes, Hematoma, Ecchymosis  Lymphatic: No Neck, axilla, groin LAD  Psych: Oriented x 3, normal affect      LABS:                        10.3   10.13 )-----------( 297      ( 07 Jun 2018 21:19 ) down from 11.1/34.2             31.7     06-07    142  |  102  |  15  ----------------------------<  107<H>  4.3   |  25  |  0.87    Ca    8.8      07 Jun 2018 18:47  Phos  2.6     06-06  Mg     2.0     06-06    TPro  6.6  /  Alb  3.0<L>  /  TBili  0.8  /  DBili  x   /  AST  15  /  ALT  12  /  AlkPhos  212<H>  06-07    PT/INR - ( 07 Jun 2018 18:47 )   PT: 15.0 SEC;   INR: 1.34          PTT - ( 07 Jun 2018 18:47 )  PTT:51.5 SEC      CTA-  RUL bullae and opacities c/w hemorrhage; RML and RLL opacities as well
63M PMH HTN, COPD (not on home O2), moderate pulmonary HTN (on echo from 2/8/13), aflutter (s/p ablation in 2015, currently on Eliquis), metastatic prostate CA (to bone, not on treatment, s/p radiation seed implants), d/c'd yesterday, now p/w cynthia hemoptysis x 1 day. States this morning noted some coughing followed by cynthia hemoptysis (approx 1/2 cup bright red blood). No NVD, chest pain or SOB. No hx of hemoptysis in the past. Last dose of Eliquis at 8am, Plavix at 3pm. States worsening throughout afternoon, could not get in touch with his surgeon so came to ED for re-evaluation.     Pt d/c'd yesterday after complicated hospitali course. Pt initially admitted 4/2018 for afib with RVR and wad cardioverted back to sinus rhythm. Pt then presented on 5/18/18 to ED for partial SBO 2/2 ventral hernia, reduced by surgery and kept in CDU for observation and serial exams. D/c;d on 5/19/18 after stable exam and tolerating PO. Presented to hospital again on 5/25/18 for elective repair of incisional hernia without obstruction. Post-op course c/b POD#1 hypotension, tachycardia and hypoxia, lactate 5.3, given 2L LR and brought to SICU at that time. BP improved after further IVF. CT at that time showed right middle lobe collapse and severe emphysema. NGT placed for post-op ileus. Labs at that time then showed elevated troponin (1.55) with elevated CK (1024) and CKMB (54.51). Thought to be due to NSTEMI intra-operatively given changes all post-op, started on heparin gtt initially, then d/c'd on aspirin and Plavix (in addition to Eliquis).     In ED, pt comfortable with stable vital signs, O2 sat 94-98% on room air, but tachycardic to low 100's, noted to have bright red hemopytsis. CTA chest showing small to moderate hyperdense material in RUL concerning for possible pulmonary hemorrhage, as well as increased size of abdominal hematoma. MICU consulted for hemoptysis. Given DDAVP for reversal and MICU consulted.     PAST MEDICAL & SURGICAL HISTORY:  History of anxiety  Atrial fibrillation and flutter  Ventral hernia  Inguinal hernia  Prostate Cancer: with seeds implant    Diverticulitis of Colon: with Hx of colostomy bag  Emphysema  HTN (Hypertension)  History of electrophysiologic study: 3/18 s/p ablation [facility]  History of ventral hernia repair  S/P Kamini's procedure: with reversal  History of Hernia Surgery: BL inguinal  History of Cholecystectomy  History of Appendectomy      FAMILY HISTORY:  Family history of hypertension  Family history of heart attack      SOCIAL HISTORY:  Smoking: [ ] Never Smoked [x ] Former Smoker (__ packs x ___ years) [ ] Current Smoker  (__ packs x ___ years)  Substance Use: [x ] Never Used [ ] Used ____  EtOH Use: none  Marital Status: [ ] Single [ ]  [ ]  [ ]   Sexual History:   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  Recent Travel: none  Country of Birth:  Advance Directives: deciding, but OK with intubation for procedure    Allergies    No Known Drug Allergies  shellfish (Unknown)    Intolerances    azithromycin (Vomiting; Diarrhea)      HOME MEDICATIONS:  Home Medications:  acetaminophen 325 mg oral tablet: 2 tab(s) orally every 4 hours, as needed for pain (31 May 2018 15:02)  aspirin 81 mg oral tablet, chewable: 1 tab(s) orally once a day (05 Jun 2018 12:58)  Metoprolol Tartrate 100 mg oral tablet: 1 tab(s) orally 2 times a day (31 May 2018 15:02)      REVIEW OF SYSTEMS:  Constitutional: [x ] negative [ ] fevers [ ] chills [ ] weight loss [ ] weight gain  HEENT: [ ] negative [ ] dry eyes [ ] eye irritation [ ] postnasal drip [ ] nasal congestion  CV: [ ] negative  [ ] chest pain [ ] orthopnea [ ] palpitations [ ] murmur  Resp: [ ] negative [ ] cough [ ] shortness of breath [ ] dyspnea [ ] wheezing [ ] sputum [ x] hemoptysis  GI: [ ] negative [ ] nausea [ ] vomiting [ ] diarrhea [ ] constipation [ ] abd pain [ ] dysphagia   : [ ] negative [ ] dysuria [ ] nocturia [ ] hematuria [ ] increased urinary frequency  Musculoskeletal: [ ] negative [ ] back pain [ ] myalgias [ ] arthralgias [ ] fracture  Skin: [ ] negative [ ] rash [ ] itch  Neurological: [ ] negative [ ] headache [ ] dizziness [ ] syncope [ ] weakness [ ] numbness  Psychiatric: [ ] negative [ ] anxiety [ ] depression  Endocrine: [ ] negative [ ] diabetes [ ] thyroid problem  Hematologic/Lymphatic: [ ] negative [ ] anemia [ ] bleeding problem  Allergic/Immunologic: [ ] negative [ ] itchy eyes [ ] nasal discharge [ ] hives [ ] angioedema  [x ] All other systems negative  [ ] Unable to assess ROS because ________    OBJECTIVE:  ICU Vital Signs Last 24 Hrs  T(C): 36.8 (07 Jun 2018 17:29), Max: 36.8 (07 Jun 2018 17:29)  T(F): 98.3 (07 Jun 2018 17:29), Max: 98.3 (07 Jun 2018 17:29)  HR: 105 (07 Jun 2018 20:14) (105 - 106)  BP: 146/80 (07 Jun 2018 20:14) (146/80 - 147/86)  BP(mean): --  ABP: --  ABP(mean): --  RR: 16 (07 Jun 2018 20:14) (16 - 18)  SpO2: 95% (07 Jun 2018 20:14) (95% - 95%)        CAPILLARY BLOOD GLUCOSE          PHYSICAL EXAM:  General: well appearing, no acute distress, speaking in full sentences  HEENT: moist mucous membranes  Lymph Nodes: no lymphadenopathy  Neck: supple  Respiratory: clear b/l  Cardiovascular: tachycardic, regular rhythm  Abdomen: soft, midline vertical incision clean and dry, no bleeding or drainage  Extremities: warm, cap refill <2sec  Skin: warm, well perfused  Neurological: alert and oriented, no deficits  Psychiatry: none    LINES: Lists of hospitals in the [place]    HOSPITAL MEDICATIONS:  Standing Meds:      PRN Meds:      LABS:                        10.3   10.13 )-----------( 297      ( 07 Jun 2018 21:19 )             31.7     Hgb Trend: 10.3<--, 11.1<--, 11.7<--, 11.9<--, 13.1<--  06-07    142  |  102  |  15  ----------------------------<  107<H>  4.3   |  25  |  0.87    Ca    8.8      07 Jun 2018 18:47  Phos  2.6     06-06  Mg     2.0     06-06    TPro  6.6  /  Alb  3.0<L>  /  TBili  0.8  /  DBili  x   /  AST  15  /  ALT  12  /  AlkPhos  212<H>  06-07    Creatinine Trend: 0.87<--, 0.85<--, 0.94<--, 1.04<--, 0.89<--, 1.02<--  PT/INR - ( 07 Jun 2018 18:47 )   PT: 15.0 SEC;   INR: 1.34          PTT - ( 07 Jun 2018 18:47 )  PTT:51.5 SEC      Venous Blood Gas:  06-07 @ 18:40  7.45/40/< 24/26/37.7  VBG Lactate: 2.0      MICROBIOLOGY:       RADIOLOGY:  [ ] Reviewed and interpreted by me    EKG:
A TEAM / GENERAL SURGERY (#[id number]) CONSULT NOTE  ---------------------------------------------------------------------------------------------     HPI:   Patient is a 63y old male who presents with a chief complaint of coughing up blood.  Patient was recently admitted to [facility] for an elective incisional hernia repair with a component separation with Dr. Ring and Dr. Chester (plastic surgery) on 5/31/18.  Postoperatively, patient was found to be hypotensive, and was transferred to the SICU with an NSTEMI.  He was started on aspirin and heparin drip with improvement of symptoms.  He was transitioned to PO eliquis.  Prior to discharge, a nuclear stress test showed mild overall hypokinesis without clear evidence of ischemia.  Patient was discharged home yesterday after being cleared by cardiology, on Asprin, Plavix, and Eliquis.      This morning, patient had started to notice a red color in his sputum that he was coughing up.  He later noted progression to bright red blood and presented to the hospital for further evaluation.  Patient had taken his morning dose of eliquis, aspirin, and plavix.  Patient states that now for the past 1.5 hours, he has had no cough or hemoptysis.      Patient denies fevers/chills, denies lightheadedness/dizziness, denies SOB/chest pain, denies nausea/vomiting, denies constipation/diarrhea.     ROS: 10-system review is otherwise negative except HPI above.      PAST MEDICAL & SURGICAL HISTORY:  History of anxiety  Atrial fibrillation and flutter  Ventral hernia  Inguinal hernia  Prostate Cancer: with seeds implant    Diverticulitis of Colon: with Hx of colostomy bag  Emphysema  HTN (Hypertension)  History of electrophysiologic study: 3/18 s/p ablation [facility]  History of ventral hernia repair  S/P Kamini's procedure: with reversal  History of Hernia Surgery: BL inguinal  History of Cholecystectomy  History of Appendectomy    FAMILY HISTORY:  Family history of hypertension  Family history of heart attack    ALLERGIES: azithromycin (Vomiting; Diarrhea)  No Known Drug Allergies  shellfish (Unknown)    HOME MEDICATIONS:    acetaminophen 325 mg oral tablet 2 tab(s) orally every 4 hours, as needed for pain  	   albuterol 90 mcg/inh inhalation aerosol 2 puff(s) inhaled every 6 hours, As needed, Shortness of Breath and/or Wheezing MDD:8  	   aspirin 81 mg oral tablet, chewable 1 tab(s) orally once a day  	   atorvastatin 80 mg oral tablet 1 tab(s) orally once a day MDD:1  	   budesonide-formoterol 160 mcg-4.5 mcg/inh inhalation aerosol 2 puff(s) inhaled 2 times a day   	   clopidogrel 75 mg oral tablet 1 tab(s) orally once a day MDD:1  	   Eliquis 5 mg oral tablet 1 tab(s) orally 2 times a day MDD:2  	   Metoprolol Tartrate 100 mg oral tablet 1 tab(s) orally 2 times a day  	   oxyCODONE 5 mg oral tablet 1 tab(s) orally every 6 hours as needed for moderate to severe pain MDD:4   	   tamsulosin 0.4 mg oral capsule 1 cap(s) orally once a day (at bedtime)  	   tiotropium 18 mcg inhalation capsule 1 cap(s) inhaled once a day (at bedtime) MDD:1  	    --------------------------------------------------------------------------------------------    Vitals:   T(C): 36.8 (06-07-18 @ 17:29), Max: 36.8 (06-07-18 @ 17:29)  HR: 105 (06-07-18 @ 20:14) (105 - 106)  BP: 146/80 (06-07-18 @ 20:14) (146/80 - 147/86)  RR: 16 (06-07-18 @ 20:14) (16 - 18)  SpO2: 95% (06-07-18 @ 20:14) (95% - 95%) on 4LNC    PHYSICAL EXAM:   General: NAD, Lying in bed comfortably  Neuro: A+Ox3  HEENT: NC/AT, EOMI  Cardio: RRR  Resp: Good effort b/l, on 4LNC.  GI/Abd: Soft, NT/ND, no rebound/guarding, upper midline wound with Dermabond C/D/I, no drainage  Vascular: All 4 extremities warm.  Musculoskeletal: All 4 extremities moving spontaneously, no limitations    --------------------------------------------------------------------------------------------    LABS  CBC (06-07 @ 21:19)                              10.3<L>                         10.13   )----------------(  297        61.9  % Neutrophils, 19.4  % Lymphocytes, ANC: 6.27                                31.7<L>  CBC (06-07 @ 18:47)                              11.1<L>                         9.42    )----------------(  314        61.9  % Neutrophils, 19.6  % Lymphocytes, ANC: 5.82                                34.2<L>    BMP (06-07 @ 18:47)             142     |  102     |  15    		Ca++ --      Ca 8.8                ---------------------------------( 107<H>		Mg --                 4.3     |  25      |  0.87  			Ph --      BMP (06-06 @ 10:14)             137     |  98      |  13    		Ca++ --      Ca 8.7                ---------------------------------( 110<H>		Mg 2.0                3.8     |  25      |  0.85  			Ph 2.6       LFTs (06-07 @ 18:47)      TPro 6.6 / Alb 3.0<L> / TBili 0.8 / DBili -- / AST 15 / ALT 12 / AlkPhos 212<H>    Coags (06-07 @ 18:47)  aPTT 51.5<H> / INR 1.34<H> / PT 15.0<H>    Cardiac Markers (06-07 @ 18:47)     Trop: 0.14 -- / CKMB: 1.73 / CK: 98    VBG (06-07 @ 18:40)     7.45<H> / 40<L> / < 24<L> / 26 / 3.4 / 37.7<L>%     Lactate: 2.0    --------------------------------------------------------------------------------------------    IMAGING    < from: CT Angio Chest w/ IV Cont (06.07.18 @ 19:28) >  IMPRESSION:  No central PE.  Small to moderate amount of high density material in the right upper lobe   bullae may represent hemorrhage, in the given clinical setting.   Additional opacities in the right lung may represent hemorrhage or   pneumonia.   Increase in size of a subxiphoid abdominal wall hematoma, partially   imaged.    < end of copied text >    --------------------------------------------------------------------------------------------

## 2018-06-09 DIAGNOSIS — C61 MALIGNANT NEOPLASM OF PROSTATE: ICD-10-CM

## 2018-06-09 DIAGNOSIS — Z98.890 OTHER SPECIFIED POSTPROCEDURAL STATES: ICD-10-CM

## 2018-06-09 DIAGNOSIS — Z29.9 ENCOUNTER FOR PROPHYLACTIC MEASURES, UNSPECIFIED: ICD-10-CM

## 2018-06-09 DIAGNOSIS — I10 ESSENTIAL (PRIMARY) HYPERTENSION: ICD-10-CM

## 2018-06-09 DIAGNOSIS — I25.10 ATHEROSCLEROTIC HEART DISEASE OF NATIVE CORONARY ARTERY WITHOUT ANGINA PECTORIS: ICD-10-CM

## 2018-06-09 DIAGNOSIS — I48.91 UNSPECIFIED ATRIAL FIBRILLATION: ICD-10-CM

## 2018-06-09 DIAGNOSIS — J43.9 EMPHYSEMA, UNSPECIFIED: ICD-10-CM

## 2018-06-09 LAB
ALBUMIN SERPL ELPH-MCNC: 2.6 G/DL — LOW (ref 3.3–5)
ALP SERPL-CCNC: 190 U/L — HIGH (ref 40–120)
ALT FLD-CCNC: 9 U/L — SIGNIFICANT CHANGE UP (ref 4–41)
APTT BLD: 43.2 SEC — HIGH (ref 27.5–37.4)
AST SERPL-CCNC: 13 U/L — SIGNIFICANT CHANGE UP (ref 4–40)
BILIRUB SERPL-MCNC: 0.7 MG/DL — SIGNIFICANT CHANGE UP (ref 0.2–1.2)
BUN SERPL-MCNC: 18 MG/DL — SIGNIFICANT CHANGE UP (ref 7–23)
CALCIUM SERPL-MCNC: 8.3 MG/DL — LOW (ref 8.4–10.5)
CHLORIDE SERPL-SCNC: 103 MMOL/L — SIGNIFICANT CHANGE UP (ref 98–107)
CO2 SERPL-SCNC: 21 MMOL/L — LOW (ref 22–31)
CREAT SERPL-MCNC: 0.79 MG/DL — SIGNIFICANT CHANGE UP (ref 0.5–1.3)
GLUCOSE SERPL-MCNC: 92 MG/DL — SIGNIFICANT CHANGE UP (ref 70–99)
HCT VFR BLD CALC: 29.3 % — LOW (ref 39–50)
HGB BLD-MCNC: 9.5 G/DL — LOW (ref 13–17)
INR BLD: 1.1 — SIGNIFICANT CHANGE UP (ref 0.88–1.17)
MAGNESIUM SERPL-MCNC: 1.8 MG/DL — SIGNIFICANT CHANGE UP (ref 1.6–2.6)
MCHC RBC-ENTMCNC: 29.9 PG — SIGNIFICANT CHANGE UP (ref 27–34)
MCHC RBC-ENTMCNC: 32.4 % — SIGNIFICANT CHANGE UP (ref 32–36)
MCV RBC AUTO: 92.1 FL — SIGNIFICANT CHANGE UP (ref 80–100)
NRBC # FLD: 0.03 — SIGNIFICANT CHANGE UP
PHOSPHATE SERPL-MCNC: 2.7 MG/DL — SIGNIFICANT CHANGE UP (ref 2.5–4.5)
PLATELET # BLD AUTO: 293 K/UL — SIGNIFICANT CHANGE UP (ref 150–400)
PMV BLD: 10.3 FL — SIGNIFICANT CHANGE UP (ref 7–13)
POTASSIUM SERPL-MCNC: 3.5 MMOL/L — SIGNIFICANT CHANGE UP (ref 3.5–5.3)
POTASSIUM SERPL-SCNC: 3.5 MMOL/L — SIGNIFICANT CHANGE UP (ref 3.5–5.3)
PROT SERPL-MCNC: 6.1 G/DL — SIGNIFICANT CHANGE UP (ref 6–8.3)
PROTHROM AB SERPL-ACNC: 12.2 SEC — SIGNIFICANT CHANGE UP (ref 9.8–13.1)
RBC # BLD: 3.18 M/UL — LOW (ref 4.2–5.8)
RBC # FLD: 14.4 % — SIGNIFICANT CHANGE UP (ref 10.3–14.5)
SODIUM SERPL-SCNC: 141 MMOL/L — SIGNIFICANT CHANGE UP (ref 135–145)
WBC # BLD: 8.86 K/UL — SIGNIFICANT CHANGE UP (ref 3.8–10.5)
WBC # FLD AUTO: 8.86 K/UL — SIGNIFICANT CHANGE UP (ref 3.8–10.5)

## 2018-06-09 PROCEDURE — 99233 SBSQ HOSP IP/OBS HIGH 50: CPT | Mod: GC

## 2018-06-09 RX ORDER — METOPROLOL TARTRATE 50 MG
100 TABLET ORAL
Qty: 0 | Refills: 0 | Status: DISCONTINUED | OUTPATIENT
Start: 2018-06-09 | End: 2018-06-10

## 2018-06-09 RX ADMIN — CLOPIDOGREL BISULFATE 75 MILLIGRAM(S): 75 TABLET, FILM COATED ORAL at 13:21

## 2018-06-09 RX ADMIN — Medication 50 MILLIGRAM(S): at 05:59

## 2018-06-09 RX ADMIN — OXYCODONE HYDROCHLORIDE 5 MILLIGRAM(S): 5 TABLET ORAL at 17:49

## 2018-06-09 RX ADMIN — TIOTROPIUM BROMIDE 1 CAPSULE(S): 18 CAPSULE ORAL; RESPIRATORY (INHALATION) at 22:03

## 2018-06-09 RX ADMIN — Medication 81 MILLIGRAM(S): at 13:21

## 2018-06-09 RX ADMIN — BUDESONIDE AND FORMOTEROL FUMARATE DIHYDRATE 2 PUFF(S): 160; 4.5 AEROSOL RESPIRATORY (INHALATION) at 21:49

## 2018-06-09 RX ADMIN — BUDESONIDE AND FORMOTEROL FUMARATE DIHYDRATE 2 PUFF(S): 160; 4.5 AEROSOL RESPIRATORY (INHALATION) at 09:23

## 2018-06-09 RX ADMIN — OXYCODONE HYDROCHLORIDE 5 MILLIGRAM(S): 5 TABLET ORAL at 06:02

## 2018-06-09 RX ADMIN — TAMSULOSIN HYDROCHLORIDE 0.4 MILLIGRAM(S): 0.4 CAPSULE ORAL at 21:59

## 2018-06-09 RX ADMIN — Medication 100 MILLIGRAM(S): at 17:19

## 2018-06-09 RX ADMIN — ATORVASTATIN CALCIUM 80 MILLIGRAM(S): 80 TABLET, FILM COATED ORAL at 21:57

## 2018-06-09 RX ADMIN — OXYCODONE HYDROCHLORIDE 5 MILLIGRAM(S): 5 TABLET ORAL at 06:32

## 2018-06-09 RX ADMIN — OXYCODONE HYDROCHLORIDE 5 MILLIGRAM(S): 5 TABLET ORAL at 17:19

## 2018-06-09 NOTE — PROGRESS NOTE ADULT - PROBLEM SELECTOR PLAN 3
s/p ablation, now CHADSVASc 2, s/p recent ablation, on eliquis prior to admission  - d/c eliquis likely indefinitely  - c/w metoprolol for rate control  - f/u EP as outpatient

## 2018-06-09 NOTE — PROGRESS NOTE ADULT - PROBLEM SELECTOR PLAN 1
likely 2/2 hemorrhage within pulmonary bullae 2/2 COPD in setting of eliquis, asa and plavix  - CTA Chest w/o evidence of PE, showing RUL bullae w/ high density material c/w hemorrhage, also with sub xiphoid hematoma increased in size from prior  - surgery consulted and recommend no surgical intervention at this time  - patient given DDAVP and eliquis discontinued  - HD stable, currently w/ small volume hemoptysis  - Monitor CBC and transfuse for Hgb <7 likely 2/2 hemorrhage within pulmonary bullae 2/2 COPD in setting of eliquis, asa and plavix  - CTA Chest w/o evidence of PE, showing RUL bullae w/ high density material c/w hemorrhage, also with sub xiphoid hematoma increased in size from prior  - surgery consulted and recommend no surgical intervention at this time  - patient given DDAVP and eliquis discontinued  - HD stable, currently w/ small volume hemoptysis- about 1 tsp last night as per pt  - Monitor CBC and transfuse for Hgb <7

## 2018-06-09 NOTE — CHART NOTE - NSCHARTNOTEFT_GEN_A_CORE
MICU transfer    63yoM w/ PMHx HTN, COPD, aflutter s/p ablation on eliquis, prostate Ca s/p radiation seed implants, recently here for [facility] for elective epigastric hernia repair following an admission for SBO, complicated by NSTEMI, now presenting with hemoptysis.  CT angio chest showed: No PE, small to moderate amount of high density material in the right upper lobe bullae possibly representing hemorrhage. Patient admitted to the MICU for close monitoring in the setting of hemoptysis and potential bronch. Home DAPT and Eliquis held. Cards consulted and recommended continuing DAPT in the setting of recent NSTEMI, and continuing to hold his Eliquis. Patient remained hemodynamically stable throughout his MICU course. Hgb 9.3, baseline of 11. Per Radiology, no bleeding off vessel amenable to IR embolization    FOR FOLLOW UP:  - continue to closely monitor hemodynamics. Uptitrate Metoprolol to home dose of 100 BID if patient tolerates  - monitor CBC. Transfuse as necessary for goal Hgb of 7  - continue to wean O2 (patient is not on home supplemental O2)  - Patient will require close follow up with EP once discharged.    [place]  [id number] MICU transfer    63yoM w/ PMHx HTN, COPD, aflutter s/p ablation on eliquis, prostate Ca s/p radiation seed implants, recently here for [facility] for elective epigastric hernia repair following an admission for SBO, complicated by NSTEMI, now presenting with hemoptysis.  CT angio chest showed: No PE, small to moderate amount of high density material in the right upper lobe bullae possibly representing hemorrhage. Patient admitted to the MICU for close monitoring in the setting of hemoptysis and potential bronch. Home DAPT and Eliquis held. Cards consulted and recommended continuing DAPT in the setting of recent NSTEMI, and continuing to hold his Eliquis. Patient remained hemodynamically stable throughout his MICU course. Hgb 9.3, baseline of 11. Per Radiology, no bleeding off vessel amenable to IR embolization    FOR FOLLOW UP:  - Uptitrate Metoprolol to home dose of 100 BID if patient tolerates  - monitor CBC. Transfuse prn for goal Hgb of 7  - continue to wean O2 (patient is not on home supplemental O2)  - EP f/up on discharge. Pt to be discharged off eliquis[place]  [id number]

## 2018-06-09 NOTE — PROGRESS NOTE ADULT - SUBJECTIVE AND OBJECTIVE BOX
Peggy Genao MD (PGY-1)  Pager: [phone]  7AM-7PM      Patient is a 63y old  Male who presents with a chief complaint of Hemoptysis (2018 22:14)      SUBJECTIVE / OVERNIGHT EVENTS:  Patient with small amount of blood speckled sputum overnight. No SOB, dizziness, fatigue. Cough w/ deep inspiration.    MEDICATIONS  (STANDING):  aspirin enteric coated 81 milliGRAM(s) Oral daily  atorvastatin 80 milliGRAM(s) Oral at bedtime  buDESOnide 160 MICROgram(s)/formoterol 4.5 MICROgram(s) Inhaler 2 Puff(s) Inhalation two times a day  clopidogrel Tablet 75 milliGRAM(s) Oral daily  metoprolol tartrate 100 milliGRAM(s) Oral two times a day  tamsulosin 0.4 milliGRAM(s) Oral at bedtime  tiotropium 18 MICROgram(s) Capsule 1 Capsule(s) Inhalation at bedtime    MEDICATIONS  (PRN):  ALBUTerol    90 MICROgram(s) HFA Inhaler 2 Puff(s) Inhalation every 6 hours PRN Shortness of Breath and/or Wheezing  oxyCODONE    IR 5 milliGRAM(s) Oral every 6 hours PRN Severe Pain (7 - 10)      Vital Signs:  Vital Signs Last 24 Hrs  T(C): 37.2 (2018 05:05), Max: 37.2 (2018 05:05)  T(F): 99 (2018 05:05), Max: 99 (2018 05:05)  HR: 102 (2018 05:05) (82 - 102)  BP: 125/83 (2018 05:05) (106/79 - 150/78)  BP(mean): 92 (2018 00:00) (66 - 105)  RR: 18 (2018 05:05) (10 - 25)  SpO2: 94% (2018 05:05) (92% - 99%)  Daily     Daily Weight in k.1 (2018 02:00)  CAPILLARY BLOOD GLUCOSE        I&O's Summary    2018 07:01  -  2018 07:00  --------------------------------------------------------  IN: 1005 mL / OUT: 1050 mL / NET: -45 mL        PHYSICAL EXAM  GENERAL: NAD, sitting comfortably in bed, responding to questions appropriately  HEAD:  Atraumatic, Normocephalic  EYES: conjunctiva and sclera clear, no pallor  CHEST/LUNG: Clear to auscultation bilaterally; No wheeze, rhonchi or rales; cough with inspiration  HEART: Regular rate and rhythm; No murmurs, rubs, or gallops  ABDOMEN: Soft, Nontender, No guarding, Nondistended; Bowel sounds present; midline incision healing well  EXTREMITIES:  Warm, well perfused, 2+ Peripheral Pulses, No clubbing, cyanosis, or edema  NEURO: AAOx3, moving all extremities  SKIN: No rashes or lesions    LABS:                        9.5    8.86  )-----------( 293      ( 2018 05:50 )             29.3     -    141  |  103  |  18  ----------------------------<  92  3.5   |  21<L>  |  0.79    Ca    8.3<L>      2018 05:50  Phos  2.7     -  Mg     1.8         TPro  6.1  /  Alb  2.6<L>  /  TBili  0.7  /  DBili  x   /  AST  13  /  ALT  9   /  AlkPhos  190<H>      PT/INR - ( 2018 05:50 )   PT: 12.2 SEC;   INR: 1.10          PTT - ( 2018 05:50 )  PTT:43.2 SEC  CARDIAC MARKERS ( 2018 18:47 )  x     / 0.14 ng/mL / 98 u/L / 1.73 ng/mL / x              RADIOLOGY & ADDITIONAL TESTS: Peggy Genao MD (PGY-1)  Pager: [phone]  7AM-7PM      Patient is a 63y old  Male who presents with a chief complaint of Hemoptysis (2018 22:14)      SUBJECTIVE / OVERNIGHT EVENTS:  Patient with small amount of blood speckled sputum overnight. No SOB, dizziness, fatigue. Cough w/ deep inspiration.    MEDICATIONS  (STANDING):  aspirin enteric coated 81 milliGRAM(s) Oral daily  atorvastatin 80 milliGRAM(s) Oral at bedtime  buDESOnide 160 MICROgram(s)/formoterol 4.5 MICROgram(s) Inhaler 2 Puff(s) Inhalation two times a day  clopidogrel Tablet 75 milliGRAM(s) Oral daily  metoprolol tartrate 100 milliGRAM(s) Oral two times a day  tamsulosin 0.4 milliGRAM(s) Oral at bedtime  tiotropium 18 MICROgram(s) Capsule 1 Capsule(s) Inhalation at bedtime    MEDICATIONS  (PRN):  ALBUTerol    90 MICROgram(s) HFA Inhaler 2 Puff(s) Inhalation every 6 hours PRN Shortness of Breath and/or Wheezing  oxyCODONE    IR 5 milliGRAM(s) Oral every 6 hours PRN Severe Pain (7 - 10)      Vital Signs:  Vital Signs Last 24 Hrs  T(C): 37.2 (2018 05:05), Max: 37.2 (2018 05:05)  T(F): 99 (2018 05:05), Max: 99 (2018 05:05)  HR: 102 (2018 05:05) (82 - 102)  BP: 125/83 (2018 05:05) (106/79 - 150/78)  BP(mean): 92 (2018 00:00) (66 - 105)  RR: 18 (2018 05:05) (10 - 25)  SpO2: 94% (2018 05:05) (92% - 99%) on room air  Daily     Daily Weight in k.1 (2018 02:00)  CAPILLARY BLOOD GLUCOSE        I&O's Summary    2018 07:01  -  2018 07:00  --------------------------------------------------------  IN: 1005 mL / OUT: 1050 mL / NET: -45 mL        PHYSICAL EXAM  GENERAL: NAD, sitting comfortably in bed, responding to questions appropriately  HEAD:  Atraumatic, Normocephalic  EYES: conjunctiva and sclera clear, no pallor  CHEST/LUNG: No tachypnea or resp distress, Crackles RUL; cough with inspiration  HEART: Regular rate and rhythm; No murmurs, rubs, or gallops  ABDOMEN: Soft, Nontender, No guarding, Nondistended; Bowel sounds present; midline incision healing well  EXTREMITIES:  Warm, well perfused, 2+ Peripheral Pulses, No clubbing, cyanosis, or edema  NEURO: AAOx3, moving all extremities  SKIN: No rashes or lesions    LABS:                        9.5    8.86  )-----------( 293      ( 2018 05:50 )             29.3     -    141  |  103  |  18  ----------------------------<  92  3.5   |  21<L>  |  0.79    Ca    8.3<L>      2018 05:50  Phos  2.7     06-  Mg     1.8     -    TPro  6.1  /  Alb  2.6<L>  /  TBili  0.7  /  DBili  x   /  AST  13  /  ALT  9   /  AlkPhos  190<H>      PT/INR - ( 2018 05:50 )   PT: 12.2 SEC;   INR: 1.10          PTT - ( 2018 05:50 )  PTT:43.2 SEC  CARDIAC MARKERS ( 2018 18:47 )  x     / 0.14 ng/mL / 98 u/L / 1.73 ng/mL / x              RADIOLOGY & ADDITIONAL TESTS:    Imaging reviewed  < from: CT Angio Chest w/ IV Cont (18 @ 19:28) >  No central PE.  Small to moderate amount of high density material in the right upper lobe   bullae may represent hemorrhage, in the given clinical setting.   Additional opacities in the right lung may represent hemorrhage or   pneumonia.   Increase in size of a subxiphoid abdominal wall hematoma, partially   imaged.    < end of copied text >

## 2018-06-09 NOTE — PROGRESS NOTE ADULT - ATTENDING COMMENTS
Pt seen and examined. Case d/w patient and housestaff. Last episode of hemoptysis overnight about 1 tsp. Pt reports he feels much improved. Denies dyspnea, chest pain, palpitations, abdominal pain. Pt weaned off 2L NC to room air and tolerating. Will continue to monitor off anticoagulation and continue DAPT for recent NSTEMI. Rest as above Pt seen and examined. Case d/w patient and housestaff. Last episode of hemoptysis overnight about 1 tsp. Pt reports he feels much improved. Denies dyspnea, chest pain, palpitations, abdominal pain. Pt weaned off 2L NC to room air and tolerating. CBC now stable. Will continue to monitor off anticoagulation and continue DAPT for recent NSTEMI. Rest as above

## 2018-06-10 ENCOUNTER — TRANSCRIPTION ENCOUNTER (OUTPATIENT)
Age: 63
End: 2018-06-10

## 2018-06-10 VITALS
DIASTOLIC BLOOD PRESSURE: 90 MMHG | SYSTOLIC BLOOD PRESSURE: 133 MMHG | RESPIRATION RATE: 17 BRPM | OXYGEN SATURATION: 95 % | HEART RATE: 81 BPM | TEMPERATURE: 98 F

## 2018-06-10 LAB
BUN SERPL-MCNC: 11 MG/DL — SIGNIFICANT CHANGE UP (ref 7–23)
CALCIUM SERPL-MCNC: 8.6 MG/DL — SIGNIFICANT CHANGE UP (ref 8.4–10.5)
CHLORIDE SERPL-SCNC: 102 MMOL/L — SIGNIFICANT CHANGE UP (ref 98–107)
CO2 SERPL-SCNC: 23 MMOL/L — SIGNIFICANT CHANGE UP (ref 22–31)
CREAT SERPL-MCNC: 0.82 MG/DL — SIGNIFICANT CHANGE UP (ref 0.5–1.3)
GLUCOSE SERPL-MCNC: 94 MG/DL — SIGNIFICANT CHANGE UP (ref 70–99)
HCT VFR BLD CALC: 32 % — LOW (ref 39–50)
HGB BLD-MCNC: 10.4 G/DL — LOW (ref 13–17)
MAGNESIUM SERPL-MCNC: 1.8 MG/DL — SIGNIFICANT CHANGE UP (ref 1.6–2.6)
MCHC RBC-ENTMCNC: 29.6 PG — SIGNIFICANT CHANGE UP (ref 27–34)
MCHC RBC-ENTMCNC: 32.5 % — SIGNIFICANT CHANGE UP (ref 32–36)
MCV RBC AUTO: 91.2 FL — SIGNIFICANT CHANGE UP (ref 80–100)
NRBC # FLD: 0.03 — SIGNIFICANT CHANGE UP
PHOSPHATE SERPL-MCNC: 3 MG/DL — SIGNIFICANT CHANGE UP (ref 2.5–4.5)
PLATELET # BLD AUTO: 329 K/UL — SIGNIFICANT CHANGE UP (ref 150–400)
PMV BLD: 10.2 FL — SIGNIFICANT CHANGE UP (ref 7–13)
POTASSIUM SERPL-MCNC: 3.8 MMOL/L — SIGNIFICANT CHANGE UP (ref 3.5–5.3)
POTASSIUM SERPL-SCNC: 3.8 MMOL/L — SIGNIFICANT CHANGE UP (ref 3.5–5.3)
RBC # BLD: 3.51 M/UL — LOW (ref 4.2–5.8)
RBC # FLD: 14.5 % — SIGNIFICANT CHANGE UP (ref 10.3–14.5)
SODIUM SERPL-SCNC: 138 MMOL/L — SIGNIFICANT CHANGE UP (ref 135–145)
WBC # BLD: 9.11 K/UL — SIGNIFICANT CHANGE UP (ref 3.8–10.5)
WBC # FLD AUTO: 9.11 K/UL — SIGNIFICANT CHANGE UP (ref 3.8–10.5)

## 2018-06-10 PROCEDURE — 99233 SBSQ HOSP IP/OBS HIGH 50: CPT | Mod: GC

## 2018-06-10 RX ADMIN — OXYCODONE HYDROCHLORIDE 5 MILLIGRAM(S): 5 TABLET ORAL at 03:39

## 2018-06-10 RX ADMIN — OXYCODONE HYDROCHLORIDE 5 MILLIGRAM(S): 5 TABLET ORAL at 04:09

## 2018-06-10 RX ADMIN — CLOPIDOGREL BISULFATE 75 MILLIGRAM(S): 75 TABLET, FILM COATED ORAL at 11:47

## 2018-06-10 RX ADMIN — BUDESONIDE AND FORMOTEROL FUMARATE DIHYDRATE 2 PUFF(S): 160; 4.5 AEROSOL RESPIRATORY (INHALATION) at 13:12

## 2018-06-10 RX ADMIN — Medication 100 MILLIGRAM(S): at 06:22

## 2018-06-10 RX ADMIN — Medication 81 MILLIGRAM(S): at 11:47

## 2018-06-10 RX ADMIN — OXYCODONE HYDROCHLORIDE 5 MILLIGRAM(S): 5 TABLET ORAL at 13:11

## 2018-06-10 RX ADMIN — OXYCODONE HYDROCHLORIDE 5 MILLIGRAM(S): 5 TABLET ORAL at 13:41

## 2018-06-10 NOTE — DISCHARGE NOTE ADULT - MEDICATION SUMMARY - MEDICATIONS TO STOP TAKING
I will STOP taking the medications listed below when I get home from the hospital:    Eliquis 5 mg oral tablet  -- 1 tab(s) by mouth 2 times a day MDD:2  -- Check with your doctor before becoming pregnant.  It is very important that you take or use this exactly as directed.  Do not skip doses or discontinue unless directed by your doctor.  Obtain medical advice before taking any non-prescription drugs as some may affect the action of this medication.

## 2018-06-10 NOTE — PROGRESS NOTE ADULT - SUBJECTIVE AND OBJECTIVE BOX
Patient is a 63y old  Male who presents with a chief complaint of Hemoptysis (07 Jun 2018 22:14)      SUBJECTIVE / OVERNIGHT EVENTS: no acute events overnight. No coughing. No hemoptysis    MEDICATIONS  (STANDING):  aspirin enteric coated 81 milliGRAM(s) Oral daily  atorvastatin 80 milliGRAM(s) Oral at bedtime  buDESOnide 160 MICROgram(s)/formoterol 4.5 MICROgram(s) Inhaler 2 Puff(s) Inhalation two times a day  clopidogrel Tablet 75 milliGRAM(s) Oral daily  metoprolol tartrate 100 milliGRAM(s) Oral two times a day  tamsulosin 0.4 milliGRAM(s) Oral at bedtime  tiotropium 18 MICROgram(s) Capsule 1 Capsule(s) Inhalation at bedtime    MEDICATIONS  (PRN):  ALBUTerol    90 MICROgram(s) HFA Inhaler 2 Puff(s) Inhalation every 6 hours PRN Shortness of Breath and/or Wheezing  oxyCODONE    IR 5 milliGRAM(s) Oral every 6 hours PRN Severe Pain (7 - 10)      T(C): 37.3 (06-10-18 @ 06:19), Max: 37.6 (06-09-18 @ 19:14)  HR: 95 (06-10-18 @ 06:19) (84 - 97)  BP: 146/98 (06-10-18 @ 06:19) (129/87 - 149/102)  RR: 16 (06-10-18 @ 06:19) (16 - 18)  SpO2: 92% (06-10-18 @ 06:19) (92% - 98%)  CAPILLARY BLOOD GLUCOSE        I&O's Summary    09 Jun 2018 07:01  -  10 Balta 2018 07:00  --------------------------------------------------------  IN: 0 mL / OUT: 300 mL / NET: -300 mL        PHYSICAL EXAM  GENERAL: NAD, sitting comfortably in bed, responding to questions appropriately  HEAD:  Atraumatic, Normocephalic  EYES: conjunctiva and sclera clear, no pallor  CHEST/LUNG: No tachypnea or resp distress, Crackles RUL; cough with inspiration  HEART: Regular rate and rhythm; No murmurs, rubs, or gallops  ABDOMEN: Soft, Nontender, No guarding, Nondistended; Bowel sounds present; midline incision healing well  EXTREMITIES:  Warm, well perfused, 2+ Peripheral Pulses, No clubbing, cyanosis, or edema  NEURO: AAOx3, moving all extremities  SKIN: No rashes or lesions      LABS:  (06-10 @ 07:55)                        10.4  9.11 )-----------( 329                 32.0    Neutrophils = -- (--%)  Lymphocytes = -- (--%)  Eosinophils = -- (--%)  Basophils = -- (--%)  Monocytes = -- (--%)  Bands = --%    WBC Trend: 9.11<--, 8.86<--, 8.59<--  Hb Trend: 10.4<--, 9.5<--, 9.3<--, 10.3<--, 11.1<--  Plt Trend: 329<--, 293<--, 279<--, 297<--, 314<--  06-10    138  |  102  |  11  ----------------------------<  94  3.8   |  23  |  0.82    Ca    8.6      10 Balta 2018 07:55  Phos  3.0     06-10  Mg     1.8     06-10    TPro  6.1  /  Alb  2.6<L>  /  TBili  0.7  /  DBili  x   /  AST  13  /  ALT  9   /  AlkPhos  190<H>  06-09    Creatinine Trend: 0.82<--, 0.79<--, 0.77<--, 0.87<--, 0.85<--, 0.94<--  ( 09 Jun 2018 05:50 )   PT: 12.2 SEC;   INR: 1.10 ;       PTT:43.2 SEC          RADIOLOGY & ADDITIONAL TESTS:  X- Ray:  CTA chest: No central PE.  Small to moderate amount of high density material in the right upper lobe   bullae may represent hemorrhage, in the given clinical setting.   Additional opacities in the right lung may represent hemorrhage or   pneumonia.   Increase in size of a subxiphoid abdominal wall hematoma, partially   imaged.  [x] imaging personally reviewed and interpreted by me    Consultant(s) Notes Reviewed:  Cardio, CT surg

## 2018-06-10 NOTE — PROGRESS NOTE ADULT - ATTENDING COMMENTS
Pt seen and examined. Remains HD stable, no further hemoptysis. No hypoxia. Feels at baseline. CBC stable. Pt had small volume hemoptysis while on eliquis, ASA, plavix. Eliquis d/tej. Will continue ASA and plavix given recent NSTEMI. Pt is HD stable and medically stable for discharge. He will f/u outpatient. Instructed to return to hospital if any signs of further bleeding, clots, dizziness, lightheadedness, chest pain etc. D/c planning time 35 mins. See discharge summary for further details

## 2018-06-10 NOTE — PROGRESS NOTE ADULT - ASSESSMENT
63yoM w/ PMHx HTN, COPD, aflutter s/p ablation on eliquis, prostate Ca s/p radiation seed implants, recently hospitalized at [facility] for elective epigastric hernia repair complicated by NSTEMI treated with DAPT, now presenting with hemoptysis 2/2 pulmonary hemorrhage.
63M with hemoptysis while on anticoagulation, currently hemodynamically stable but with potential for decompensation.    Neuro:  -alert and oriented, no active issues    Cardiovascular:  -EKG unchanged from prior given recent NSTEMI  -trop downtrending  -Monitor on tele  -hold aspirin/plavix in setting of active bleeding given risk/benefit    Respiratory:  -Probable pulmonary hemorrhage on CTA  -Per Radiology, no bleeding off vessel amenable to IR embolization  -Monitor respiratory status  -Supplemental O2 if needed to maintain O2 sat 94%  -Likely bronch tomorrow    GI:  -NPO at this time    Renal:  -No active issues    Heme:  -Given DDAVP  -Consider platelet transfusion  -repeat coags  -rpt cbc    ID:  -no active issues, monitor off abx    DVT PPX:  -hold in setting of active bleeding    Code Status: Pt deciding whether he wants to be full code or not, discussing with family. OK with intubation for procedural purposes.
63yoM w/ PMHx HTN, COPD, aflutter s/p ablation on eliquis, prostate Ca s/p radiation seed implants, recently hospitalized at [facility] for elective epigastric hernia repair complicated by NSTEMI treated with DAPT, now presenting with hemoptysis 2/2 pulmonary hemorrhage.

## 2018-06-10 NOTE — PROGRESS NOTE ADULT - PROBLEM SELECTOR PLAN 7
metastatic to bone s/p radiation and seed implants, not currently on chemo  - c/w tamsulosin  - outpatient follow up
metastatic to bone s/p radiation and seed implants, not currently on chemo  - c/w tamsulosin  - outpatient follow up

## 2018-06-10 NOTE — DISCHARGE NOTE ADULT - HOSPITAL COURSE
62 y/o M w/ PMHx HTN, COPD, aflutter s/p ablation on eliquis, prostate Ca s/p radiation seed implants, recently here for [facility] for elective epigastric hernia repair following an admission for SBO, complicated by NSTEMI, presented to [facility] with hemoptysis.  Patient underwent CT angio chest which showed: No PE, small to moderate amount of high density material in the right upper lobe bullae possibly representing hemorrhage. Patient admitted to the MICU for close monitoring in the setting of hemoptysis and potential bronchoscopy. Home DAPT and Eliquis were initially held. Cardiology was consulted and recommended continuing DAPT in the setting of recent NSTEMI, and continuing to hold patient's Eliquis. Patient remained hemodynamically stable throughout his MICU course. Per Radiology, no bleeding off vessel amenable to IR embolization. Patient was then transferred to medicine floors.     Patient's hemoglobin remained stable and remained without further episodes of hemoptysis.     Patient will be discharged on DAPT, but off of Eliquis and will need to follow up with EP within 1 week of discharge.

## 2018-06-10 NOTE — PROGRESS NOTE ADULT - PROBLEM SELECTOR PROBLEM 5
COPD (chronic obstructive pulmonary disease) with emphysema
COPD (chronic obstructive pulmonary disease) with emphysema

## 2018-06-10 NOTE — PROGRESS NOTE ADULT - PROBLEM SELECTOR PLAN 1
likely 2/2 hemorrhage within pulmonary bullae 2/2 COPD in setting of eliquis, asa and plavix  - CTA Chest w/o evidence of PE, showing RUL bullae w/ high density material c/w hemorrhage, also with sub xiphoid hematoma increased in size from prior  - surgery consulted and recommend no surgical intervention at this time  - patient given DDAVP and eliquis discontinued  - HD stable with no more hemoptysis.   - Monitor CBC and transfuse for Hgb <7

## 2018-06-10 NOTE — DISCHARGE NOTE ADULT - MEDICATION SUMMARY - MEDICATIONS TO TAKE
I will START or STAY ON the medications listed below when I get home from the hospital:    aspirin 81 mg oral tablet, chewable  -- 1 tab(s) by mouth once a day  -- Indication: For CAD (coronary artery disease)    acetaminophen 325 mg oral tablet  -- 2 tab(s) by mouth every 4 hours, as needed for pain  -- Indication: For Pain    tamsulosin 0.4 mg oral capsule  -- 1 cap(s) by mouth once a day (at bedtime)  -- Indication: For BPH    atorvastatin 80 mg oral tablet  -- 1 tab(s) by mouth once a day MDD:1  -- Avoid grapefruit and grapefruit juice while taking this medication.  Do not take this drug if you are pregnant.  It is very important that you take or use this exactly as directed.  Do not skip doses or discontinue unless directed by your doctor.  Obtain medical advice before taking any non-prescription drugs as some may affect the action of this medication.  Take with food or milk.    -- Indication: For CAD (coronary artery disease)    clopidogrel 75 mg oral tablet  -- 1 tab(s) by mouth once a day MDD:1  -- Indication: For CAD (coronary artery disease)    Metoprolol Tartrate 100 mg oral tablet  -- 1 tab(s) by mouth 2 times a day  -- Indication: For CAD (coronary artery disease)    albuterol 90 mcg/inh inhalation aerosol  -- 2 puff(s) inhaled every 6 hours, As needed, Shortness of Breath and/or Wheezing MDD:8  -- Indication: For COPD (chronic obstructive pulmonary disease) with emphysema    tiotropium 18 mcg inhalation capsule  -- 1 cap(s) inhaled once a day (at bedtime) MDD:1  -- Indication: For COPD (chronic obstructive pulmonary disease) with emphysema    budesonide-formoterol 160 mcg-4.5 mcg/inh inhalation aerosol  -- 2 puff(s) inhaled 2 times a day   -- Indication: For COPD (chronic obstructive pulmonary disease) with emphysema

## 2018-06-10 NOTE — PROGRESS NOTE ADULT - PROBLEM SELECTOR PLAN 4
Ventral hernia repaired 5/25  - per surgery, no contraindications for DAPT at this time  - outpatient follow up
Ventral hernia repaired 5/25  - per surgery, no contraindications for DAPT at this time  - outpatient follow up

## 2018-06-10 NOTE — DISCHARGE NOTE ADULT - CARE PLAN
Principal Discharge DX:	Hemoptysis  Goal:	Please follow up with electrophysiologist to discuss management of aflutter. Please follow up with your PCP within 1 week of discharge to monitor your Hgb.  Assessment and plan of treatment:	You came into the hospital because of hemoptysis. You were initially monitored in the MICU. You remained stable and were transferred to the medicine floors. Eliquis was discontinued due to hemorrhage seen on CT angio of your chest. You will need to follow up with EP within 1 week of discharge to discuss further management of your Aflutter.

## 2018-06-10 NOTE — DISCHARGE NOTE ADULT - PLAN OF CARE
Please follow up with electrophysiologist to discuss management of aflutter. Please follow up with your PCP within 1 week of discharge to monitor your Hgb. You came into the hospital because of hemoptysis. You were initially monitored in the MICU. You remained stable and were transferred to the medicine floors. Eliquis was discontinued due to hemorrhage seen on CT angio of your chest. You will need to follow up with EP within 1 week of discharge to discuss further management of your Aflutter.

## 2018-06-10 NOTE — PROGRESS NOTE ADULT - PROBLEM SELECTOR PLAN 2
Patient with recent NSTEMI managed medically with DAPT (asa, plavix)  - c/w asa and plavix for now given HD stability  - c/w statin  - cardiology following
Patient with recent NSTEMI managed medically with DAPT (asa, plavix)  - c/w asa and plavix for now given HD stability  - c/w statin  - cardiology following

## 2018-06-10 NOTE — PROGRESS NOTE ADULT - PROBLEM SELECTOR PLAN 3
CHADSVASc 2, s/p recent ablation, on eliquis prior to admission  - d/c eliquis likely indefinitely  - c/w metoprolol for rate control  - f/u EP as outpatient

## 2018-06-10 NOTE — DISCHARGE NOTE ADULT - CARE PROVIDER_API CALL
Larry Polk (MD), Cardiac Electrophysiology; Cardiovascular Disease; Internal Medicine  [id number] [place] [address]  Phone: [phone]  Fax: [phone]

## 2018-06-27 ENCOUNTER — RX RENEWAL (OUTPATIENT)
Age: 63
End: 2018-06-27

## 2018-07-03 ENCOUNTER — NON-APPOINTMENT (OUTPATIENT)
Age: 63
End: 2018-07-03

## 2018-07-03 ENCOUNTER — APPOINTMENT (OUTPATIENT)
Dept: ELECTROPHYSIOLOGY | Facility: CLINIC | Age: 63
End: 2018-07-03
Payer: COMMERCIAL

## 2018-07-03 VITALS
HEART RATE: 76 BPM | BODY MASS INDEX: 23.6 KG/M2 | SYSTOLIC BLOOD PRESSURE: 126 MMHG | WEIGHT: 163 LBS | OXYGEN SATURATION: 96 % | HEIGHT: 69.5 IN | DIASTOLIC BLOOD PRESSURE: 87 MMHG

## 2018-07-03 DIAGNOSIS — I48.0 PAROXYSMAL ATRIAL FIBRILLATION: ICD-10-CM

## 2018-07-03 PROCEDURE — 93000 ELECTROCARDIOGRAM COMPLETE: CPT

## 2018-07-03 PROCEDURE — 99213 OFFICE O/P EST LOW 20 MIN: CPT

## 2018-07-03 RX ORDER — ASPIRIN ENTERIC COATED TABLETS 81 MG 81 MG/1
81 TABLET, DELAYED RELEASE ORAL DAILY
Refills: 0 | Status: ACTIVE | COMMUNITY
Start: 2018-07-03

## 2018-07-03 RX ORDER — TIOTROPIUM BROMIDE 18 UG/1
18 CAPSULE ORAL; RESPIRATORY (INHALATION) DAILY
Refills: 0 | Status: ACTIVE | COMMUNITY
Start: 2018-07-03

## 2018-07-03 RX ORDER — BUDESONIDE AND FORMOTEROL FUMARATE DIHYDRATE 160; 4.5 UG/1; UG/1
160-4.5 AEROSOL RESPIRATORY (INHALATION)
Refills: 0 | Status: ACTIVE | COMMUNITY
Start: 2018-07-03

## 2018-07-03 RX ORDER — DILTIAZEM HYDROCHLORIDE 180 MG/1
180 CAPSULE, EXTENDED RELEASE ORAL DAILY
Qty: 90 | Refills: 1 | Status: DISCONTINUED | COMMUNITY
Start: 2018-05-01 | End: 2018-07-03

## 2018-07-09 ENCOUNTER — RX RENEWAL (OUTPATIENT)
Age: 63
End: 2018-07-09

## 2018-07-09 NOTE — ED PROVIDER NOTE - TOBACCO USE
"              After Visit Summary   2018    [place]    MRN: [id number]           Patient Information     Date Of Birth          1952        Visit Information        Provider Department      2018 8:25 PM Flory Cee PA-C [facility]        Today's Diagnoses     Viral upper respiratory tract infection    -  1       Follow-ups after your visit        Who to contact     If you have questions or need follow up information about today's clinic visit or your schedule please contact [facility] directly at [phone].  Normal or non-critical lab and imaging results will be communicated to you by [employer], letter or phone within 4 business days after the clinic has received the results. If you do not hear from us within 7 days, please contact the clinic through [employer] or phone. If you have a critical or abnormal lab result, we will notify you by phone as soon as possible.  Submit refill requests through [employer] or call your pharmacy and they will forward the refill request to us. Please allow 3 business days for your refill to be completed.          Additional Information About Your Visit        MyChart Information     [employer] lets you send messages to your doctor, view your test results, renew your prescriptions, schedule appointments and more. To sign up, go to [email or web address] . Click on \"Log in\" on the left side of the screen, which will take you to the Welcome page. Then click on \"Sign up Now\" on the right side of the page.     You will be asked to enter the access code listed below, as well as some personal information. Please follow the directions to create your username and password.     Your access code is: [email or web address]  Expires: 10/7/2018  9:08 PM     Your access code will  in 90 days. If you need help or a new code, please call your [place] clinic or [phone].        Care EveryWhere ID     This is your Care " "EveryWhere ID. This could be used by other organizations to access your [place] medical records  [id number]        Your Vitals Were     Pulse Temperature Respirations Height Pulse Oximetry Breastfeeding?    70 98  F (36.7  C) (Oral) 16 5' 4\" (1.626 m) 97% No    BMI (Body Mass Index)                   18.88 kg/m2            Blood Pressure from Last 3 Encounters:   07/09/18 110/60    Weight from Last 3 Encounters:   07/09/18 110 lb (49.9 kg)              Today, you had the following     No orders found for display         Today's Medication Changes          These changes are accurate as of 7/9/18  9:08 PM.  If you have any questions, ask your nurse or doctor.               Start taking these medicines.        Dose/Directions    albuterol 108 (90 Base) MCG/ACT Inhaler   Commonly known as:  PROAIR HFA/PROVENTIL HFA/VENTOLIN HFA   Used for:  Viral upper respiratory tract infection   Started by:  Flory Cee PA-C        Dose:  2 puff   Inhale 2 puffs into the lungs every 6 hours as needed for shortness of breath / dyspnea or wheezing   Quantity:  1 Inhaler   Refills:  0       guaiFENesin-codeine 100-10 MG/5ML Soln solution   Commonly known as:  ROBITUSSIN AC   Used for:  Viral upper respiratory tract infection   Started by:  Flory Cee PA-C        Dose:  1 tsp.   Take 5 mLs by mouth every 4 hours as needed for cough   Quantity:  120 mL   Refills:  0            Where to get your medicines      Some of these will need a paper prescription and others can be bought over the counter.  Ask your nurse if you have questions.     Bring a paper prescription for each of these medications     albuterol 108 (90 Base) MCG/ACT Inhaler    guaiFENesin-codeine 100-10 MG/5ML Soln solution                Primary Care Provider Fax #    Physician No Ref-Primary [phone]       No address on file        Equal Access to Services     STEPHANIE CORDERO: Lima Bundy, arjun herrera, carol nicolas " tiffany almendarezluis weberaan ah. [facility] [phone].    ATENCIÓN: Si habla bud, tiene a whipple disposición servicios gratuitos de asistencia lingüística. Abbey al [phone].    We comply with applicable federal civil rights laws and [place] laws. We do not discriminate on the basis of race, color, national origin, age, disability, sex, sexual orientation, or gender identity.            Thank you!     Thank you for choosing [facility]  for your care. Our goal is always to provide you with excellent care. Hearing back from our patients is one way we can continue to improve our services. Please take a few minutes to complete the written survey that you may receive in the mail after your visit with us. Thank you!             Your Updated Medication List - Protect others around you: Learn how to safely use, store and throw away your medicines at www.disposemymeds.org.          This list is accurate as of 7/9/18  9:08 PM.  Always use your most recent med list.                   Brand Name Dispense Instructions for use Diagnosis    albuterol 108 (90 Base) MCG/ACT Inhaler    PROAIR HFA/PROVENTIL HFA/VENTOLIN HFA    1 Inhaler    Inhale 2 puffs into the lungs every 6 hours as needed for shortness of breath / dyspnea or wheezing    Viral upper respiratory tract infection       guaiFENesin-codeine 100-10 MG/5ML Soln solution    ROBITUSSIN AC    120 mL    Take 5 mLs by mouth every 4 hours as needed for cough    Viral upper respiratory tract infection          Former smoker

## 2018-07-16 ENCOUNTER — MEDICATION RENEWAL (OUTPATIENT)
Age: 63
End: 2018-07-16

## 2018-07-20 ENCOUNTER — APPOINTMENT (OUTPATIENT)
Dept: CARDIOLOGY | Facility: CLINIC | Age: 63
End: 2018-07-20

## 2018-10-20 NOTE — ED PROVIDER NOTE - CARDIAC, MLM
Normal rate, regular rhythm.  Heart sounds S1, S2.  No murmurs, rubs or gallops. Implemented All Fall Risk Interventions:  [place] to call system. Call bell, personal items and telephone within reach. Instruct patient to call for assistance. Room bathroom lighting operational. Non-slip footwear when patient is off stretcher. Physically safe environment: no spills, clutter or unnecessary equipment. Stretcher in lowest position, wheels locked, appropriate side rails in place. Provide visual cue, wrist band, yellow gown, etc. Monitor gait and stability. Monitor for mental status changes and reorient to person, place, and time. Review medications for side effects contributing to fall risk. Reinforce activity limits and safety measures with patient and family.

## 2018-10-26 ENCOUNTER — RX RENEWAL (OUTPATIENT)
Age: 63
End: 2018-10-26

## 2018-11-02 ENCOUNTER — APPOINTMENT (OUTPATIENT)
Dept: ELECTROPHYSIOLOGY | Facility: CLINIC | Age: 63
End: 2018-11-02

## 2018-12-03 ENCOUNTER — RX RENEWAL (OUTPATIENT)
Age: 63
End: 2018-12-03

## 2019-01-10 PROBLEM — Z86.59 PERSONAL HISTORY OF OTHER MENTAL AND BEHAVIORAL DISORDERS: Chronic | Status: ACTIVE | Noted: 2018-05-25

## 2019-01-14 ENCOUNTER — APPOINTMENT (OUTPATIENT)
Dept: CARDIOLOGY | Facility: CLINIC | Age: 64
End: 2019-01-14
Payer: MEDICAID

## 2019-01-14 ENCOUNTER — NON-APPOINTMENT (OUTPATIENT)
Age: 64
End: 2019-01-14

## 2019-01-14 VITALS
WEIGHT: 159 LBS | HEART RATE: 63 BPM | BODY MASS INDEX: 23.02 KG/M2 | OXYGEN SATURATION: 96 % | DIASTOLIC BLOOD PRESSURE: 92 MMHG | SYSTOLIC BLOOD PRESSURE: 137 MMHG | HEIGHT: 69.5 IN

## 2019-01-14 PROCEDURE — 99214 OFFICE O/P EST MOD 30 MIN: CPT

## 2019-01-14 PROCEDURE — 93000 ELECTROCARDIOGRAM COMPLETE: CPT

## 2019-01-14 RX ORDER — ATORVASTATIN CALCIUM 40 MG/1
40 TABLET, FILM COATED ORAL DAILY
Qty: 90 | Refills: 3 | Status: ACTIVE | COMMUNITY
Start: 2018-07-03 | End: 1900-01-01

## 2019-01-14 NOTE — PHYSICAL EXAM
[General Appearance - Well Developed] : well developed [Normal Appearance] : normal appearance [Well Groomed] : well groomed [General Appearance - Well Nourished] : well nourished [No Deformities] : no deformities [General Appearance - In No Acute Distress] : no acute distress [Normal Oral Mucosa] : normal oral mucosa [No Oral Pallor] : no oral pallor [No Oral Cyanosis] : no oral cyanosis [Normal Jugular Venous A Waves Present] : normal jugular venous A waves present [Normal Jugular Venous V Waves Present] : normal jugular venous V waves present [No Jugular Venous Catherine A Waves] : no jugular venous catherine A waves [Respiration, Rhythm And Depth] : normal respiratory rhythm and effort [Exaggerated Use Of Accessory Muscles For Inspiration] : no accessory muscle use [Auscultation Breath Sounds / Voice Sounds] : lungs were clear to auscultation bilaterally [Heart Rate And Rhythm] : heart rate and rhythm were normal [Heart Sounds] : normal S1 and S2 [Murmurs] : no murmurs present [Abdomen Soft] : soft [Abdomen Tenderness] : non-tender [Abdomen Mass (___ Cm)] : no abdominal mass palpated [Abnormal Walk] : normal gait [Gait - Sufficient For Exercise Testing] : the gait was sufficient for exercise testing [Nail Clubbing] : no clubbing of the fingernails [Cyanosis, Localized] : no localized cyanosis [Petechial Hemorrhages (___cm)] : no petechial hemorrhages [] : no ischemic changes [Oriented To Time, Place, And Person] : oriented to person, place, and time [Affect] : the affect was normal [Mood] : the mood was normal [No Anxiety] : not feeling anxious

## 2019-01-14 NOTE — DISCUSSION/SUMMARY
[FreeTextEntry1] : 63 year old man with history AFlutter and AF, sp ablation in 2015, and DCCV in 2018 here for followup.\par \par 1. AFib/Aflutter- On Metoprolol 100 mg BID, EKG reviewed and now back in aflutter. On ASA daily, no ac due to bleeding. Patient will schedule to see pulmonologist given hemoptysis and need for use of eliquis.\par 2. HTN- Condition is stale. Continue present meds\par 3. HLD- On Statin therapy, condition is stable.\par 4. FU in3 months

## 2019-01-14 NOTE — HISTORY OF PRESENT ILLNESS
[FreeTextEntry1] : Here for followup. Last seen by me in 10/2017 but seen more recently by Dr. Polk after DCCV over the summer. Briefly was admitted after bone biopsy where found to be in AF with RVR. SP MARCEL DCCV in April 2018. Episode of hemoptysis thereafter and now off anticoagulation. Now being treated for prostate cancer with bony lesions. No complaints of chest pain or palpitations\par 1. AFib/Aflutter- On Metoprolol 100 mg BID, EKG reviewed and now back in aflutter. On ASA daily, no ac due to bleeding. Patient will schedule to see pulmonologist given hemoptysis and need for use of eliquis.\par 2. HTN- Condition is stale. Continue present meds\par 3. HLD- On Statin therapy, condition is stable.

## 2019-01-14 NOTE — REASON FOR VISIT
[Follow-Up - Clinic] : a clinic follow-up of [FreeTextEntry2] : Afib/Aflutter sp ablation in 2015 and then found to be in AF sp DCCV in 2018

## 2019-01-22 ENCOUNTER — NON-APPOINTMENT (OUTPATIENT)
Age: 64
End: 2019-01-22

## 2019-01-22 ENCOUNTER — APPOINTMENT (OUTPATIENT)
Dept: ELECTROPHYSIOLOGY | Facility: CLINIC | Age: 64
End: 2019-01-22
Payer: MEDICAID

## 2019-01-22 VITALS
HEART RATE: 65 BPM | WEIGHT: 159 LBS | RESPIRATION RATE: 16 BRPM | OXYGEN SATURATION: 96 % | HEIGHT: 69.5 IN | DIASTOLIC BLOOD PRESSURE: 86 MMHG | BODY MASS INDEX: 23.02 KG/M2 | SYSTOLIC BLOOD PRESSURE: 135 MMHG

## 2019-01-22 DIAGNOSIS — R60.0 LOCALIZED EDEMA: ICD-10-CM

## 2019-01-22 DIAGNOSIS — I48.4 ATYPICAL ATRIAL FLUTTER: ICD-10-CM

## 2019-01-22 PROCEDURE — 93000 ELECTROCARDIOGRAM COMPLETE: CPT

## 2019-01-22 PROCEDURE — 99214 OFFICE O/P EST MOD 30 MIN: CPT

## 2019-01-23 NOTE — DISCUSSION/SUMMARY
[FreeTextEntry1] : In summary, Brice Szymanski is a 64y/o man with Hx of COPD, hypertension, atrial flutter s/p CTI ablation 08/2015 and prostate cancer with seed implant with recent MRI and PET/CT scan with multiple lytic bone lesions, on CT, who was recently admitted for an outpatient bone biopsy on 4/4/2018 and found to be in atypical atrial flutter with RVR s/p MARCEL/DCCV on 4/10/2018 who presents today for routine f/u. Admits doing well but does have occasional nausea. Denies chest pain, palpitations, SOB, syncope or near syncope. Not currently on AC. Was previously on Eliquis but had nose bleeding and still occasional coughs up blood tinged sputum. Also has upcoming cataract surgery for right eye. EKG today shows persistent atrial flutter. Discussed possibility of AC for thromboembolic prophylaxis as well as possible MARCEL/DCCV. Given bleeding in past, patient reluctant to initiate. Unable to undergo DCCV without use of AC. Verbalizes understanding of thromboembolic risk without use. Recommend initiating furosemide 20mg daily for LE edema. \par \par Sincerely,\par \par Larry Polk MD

## 2019-01-23 NOTE — HISTORY OF PRESENT ILLNESS
[FreeTextEntry1] : Esther Howell MD\par \par I saw Brice Szymanski on January 22, 2019. As you know, he is a 64y/o man with Hx of COPD, hypertension, atrial flutter s/p CTI ablation 08/2015 and prostate cancer with seed implant with recent MRI and PET/CT scan with multiple lytic bone lesions, on CT, who was recently admitted for an outpatient bone biopsy on 4/4/2018 and found to be in atypical atrial flutter with RVR s/p MARCEL/DCCV on 4/10/2018 who presents today for routine f/u. Admits doing well but does have occasional nausea. Denies chest pain, palpitations, SOB, syncope or near syncope. Not currently on AC. Was previously on Eliquis but had nose bleeding and still occasional coughs up blood tinged sputum. Also has upcoming cataract surgery for right eye.

## 2019-01-23 NOTE — PHYSICAL EXAM
[General Appearance - Well Developed] : well developed [Normal Appearance] : normal appearance [Well Groomed] : well groomed [General Appearance - Well Nourished] : well nourished [No Deformities] : no deformities [General Appearance - In No Acute Distress] : no acute distress [Normal Conjunctiva] : the conjunctiva exhibited no abnormalities [Eyelids - No Xanthelasma] : the eyelids demonstrated no xanthelasmas [Normal Oral Mucosa] : normal oral mucosa [No Oral Pallor] : no oral pallor [No Oral Cyanosis] : no oral cyanosis [Normal Jugular Venous A Waves Present] : normal jugular venous A waves present [Normal Jugular Venous V Waves Present] : normal jugular venous V waves present [No Jugular Venous Catherine A Waves] : no jugular venous catherine A waves [Respiration, Rhythm And Depth] : normal respiratory rhythm and effort [Exaggerated Use Of Accessory Muscles For Inspiration] : no accessory muscle use [Auscultation Breath Sounds / Voice Sounds] : lungs were clear to auscultation bilaterally [Heart Rate And Rhythm] : heart rate and rhythm were normal [Heart Sounds] : normal S1 and S2 [Murmurs] : no murmurs present [Abdomen Soft] : soft [Abdomen Tenderness] : non-tender [Abdomen Mass (___ Cm)] : no abdominal mass palpated [Abnormal Walk] : normal gait [Gait - Sufficient For Exercise Testing] : the gait was sufficient for exercise testing [Nail Clubbing] : no clubbing of the fingernails [Cyanosis, Localized] : no localized cyanosis [Petechial Hemorrhages (___cm)] : no petechial hemorrhages [Skin Color & Pigmentation] : normal skin color and pigmentation [] : no rash [No Venous Stasis] : no venous stasis [Skin Lesions] : no skin lesions [No Skin Ulcers] : no skin ulcer [No Xanthoma] : no  xanthoma was observed [Oriented To Time, Place, And Person] : oriented to person, place, and time [Affect] : the affect was normal [Mood] : the mood was normal [No Anxiety] : not feeling anxious [FreeTextEntry1] : +2 B/L LE edema

## 2019-02-03 ENCOUNTER — RX RENEWAL (OUTPATIENT)
Age: 64
End: 2019-02-03

## 2019-02-03 RX ORDER — CLOPIDOGREL BISULFATE 75 MG/1
75 TABLET, FILM COATED ORAL
Qty: 90 | Refills: 0 | Status: ACTIVE | COMMUNITY
Start: 2018-07-03 | End: 1900-01-01

## 2019-04-01 ENCOUNTER — OUTPATIENT (OUTPATIENT)
Dept: OUTPATIENT SERVICES | Facility: HOSPITAL | Age: 64
LOS: 1 days | End: 2019-04-01
Payer: MEDICAID

## 2019-04-01 DIAGNOSIS — Z98.890 OTHER SPECIFIED POSTPROCEDURAL STATES: Chronic | ICD-10-CM

## 2019-04-01 PROCEDURE — G9001: CPT

## 2019-04-17 ENCOUNTER — INPATIENT (INPATIENT)
Facility: HOSPITAL | Age: 64
LOS: 6 days | Discharge: NURSING, REHAB OR LONG-TERM CARE | End: 2019-04-24
Attending: HOSPITALIST | Admitting: HOSPITALIST
Payer: MEDICAID

## 2019-04-17 VITALS
SYSTOLIC BLOOD PRESSURE: 118 MMHG | HEART RATE: 114 BPM | RESPIRATION RATE: 20 BRPM | TEMPERATURE: 99 F | DIASTOLIC BLOOD PRESSURE: 88 MMHG | OXYGEN SATURATION: 86 %

## 2019-04-17 DIAGNOSIS — Z98.890 OTHER SPECIFIED POSTPROCEDURAL STATES: Chronic | ICD-10-CM

## 2019-04-17 LAB
ALBUMIN SERPL ELPH-MCNC: 2.7 G/DL — LOW (ref 3.3–5)
ALP SERPL-CCNC: 157 U/L — HIGH (ref 40–120)
ALT FLD-CCNC: 4 U/L — SIGNIFICANT CHANGE UP (ref 4–41)
ANION GAP SERPL CALC-SCNC: 13 MMO/L — SIGNIFICANT CHANGE UP (ref 7–14)
AST SERPL-CCNC: 8 U/L — SIGNIFICANT CHANGE UP (ref 4–40)
BASE EXCESS BLDV CALC-SCNC: 2.4 MMOL/L — SIGNIFICANT CHANGE UP
BASOPHILS # BLD AUTO: 0.05 K/UL — SIGNIFICANT CHANGE UP (ref 0–0.2)
BASOPHILS NFR BLD AUTO: 0.8 % — SIGNIFICANT CHANGE UP (ref 0–2)
BILIRUB SERPL-MCNC: 0.5 MG/DL — SIGNIFICANT CHANGE UP (ref 0.2–1.2)
BLOOD GAS VENOUS - CREATININE: 0.68 MG/DL — SIGNIFICANT CHANGE UP (ref 0.5–1.3)
BUN SERPL-MCNC: 19 MG/DL — SIGNIFICANT CHANGE UP (ref 7–23)
CALCIUM SERPL-MCNC: 8.9 MG/DL — SIGNIFICANT CHANGE UP (ref 8.4–10.5)
CHLORIDE BLDV-SCNC: 109 MMOL/L — HIGH (ref 96–108)
CHLORIDE SERPL-SCNC: 104 MMOL/L — SIGNIFICANT CHANGE UP (ref 98–107)
CO2 SERPL-SCNC: 23 MMOL/L — SIGNIFICANT CHANGE UP (ref 22–31)
CREAT SERPL-MCNC: 0.85 MG/DL — SIGNIFICANT CHANGE UP (ref 0.5–1.3)
EOSINOPHIL # BLD AUTO: 0.15 K/UL — SIGNIFICANT CHANGE UP (ref 0–0.5)
EOSINOPHIL NFR BLD AUTO: 2.4 % — SIGNIFICANT CHANGE UP (ref 0–6)
GAS PNL BLDV: 136 MMOL/L — SIGNIFICANT CHANGE UP (ref 136–146)
GLUCOSE BLDV-MCNC: 88 — SIGNIFICANT CHANGE UP (ref 70–99)
GLUCOSE SERPL-MCNC: 90 MG/DL — SIGNIFICANT CHANGE UP (ref 70–99)
HCO3 BLDV-SCNC: 27 MMOL/L — SIGNIFICANT CHANGE UP (ref 20–27)
HCT VFR BLD CALC: 35.4 % — LOW (ref 39–50)
HCT VFR BLDV CALC: 36.2 % — LOW (ref 39–51)
HGB BLD-MCNC: 11.2 G/DL — LOW (ref 13–17)
HGB BLDV-MCNC: 11.7 G/DL — LOW (ref 13–17)
IMM GRANULOCYTES NFR BLD AUTO: 1.1 % — SIGNIFICANT CHANGE UP (ref 0–1.5)
LACTATE BLDV-MCNC: 1.3 MMOL/L — SIGNIFICANT CHANGE UP (ref 0.5–2)
LYMPHOCYTES # BLD AUTO: 1.14 K/UL — SIGNIFICANT CHANGE UP (ref 1–3.3)
LYMPHOCYTES # BLD AUTO: 18.2 % — SIGNIFICANT CHANGE UP (ref 13–44)
MAGNESIUM SERPL-MCNC: 1.7 MG/DL — SIGNIFICANT CHANGE UP (ref 1.6–2.6)
MCHC RBC-ENTMCNC: 29.1 PG — SIGNIFICANT CHANGE UP (ref 27–34)
MCHC RBC-ENTMCNC: 31.6 % — LOW (ref 32–36)
MCV RBC AUTO: 91.9 FL — SIGNIFICANT CHANGE UP (ref 80–100)
MONOCYTES # BLD AUTO: 0.94 K/UL — HIGH (ref 0–0.9)
MONOCYTES NFR BLD AUTO: 15 % — HIGH (ref 2–14)
NEUTROPHILS # BLD AUTO: 3.9 K/UL — SIGNIFICANT CHANGE UP (ref 1.8–7.4)
NEUTROPHILS NFR BLD AUTO: 62.5 % — SIGNIFICANT CHANGE UP (ref 43–77)
NRBC # FLD: 0.02 K/UL — SIGNIFICANT CHANGE UP (ref 0–0)
NT-PROBNP SERPL-SCNC: 2560 PG/ML — SIGNIFICANT CHANGE UP
PCO2 BLDV: 35 MMHG — LOW (ref 41–51)
PH BLDV: 7.48 PH — HIGH (ref 7.32–7.43)
PLATELET # BLD AUTO: 444 K/UL — HIGH (ref 150–400)
PMV BLD: 9.7 FL — SIGNIFICANT CHANGE UP (ref 7–13)
PO2 BLDV: 48 MMHG — HIGH (ref 35–40)
POTASSIUM BLDV-SCNC: 2.7 MMOL/L — CRITICAL LOW (ref 3.4–4.5)
POTASSIUM SERPL-MCNC: 2.7 MMOL/L — CRITICAL LOW (ref 3.5–5.3)
POTASSIUM SERPL-SCNC: 2.7 MMOL/L — CRITICAL LOW (ref 3.5–5.3)
PROT SERPL-MCNC: 5.7 G/DL — LOW (ref 6–8.3)
RBC # BLD: 3.85 M/UL — LOW (ref 4.2–5.8)
RBC # FLD: 14.9 % — HIGH (ref 10.3–14.5)
SAO2 % BLDV: 79.9 % — SIGNIFICANT CHANGE UP (ref 60–85)
SODIUM SERPL-SCNC: 140 MMOL/L — SIGNIFICANT CHANGE UP (ref 135–145)
TROPONIN T, HIGH SENSITIVITY: 40 NG/L — SIGNIFICANT CHANGE UP (ref ?–14)
WBC # BLD: 6.25 K/UL — SIGNIFICANT CHANGE UP (ref 3.8–10.5)
WBC # FLD AUTO: 6.25 K/UL — SIGNIFICANT CHANGE UP (ref 3.8–10.5)

## 2019-04-17 RX ORDER — POTASSIUM CHLORIDE 20 MEQ
10 PACKET (EA) ORAL
Qty: 0 | Refills: 0 | Status: COMPLETED | OUTPATIENT
Start: 2019-04-17 | End: 2019-04-18

## 2019-04-17 RX ORDER — SODIUM CHLORIDE 9 MG/ML
500 INJECTION INTRAMUSCULAR; INTRAVENOUS; SUBCUTANEOUS ONCE
Qty: 0 | Refills: 0 | Status: COMPLETED | OUTPATIENT
Start: 2019-04-17 | End: 2019-04-17

## 2019-04-17 RX ORDER — POTASSIUM CHLORIDE 20 MEQ
40 PACKET (EA) ORAL ONCE
Qty: 0 | Refills: 0 | Status: COMPLETED | OUTPATIENT
Start: 2019-04-17 | End: 2019-04-17

## 2019-04-17 RX ORDER — MAGNESIUM SULFATE 500 MG/ML
2 VIAL (ML) INJECTION ONCE
Qty: 0 | Refills: 0 | Status: COMPLETED | OUTPATIENT
Start: 2019-04-17 | End: 2019-04-17

## 2019-04-17 RX ADMIN — Medication 100 MILLIEQUIVALENT(S): at 23:46

## 2019-04-17 RX ADMIN — Medication 40 MILLIEQUIVALENT(S): at 23:17

## 2019-04-17 RX ADMIN — Medication 50 GRAM(S): at 23:17

## 2019-04-17 NOTE — ED ADULT TRIAGE NOTE - CHIEF COMPLAINT QUOTE
hx of metastatic prostate CA states he is on chemo as per NH notes pt has mets to spinal cord with compression, sent from NH for several days of hypotension and tachycardia. pt normotensive however tachy and pulse ox 88% on RA.  LS clear no resp distress. started on NC in triage.

## 2019-04-17 NOTE — ED ADULT NURSE NOTE - OBJECTIVE STATEMENT
David RN: 65 yo received to room 20 from a nursing facility for further evaluation for rapid HR, hx Afib on daily ASA, denies CP/palpitations/SOB, placed on 2L nasal cannula for support with 96% saturation, resps even and unlabored, sinus tach on CM, no sacral skin breakdown, 20G placed in right forearm, labs drawn and sent, VS as charted, report given to primary RN Frank, will monitor David RN: 63 yo received to room 20 from a nursing facility for further evaluation for rapid HR, hx Afib on daily ASA, nonambulatory due to spinal cord mets, denies CP/palpitations/SOB, placed on 2L nasal cannula for 87% saturation on RA, 96% with oxygen support, resps even and unlabored, sinus tach on CM, no sacral skin breakdown, 20G placed in right forearm, labs drawn and sent, VS as charted, report given to primary RN Frank, will monitor

## 2019-04-18 DIAGNOSIS — C61 MALIGNANT NEOPLASM OF PROSTATE: ICD-10-CM

## 2019-04-18 DIAGNOSIS — I10 ESSENTIAL (PRIMARY) HYPERTENSION: ICD-10-CM

## 2019-04-18 DIAGNOSIS — J43.2 CENTRILOBULAR EMPHYSEMA: ICD-10-CM

## 2019-04-18 DIAGNOSIS — R00.0 TACHYCARDIA, UNSPECIFIED: ICD-10-CM

## 2019-04-18 DIAGNOSIS — E87.6 HYPOKALEMIA: ICD-10-CM

## 2019-04-18 DIAGNOSIS — I50.33 ACUTE ON CHRONIC DIASTOLIC (CONGESTIVE) HEART FAILURE: ICD-10-CM

## 2019-04-18 DIAGNOSIS — Z29.9 ENCOUNTER FOR PROPHYLACTIC MEASURES, UNSPECIFIED: ICD-10-CM

## 2019-04-18 DIAGNOSIS — I48.91 UNSPECIFIED ATRIAL FIBRILLATION: ICD-10-CM

## 2019-04-18 DIAGNOSIS — R09.02 HYPOXEMIA: ICD-10-CM

## 2019-04-18 DIAGNOSIS — I48.92 UNSPECIFIED ATRIAL FLUTTER: ICD-10-CM

## 2019-04-18 DIAGNOSIS — E78.5 HYPERLIPIDEMIA, UNSPECIFIED: ICD-10-CM

## 2019-04-18 LAB
ANION GAP SERPL CALC-SCNC: 11 MMO/L — SIGNIFICANT CHANGE UP (ref 7–14)
BUN SERPL-MCNC: 15 MG/DL — SIGNIFICANT CHANGE UP (ref 7–23)
CALCIUM SERPL-MCNC: 9.3 MG/DL — SIGNIFICANT CHANGE UP (ref 8.4–10.5)
CHLORIDE SERPL-SCNC: 106 MMOL/L — SIGNIFICANT CHANGE UP (ref 98–107)
CHOLEST SERPL-MCNC: 101 MG/DL — LOW (ref 120–199)
CO2 SERPL-SCNC: 26 MMOL/L — SIGNIFICANT CHANGE UP (ref 22–31)
CREAT SERPL-MCNC: 0.89 MG/DL — SIGNIFICANT CHANGE UP (ref 0.5–1.3)
GLUCOSE SERPL-MCNC: 77 MG/DL — SIGNIFICANT CHANGE UP (ref 70–99)
HCT VFR BLD CALC: 39.1 % — SIGNIFICANT CHANGE UP (ref 39–50)
HDLC SERPL-MCNC: 26 MG/DL — LOW (ref 35–55)
HGB BLD-MCNC: 12.1 G/DL — LOW (ref 13–17)
LIPID PNL WITH DIRECT LDL SERPL: 61 MG/DL — SIGNIFICANT CHANGE UP
MAGNESIUM SERPL-MCNC: 2.1 MG/DL — SIGNIFICANT CHANGE UP (ref 1.6–2.6)
MCHC RBC-ENTMCNC: 29.9 PG — SIGNIFICANT CHANGE UP (ref 27–34)
MCHC RBC-ENTMCNC: 30.9 % — LOW (ref 32–36)
MCV RBC AUTO: 96.5 FL — SIGNIFICANT CHANGE UP (ref 80–100)
NRBC # FLD: 0 K/UL — SIGNIFICANT CHANGE UP (ref 0–0)
PHOSPHATE SERPL-MCNC: 3.2 MG/DL — SIGNIFICANT CHANGE UP (ref 2.5–4.5)
PLATELET # BLD AUTO: 422 K/UL — HIGH (ref 150–400)
PMV BLD: 9.5 FL — SIGNIFICANT CHANGE UP (ref 7–13)
POTASSIUM SERPL-MCNC: 4.4 MMOL/L — SIGNIFICANT CHANGE UP (ref 3.5–5.3)
POTASSIUM SERPL-SCNC: 4.4 MMOL/L — SIGNIFICANT CHANGE UP (ref 3.5–5.3)
RBC # BLD: 4.05 M/UL — LOW (ref 4.2–5.8)
RBC # FLD: 15 % — HIGH (ref 10.3–14.5)
SODIUM SERPL-SCNC: 143 MMOL/L — SIGNIFICANT CHANGE UP (ref 135–145)
TRIGL SERPL-MCNC: 80 MG/DL — SIGNIFICANT CHANGE UP (ref 10–149)
TROPONIN T, HIGH SENSITIVITY: 41 NG/L — SIGNIFICANT CHANGE UP (ref ?–14)
TSH SERPL-MCNC: 3.6 UIU/ML — SIGNIFICANT CHANGE UP (ref 0.27–4.2)
WBC # BLD: 5.93 K/UL — SIGNIFICANT CHANGE UP (ref 3.8–10.5)
WBC # FLD AUTO: 5.93 K/UL — SIGNIFICANT CHANGE UP (ref 3.8–10.5)

## 2019-04-18 PROCEDURE — 99222 1ST HOSP IP/OBS MODERATE 55: CPT

## 2019-04-18 PROCEDURE — 99223 1ST HOSP IP/OBS HIGH 75: CPT | Mod: AI

## 2019-04-18 PROCEDURE — 71275 CT ANGIOGRAPHY CHEST: CPT | Mod: 26

## 2019-04-18 RX ORDER — ENZALUTAMIDE 80 MG/1
160 TABLET ORAL DAILY
Qty: 0 | Refills: 0 | Status: DISCONTINUED | OUTPATIENT
Start: 2019-04-18 | End: 2019-04-23

## 2019-04-18 RX ORDER — CLOPIDOGREL BISULFATE 75 MG/1
75 TABLET, FILM COATED ORAL DAILY
Qty: 0 | Refills: 0 | Status: DISCONTINUED | OUTPATIENT
Start: 2019-04-18 | End: 2019-04-24

## 2019-04-18 RX ORDER — ENZALUTAMIDE 80 MG/1
4 TABLET ORAL
Qty: 0 | Refills: 0 | COMMUNITY

## 2019-04-18 RX ORDER — IPRATROPIUM/ALBUTEROL SULFATE 18-103MCG
3 AEROSOL WITH ADAPTER (GRAM) INHALATION EVERY 6 HOURS
Qty: 0 | Refills: 0 | Status: DISCONTINUED | OUTPATIENT
Start: 2019-04-18 | End: 2019-04-24

## 2019-04-18 RX ORDER — METOPROLOL TARTRATE 50 MG
1 TABLET ORAL
Qty: 0 | Refills: 0 | COMMUNITY

## 2019-04-18 RX ORDER — POLYETHYLENE GLYCOL 3350 17 G/17G
17 POWDER, FOR SOLUTION ORAL DAILY
Qty: 0 | Refills: 0 | Status: DISCONTINUED | OUTPATIENT
Start: 2019-04-18 | End: 2019-04-22

## 2019-04-18 RX ORDER — POLYETHYLENE GLYCOL 3350 17 G/17G
17 POWDER, FOR SOLUTION ORAL
Qty: 0 | Refills: 0 | COMMUNITY

## 2019-04-18 RX ORDER — PANTOPRAZOLE SODIUM 20 MG/1
1 TABLET, DELAYED RELEASE ORAL
Qty: 0 | Refills: 0 | COMMUNITY

## 2019-04-18 RX ORDER — OXYCODONE HYDROCHLORIDE 5 MG/1
1 TABLET ORAL
Qty: 0 | Refills: 0 | COMMUNITY

## 2019-04-18 RX ORDER — ALBUTEROL 90 UG/1
2 AEROSOL, METERED ORAL EVERY 6 HOURS
Qty: 0 | Refills: 0 | Status: DISCONTINUED | OUTPATIENT
Start: 2019-04-18 | End: 2019-04-18

## 2019-04-18 RX ORDER — METOPROLOL TARTRATE 50 MG
100 TABLET ORAL
Qty: 0 | Refills: 0 | Status: DISCONTINUED | OUTPATIENT
Start: 2019-04-18 | End: 2019-04-24

## 2019-04-18 RX ORDER — MIDODRINE HYDROCHLORIDE 2.5 MG/1
5 TABLET ORAL
Qty: 0 | Refills: 0 | Status: DISCONTINUED | OUTPATIENT
Start: 2019-04-18 | End: 2019-04-24

## 2019-04-18 RX ORDER — TIOTROPIUM BROMIDE 18 UG/1
1 CAPSULE ORAL; RESPIRATORY (INHALATION)
Qty: 0 | Refills: 0 | COMMUNITY

## 2019-04-18 RX ORDER — FERROUS SULFATE 325(65) MG
1 TABLET ORAL
Qty: 0 | Refills: 0 | COMMUNITY

## 2019-04-18 RX ORDER — TAMSULOSIN HYDROCHLORIDE 0.4 MG/1
0.4 CAPSULE ORAL AT BEDTIME
Qty: 0 | Refills: 0 | Status: DISCONTINUED | OUTPATIENT
Start: 2019-04-18 | End: 2019-04-24

## 2019-04-18 RX ORDER — TIOTROPIUM BROMIDE 18 UG/1
1 CAPSULE ORAL; RESPIRATORY (INHALATION) DAILY
Qty: 0 | Refills: 0 | Status: DISCONTINUED | OUTPATIENT
Start: 2019-04-18 | End: 2019-04-24

## 2019-04-18 RX ORDER — ATORVASTATIN CALCIUM 80 MG/1
80 TABLET, FILM COATED ORAL AT BEDTIME
Qty: 0 | Refills: 0 | Status: DISCONTINUED | OUTPATIENT
Start: 2019-04-18 | End: 2019-04-24

## 2019-04-18 RX ORDER — FERROUS SULFATE 325(65) MG
325 TABLET ORAL DAILY
Qty: 0 | Refills: 0 | Status: DISCONTINUED | OUTPATIENT
Start: 2019-04-18 | End: 2019-04-24

## 2019-04-18 RX ORDER — ASPIRIN/CALCIUM CARB/MAGNESIUM 324 MG
81 TABLET ORAL DAILY
Qty: 0 | Refills: 0 | Status: DISCONTINUED | OUTPATIENT
Start: 2019-04-18 | End: 2019-04-24

## 2019-04-18 RX ORDER — PANTOPRAZOLE SODIUM 20 MG/1
40 TABLET, DELAYED RELEASE ORAL
Qty: 0 | Refills: 0 | Status: DISCONTINUED | OUTPATIENT
Start: 2019-04-18 | End: 2019-04-24

## 2019-04-18 RX ORDER — OXYCODONE HYDROCHLORIDE 5 MG/1
5 TABLET ORAL EVERY 4 HOURS
Qty: 0 | Refills: 0 | Status: DISCONTINUED | OUTPATIENT
Start: 2019-04-18 | End: 2019-04-24

## 2019-04-18 RX ORDER — IPRATROPIUM/ALBUTEROL SULFATE 18-103MCG
3 AEROSOL WITH ADAPTER (GRAM) INHALATION
Qty: 0 | Refills: 0 | COMMUNITY

## 2019-04-18 RX ORDER — BUDESONIDE AND FORMOTEROL FUMARATE DIHYDRATE 160; 4.5 UG/1; UG/1
2 AEROSOL RESPIRATORY (INHALATION)
Qty: 0 | Refills: 0 | Status: DISCONTINUED | OUTPATIENT
Start: 2019-04-18 | End: 2019-04-24

## 2019-04-18 RX ORDER — FUROSEMIDE 40 MG
40 TABLET ORAL EVERY 12 HOURS
Qty: 0 | Refills: 0 | Status: DISCONTINUED | OUTPATIENT
Start: 2019-04-18 | End: 2019-04-19

## 2019-04-18 RX ORDER — ACETAMINOPHEN 500 MG
650 TABLET ORAL EVERY 6 HOURS
Qty: 0 | Refills: 0 | Status: DISCONTINUED | OUTPATIENT
Start: 2019-04-18 | End: 2019-04-24

## 2019-04-18 RX ORDER — ENOXAPARIN SODIUM 100 MG/ML
40 INJECTION SUBCUTANEOUS EVERY 24 HOURS
Qty: 0 | Refills: 0 | Status: DISCONTINUED | OUTPATIENT
Start: 2019-04-18 | End: 2019-04-24

## 2019-04-18 RX ORDER — MIDODRINE HYDROCHLORIDE 2.5 MG/1
1 TABLET ORAL
Qty: 0 | Refills: 0 | COMMUNITY

## 2019-04-18 RX ADMIN — Medication 40 MILLIGRAM(S): at 11:33

## 2019-04-18 RX ADMIN — Medication 100 MILLIGRAM(S): at 17:38

## 2019-04-18 RX ADMIN — Medication 100 MILLIEQUIVALENT(S): at 03:01

## 2019-04-18 RX ADMIN — Medication 325 MILLIGRAM(S): at 11:49

## 2019-04-18 RX ADMIN — ATORVASTATIN CALCIUM 80 MILLIGRAM(S): 80 TABLET, FILM COATED ORAL at 22:18

## 2019-04-18 RX ADMIN — ENOXAPARIN SODIUM 40 MILLIGRAM(S): 100 INJECTION SUBCUTANEOUS at 11:53

## 2019-04-18 RX ADMIN — CLOPIDOGREL BISULFATE 75 MILLIGRAM(S): 75 TABLET, FILM COATED ORAL at 11:49

## 2019-04-18 RX ADMIN — Medication 40 MILLIGRAM(S): at 22:19

## 2019-04-18 RX ADMIN — BUDESONIDE AND FORMOTEROL FUMARATE DIHYDRATE 2 PUFF(S): 160; 4.5 AEROSOL RESPIRATORY (INHALATION) at 22:18

## 2019-04-18 RX ADMIN — TIOTROPIUM BROMIDE 1 CAPSULE(S): 18 CAPSULE ORAL; RESPIRATORY (INHALATION) at 17:39

## 2019-04-18 RX ADMIN — Medication 100 MILLIEQUIVALENT(S): at 01:50

## 2019-04-18 RX ADMIN — SODIUM CHLORIDE 500 MILLILITER(S): 9 INJECTION INTRAMUSCULAR; INTRAVENOUS; SUBCUTANEOUS at 01:43

## 2019-04-18 RX ADMIN — Medication 81 MILLIGRAM(S): at 11:49

## 2019-04-18 RX ADMIN — TAMSULOSIN HYDROCHLORIDE 0.4 MILLIGRAM(S): 0.4 CAPSULE ORAL at 22:18

## 2019-04-18 RX ADMIN — Medication 2 GRAM(S): at 00:59

## 2019-04-18 NOTE — ED PROVIDER NOTE - CARDIAC, MLM
Normal rate, regular rhythm.  Heart sounds S1, S2.  No murmurs, rubs or gallops. 1+ RLE edema, trace LLE pedal edema

## 2019-04-18 NOTE — H&P ADULT - PROBLEM SELECTOR PLAN 3
Continue Xtandi, tamsulosin Continue budesonide-formoterol, albuterol PRN, tiotropium PRN  Started on oxygen delivery therapy   Monitor Pulse-OX Started on IV potassium chloride 10mEq, potassium chloride tab 40 mEq  Monitor serum K+ Started on IV potassium chloride 10mEq, potassium chloride tab 40 mEq  Monitor serum K+  Repeat BMP, Mg and Phos in progress

## 2019-04-18 NOTE — H&P ADULT - ATTENDING COMMENTS
Patient seen and examined with tele team in ED.  Agree with above A/p by JOSE GUADALUPE Peters.  65 y/o  M with pmhx of Atrial fibrillation and flutter (not on anticoagulants due to GI bleed), prostate CA w/ mets to bone (on chemo with seeds implant), HTN, HLD, hx of anxiety, emphysema presents from [facility] with tachycardia and hypotension for past 4 days. Pt reports blood pressure to be around 100/70, usually SBP is 120-140's. Pt states he's hypotensive s/p medications. Pt was told to go to ED 4 days ago by nursing home staff, however pt refused. Pt comes in today after his cardiologist recommended him to go to ED. Pt states he feels well and his only complaint is the intermittent BLE edema for 1 month and LE weakness.  PE Alert and Ox3.  VS T97.9, , /92, R 17, Sat (7 % on Oxygen via NC 2L  lungs- dec BS at  bases only, otherwise CTA. Cor RRR, abd soft N/T.  Ext Upper Ext 5/5 strength, LE 4/5 strength to R lE- Old, LLE 5/5 strenth.  Labs Sig WBC 6.25- nl,  lactate 1.3- nl, HStrop 40 and 41- stable, bun/creat 19/8.85 and K2.7  CTA of Chest- no pe, pos Pulm edema.  A/p  Pulm Edema- inc lasix 40 mg 1v q12 Hold Sbp ,100, Hr <60  Hypokalemia- supplemented- monitor BMP and supplement K to keep >4.  pT eval for R leg weakness- chronic sec to prostate ca with spinal mets.  Cardiology consult to house cardiology- patient is known to house Cardiology. Patient seen and examined with tele team in ED.  Agree with above A/p by JOSE GUADALUPE Peters.  65 y/o  M with pmhx of Atrial fibrillation and flutter (not on anticoagulants due to GI bleed), prostate CA w/ mets to bone (on chemo with seeds implant), HTN, HLD, hx of anxiety, emphysema presents from [facility] with tachycardia and hypotension for past 4 days. Pt reports blood pressure to be around 100/70, usually SBP is 120-140's. Pt states he's hypotensive s/p medications. Pt was told to go to ED 4 days ago by nursing home staff, however pt refused. Pt comes in today after his cardiologist recommended him to go to ED. Pt states he feels well and his only complaint is the intermittent BLE edema for 1 month and LE weakness.  PE Alert and Ox3.  VS T97.9, , /92, R 17, Sat (7 % on Oxygen via NC 2L  lungs- dec BS at  bases only, otherwise CTA, no wheezing. . Cor RRR, abd soft N/T.  Ext Upper Ext 5/5 strength, LE 4/5 strength to R lE- Old, LLE 5/5 strenth.  Labs Sig WBC 6.25- nl,  lactate 1.3- nl, HStrop 40 and 41- stable, bun/creat 19/8.85 and K2.7  CTA of Chest- no pe, pos Pulm edema.  A/p  Pulm Edema- inc lasix 40 mg 1v q12 Hold Sbp ,100, Hr <60  Afib- c/w midodrine and metoprolol. ? role to increase metoprolol for better rate control if hR still elevated after pulm edema has corrected?? , ekg/ Echo. Will defer to cardiology.  Hypokalemia- supplemented- monitor BMP and supplement K to keep >4.  pT eval for R leg weakness- chronic sec to prostate ca with spinal mets.  Cardiology consult to house cardiology- patient is known to house Cardiology. Patient seen and examined with tele team in ED.  Agree with above A/p by NP Fran.  65 y/o  M with pmhx of Atrial fibrillation and flutter (not on anticoagulants due to GI bleed), prostate CA w/ mets to bone (on chemo with seeds implant), HTN, HLD, hx of anxiety, emphysema presents from [facility] with tachycardia and hypotension for past 4 days. Pt reports blood pressure to be around 100/70, usually SBP is 120-140's. Pt states he's hypotensive s/p medications. Pt was told to go to ED 4 days ago by nursing home staff, however pt refused. Pt comes in today after his cardiologist recommended him to go to ED. Pt states he feels well and his only complaint is the intermittent BLE edema for 1 month and LE weakness.  PE Alert and Ox3.  VS T97.9, , /92, R 17, Sat (7 % on Oxygen via NC 2L  lungs- dec BS at  bases only, otherwise CTA, no wheezing. . Cor RRR, abd soft N/T.  Ext Upper Ext 5/5 strength, LE 4/5 strength to R lE- Old, LLE 5/5 strenth.  Labs Sig WBC 6.25- nl,  lactate 1.3- nl, HStrop 40 and 41- stable, bun/creat 19/8.85 and K2.7  CTA of Chest- no pe, pos Pulm edema.  A/p  Pulm Edema- inc lasix 40 mg 1v q12 Hold Sbp ,100, Hr <60  Afib- c/w midodrine and metoprolol. ? role to increase metoprolol for better rate control if hR still elevated after pulm edema has corrected?? , ekg/ Echo. Will defer to cardiology.  Hypokalemia- supplemented- monitor BMP and supplement K to keep >4.  pT eval for R leg weakness- chronic sec to prostate ca with spinal mets.  Cardiology consult to house cardiology- patient is known to house Cardiology.

## 2019-04-18 NOTE — H&P ADULT - RS GEN PE MLT RESP DETAILS PC
normal/diminished breath sounds, R/airway patent/breath sounds equal/diminished breath sounds, L/no chest wall tenderness normal/diminished breath sounds, R/diminished breath sounds, L/rales/airway patent/no chest wall tenderness/breath sounds equal diminished breath sounds, R/rales/diminished breath sounds, L/no chest wall tenderness

## 2019-04-18 NOTE — ED PROVIDER NOTE - ATTENDING CONTRIBUTION TO CARE
Dr. Tucker: I have personally performed a face to face bedside history and physical examination of this patient. I have discussed the history, examination, review of systems, assessment and plan of management with the resident. I have reviewed the electronic medical record and amended it to reflect my history, review of systems, physical exam, assessment and plan.    Attending Exam - Dr. Tucker: GEN: well appearing, NAD  HEENT: +PERRL, EOMI  RESP: CTAB, no signs of respiratory distress CV: s1s2 tachycardic RRR, LE edema R>L ABD: soft/non tender/non distended  MSK: no deformities / swelling, normal range of motion, spine grossly normal NEURO: alert, non focal exam SKIN: normal color / temperature / condition.

## 2019-04-18 NOTE — CONSULT NOTE ADULT - ASSESSMENT
Pt is a 65 y/o man with a PMHx of Atrial fibrillation and flutter (not on anticoagulants due to GI bleed), prostate CA w/ mets to bone (on chemo with seeds implant), HTN, HLD, hx of anxiety, emphysema and HFpEF, presents from [facility] with tachycardia and hypotension for past 4 days. Cardiology consulted for acute on chronic HFpEF exacerbation    #Acute on chronic HFpEF exacerbation: Likely in the setting of recieving IVF in the ED. On lasix 40 IV BID now (Home dose lasix 40 daily). +JVD, mild crackles on exam  -Pt currently asymptomatic  -Continue IV lasix. Can transition to home lasix tomorrow  -Monitor K closely. Keep >4, Mg >2  -TTE ordered by primary team- will follow up    #Afib/flutter: EKG showed Atrial flutter with RVR 2:1 conduction  -Currently on metoprolol and midodrine. Unsure when midodrine and for what reason was started- will look through OP records  -[id number]: 3-->Not on AC due to significant GI bleed last year    #CAD; NST w/ predominately fixed defects  -Continue home asa/plavix/statin

## 2019-04-18 NOTE — ED ADULT NURSE REASSESSMENT NOTE - NS ED NURSE REASSESS COMMENT FT1
Received report from bucky Mccollum. Pt Aox4, appears in NAD at this time, pt denies any sob, cp, dizziness. 2nd potassium started as ordered, sinus tach on cardiac monitor, will continue to monitor.

## 2019-04-18 NOTE — ED PROVIDER NOTE - CLINICAL SUMMARY MEDICAL DECISION MAKING FREE TEXT BOX
Jonathan Weil, PGY2 - Tachy to 115, sats 89% on RA improved w/ 2L via NC, though overall very well appearing. Concern for PE w/ unilateral leg swelling and tachycardia, will obtain CTA, labs to r/o ACS as well, assess for pulm edema, occult infection, likely admit.

## 2019-04-18 NOTE — H&P ADULT - HISTORY OF PRESENT ILLNESS
63 y/o  M with pmhx of Atrial fibrillation and flutter (not on anticoagulants due to GI bleed), prostate CA w/ mets to bone (on chemo with seeds implant), HTN, HLD, hx of anxiety, emphysema presents from [facility] with tachycardia and hypotension for past 4 days. Pt was told to go to ED 4 days ago by nursing home staff, however pt refused. Pt comes in today after his cardiologist recommended him to go to ED. Pt states he feels well and his only complaint is the intermittent BLE edema for 1 month. Pt states the BLE edema started after he started taking chemo pills. Denies fever, chills, syncope, LOC, seizures, dizziness, wt gain/loss, vision changes, fatigue, CP, palpitations, abd pain, cough, dysuria, incontinence, hx of MI, and hx of stroke. 65 y/o  M with pmhx of Atrial fibrillation and flutter (not on anticoagulants due to GI bleed), prostate CA w/ mets to bone (on chemo with seeds implant), HTN, HLD, hx of anxiety, emphysema presents from [facility] with tachycardia and hypotension for past 4 days. Pt reports blood pressure to be around 100/70, usually SBP is 120-140's.  Pt was told to go to ED 4 days ago by nursing home staff, however pt refused. Pt comes in today after his cardiologist recommended him to go to ED. Pt states he feels well and his only complaint is the intermittent BLE edema for 1 month and LE weakness, which started after taking chemo pills. Pt admits to being wheelchair bound. Denies fever, chills, syncope, LOC, seizures, dizziness, wt gain/loss, vision changes, fatigue, CP, palpitations, abd pain, cough, dysuria, incontinence, hx of MI, and hx of stroke. 63 y/o  M with pmhx of Atrial fibrillation and flutter (not on anticoagulants due to GI bleed), prostate CA w/ mets to bone (on chemo with seeds implant), HTN, HLD, hx of anxiety, emphysema presents from [facility] with tachycardia and hypotension for past 4 days. Pt reports blood pressure to be around 100/70, usually SBP is 120-140's. Pt states he's hypotensive s/p medications. Pt was told to go to ED 4 days ago by nursing home staff, however pt refused. Pt comes in today after his cardiologist recommended him to go to ED. Pt states he feels well and his only complaint is the intermittent BLE edema for 1 month and LE weakness, which started after taking chemo pills. Pt admits to being wheelchair bound. Denies fever, chills, syncope, LOC, seizures, dizziness, wt gain/loss, vision changes, fatigue, CP, palpitations, abd pain, cough, dysuria, incontinence, hx of MI, and hx of stroke. 65 y/o  M with pmhx of Atrial fibrillation and flutter (not on anticoagulants due to GI bleed), prostate CA w/ mets to bone (on chemo with seeds implant), HTN, HLD, hx of anxiety , emphysema and CHF, presents from [facility] with tachycardia and hypotension for past 4 days. Pt reports blood pressure to be around 100/70, usually SBP is 120-140's. Pt states he's hypotensive s/p medications. Pt was told to go to ED 4 days ago by nursing home staff, however pt refused. Pt comes in today after his cardiologist recommended him to go to ED. Pt states he feels well and his only complaint is the intermittent BLE edema for 1 month and LE weakness, which started after taking chemo pills. Pt admits to being wheelchair bound. Denies fever, chills, syncope, LOC, seizures, dizziness, wt gain/loss, vision changes, fatigue, CP, palpitations, abd pain, cough, dysuria, incontinence, hx of MI, and hx of stroke.     On admission, pt tachycardic, +JVD, has mild rales and significant b/l LE edema.

## 2019-04-18 NOTE — H&P ADULT - NSHPLABSRESULTS_GEN_ALL_CORE
EK.2   6.25  )-----------( 444      ( 2019 21:40 )             35.4         140  |  104  |  19  ----------------------------<  90  2.7<LL>   |  23  |  0.85    Ca    8.9      2019 21:40  Mg     1.7         TPro  5.7<L>  /  Alb  2.7<L>  /  TBili  0.5  /  DBili  x   /  AST  8   /  ALT  4   /  AlkPhos  157<H>      Troponin: 40 -->41     BNP: 2560     CT chest w/ IV cont: No pulmonary edema, mild pulmonary edema, centrilobular emphysema EKG: Aflutter w/ RVR 2:1 conduction @ 114 bpm  Trops: 40-->41  K+ 2.7-->supplementing  proBNP: 2560  CTPA: No pulmonary embolism. Mild pulmonary edema. Centrilobular emphysema.    TTE on 6/2018: EF 65%  1. Mildly dilated left atrium.  LA volume index = 36 cc/m2.  2. Increased relative wall thickness with normal left  ventricular mass index, consistent with concentric left  ventricular remodeling.  3. Endocardium not well visualized; grossly normal left  ventricular systolic function.  4. Moderate right atrial enlargement.  5. Right ventricular enlargement with decreased right  ventricular systolic function.  6. Normal tricuspid valve.    Moderate-severe tricuspid  regurgitation.  7. Estimated pulmonary artery systolic pressure equals 75  mm Hg, assuming right atrial pressure equals 10  mm Hg,  consistent with severe pulmonary hypertension.

## 2019-04-18 NOTE — H&P ADULT - SKIN
Nephrology  Patient: Justo Robert Date:2017   : 1932 Attending: Howie Ching MD   84 year old male        Chief complaint: Acute Kidney Injury    Admission HPI:  This is a 84 year old male with a past medical history significant for HLD, HTN, DMII, PAD, LUTS, lymphedema, former smoker. He was admitted on 17 for RLE cellulitis treatment and was started on Unasyn antibiotic therapy. Patient was noted to have urinary retention requiring straight cath. He reports history of BPH related obstruction but no kidney function issues. Renal function declining over last 3 days.  Creatitine has risen to 3.67 today, baseline ~0.95, BUN 55, BUN/Creat ratio 15 with 175cc of urine output over the past 24 hours. Electrolytes have remained within reference range.      Nephrology has been consulted for evaluation and management of acute kidney injury    Recent events/Subjective:  4/10-  creat is improving. C/o RUE edema.  Denies cp or SOB. D/w RN at bed side  - no issues overnight. US negative for DVT. Creat is improving. Making Urine. Denies sob or CP    Past Medical History:   Diagnosis Date   • Arthritis     knees   • Benign localized hyperplasia of prostate with urinary obstruction and other lower urinary tract symptoms (LUTS) 2009   • Diabetes mellitus    • Essential hypertension, benign 2009   • Non-healing ulcer    • Obesity, unspecified 2009   • Onychomycosis    • Other and unspecified hyperlipidemia 2009   • PAD (peripheral artery disease)    • Shingles 2005    back, buttocks, post thigh       Past Surgical History:   Procedure Laterality Date   • CIRCUMCISION OTHER > 28 DAYS OF AGE         Social History     Social History   • Marital status:      Spouse name: N/A   • Number of children: N/A   • Years of education: N/A     Occupational History   • Not on file.     Social History Main Topics   • Smoking status: Former Smoker     Packs/day: 2.00     Years: 35.00      Types: Cigarettes     Quit date: 1/1/1979   • Smokeless tobacco: Never Used      Comment: quit about 30 years ago    • Alcohol use No   • Drug use: No   • Sexual activity: Not on file     Other Topics Concern   • Seat Belt Yes     Social History Narrative     Family History   Problem Relation Age of Onset   • Hypertension Father    • High blood pressure Father    • Heart disease Brother      ALLERGIES:   Allergen Reactions   • Unasyn [Ampicillin-Sulbactam Sodium] Other (See Comments)     Acute interstitial nephritis   • Morphine PRURITUS       Review of Systems:  Positives stated above in HPI.  Full ROS completed and is otherwise negative.         Vital Last Value 24 Hour Range   Temperature 98.1 °F (36.7 °C) Temp  Min: 97.7 °F (36.5 °C)  Max: 98.1 °F (36.7 °C)   Pulse 53 Pulse  Min: 47  Max: 53   Respiratory 20 Resp  Min: 18  Max: 20   Blood Pressure 108/58 BP  Min: 106/64  Max: 108/58   Art BP   No Data Recorded   Pulse Oximetry 94 % SpO2  Min: 94 %  Max: 99 %     Vital Today Admitted   Weight 104.1 kg Weight: 98 kg   Height N/A Height: 5' 7\" (170.2 cm)   BMI N/A BMI (Calculated): 33.9     Weight over the past 48 Hours:  Patient Vitals for the past 48 hrs:   Weight   04/10/17 0621 135.7 kg   04/10/17 1040 104.5 kg   04/11/17 0500 104.1 kg      Intake/Output:  Last Stool Occurrence: 1 (04/10/17 1719)     I/O last 3 completed shifts:  In: 1040 [P.O.:1040]  Out: 1350 [Urine:1350]    Intake/Output Summary (Last 24 hours) at 04/11/17 0749  Last data filed at 04/11/17 0521   Gross per 24 hour   Intake             1040 ml   Output             1200 ml   Net             -160 ml     Medications/Infusions:  Prescriptions Prior to Admission   Medication Sig Dispense Refill   • metFORMIN (GLUCOPHAGE) 500 MG tablet Take 500 mg by mouth daily (with breakfast).     • oxybutynin (DITROPAN) 5 MG tablet Take 5 mg by mouth.      • furosemide (LASIX) 40 MG tablet TAKE 1 TABLET BY MOUTH DAILY 90 tablet 3   • atenolol (TENORMIN) 50 MG  tablet TAKE 1 TABLET BY MOUTH TWICE DAILY 180 tablet 1   • fenofibrate (TRICOR) 145 MG tablet TAKE 1 TABLET BY MOUTH DAILY 90 tablet 1   • tamsulosin (FLOMAX) 0.4 MG Cap TAKE 1 CAPSULE BY MOUTH TWICE DAILY 180 capsule 1   • clopidogrel (PLAVIX) 75 MG tablet Take 1 tablet by mouth daily. 90 tablet 1   • atorvastatin (LIPITOR) 20 MG tablet Take 1 tablet by mouth daily. 90 tablet 1   • lisinopril (ZESTRIL) 20 MG tablet Take 20 mg by mouth daily.     • naproxen sodium (ANAPROX) 220 MG tablet Take 220 mg by mouth 2 times daily as needed.       • acetaminophen (TYLENOL) 650 MG CR tablet Take 650 mg by mouth 2 times daily as needed.       • Glucosamine 500 MG OR TABS 1 twice daily 0 0   • OMEGA-3 FATTY ACIDS (FISH OIL) 1000 MG OR CAPS 1 nightly 0 0   • TIMOLOL MALEATE 0.5 % OP SOLN 1 drop in both eyes 2 times daily 0 0   • XALATAN 0.005 % OP SOLN 1 drop in both eyes in the evening 0 0   • ASPIRIN 81 MG PO TABS one tablet by mouth every day 0 0   • VITAMIN D 1000 UNIT PO CAPS 1 CAPSULE DAILY 0 0   • CALCIUM 600 600 MG PO TABS 1/2 tab daily 0 0   • traMADOL (ULTRAM) 50 MG tablet TAKE 1 TABLET BY MOUTH TWICE DAILY AS NEEDED 60 tablet 2     • enoxaparin (LOVENOX) injection  30 mg Subcutaneous Q24H   • aspirin  81 mg Oral Daily   • atenolol  50 mg Oral BID   • atorvastatin  20 mg Oral Daily   • clopidogrel  75 mg Oral Daily   • fenofibrate micronized  134 mg Oral Daily with breakfast   • tamsulosin  0.4 mg Oral BID   • timolol  1 drop Both Eyes BID   • latanoprost  1 drop Both Eyes Nightly   • sodium chloride (PF)  2 mL Injection 2 times per day   • insulin regular (human)   Subcutaneous TID AC     • dextrose 5 % infusion         Physical Exam:    General: Alert, no acute distress  HEENT: Head atraumatic, normocephalic.  Moist oral mucus membranes.  Sclera non-icteric.   Neck: Supple, no JVD.  Trachea midline.  Chest/Lungs: Normal effort.  Symmetrical expansion.  Diminished/coarse to auscultation.  No wheezes, rales or  rhonchi.  CVS: Regular rate and rhythm. S1,S2 well heard.no pericardial rub heard.  Abdomen: obese;  Soft, non tender, obese, No hepatosplenomegaly.  Neuro: No focal neurological deficit.  A&O x 3.   Skin: Warm, dry, intact.    Extremities: +3 edema/dressing RLE; LLE +2 edema. +edema RUE     Laboratory Results:    Recent Labs  Lab 04/11/17  0334 04/10/17  0320 04/09/17  0330 04/08/17  2217  04/06/17  0338  04/04/17  2221   SODIUM 136 135 134*  --   < > 138  --  136   POTASSIUM 4.1 3.7 3.8  --   < > 4.9  < > 3.6   CHLORIDE 104 103 101  --   < > 99  --  103   CO2 29 24 25  --   < > 24  --  28   ANIONGAP 7* 12 12  --   < > 20  --  9*   * 109* 102*  --   < > 55*  --  36*   CREATININE 2.53* 2.93* 3.67*  --   < > 3.67*  < > 1.28*   GFRNA 23 19 14  --   < > 14  < > 52   GFRA 26 22 17  --   < > 17  < > 60   GLUCOSE 131* 119* 134*  --   < > 74  --  122*   CALCIUM 8.5 8.3* 8.0*  --   < > 8.4  --  8.6   ALBUMIN  --   --   --   --   --   --   --  3.0*   MG  --   --   --   --   --  2.0  --  1.7   AST  --   --   --   --   --   --   --  28   GPT  --   --   --   --   --   --   --  22   ALKPT  --   --   --   --   --   --   --  50   BILIRUBIN  --   --   --   --   --   --   --  1.4*   BNP  --   --   --  351*  --   --   --   --    < > = values in this interval not displayed.    Recent Labs  Lab 04/08/17 0335 04/07/17 0332 04/06/17 0338   WBC 13.6* 12.6* 11.7*   HGB 12.1* 13.1 14.0   HCT 36.0* 39.2 41.8   * 133* 148       Impression, Plan & Recommendations:    · Acute Kidney Injury: seem to be multifactorial-Acute intersitial Nephritis (from unasyn); hemodynamic(hypotension) and NSIADS(which he was taking at home and creat was elevated on admit, even before unasyn was started)  · Urine sodium was low-suggestive of prerenal component as well. Received IVF. Now stopped  · Creat peaked at 4.1, now improving -down to 2.5  · off of Unasyn  · holding diuretics/ACEI  · Avoid nephrotoxins    · HTN  · recent hypotension  · ACE  held   · bp's acceptable now    · RLE Cellulitis & lymphedema   · Followed by ID    D/w pt    Cholo Smith MD               detailed exam color normal/warm and dry

## 2019-04-18 NOTE — ED ADULT NURSE REASSESSMENT NOTE - NS ED NURSE REASSESS COMMENT FT1
Pt Aox4, breathing even and unlabored, vitals as charted, appears in NAD at this time, report given to ESSU3 MICHAEL Altman.

## 2019-04-18 NOTE — H&P ADULT - NEGATIVE NEUROLOGICAL SYMPTOMS
no generalized seizures/no tremors/no loss of sensation/no syncope/no headache/no hemiparesis/no facial palsy/no transient paralysis/no paresthesias/no weakness/no focal seizures/no vertigo/no confusion/no loss of consciousness

## 2019-04-18 NOTE — ED PROVIDER NOTE - OBJECTIVE STATEMENT
64M hx prostate ca w/ mets to bone on chemo s/p radiation therapy presents from  home with tachycardia. Noted to be tachy and also borderline hypotensive at the nursing home. Patient states he feels well, only complaint is BLE edema that has been present for ~10 days since starting chemo. Reports a negative BLE doppler a few days ago. Denies chest pain/dyspnea/palpitations.

## 2019-04-18 NOTE — H&P ADULT - NSHPOUTPATIENTPROVIDERS_GEN_ALL_CORE
Dr. Allan Detweiler (PCP)   Dr. Arroyo (Cardiologist)  Dr. Howell (Cardiologist) Dr. Allan Detweiler (PCP)   Dr. Arroyo (Cardiologist in [facility])  Dr. Howell (Cardiologist in [facility])  Dr. Wray (Oncologist in [place])

## 2019-04-18 NOTE — H&P ADULT - PROBLEM SELECTOR PLAN 1
Admit to telemetry   Continue metoprolol   EKG showed Atrial flutter with RVR 2:1 conduction  Serial EKGs; Echo   Cardiology consult Admit to telemetry  Started on IV Lasix 40mg BID   Trend CE's; Troponin 40-->41   Serial EKGs; Echo   2g sodium diet with 1.5L fluid restriction   Strict I&Os and daily weights Admit to telemetry  Started on IV Lasix 40mg BID   Trend CE's; Troponin 40-->41   Serial EKGs; Echo   2g sodium diet with 1.5L fluid restriction   Strict I&Os and daily weights  Cardiology consult Admit to telemetry  Started on IV Lasix 40mg BID   Continue metoprolol   Trend CE's; Troponin 40-->41   Serial EKGs; Echo   2g sodium diet with 1.5L fluid restriction   Strict I&Os and daily weights  Cardiology consult

## 2019-04-18 NOTE — H&P ADULT - PROBLEM SELECTOR PLAN 2
Started on IV potassium chloride 10mEq, potassium chloride tab 40 mEq Started on IV potassium chloride 10mEq, potassium chloride tab 40 mEq  Monitor serum K+ Admit to telemetry   Continue metoprolol   EKG showed Atrial flutter with RVR 2:1 conduction  Serial EKGs; Echo   Cardiology consult Admit to telemetry   Continue metoprolol   EKG showed Atrial flutter with RVR 2:1 conduction  Serial EKGs; Echo   Cardiology consult  [id number]: 3 Admit to telemetry   Treat Heart Failure  Continue metoprolol   EKG showed Atrial flutter with RVR 2:1 conduction  Serial EKGs;  Cardiology consulted House: F/U  [id number]: 3-->c/w ASA and PLAVIX only, pt has hx of GI bleed last year.

## 2019-04-18 NOTE — ED PROVIDER NOTE - PROGRESS NOTE DETAILS
Jonathan Weil, PGY2 - CTA and labs unrevealing, will admit for undifferentiated hypoxia and persistent tachycardia. Remains well appearing. Rec'd signout on this pt from Dr. Tucker:  63yo M w/ sent from nursing home for tachycardia/hypotension and b/l LE edema since being started on chemo 10 days ago (for prostate ca w/ mets). ECG, labs, imaging, meds, admission.   -Dr. Guillen

## 2019-04-18 NOTE — H&P ADULT - PROBLEM SELECTOR PLAN 4
Continue metoprolol tartrate, Continue Xtandi, tamsulosin Continue budesonide-formoterol, albuterol PRN, tiotropium PRN  Started on oxygen delivery therapy   Monitor Pulse-OX

## 2019-04-18 NOTE — H&P ADULT - NSICDXPASTMEDICALHX_GEN_ALL_CORE_FT
PAST MEDICAL HISTORY:  Atrial fibrillation and flutter not on anticoagulants due to GI bleed    Diverticulitis of Colon with Hx of colostomy bag    Emphysema     History of anxiety     HTN (Hypertension)     Inguinal hernia     Prostate Cancer with seeds implant      Ventral hernia

## 2019-04-18 NOTE — H&P ADULT - NSICDXPASTSURGICALHX_GEN_ALL_CORE_FT
PAST SURGICAL HISTORY:  History of Appendectomy     History of Cholecystectomy     History of electrophysiologic study 3/18 s/p ablation [facility]    History of Hernia Surgery BL inguinal    History of ventral hernia repair     S/P Kamini's procedure with reversal

## 2019-04-18 NOTE — H&P ADULT - NSHPSOCIALHISTORY_GEN_ALL_CORE
Pt is  with 4 children. Pt is retired. Pt does not drink EtOH, or use any illicit drugs. Pt was a 1ppd smoker for 20 years in the past but quit 11 years ago. Pt's last prostate exam was a few weeks ago. Pt's last influenza and PNA shot was this year.

## 2019-04-18 NOTE — H&P ADULT - PROBLEM SELECTOR PROBLEM 1
Tachycardia Atrial flutter by electrocardiogram Acute on chronic diastolic (congestive) heart failure

## 2019-04-18 NOTE — H&P ADULT - PROBLEM SELECTOR PLAN 6
Continue atorvastatin   Order FLP Continue metoprolol tartrate  Monitor BP and adjust medication as necessary   DASH diet

## 2019-04-18 NOTE — H&P ADULT - NEGATIVE OPHTHALMOLOGIC SYMPTOMS
no photophobia/no blurred vision R/no loss of vision R/no pain R/no diplopia/no blurred vision L/no loss of vision L/no pain L

## 2019-04-18 NOTE — CONSULT NOTE ADULT - SUBJECTIVE AND OBJECTIVE BOX
Patient seen and evaluated at bedside    Chief Complaint: Hypotension, tachycardiac at rehab    HPI:  Pt is a 63 y/o man with a PMHx of Atrial fibrillation and flutter (not on anticoagulants due to GI bleed), prostate CA w/ mets to bone (on chemo with seeds implant), HTN, HLD, hx of anxiety, emphysema and HFpEF, mod-severe pulm htn, presents from [facility] with tachycardia and hypotension for past 4 days. Pt reports blood pressure to be around 100/70, usually SBP is 120-140's. Pt states he's hypotensive s/p medications. Pt was told to go to ED 4 days ago by nursing home staff, however pt refused. Pt comes in today after his cardiologist recommended him to go to ED. Pt states he feels well and his only complaint is the intermittent BLE edema for 1 month and LE weakness, which started after taking chemo pills. Pt admits to being wheelchair bound. Denies fever, chills, syncope, LOC, seizures, dizziness, wt gain/loss, vision changes, fatigue, CP, palpitations, abd pain, cough, dysuria, incontinence, hx of MI, and hx of stroke.     On my assessment, vitals T 98.3,  (aflutter w/ 2:1), /88, satting 96% on RA.  Initially K 2.7, repeated w/ repeat 4.4. HsTrop 40 X2. BNP 2560 (no previous). CTA with no pulmonary embolism, mild pulmonary edema, centrilobular emphysema. Pt currently asymptomatic      PMH:   History of anxiety  Atrial fibrillation and flutter  Ventral hernia  Inguinal hernia  Atrial fibrillation  Prostate Cancer  Diverticulitis of Colon  Emphysema  HTN (Hypertension)      PSH:   History of electrophysiologic study  History of ventral hernia repair  S/P Kamini's procedure  History of Hernia Surgery  History of Cholecystectomy  History of Appendectomy      Medications:   Home Medications:  acetaminophen 325 mg oral tablet: 2 tab(s) orally every 4 hours, as needed for pain (2019 09:36)  aspirin 81 mg oral tablet, chewable: 1 tab(s) orally once a day (2019 09:36)  ferrous sulfate 325 mg (65 mg elemental iron) oral delayed release tablet: 1 tab(s) orally once a day (2019 09:36)  ipratropium-albuterol 0.5 mg-2.5 mg/3 mLinhalation solution: 3 milliliter(s) inhaled 3 times a day (2019 09:36)  Lasix 40 mg oral tablet: 1 tab(s) orally once a day (2019 09:36)  Metoprolol Tartrate 100 mg oral tablet: 1 tab(s) orally 2 times a day (:36)  midodrine 5 mg oral tablet: 1 tab(s) orally 2 times a day (:36)  MiraLax oral powder for reconstitution: 17 gram(s) orally once a day (:36)  oxyCODONE 5 mg oral tablet: 1 tab(s) orally every 4 hours, As Needed (:36)  pantoprazole 40 mg oral delayed release tablet: 1 tab(s) orally once a day (in the morning) (:36)  tiotropium 18 mcg inhalation capsule: 1 cap(s) inhaled once a day, As Needed (2019 09:36)  Xtandi 40 mg oral capsule: 4 cap(s) orally once a day (2019 09:36)    MEDICATIONS  (STANDING):  aspirin enteric coated 81 milliGRAM(s) Oral daily  atorvastatin 80 milliGRAM(s) Oral at bedtime  buDESOnide 160 MICROgram(s)/formoterol 4.5 MICROgram(s) Inhaler 2 Puff(s) Inhalation two times a day  clopidogrel Tablet 75 milliGRAM(s) Oral daily  enoxaparin Injectable 40 milliGRAM(s) SubCutaneous every 24 hours  enzalutamide 160 milliGRAM(s) Oral daily  ferrous    sulfate 325 milliGRAM(s) Oral daily  furosemide   Injectable 40 milliGRAM(s) IV Push every 12 hours  metoprolol tartrate 100 milliGRAM(s) Oral two times a day  midodrine 5 milliGRAM(s) Oral <User Schedule>  pantoprazole    Tablet 40 milliGRAM(s) Oral before breakfast  polyethylene glycol 3350 17 Gram(s) Oral daily  tamsulosin 0.4 milliGRAM(s) Oral at bedtime  tiotropium 18 MICROgram(s) Capsule 1 Capsule(s) Inhalation daily    MEDICATIONS  (PRN):  acetaminophen   Tablet .. 650 milliGRAM(s) Oral every 6 hours PRN Mild Pain (1 - 3), Moderate Pain (4 - 6)  ALBUTerol/ipratropium for Nebulization 3 milliLiter(s) Nebulizer every 6 hours PRN Shortness of Breath and/or Wheezing  oxyCODONE    IR 5 milliGRAM(s) Oral every 4 hours PRN Severe Pain (7 - 10)    Allergies:  azithromycin (Vomiting; Diarrhea)  No Known Drug Allergies  shellfish (Unknown)      FAMILY HISTORY:  No pertinent family history in first degree relatives      Social History:  Smoking History: Denies  Alcohol Use: Denies  Drug Use: Denies    Review of Systems:  REVIEW OF SYSTEMS: Negative except per HPI    Physical Exam:  T(F): 98.3 (-), Max: 98.9 (-)  HR: 114 () (112 - 114)  BP: 123/88 (-) (100/92 - 130/100)  RR: 20 (-)  SpO2: 96% ()  GENERAL: No acute distress, well-developed  HEAD:  Atraumatic, Normocephalic  ENT: EOMI, PERRLA, conjunctiva and sclera clear, Neck supple, +JVD to mid neck  CHEST/LUNG: Mild crackles at the bases b/l; No wheeze, equal breath sounds bilaterally   BACK: No spinal tenderness  HEART: Regular rate and rhythm; No murmurs, rubs, or gallops  ABDOMEN: Soft, Nontender, Nondistended; Bowel sounds present  EXTREMITIES:  No clubbing, cyanosis, or edema  PSYCH: Nl behavior, nl affect  NEUROLOGY: AAOx3, non-focal, cranial nerves intact  SKIN: Normal color, No rashes or lesions  LINES:    Cardiovascular Diagnostic Testing:    ECG: Personally reviewed    Echo:  ------------------------------------------------------------------------  DIMENSIONS:  Dimensions:     Normal Values:  LA:     3.6 cm    2.0 - 4.0 cm  Ao:     3.0 cm    2.0 - 3.8 cm  SEPTUM: 1.1 cm    0.6 - 1.2 cm  PWT:   1.4 cm    0.6 - 1.1 cm  LVIDd:  3.7 cm    3.0 - 5.6 cm  LVIDs:  2.4 cm    1.8 - 4.0 cm  Derived Variables:  LVMI: 79 g/m2  RWT: 0.75  Fractional short: 35 %  Ejection Fraction (Teicholtz): 65 %  ------------------------------------------------------------------------  OBSERVATIONS:  Mitral Valve: Normal mitral valve. Mild mitral  regurgitation.  Aortic Root: Normal aortic root.  Aortic Valve: Normal trileaflet aortic valve.  Left Atrium: Mildly dilated left atrium.  LA volume index =  36 cc/m2.  Left Ventricle: Endocardium not well visualized; grossly  normal left ventricular systolic function. Increased  relative wall thickness with normal left ventricular mass  index, consistent with concentric left ventricular  remodeling. (DT:121 ms).  Right Heart: Moderate right atrial enlargement. Right  ventricular enlargement with decreased right ventricular  systolic function. Normal tricuspid valve.  Moderate-severe tricuspid regurgitation. Normal pulmonic  valve.  Pericardium/PleuraNormal pericardium with no pericardial  effusion.  Hemodynamic: Estimated right ventricular systolic pressure  equals 75 mm Hg, assuming right atrial pressure equals 10  mm Hg, consistent with severe pulmonary hypertension.  ------------------------------------------------------------------------  CONCLUSIONS:  1. Mildly dilated left atrium.  LA volume index = 36 cc/m2.  2. Increased relative wall thickness with normal left  ventricular mass index, consistent with concentric left  ventricular remodeling.  3. Endocardium not well visualized; grossly normal left  ventricular systolic function.  4. Moderate right atrial enlargement.  5. Right ventricular enlargement with decreased right  ventricular systolic function.  6. Normal tricuspid valve.    Moderate-severe tricuspid  regurgitation.  7. Estimated pulmonary artery systolic pressure equals 75  mm Hg, assuming right atrial pressure equals 10  mm Hg,  consistent with severe pulmonary hypertension.  ------------------------------------------------------------------------  Confirmed on  2018 - 17:19:21 by Layne Peña M.D. RPVI  ------------------------------------------------------------------------      Stress Testing:  IMPRESSIONS:Abnormal Study  * Myocardial Perfusion SPECT results are abnormal.  * Review of raw data shows: The study is of good technical  quality.  * The left ventricle was normal in size. There are medium  sized, moderate to severe defects in inferior and  inferoseptal walls that are fixed, suggestive of infarct.  * Post-stress gated wall motion analysis wasperformed  (LVEF = 47 %;LVEDV = 94 ml.), revealing mild overall  hypokinesis, due to segmental wall motion abnormality.  There was mild inferior / inferoseptal hypokinesis with  severely diminished systolic thickening.  * No clear evidence of ischemia.  ------------------------------------------------------------------------  Confirmed on  2018 - 15:57:48 by Jules Torres M.D.  ------------------------------------------------------------------------      Cath: No previous caths in [facility]      Interpretation of Telemetry:    Imaging:  CTA :    IMPRESSION:     No pulmonary embolism.  Mild pulmonary edema.  Centrilobular emphysema.      Labs: Personally reviewed                        12.1   5.93  )-----------( 422      ( 2019 11:41 )             39.1         143  |  106  |  15  ----------------------------<  77  4.4   |  26  |  0.89    Ca    9.3      2019 09:25  Phos  3.2       Mg     2.1         TPro  5.7<L>  /  Alb  2.7<L>  /  TBili  0.5  /  DBili  x   /  AST  8   /  ALT  4   /  AlkPhos  157<H>            Serum Pro-Brain Natriuretic Peptide: 2560 pg/mL ( @ 21:40)    Total Cholesterol: 101  LDL: 61  HDL: 26  T      Thyroid Stimulating Hormone, Serum: 3.60 uIU/mL ( @ 09:25)

## 2019-04-18 NOTE — H&P ADULT - ASSESSMENT
63 y/o  M with pmhx of Atrial fibrillation and flutter (not on anticoagulants due to GI bleed), prostate CA w/ mets to bone (on chemo with seeds implant), HTN, HLD, hx of anxiety, emphysema presents from [facility] with tachycardia and hypotension for past 4 days. Admit to telemetry for tachycardia, hypokalemia and hypotension. 63 y/o  M with pmhx of Atrial fibrillation and flutter (not on anticoagulants due to GI bleed), prostate CA w/ mets to bone (on chemo with seeds implant), HTN, HLD, hx of anxiety, emphysema presents from [facility] with tachycardia and hypotension for past 4 days. Admit to telemetry for atrial flutter, hypokalemia and hypotension.     EKG: Atrial flutter w/ RVR 2:1 conduction @ 114 bpm 65 y/o  M with pmhx of Atrial fibrillation and flutter (not on anticoagulants due to GI bleed), prostate CA w/ mets to bone (on chemo with seeds implant), HTN, HLD, hx of anxiety, emphysema presents from [facility] with tachycardia and hypotension for past 4 days. Admit to telemetry for acute on chronic diastolic CHF, atrial flutter w/ RVR and hypokalemia.     EKG: Atrial flutter w/ RVR 2:1 conduction @ 114 bpm

## 2019-04-18 NOTE — H&P ADULT - PROBLEM SELECTOR PLAN 5
Continue budesonide-formoterol, albuterol PRN, tiotropium PRN, Continue metoprolol tartrate  Monitor BP and adjust medication as necessary   DASH diet Continue Xtandi, tamsulosin

## 2019-04-18 NOTE — H&P ADULT - MUSCULOSKELETAL
details… detailed exam no joint warmth/diminished strength/normal/ROM intact/no joint swelling/no calf tenderness

## 2019-04-19 DIAGNOSIS — Z71.89 OTHER SPECIFIED COUNSELING: ICD-10-CM

## 2019-04-19 LAB
ANION GAP SERPL CALC-SCNC: 13 MMO/L — SIGNIFICANT CHANGE UP (ref 7–14)
BUN SERPL-MCNC: 12 MG/DL — SIGNIFICANT CHANGE UP (ref 7–23)
CALCIUM SERPL-MCNC: 9.1 MG/DL — SIGNIFICANT CHANGE UP (ref 8.4–10.5)
CHLORIDE SERPL-SCNC: 103 MMOL/L — SIGNIFICANT CHANGE UP (ref 98–107)
CO2 SERPL-SCNC: 26 MMOL/L — SIGNIFICANT CHANGE UP (ref 22–31)
CREAT SERPL-MCNC: 0.88 MG/DL — SIGNIFICANT CHANGE UP (ref 0.5–1.3)
GLUCOSE SERPL-MCNC: 91 MG/DL — SIGNIFICANT CHANGE UP (ref 70–99)
HCT VFR BLD CALC: 39.9 % — SIGNIFICANT CHANGE UP (ref 39–50)
HCV AB S/CO SERPL IA: 0.48 S/CO — SIGNIFICANT CHANGE UP (ref 0–0.99)
HCV AB SERPL-IMP: SIGNIFICANT CHANGE UP
HGB BLD-MCNC: 12.5 G/DL — LOW (ref 13–17)
MAGNESIUM SERPL-MCNC: 1.8 MG/DL — SIGNIFICANT CHANGE UP (ref 1.6–2.6)
MCHC RBC-ENTMCNC: 29.3 PG — SIGNIFICANT CHANGE UP (ref 27–34)
MCHC RBC-ENTMCNC: 31.3 % — LOW (ref 32–36)
MCV RBC AUTO: 93.7 FL — SIGNIFICANT CHANGE UP (ref 80–100)
NRBC # FLD: 0 K/UL — SIGNIFICANT CHANGE UP (ref 0–0)
PHOSPHATE SERPL-MCNC: 3.6 MG/DL — SIGNIFICANT CHANGE UP (ref 2.5–4.5)
PLATELET # BLD AUTO: 423 K/UL — HIGH (ref 150–400)
PMV BLD: 9.7 FL — SIGNIFICANT CHANGE UP (ref 7–13)
POTASSIUM SERPL-MCNC: 3.5 MMOL/L — SIGNIFICANT CHANGE UP (ref 3.5–5.3)
POTASSIUM SERPL-SCNC: 3.5 MMOL/L — SIGNIFICANT CHANGE UP (ref 3.5–5.3)
RBC # BLD: 4.26 M/UL — SIGNIFICANT CHANGE UP (ref 4.2–5.8)
RBC # FLD: 15.4 % — HIGH (ref 10.3–14.5)
SODIUM SERPL-SCNC: 142 MMOL/L — SIGNIFICANT CHANGE UP (ref 135–145)
WBC # BLD: 5.67 K/UL — SIGNIFICANT CHANGE UP (ref 3.8–10.5)
WBC # FLD AUTO: 5.67 K/UL — SIGNIFICANT CHANGE UP (ref 3.8–10.5)

## 2019-04-19 PROCEDURE — 99233 SBSQ HOSP IP/OBS HIGH 50: CPT

## 2019-04-19 RX ORDER — POTASSIUM CHLORIDE 20 MEQ
40 PACKET (EA) ORAL ONCE
Qty: 0 | Refills: 0 | Status: COMPLETED | OUTPATIENT
Start: 2019-04-19 | End: 2019-04-19

## 2019-04-19 RX ORDER — ENZALUTAMIDE 80 MG/1
4 TABLET ORAL
Qty: 120 | Refills: 0 | OUTPATIENT
Start: 2019-04-19 | End: 2019-05-18

## 2019-04-19 RX ORDER — FUROSEMIDE 40 MG
40 TABLET ORAL DAILY
Qty: 0 | Refills: 0 | Status: DISCONTINUED | OUTPATIENT
Start: 2019-04-19 | End: 2019-04-24

## 2019-04-19 RX ADMIN — ATORVASTATIN CALCIUM 80 MILLIGRAM(S): 80 TABLET, FILM COATED ORAL at 21:05

## 2019-04-19 RX ADMIN — MIDODRINE HYDROCHLORIDE 5 MILLIGRAM(S): 2.5 TABLET ORAL at 09:42

## 2019-04-19 RX ADMIN — BUDESONIDE AND FORMOTEROL FUMARATE DIHYDRATE 2 PUFF(S): 160; 4.5 AEROSOL RESPIRATORY (INHALATION) at 09:42

## 2019-04-19 RX ADMIN — Medication 40 MILLIEQUIVALENT(S): at 09:41

## 2019-04-19 RX ADMIN — CLOPIDOGREL BISULFATE 75 MILLIGRAM(S): 75 TABLET, FILM COATED ORAL at 12:07

## 2019-04-19 RX ADMIN — BUDESONIDE AND FORMOTEROL FUMARATE DIHYDRATE 2 PUFF(S): 160; 4.5 AEROSOL RESPIRATORY (INHALATION) at 21:05

## 2019-04-19 RX ADMIN — Medication 325 MILLIGRAM(S): at 12:07

## 2019-04-19 RX ADMIN — TAMSULOSIN HYDROCHLORIDE 0.4 MILLIGRAM(S): 0.4 CAPSULE ORAL at 21:05

## 2019-04-19 RX ADMIN — ENOXAPARIN SODIUM 40 MILLIGRAM(S): 100 INJECTION SUBCUTANEOUS at 12:07

## 2019-04-19 RX ADMIN — Medication 650 MILLIGRAM(S): at 12:06

## 2019-04-19 RX ADMIN — Medication 40 MILLIGRAM(S): at 09:45

## 2019-04-19 RX ADMIN — PANTOPRAZOLE SODIUM 40 MILLIGRAM(S): 20 TABLET, DELAYED RELEASE ORAL at 05:56

## 2019-04-19 RX ADMIN — Medication 81 MILLIGRAM(S): at 12:07

## 2019-04-19 RX ADMIN — Medication 100 MILLIGRAM(S): at 05:56

## 2019-04-19 RX ADMIN — Medication 650 MILLIGRAM(S): at 12:36

## 2019-04-19 RX ADMIN — Medication 100 MILLIGRAM(S): at 18:09

## 2019-04-19 NOTE — PROGRESS NOTE ADULT - ASSESSMENT
Pt is a 65 y/o man with a PMHx of Atrial fibrillation and flutter (not on anticoagulants due to GI bleed), prostate CA w/ mets to bone (on chemo with seeds implant), HTN, HLD, hx of anxiety, emphysema and HFpEF, presents from [facility] with tachycardia and hypotension for past 4 days. Cardiology consulted for acute on chronic HFpEF exacerbation    #Acute on chronic HFpEF exacerbation: Likely in the setting of receiving IVF in the ED.   -Transition back to home lasix 40 mg daily  -Pt currently asymptomatic  -Monitor K closely. Keep >4, Mg >2  -TTE ordered by primary team- will follow up    #Afib/flutter: EKG showed Atrial flutter with RVR 2:1 conduction  -Continue metorpolol for rate control  -[id number]: 3-->Not on AC due to significant GI bleed last year    #CAD; NST w/ predominately fixed defects  -Continue home asa/plavix/statin Pt is a 63 y/o man with a PMHx of Atrial fibrillation and flutter (not on anticoagulants due to GI bleed), prostate CA w/ mets to bone (on chemo with seeds implant), HTN, HLD, hx of anxiety, emphysema and HFpEF, presents from [facility] with tachycardia and hypotension for past 4 days. Cardiology consulted for acute on chronic HFpEF exacerbation    #Acute on chronic HFpEF exacerbation: Likely in the setting of receiving IVF in the ED.   -Transition back to home lasix 40 mg daily  -Pt currently asymptomatic  -Monitor K closely. Keep >4, Mg >2  -TTE ordered by primary team- will follow up    #Afib/flutter: EKG showed Atrial flutter with RVR 2:1 conduction  -Continue metoprolol for rate control  -[id number]: 3-->Not on AC due to significant GI bleed last year    #CAD; NST w/ predominately fixed defects  -Continue home asa/plavix/statin

## 2019-04-19 NOTE — DIETITIAN INITIAL EVALUATION ADULT. - PERTINENT MEDS FT
MEDICATIONS  (STANDING):  aspirin enteric coated 81 milliGRAM(s) Oral daily  atorvastatin 80 milliGRAM(s) Oral at bedtime  buDESOnide 160 MICROgram(s)/formoterol 4.5 MICROgram(s) Inhaler 2 Puff(s) Inhalation two times a day  clopidogrel Tablet 75 milliGRAM(s) Oral daily  enoxaparin Injectable 40 milliGRAM(s) SubCutaneous every 24 hours  enzalutamide 160 milliGRAM(s) Oral daily  ferrous    sulfate 325 milliGRAM(s) Oral daily  furosemide   Injectable 40 milliGRAM(s) IV Push every 12 hours  metoprolol tartrate 100 milliGRAM(s) Oral two times a day  midodrine 5 milliGRAM(s) Oral <User Schedule>  pantoprazole    Tablet 40 milliGRAM(s) Oral before breakfast  polyethylene glycol 3350 17 Gram(s) Oral daily  tamsulosin 0.4 milliGRAM(s) Oral at bedtime  tiotropium 18 MICROgram(s) Capsule 1 Capsule(s) Inhalation daily    MEDICATIONS  (PRN):  acetaminophen   Tablet .. 650 milliGRAM(s) Oral every 6 hours PRN Mild Pain (1 - 3), Moderate Pain (4 - 6)  ALBUTerol/ipratropium for Nebulization 3 milliLiter(s) Nebulizer every 6 hours PRN Shortness of Breath and/or Wheezing  oxyCODONE    IR 5 milliGRAM(s) Oral every 4 hours PRN Severe Pain (7 - 10)

## 2019-04-19 NOTE — DIETITIAN INITIAL EVALUATION ADULT. - PROBLEM SELECTOR PLAN 1
Admit to telemetry  Started on IV Lasix 40mg BID   Continue metoprolol   Trend CE's; Troponin 40-->41   Serial EKGs; Echo   2g sodium diet with 1.5L fluid restriction   Strict I&Os and daily weights  Cardiology consult

## 2019-04-19 NOTE — PROGRESS NOTE ADULT - SUBJECTIVE AND OBJECTIVE BOX
Patient seen and examined at bedside.    Overnight Events: No acute evetns overnigt    Review Of Systems: No chest pain, shortness of breath, or palpitations            Medications:  acetaminophen   Tablet .. 650 milliGRAM(s) Oral every 6 hours PRN  ALBUTerol/ipratropium for Nebulization 3 milliLiter(s) Nebulizer every 6 hours PRN  aspirin enteric coated 81 milliGRAM(s) Oral daily  atorvastatin 80 milliGRAM(s) Oral at bedtime  buDESOnide 160 MICROgram(s)/formoterol 4.5 MICROgram(s) Inhaler 2 Puff(s) Inhalation two times a day  clopidogrel Tablet 75 milliGRAM(s) Oral daily  enoxaparin Injectable 40 milliGRAM(s) SubCutaneous every 24 hours  enzalutamide 160 milliGRAM(s) Oral daily  ferrous    sulfate 325 milliGRAM(s) Oral daily  furosemide   Injectable 40 milliGRAM(s) IV Push every 12 hours  metoprolol tartrate 100 milliGRAM(s) Oral two times a day  midodrine 5 milliGRAM(s) Oral <User Schedule>  oxyCODONE    IR 5 milliGRAM(s) Oral every 4 hours PRN  pantoprazole    Tablet 40 milliGRAM(s) Oral before breakfast  polyethylene glycol 3350 17 Gram(s) Oral daily  tamsulosin 0.4 milliGRAM(s) Oral at bedtime  tiotropium 18 MICROgram(s) Capsule 1 Capsule(s) Inhalation daily      PAST MEDICAL & SURGICAL HISTORY:  History of anxiety  Atrial fibrillation and flutter: not on anticoagulants due to GI bleed  Prostate Cancer: with seeds implant    Diverticulitis of Colon: with Hx of colostomy bag  Emphysema  HTN (Hypertension)  History of electrophysiologic study: 3/18 s/p ablation [facility]  History of ventral hernia repair  S/P Kamini's procedure: with reversal  History of Hernia Surgery: BL inguinal  History of Cholecystectomy  History of Appendectomy      Vitals:  T(F): 98.3 (-), Max: 98.3 (-)  HR: 66 (-) (66 - 114)  BP: 122/88 (-) (117/85 - 123/88)  RR: 17 (-)  SpO2: 99% (-)  I&O's Summary    2019 07:01  -  2019 07:00  --------------------------------------------------------  IN: 0 mL / OUT: 2750 mL / NET: -2750 mL        Physical Exam:  GENERAL: No acute distress, well-developed  HEAD:  Atraumatic, Normocephalic  ENT: EOMI, PERRLA, conjunctiva and sclera clear, Neck supple, +JVD to mid neck  CHEST/LUNG: CTA b/l; No wheeze, equal breath sounds bilaterally   BACK: No spinal tenderness  HEART: Regular rate and rhythm; No murmurs, rubs, or gallops  ABDOMEN: Soft, Nontender, Nondistended; Bowel sounds present  EXTREMITIES:  No clubbing, cyanosis, or edema  PSYCH: Nl behavior, nl affect  NEUROLOGY: AAOx3, non-focal, cranial nerves intact  SKIN: Normal color, No rashes or lesions                          12.5   5.67  )-----------( 423      ( 2019 06:30 )             39.9         142  |  103  |  12  ----------------------------<  91  3.5   |  26  |  0.88    Ca    9.1      2019 06:30  Phos  3.6       Mg     1.8         TPro  5.7<L>  /  Alb  2.7<L>  /  TBili  0.5  /  DBili  x   /  AST  8   /  ALT  4   /  AlkPhos  157<H>            Serum Pro-Brain Natriuretic Peptide: 2560 pg/mL ( @ 21:40)    Total Cholesterol: 101  LDL: 61  HDL: 26  T      Echo:  ------------------------------------------------------------------------  DIMENSIONS:  Dimensions:     Normal Values:  LA:     3.6 cm    2.0 - 4.0 cm  Ao:     3.0 cm    2.0 - 3.8 cm  SEPTUM: 1.1 cm    0.6 - 1.2 cm  PWT:   1.4 cm    0.6 - 1.1 cm  LVIDd:  3.7 cm    3.0 - 5.6 cm  LVIDs:  2.4 cm    1.8 - 4.0 cm  Derived Variables:  LVMI: 79 g/m2  RWT: 0.75  Fractional short: 35 %  Ejection Fraction (Teicholtz): 65 %  ------------------------------------------------------------------------  OBSERVATIONS:  Mitral Valve: Normal mitral valve. Mild mitral  regurgitation.  Aortic Root: Normal aortic root.  Aortic Valve: Normal trileaflet aortic valve.  Left Atrium: Mildly dilated left atrium.  LA volume index =  36 cc/m2.  Left Ventricle: Endocardium not well visualized; grossly  normal left ventricular systolic function. Increased  relative wall thickness with normal left ventricular mass  index, consistent with concentric left ventricular  remodeling. (DT:121 ms).  Right Heart: Moderate right atrial enlargement. Right  ventricular enlargement with decreased right ventricular  systolic function. Normal tricuspid valve.  Moderate-severe tricuspid regurgitation. Normal pulmonic  valve.  Pericardium/PleuraNormal pericardium with no pericardial  effusion.  Hemodynamic: Estimated right ventricular systolic pressure  equals 75 mm Hg, assuming right atrial pressure equals 10  mm Hg, consistent with severe pulmonary hypertension.  ------------------------------------------------------------------------  CONCLUSIONS:  1. Mildly dilated left atrium.  LA volume index = 36 cc/m2.  2. Increased relative wall thickness with normal left  ventricular mass index, consistent with concentric left  ventricular remodeling.  3. Endocardium not well visualized; grossly normal left  ventricular systolic function.  4. Moderate right atrial enlargement.  5. Right ventricular enlargement with decreased right  ventricular systolic function.  6. Normal tricuspid valve.    Moderate-severe tricuspid  regurgitation.  7. Estimated pulmonary artery systolic pressure equals 75  mm Hg, assuming right atrial pressure equals 10  mm Hg,  consistent with severe pulmonary hypertension.  ------------------------------------------------------------------------  Confirmed on  2018 - 17:19:21 by Layne Peña M.D. RPVI  ------------------------------------------------------------------------      Stress Testing:  IMPRESSIONS:Abnormal Study  * Myocardial Perfusion SPECT results are abnormal.  * Review of raw data shows: The study is of good technical  quality.  * The left ventricle was normal in size. There are medium  sized, moderate to severe defects in inferior and  inferoseptal walls that are fixed, suggestive of infarct.  * Post-stress gated wall motion analysis wasperformed  (LVEF = 47 %;LVEDV = 94 ml.), revealing mild overall  hypokinesis, due to segmental wall motion abnormality.  There was mild inferior / inferoseptal hypokinesis with  severely diminished systolic thickening.  * No clear evidence of ischemia.  ------------------------------------------------------------------------  Confirmed on  2018 - 15:57:48 by Jules Torres M.D.  ------------------------------------------------------------------------      Cath: No previous caths in [facility]      Interpretation of Telemetry:    Imaging:  CTA :    IMPRESSION:     No pulmonary embolism.  Mild pulmonary edema.  Centrilobular emphysema. Patient seen and examined at bedside.    Overnight Events: No acute events overnight. No complaints this AM    Tele: Aflutter 90-110s     Review Of Systems: No chest pain, shortness of breath, or palpitations            Medications:  acetaminophen   Tablet .. 650 milliGRAM(s) Oral every 6 hours PRN  ALBUTerol/ipratropium for Nebulization 3 milliLiter(s) Nebulizer every 6 hours PRN  aspirin enteric coated 81 milliGRAM(s) Oral daily  atorvastatin 80 milliGRAM(s) Oral at bedtime  buDESOnide 160 MICROgram(s)/formoterol 4.5 MICROgram(s) Inhaler 2 Puff(s) Inhalation two times a day  clopidogrel Tablet 75 milliGRAM(s) Oral daily  enoxaparin Injectable 40 milliGRAM(s) SubCutaneous every 24 hours  enzalutamide 160 milliGRAM(s) Oral daily  ferrous    sulfate 325 milliGRAM(s) Oral daily  furosemide   Injectable 40 milliGRAM(s) IV Push every 12 hours  metoprolol tartrate 100 milliGRAM(s) Oral two times a day  midodrine 5 milliGRAM(s) Oral <User Schedule>  oxyCODONE    IR 5 milliGRAM(s) Oral every 4 hours PRN  pantoprazole    Tablet 40 milliGRAM(s) Oral before breakfast  polyethylene glycol 3350 17 Gram(s) Oral daily  tamsulosin 0.4 milliGRAM(s) Oral at bedtime  tiotropium 18 MICROgram(s) Capsule 1 Capsule(s) Inhalation daily      PAST MEDICAL & SURGICAL HISTORY:  History of anxiety  Atrial fibrillation and flutter: not on anticoagulants due to GI bleed  Prostate Cancer: with seeds implant    Diverticulitis of Colon: with Hx of colostomy bag  Emphysema  HTN (Hypertension)  History of electrophysiologic study: 3/18 s/p ablation [facility]  History of ventral hernia repair  S/P Kamini's procedure: with reversal  History of Hernia Surgery: BL inguinal  History of Cholecystectomy  History of Appendectomy      Vitals:  T(F): 98.3 (-), Max: 98.3 (-)  HR: 66 (-) (66 - 114)  BP: 122/88 (-) (117/85 - 123/88)  RR: 17 (-)  SpO2: 99% (-)  I&O's Summary    2019 07:01  -  2019 07:00  --------------------------------------------------------  IN: 0 mL / OUT: 2750 mL / NET: -2750 mL        Physical Exam:  GENERAL: No acute distress, well-developed  HEAD:  Atraumatic, Normocephalic  ENT: EOMI, PERRLA, conjunctiva and sclera clear, Neck supple, +JVD to mid neck  CHEST/LUNG: CTA b/l; No wheeze, equal breath sounds bilaterally   BACK: No spinal tenderness  HEART: Regular rate and rhythm; No murmurs, rubs, or gallops  ABDOMEN: Soft, Nontender, Nondistended; Bowel sounds present  EXTREMITIES:  No clubbing, cyanosis, or edema  PSYCH: Nl behavior, nl affect  NEUROLOGY: AAOx3, non-focal, cranial nerves intact  SKIN: Normal color, No rashes or lesions                          12.5   5.67  )-----------( 423      ( 2019 06:30 )             39.9         142  |  103  |  12  ----------------------------<  91  3.5   |  26  |  0.88    Ca    9.1      2019 06:30  Phos  3.6       Mg     1.8         TPro  5.7<L>  /  Alb  2.7<L>  /  TBili  0.5  /  DBili  x   /  AST  8   /  ALT  4   /  AlkPhos  157<H>            Serum Pro-Brain Natriuretic Peptide: 2560 pg/mL ( @ 21:40)    Total Cholesterol: 101  LDL: 61  HDL: 26  T      Echo:  ------------------------------------------------------------------------  DIMENSIONS:  Dimensions:     Normal Values:  LA:     3.6 cm    2.0 - 4.0 cm  Ao:     3.0 cm    2.0 - 3.8 cm  SEPTUM: 1.1 cm    0.6 - 1.2 cm  PWT:   1.4 cm    0.6 - 1.1 cm  LVIDd:  3.7 cm    3.0 - 5.6 cm  LVIDs:  2.4 cm    1.8 - 4.0 cm  Derived Variables:  LVMI: 79 g/m2  RWT: 0.75  Fractional short: 35 %  Ejection Fraction (Teicholtz): 65 %  ------------------------------------------------------------------------  OBSERVATIONS:  Mitral Valve: Normal mitral valve. Mild mitral  regurgitation.  Aortic Root: Normal aortic root.  Aortic Valve: Normal trileaflet aortic valve.  Left Atrium: Mildly dilated left atrium.  LA volume index =  36 cc/m2.  Left Ventricle: Endocardium not well visualized; grossly  normal left ventricular systolic function. Increased  relative wall thickness with normal left ventricular mass  index, consistent with concentric left ventricular  remodeling. (DT:121 ms).  Right Heart: Moderate right atrial enlargement. Right  ventricular enlargement with decreased right ventricular  systolic function. Normal tricuspid valve.  Moderate-severe tricuspid regurgitation. Normal pulmonic  valve.  Pericardium/PleuraNormal pericardium with no pericardial  effusion.  Hemodynamic: Estimated right ventricular systolic pressure  equals 75 mm Hg, assuming right atrial pressure equals 10  mm Hg, consistent with severe pulmonary hypertension.  ------------------------------------------------------------------------  CONCLUSIONS:  1. Mildly dilated left atrium.  LA volume index = 36 cc/m2.  2. Increased relative wall thickness with normal left  ventricular mass index, consistent with concentric left  ventricular remodeling.  3. Endocardium not well visualized; grossly normal left  ventricular systolic function.  4. Moderate right atrial enlargement.  5. Right ventricular enlargement with decreased right  ventricular systolic function.  6. Normal tricuspid valve.    Moderate-severe tricuspid  regurgitation.  7. Estimated pulmonary artery systolic pressure equals 75  mm Hg, assuming right atrial pressure equals 10  mm Hg,  consistent with severe pulmonary hypertension.  ------------------------------------------------------------------------  Confirmed on  2018 - 17:19:21 by Layne Peña M.D. RPVI  ------------------------------------------------------------------------      Stress Testing:  IMPRESSIONS:Abnormal Study  * Myocardial Perfusion SPECT results are abnormal.  * Review of raw data shows: The study is of good technical  quality.  * The left ventricle was normal in size. There are medium  sized, moderate to severe defects in inferior and  inferoseptal walls that are fixed, suggestive of infarct.  * Post-stress gated wall motion analysis wasperformed  (LVEF = 47 %;LVEDV = 94 ml.), revealing mild overall  hypokinesis, due to segmental wall motion abnormality.  There was mild inferior / inferoseptal hypokinesis with  severely diminished systolic thickening.  * No clear evidence of ischemia.  ------------------------------------------------------------------------  Confirmed on  2018 - 15:57:48 by Jules Torres M.D.  ------------------------------------------------------------------------      Cath: No previous caths in [facility]      Interpretation of Telemetry:    Imaging:  CTA :    IMPRESSION:     No pulmonary embolism.  Mild pulmonary edema.  Centrilobular emphysema.

## 2019-04-19 NOTE — PROGRESS NOTE ADULT - ASSESSMENT
65 y/o man with pmhx of Atrial fibrillation and flutter (not on anticoagulants due to GI bleed), prostate CA w/ mets to bone (on chemo with seeds implant), HTN, HLD, hx of anxiety, emphysema presents from [facility] with tachycardia and hypotension for past 4 days. Admit to telemetry for acute on chronic diastolic CHF, atrial flutter w/ RVR and hypokalemia. Found to have hypoxic respiratory failure now requiring supplemental O2    EKG: Atrial flutter w/ RVR 2:1 conduction @ 114 bpm

## 2019-04-19 NOTE — DIETITIAN INITIAL EVALUATION ADULT. - ENERGY NEEDS
Ht: 69 inches  Wt: 162 pounds BMI: 23.9kg/m2 IBW: 160 pounds (+/-10%) %IBW: 100%  Edema: + 3 edema- right and left foot.  Skin: intact, no pressure injuries noted

## 2019-04-19 NOTE — DIETITIAN INITIAL EVALUATION ADULT. - ORAL INTAKE PTA
Patient reported having a good appetite PTA- consumes 3 meals a day. Reported he drinks Ensure Clear only when he does not have much of an appetite./good

## 2019-04-19 NOTE — PHYSICAL THERAPY INITIAL EVALUATION ADULT - PERTINENT HX OF CURRENT PROBLEM, REHAB EVAL
65 y/o  Male with pmhx of Atrial fibrillation and flutter (not on anticoagulants due to GI bleed), prostate CA w/ mets to bone (on chemo with seeds implant), HTN, HLD, hx of anxiety, emphysema presents from [facility] (rehab) with tachycardia and hypotension for past 4 days. Admitted to telemetry for acute on chronic diastolic CHF, atrial flutter w/ RVR and hypokalemia.

## 2019-04-19 NOTE — DIETITIAN INITIAL EVALUATION ADULT. - NS AS NUTRI INTERV MEDICAL AND FOOD SUPPLEMENTS
Patient appears to be eating well at this time- patient made aware of nutritional supplement available if PO intake decrease

## 2019-04-19 NOTE — DIETITIAN INITIAL EVALUATION ADULT. - PROBLEM SELECTOR PLAN 4
Continue budesonide-formoterol, albuterol PRN, tiotropium PRN  Started on oxygen delivery therapy   Monitor Pulse-OX

## 2019-04-19 NOTE — DIETITIAN INITIAL EVALUATION ADULT. - OTHER INFO
Patient seen for nutrition consult for Registered Dietitian. Per chart: Patient with history of Afib, prostate CA with mets to bone, HTN, HLD, anxiety, emphysema. Patient reported good appetite- however stated he did not enjoy breakfast. RDN made patient aware of alternatives and menu selections. RDN also made patient aware if PO intake decrease- Ensure can be ordered. Patient denies any nausea/vomiting/diarrhea/constipation or difficulty chewing and swallowing. Patient noted with shellfish allergy- however patient denies allergy. Patient stated weight use to be 215 pounds over one year ago. Patient seen by RDN on 1/31/18- weight was 209 pounds. Current weight 162 pounds. Patient also noted with + 3 edema right and left leg.

## 2019-04-19 NOTE — DIETITIAN INITIAL EVALUATION ADULT. - ETIOLOGY
patient meets criteria for severe malnutrition in context of chronic illness ( prostate Ca mets to bone)

## 2019-04-19 NOTE — PROGRESS NOTE ADULT - SUBJECTIVE AND OBJECTIVE BOX
Patient is a 64y old  Male who presents with a chief complaint of c/o tachycardia (19 Apr 2019 08:30)      SUBJECTIVE / OVERNIGHT EVENTS: No acute events overnight. Denies chest pain, SOB, N/V/D. Aflutter alternating with Stach on tele, highest rate 110s.    MEDICATIONS  (STANDING):  aspirin enteric coated 81 milliGRAM(s) Oral daily  atorvastatin 80 milliGRAM(s) Oral at bedtime  buDESOnide 160 MICROgram(s)/formoterol 4.5 MICROgram(s) Inhaler 2 Puff(s) Inhalation two times a day  clopidogrel Tablet 75 milliGRAM(s) Oral daily  enoxaparin Injectable 40 milliGRAM(s) SubCutaneous every 24 hours  enzalutamide 160 milliGRAM(s) Oral daily  ferrous    sulfate 325 milliGRAM(s) Oral daily  furosemide    Tablet 40 milliGRAM(s) Oral daily  metoprolol tartrate 100 milliGRAM(s) Oral two times a day  midodrine 5 milliGRAM(s) Oral <User Schedule>  multivitamin 1 Tablet(s) Oral daily  pantoprazole    Tablet 40 milliGRAM(s) Oral before breakfast  polyethylene glycol 3350 17 Gram(s) Oral daily  tamsulosin 0.4 milliGRAM(s) Oral at bedtime  tiotropium 18 MICROgram(s) Capsule 1 Capsule(s) Inhalation daily    MEDICATIONS  (PRN):  acetaminophen   Tablet .. 650 milliGRAM(s) Oral every 6 hours PRN Mild Pain (1 - 3), Moderate Pain (4 - 6)  ALBUTerol/ipratropium for Nebulization 3 milliLiter(s) Nebulizer every 6 hours PRN Shortness of Breath and/or Wheezing  oxyCODONE    IR 5 milliGRAM(s) Oral every 4 hours PRN Severe Pain (7 - 10)      T(C): 36.7 (04-19-19 @ 09:45), Max: 36.8 (04-19-19 @ 05:45)  HR: 104 (04-19-19 @ 09:45) (66 - 114)  BP: 113/87 (04-19-19 @ 09:45) (113/87 - 122/88)  RR: 17 (04-19-19 @ 09:45) (17 - 18)  SpO2: 99% (04-19-19 @ 09:45) (97% - 99%)  CAPILLARY BLOOD GLUCOSE        I&O's Summary    18 Apr 2019 07:01  -  19 Apr 2019 07:00  --------------------------------------------------------  IN: 0 mL / OUT: 2750 mL / NET: -2750 mL    19 Apr 2019 07:01  -  19 Apr 2019 12:48  --------------------------------------------------------  IN: 0 mL / OUT: 350 mL / NET: -350 mL        PHYSICAL EXAM:  GENERAL: no apparent distress, on NC 2 liters, seated at edge of bed about to ambulate with PT  EYES: sclera clear b/l  CHEST/LUNG: Clear to auscultation bilaterally; No wheezing or crackles  HEART: s1/s2, no murmurs  ABDOMEN: Soft, Nontender, Nondistended; Bowel sounds present  EXTREMITIES:  2+ Peripheral Pulses, No clubbing, cyanosis, or edema  NEUROLOGY: awake, alert, responds to Qs appropriately    LABS:                        12.5   5.67  )-----------( 423      ( 19 Apr 2019 06:30 )             39.9     04-19    142  |  103  |  12  ----------------------------<  91  3.5   |  26  |  0.88    Ca    9.1      19 Apr 2019 06:30  Phos  3.6     04-19  Mg     1.8     04-19    TPro  5.7<L>  /  Alb  2.7<L>  /  TBili  0.5  /  DBili  x   /  AST  8   /  ALT  4   /  AlkPhos  157<H>  04-17              RADIOLOGY & ADDITIONAL TESTS:

## 2019-04-19 NOTE — DIETITIAN INITIAL EVALUATION ADULT. - PROBLEM SELECTOR PLAN 2
Patient/Caregiver provided printed discharge information.
Admit to telemetry   Treat Heart Failure  Continue metoprolol   EKG showed Atrial flutter with RVR 2:1 conduction  Serial EKGs;  Cardiology consulted House: F/U  [id number]: 3-->c/w ASA and PLAVIX only, pt has hx of GI bleed last year.

## 2019-04-20 LAB
ANION GAP SERPL CALC-SCNC: 15 MMO/L — HIGH (ref 7–14)
BUN SERPL-MCNC: 18 MG/DL — SIGNIFICANT CHANGE UP (ref 7–23)
CALCIUM SERPL-MCNC: 8.7 MG/DL — SIGNIFICANT CHANGE UP (ref 8.4–10.5)
CHLORIDE SERPL-SCNC: 104 MMOL/L — SIGNIFICANT CHANGE UP (ref 98–107)
CO2 SERPL-SCNC: 22 MMOL/L — SIGNIFICANT CHANGE UP (ref 22–31)
CREAT SERPL-MCNC: 0.79 MG/DL — SIGNIFICANT CHANGE UP (ref 0.5–1.3)
GLUCOSE SERPL-MCNC: 90 MG/DL — SIGNIFICANT CHANGE UP (ref 70–99)
HCT VFR BLD CALC: 38.8 % — LOW (ref 39–50)
HGB BLD-MCNC: 12.1 G/DL — LOW (ref 13–17)
MAGNESIUM SERPL-MCNC: 1.7 MG/DL — SIGNIFICANT CHANGE UP (ref 1.6–2.6)
MCHC RBC-ENTMCNC: 29.2 PG — SIGNIFICANT CHANGE UP (ref 27–34)
MCHC RBC-ENTMCNC: 31.2 % — LOW (ref 32–36)
MCV RBC AUTO: 93.5 FL — SIGNIFICANT CHANGE UP (ref 80–100)
NRBC # FLD: 0 K/UL — SIGNIFICANT CHANGE UP (ref 0–0)
NT-PROBNP SERPL-SCNC: 1195 PG/ML — SIGNIFICANT CHANGE UP
PLATELET # BLD AUTO: 393 K/UL — SIGNIFICANT CHANGE UP (ref 150–400)
PMV BLD: 10 FL — SIGNIFICANT CHANGE UP (ref 7–13)
POTASSIUM SERPL-MCNC: 3.6 MMOL/L — SIGNIFICANT CHANGE UP (ref 3.5–5.3)
POTASSIUM SERPL-SCNC: 3.6 MMOL/L — SIGNIFICANT CHANGE UP (ref 3.5–5.3)
RBC # BLD: 4.15 M/UL — LOW (ref 4.2–5.8)
RBC # FLD: 15.1 % — HIGH (ref 10.3–14.5)
SODIUM SERPL-SCNC: 141 MMOL/L — SIGNIFICANT CHANGE UP (ref 135–145)
WBC # BLD: 6.73 K/UL — SIGNIFICANT CHANGE UP (ref 3.8–10.5)
WBC # FLD AUTO: 6.73 K/UL — SIGNIFICANT CHANGE UP (ref 3.8–10.5)

## 2019-04-20 PROCEDURE — 99232 SBSQ HOSP IP/OBS MODERATE 35: CPT

## 2019-04-20 RX ADMIN — Medication 100 MILLIGRAM(S): at 05:51

## 2019-04-20 RX ADMIN — CLOPIDOGREL BISULFATE 75 MILLIGRAM(S): 75 TABLET, FILM COATED ORAL at 12:23

## 2019-04-20 RX ADMIN — ENOXAPARIN SODIUM 40 MILLIGRAM(S): 100 INJECTION SUBCUTANEOUS at 12:22

## 2019-04-20 RX ADMIN — Medication 100 MILLIGRAM(S): at 17:32

## 2019-04-20 RX ADMIN — Medication 325 MILLIGRAM(S): at 12:23

## 2019-04-20 RX ADMIN — MIDODRINE HYDROCHLORIDE 5 MILLIGRAM(S): 2.5 TABLET ORAL at 21:27

## 2019-04-20 RX ADMIN — Medication 650 MILLIGRAM(S): at 21:20

## 2019-04-20 RX ADMIN — PANTOPRAZOLE SODIUM 40 MILLIGRAM(S): 20 TABLET, DELAYED RELEASE ORAL at 05:51

## 2019-04-20 RX ADMIN — Medication 1 TABLET(S): at 12:23

## 2019-04-20 RX ADMIN — Medication 81 MILLIGRAM(S): at 12:23

## 2019-04-20 RX ADMIN — Medication 40 MILLIGRAM(S): at 05:51

## 2019-04-20 RX ADMIN — TAMSULOSIN HYDROCHLORIDE 0.4 MILLIGRAM(S): 0.4 CAPSULE ORAL at 21:27

## 2019-04-20 RX ADMIN — Medication 650 MILLIGRAM(S): at 20:20

## 2019-04-20 RX ADMIN — ATORVASTATIN CALCIUM 80 MILLIGRAM(S): 80 TABLET, FILM COATED ORAL at 21:27

## 2019-04-20 NOTE — PROGRESS NOTE ADULT - ASSESSMENT
65 y/o man with pmhx of Atrial fibrillation and flutter (not on anticoagulants due to GI bleed), prostate CA w/ mets to bone (on chemo with seeds implant), HTN, HLD, hx of anxiety, emphysema presents from [facility] with tachycardia and hypotension for 4 days. Admitted to telemetry for acute on chronic diastolic CHF, atrial flutter w/ RVR and hypokalemia. Found to have hypoxic respiratory failure

## 2019-04-20 NOTE — PROGRESS NOTE ADULT - SUBJECTIVE AND OBJECTIVE BOX
Nikos Fung Jr, MD  page [phone]  Patient is a 64y old  Male who presents with a chief complaint of c/o tachycardia (19 Apr 2019 12:48)      SUBJECTIVE / OVERNIGHT EVENTS: Breathing well on Room air, eating well    MEDICATIONS  (STANDING):  aspirin enteric coated 81 milliGRAM(s) Oral daily  atorvastatin 80 milliGRAM(s) Oral at bedtime  buDESOnide 160 MICROgram(s)/formoterol 4.5 MICROgram(s) Inhaler 2 Puff(s) Inhalation two times a day  clopidogrel Tablet 75 milliGRAM(s) Oral daily  enoxaparin Injectable 40 milliGRAM(s) SubCutaneous every 24 hours  enzalutamide 160 milliGRAM(s) Oral daily  ferrous    sulfate 325 milliGRAM(s) Oral daily  furosemide    Tablet 40 milliGRAM(s) Oral daily  metoprolol tartrate 100 milliGRAM(s) Oral two times a day  midodrine 5 milliGRAM(s) Oral <User Schedule>  multivitamin 1 Tablet(s) Oral daily  pantoprazole    Tablet 40 milliGRAM(s) Oral before breakfast  polyethylene glycol 3350 17 Gram(s) Oral daily  tamsulosin 0.4 milliGRAM(s) Oral at bedtime  tiotropium 18 MICROgram(s) Capsule 1 Capsule(s) Inhalation daily    MEDICATIONS  (PRN):  acetaminophen   Tablet .. 650 milliGRAM(s) Oral every 6 hours PRN Mild Pain (1 - 3), Moderate Pain (4 - 6)  ALBUTerol/ipratropium for Nebulization 3 milliLiter(s) Nebulizer every 6 hours PRN Shortness of Breath and/or Wheezing  oxyCODONE    IR 5 milliGRAM(s) Oral every 4 hours PRN Severe Pain (7 - 10)      Vital Signs Last 24 Hrs  T(C): 36.7 (20 Apr 2019 08:02), Max: 36.8 (19 Apr 2019 21:04)  T(F): 98 (20 Apr 2019 08:02), Max: 98.3 (19 Apr 2019 21:04)  HR: 102 (20 Apr 2019 08:02) (102 - 110)  BP: 113/89 (20 Apr 2019 08:02) (110/78 - 125/92)  BP(mean): --  RR: 18 (20 Apr 2019 08:02) (18 - 18)  SpO2: 92% (20 Apr 2019 08:02) (86% - 94%)  CAPILLARY BLOOD GLUCOSE        I&O's Summary    19 Apr 2019 07:01  -  20 Apr 2019 07:00  --------------------------------------------------------  IN: 475 mL / OUT: 1475 mL / NET: -1000 mL    20 Apr 2019 07:01  -  20 Apr 2019 11:35  --------------------------------------------------------  IN: 0 mL / OUT: 500 mL / NET: -500 mL        PHYSICAL EXAM:  GENERAL: no apparent distress, on room air  EYES: sclera clear b/l  CHEST/LUNG: Clear to auscultation bilaterally; No wheezing or crackles  HEART: s1/s2, no murmurs  ABDOMEN: Soft, Nontender, Nondistended; Bowel sounds present  EXTREMITIES:  2+ Peripheral Pulses, No clubbing, cyanosis, or edema  NEUROLOGY: awake, alert, responds to Qs appropriately    LABS:                        12.1   6.73  )-----------( 393      ( 20 Apr 2019 07:32 )             38.8     04-20    141  |  104  |  18  ----------------------------<  90  3.6   |  22  |  0.79    Ca    8.7      20 Apr 2019 07:32  Phos  3.6     04-19  Mg     1.7     04-20

## 2019-04-21 LAB
ANION GAP SERPL CALC-SCNC: 15 MMO/L — HIGH (ref 7–14)
BUN SERPL-MCNC: 24 MG/DL — HIGH (ref 7–23)
CALCIUM SERPL-MCNC: 8.7 MG/DL — SIGNIFICANT CHANGE UP (ref 8.4–10.5)
CHLORIDE SERPL-SCNC: 102 MMOL/L — SIGNIFICANT CHANGE UP (ref 98–107)
CO2 SERPL-SCNC: 24 MMOL/L — SIGNIFICANT CHANGE UP (ref 22–31)
CREAT SERPL-MCNC: 0.93 MG/DL — SIGNIFICANT CHANGE UP (ref 0.5–1.3)
GLUCOSE SERPL-MCNC: 89 MG/DL — SIGNIFICANT CHANGE UP (ref 70–99)
HCT VFR BLD CALC: 39.1 % — SIGNIFICANT CHANGE UP (ref 39–50)
HGB BLD-MCNC: 12.2 G/DL — LOW (ref 13–17)
MAGNESIUM SERPL-MCNC: 1.7 MG/DL — SIGNIFICANT CHANGE UP (ref 1.6–2.6)
MCHC RBC-ENTMCNC: 29.5 PG — SIGNIFICANT CHANGE UP (ref 27–34)
MCHC RBC-ENTMCNC: 31.2 % — LOW (ref 32–36)
MCV RBC AUTO: 94.4 FL — SIGNIFICANT CHANGE UP (ref 80–100)
NRBC # FLD: 0 K/UL — SIGNIFICANT CHANGE UP (ref 0–0)
PLATELET # BLD AUTO: 355 K/UL — SIGNIFICANT CHANGE UP (ref 150–400)
PMV BLD: 9.9 FL — SIGNIFICANT CHANGE UP (ref 7–13)
POTASSIUM SERPL-MCNC: 3.5 MMOL/L — SIGNIFICANT CHANGE UP (ref 3.5–5.3)
POTASSIUM SERPL-SCNC: 3.5 MMOL/L — SIGNIFICANT CHANGE UP (ref 3.5–5.3)
RBC # BLD: 4.14 M/UL — LOW (ref 4.2–5.8)
RBC # FLD: 14.8 % — HIGH (ref 10.3–14.5)
SODIUM SERPL-SCNC: 141 MMOL/L — SIGNIFICANT CHANGE UP (ref 135–145)
WBC # BLD: 5.78 K/UL — SIGNIFICANT CHANGE UP (ref 3.8–10.5)
WBC # FLD AUTO: 5.78 K/UL — SIGNIFICANT CHANGE UP (ref 3.8–10.5)

## 2019-04-21 PROCEDURE — 99232 SBSQ HOSP IP/OBS MODERATE 35: CPT

## 2019-04-21 RX ORDER — MAGNESIUM OXIDE 400 MG ORAL TABLET 241.3 MG
400 TABLET ORAL ONCE
Qty: 0 | Refills: 0 | Status: COMPLETED | OUTPATIENT
Start: 2019-04-21 | End: 2019-04-21

## 2019-04-21 RX ORDER — POTASSIUM CHLORIDE 20 MEQ
40 PACKET (EA) ORAL ONCE
Qty: 0 | Refills: 0 | Status: COMPLETED | OUTPATIENT
Start: 2019-04-21 | End: 2019-04-21

## 2019-04-21 RX ORDER — DOCUSATE SODIUM 100 MG
100 CAPSULE ORAL
Qty: 0 | Refills: 0 | Status: DISCONTINUED | OUTPATIENT
Start: 2019-04-21 | End: 2019-04-22

## 2019-04-21 RX ADMIN — CLOPIDOGREL BISULFATE 75 MILLIGRAM(S): 75 TABLET, FILM COATED ORAL at 12:01

## 2019-04-21 RX ADMIN — Medication 100 MILLIGRAM(S): at 17:44

## 2019-04-21 RX ADMIN — ENOXAPARIN SODIUM 40 MILLIGRAM(S): 100 INJECTION SUBCUTANEOUS at 12:01

## 2019-04-21 RX ADMIN — Medication 40 MILLIGRAM(S): at 06:05

## 2019-04-21 RX ADMIN — Medication 81 MILLIGRAM(S): at 12:01

## 2019-04-21 RX ADMIN — TAMSULOSIN HYDROCHLORIDE 0.4 MILLIGRAM(S): 0.4 CAPSULE ORAL at 21:13

## 2019-04-21 RX ADMIN — Medication 325 MILLIGRAM(S): at 12:01

## 2019-04-21 RX ADMIN — Medication 40 MILLIEQUIVALENT(S): at 18:07

## 2019-04-21 RX ADMIN — ATORVASTATIN CALCIUM 80 MILLIGRAM(S): 80 TABLET, FILM COATED ORAL at 21:13

## 2019-04-21 RX ADMIN — Medication 1 TABLET(S): at 12:01

## 2019-04-21 RX ADMIN — Medication 100 MILLIGRAM(S): at 06:04

## 2019-04-21 RX ADMIN — Medication 100 MILLIGRAM(S): at 10:26

## 2019-04-21 RX ADMIN — MAGNESIUM OXIDE 400 MG ORAL TABLET 400 MILLIGRAM(S): 241.3 TABLET ORAL at 18:07

## 2019-04-21 RX ADMIN — PANTOPRAZOLE SODIUM 40 MILLIGRAM(S): 20 TABLET, DELAYED RELEASE ORAL at 06:02

## 2019-04-21 RX ADMIN — BUDESONIDE AND FORMOTEROL FUMARATE DIHYDRATE 2 PUFF(S): 160; 4.5 AEROSOL RESPIRATORY (INHALATION) at 21:12

## 2019-04-21 RX ADMIN — Medication 650 MILLIGRAM(S): at 16:03

## 2019-04-21 RX ADMIN — MIDODRINE HYDROCHLORIDE 5 MILLIGRAM(S): 2.5 TABLET ORAL at 21:12

## 2019-04-21 RX ADMIN — BUDESONIDE AND FORMOTEROL FUMARATE DIHYDRATE 2 PUFF(S): 160; 4.5 AEROSOL RESPIRATORY (INHALATION) at 08:56

## 2019-04-21 RX ADMIN — Medication 650 MILLIGRAM(S): at 17:04

## 2019-04-21 NOTE — PROGRESS NOTE ADULT - ASSESSMENT
63 y/o man with pmhx of Atrial fibrillation and flutter (not on anticoagulants due to GI bleed), prostate CA w/ mets to bone (on chemo with seeds implant), HTN, HLD, hx of anxiety, emphysema presents from [facility] with tachycardia and hypotension for 4 days. Admitted to telemetry for acute on chronic diastolic CHF, atrial flutter w/ RVR and hypokalemia. Found to have hypoxic respiratory failure

## 2019-04-21 NOTE — PROGRESS NOTE ADULT - SUBJECTIVE AND OBJECTIVE BOX
Nikos Fung Jr, MD  page [phone]  Patient is a 64y old  Male who presents with a chief complaint of c/o tachycardia (20 Apr 2019 11:35)      SUBJECTIVE / OVERNIGHT EVENTS: Feels well without dyspnea on RA    MEDICATIONS  (STANDING):  aspirin enteric coated 81 milliGRAM(s) Oral daily  atorvastatin 80 milliGRAM(s) Oral at bedtime  buDESOnide 160 MICROgram(s)/formoterol 4.5 MICROgram(s) Inhaler 2 Puff(s) Inhalation two times a day  clopidogrel Tablet 75 milliGRAM(s) Oral daily  docusate sodium 100 milliGRAM(s) Oral two times a day  enoxaparin Injectable 40 milliGRAM(s) SubCutaneous every 24 hours  enzalutamide 160 milliGRAM(s) Oral daily  ferrous    sulfate 325 milliGRAM(s) Oral daily  furosemide    Tablet 40 milliGRAM(s) Oral daily  metoprolol tartrate 100 milliGRAM(s) Oral two times a day  midodrine 5 milliGRAM(s) Oral <User Schedule>  multivitamin 1 Tablet(s) Oral daily  pantoprazole    Tablet 40 milliGRAM(s) Oral before breakfast  polyethylene glycol 3350 17 Gram(s) Oral daily  tamsulosin 0.4 milliGRAM(s) Oral at bedtime  tiotropium 18 MICROgram(s) Capsule 1 Capsule(s) Inhalation daily    MEDICATIONS  (PRN):  acetaminophen   Tablet .. 650 milliGRAM(s) Oral every 6 hours PRN Mild Pain (1 - 3), Moderate Pain (4 - 6)  ALBUTerol/ipratropium for Nebulization 3 milliLiter(s) Nebulizer every 6 hours PRN Shortness of Breath and/or Wheezing  oxyCODONE    IR 5 milliGRAM(s) Oral every 4 hours PRN Severe Pain (7 - 10)      Vital Signs Last 24 Hrs  T(C): 36.3 (21 Apr 2019 12:00), Max: 36.7 (20 Apr 2019 17:31)  T(F): 97.4 (21 Apr 2019 12:00), Max: 98.1 (20 Apr 2019 20:55)  HR: 100 (21 Apr 2019 12:00) (95 - 109)  BP: 101/78 (21 Apr 2019 12:00) (101/78 - 121/87)  BP(mean): --  RR: 18 (21 Apr 2019 12:00) (18 - 18)  SpO2: 92% (21 Apr 2019 12:00) (92% - 94%)  CAPILLARY BLOOD GLUCOSE        I&O's Summary    20 Apr 2019 07:01  -  21 Apr 2019 07:00  --------------------------------------------------------  IN: 0 mL / OUT: 1300 mL / NET: -1300 mL    21 Apr 2019 07:01  -  21 Apr 2019 13:42  --------------------------------------------------------  IN: 0 mL / OUT: 700 mL / NET: -700 mL        PHYSICAL EXAM:  GENERAL: NAD, well-developed  HEAD:  Atraumatic, Normocephalic  EYES: EOMI, conjunctiva and sclera clear  NECK: Supple, No JVD  CHEST/LUNG: Clear to auscultation bilaterally; No wheeze  HEART: Regular rate and rhythm; No murmurs, rubs, or gallops  ABDOMEN: Soft, Nontender, Nondistended; Bowel sounds present  EXTREMITIES:  2+ Peripheral Pulses, No clubbing, cyanosis, or edema  PSYCH: AAOx3  NEUROLOGY: non-focal  SKIN: No rashes or lesions    LABS:                        12.2   5.78  )-----------( 355      ( 21 Apr 2019 06:04 )             39.1     04-21    141  |  102  |  24<H>  ----------------------------<  89  3.5   |  24  |  0.93    Ca    8.7      21 Apr 2019 06:04  Mg     1.7     04-21

## 2019-04-22 LAB
ANION GAP SERPL CALC-SCNC: 12 MMO/L — SIGNIFICANT CHANGE UP (ref 7–14)
BUN SERPL-MCNC: 23 MG/DL — SIGNIFICANT CHANGE UP (ref 7–23)
CALCIUM SERPL-MCNC: 9.4 MG/DL — SIGNIFICANT CHANGE UP (ref 8.4–10.5)
CHLORIDE SERPL-SCNC: 104 MMOL/L — SIGNIFICANT CHANGE UP (ref 98–107)
CO2 SERPL-SCNC: 24 MMOL/L — SIGNIFICANT CHANGE UP (ref 22–31)
CREAT SERPL-MCNC: 1.08 MG/DL — SIGNIFICANT CHANGE UP (ref 0.5–1.3)
GLUCOSE SERPL-MCNC: 97 MG/DL — SIGNIFICANT CHANGE UP (ref 70–99)
HCT VFR BLD CALC: 37.4 % — LOW (ref 39–50)
HGB BLD-MCNC: 12 G/DL — LOW (ref 13–17)
MAGNESIUM SERPL-MCNC: 1.8 MG/DL — SIGNIFICANT CHANGE UP (ref 1.6–2.6)
MCHC RBC-ENTMCNC: 29.3 PG — SIGNIFICANT CHANGE UP (ref 27–34)
MCHC RBC-ENTMCNC: 32.1 % — SIGNIFICANT CHANGE UP (ref 32–36)
MCV RBC AUTO: 91.4 FL — SIGNIFICANT CHANGE UP (ref 80–100)
NRBC # FLD: 0 K/UL — SIGNIFICANT CHANGE UP (ref 0–0)
PLATELET # BLD AUTO: 347 K/UL — SIGNIFICANT CHANGE UP (ref 150–400)
PMV BLD: 9.9 FL — SIGNIFICANT CHANGE UP (ref 7–13)
POTASSIUM SERPL-MCNC: 3.8 MMOL/L — SIGNIFICANT CHANGE UP (ref 3.5–5.3)
POTASSIUM SERPL-SCNC: 3.8 MMOL/L — SIGNIFICANT CHANGE UP (ref 3.5–5.3)
RBC # BLD: 4.09 M/UL — LOW (ref 4.2–5.8)
RBC # FLD: 14.8 % — HIGH (ref 10.3–14.5)
SODIUM SERPL-SCNC: 140 MMOL/L — SIGNIFICANT CHANGE UP (ref 135–145)
WBC # BLD: 8.05 K/UL — SIGNIFICANT CHANGE UP (ref 3.8–10.5)
WBC # FLD AUTO: 8.05 K/UL — SIGNIFICANT CHANGE UP (ref 3.8–10.5)

## 2019-04-22 PROCEDURE — 93010 ELECTROCARDIOGRAM REPORT: CPT

## 2019-04-22 PROCEDURE — 99232 SBSQ HOSP IP/OBS MODERATE 35: CPT

## 2019-04-22 RX ADMIN — CLOPIDOGREL BISULFATE 75 MILLIGRAM(S): 75 TABLET, FILM COATED ORAL at 13:08

## 2019-04-22 RX ADMIN — PANTOPRAZOLE SODIUM 40 MILLIGRAM(S): 20 TABLET, DELAYED RELEASE ORAL at 05:48

## 2019-04-22 RX ADMIN — Medication 40 MILLIGRAM(S): at 06:17

## 2019-04-22 RX ADMIN — TAMSULOSIN HYDROCHLORIDE 0.4 MILLIGRAM(S): 0.4 CAPSULE ORAL at 21:50

## 2019-04-22 RX ADMIN — BUDESONIDE AND FORMOTEROL FUMARATE DIHYDRATE 2 PUFF(S): 160; 4.5 AEROSOL RESPIRATORY (INHALATION) at 08:17

## 2019-04-22 RX ADMIN — OXYCODONE HYDROCHLORIDE 5 MILLIGRAM(S): 5 TABLET ORAL at 08:15

## 2019-04-22 RX ADMIN — Medication 81 MILLIGRAM(S): at 13:09

## 2019-04-22 RX ADMIN — Medication 100 MILLIGRAM(S): at 05:48

## 2019-04-22 RX ADMIN — ENOXAPARIN SODIUM 40 MILLIGRAM(S): 100 INJECTION SUBCUTANEOUS at 13:08

## 2019-04-22 RX ADMIN — OXYCODONE HYDROCHLORIDE 5 MILLIGRAM(S): 5 TABLET ORAL at 09:36

## 2019-04-22 RX ADMIN — Medication 1 TABLET(S): at 13:08

## 2019-04-22 RX ADMIN — Medication 325 MILLIGRAM(S): at 13:08

## 2019-04-22 RX ADMIN — OXYCODONE HYDROCHLORIDE 5 MILLIGRAM(S): 5 TABLET ORAL at 23:02

## 2019-04-22 RX ADMIN — ATORVASTATIN CALCIUM 80 MILLIGRAM(S): 80 TABLET, FILM COATED ORAL at 21:50

## 2019-04-22 RX ADMIN — Medication 100 MILLIGRAM(S): at 17:53

## 2019-04-22 RX ADMIN — Medication 650 MILLIGRAM(S): at 13:18

## 2019-04-22 RX ADMIN — Medication 650 MILLIGRAM(S): at 14:18

## 2019-04-22 RX ADMIN — BUDESONIDE AND FORMOTEROL FUMARATE DIHYDRATE 2 PUFF(S): 160; 4.5 AEROSOL RESPIRATORY (INHALATION) at 21:50

## 2019-04-22 NOTE — PROGRESS NOTE ADULT - SUBJECTIVE AND OBJECTIVE BOX
Patient is a 64y old  Male who presents with a chief complaint of c/o tachycardia (2019 13:41)      SUBJECTIVE / OVERNIGHT EVENTS: Denies pain. Loose  Bowel movement    MEDICATIONS  (STANDING):  aspirin enteric coated 81 milliGRAM(s) Oral daily  atorvastatin 80 milliGRAM(s) Oral at bedtime  buDESOnide 160 MICROgram(s)/formoterol 4.5 MICROgram(s) Inhaler 2 Puff(s) Inhalation two times a day  clopidogrel Tablet 75 milliGRAM(s) Oral daily  enoxaparin Injectable 40 milliGRAM(s) SubCutaneous every 24 hours  enzalutamide 160 milliGRAM(s) Oral daily  ferrous    sulfate 325 milliGRAM(s) Oral daily  furosemide    Tablet 40 milliGRAM(s) Oral daily  metoprolol tartrate 100 milliGRAM(s) Oral two times a day  midodrine 5 milliGRAM(s) Oral <User Schedule>  multivitamin 1 Tablet(s) Oral daily  pantoprazole    Tablet 40 milliGRAM(s) Oral before breakfast  tamsulosin 0.4 milliGRAM(s) Oral at bedtime  tiotropium 18 MICROgram(s) Capsule 1 Capsule(s) Inhalation daily    MEDICATIONS  (PRN):  acetaminophen   Tablet .. 650 milliGRAM(s) Oral every 6 hours PRN Mild Pain (1 - 3), Moderate Pain (4 - 6)  ALBUTerol/ipratropium for Nebulization 3 milliLiter(s) Nebulizer every 6 hours PRN Shortness of Breath and/or Wheezing  oxyCODONE    IR 5 milliGRAM(s) Oral every 4 hours PRN Severe Pain (7 - 10)      T(C): 36.2 (19 @ 12:21), Max: 37 (19 @ 17:42)  HR: 109 (19 @ 12:21) (85 - 111)  BP: 121/96 (19 @ 12:21) (105/74 - 121/96)  RR: 18 (19 @ 12:21) (18 - 18)  SpO2: 95% (19 @ 12:21) (92% - 98%)  CAPILLARY BLOOD GLUCOSE        I&O's Summary    2019 07:01  -  2019 07:00  --------------------------------------------------------  IN: 0 mL / OUT: 1150 mL / NET: -1150 mL    2019 07:01  -  2019 14:15  --------------------------------------------------------  IN: 0 mL / OUT: 600 mL / NET: -600 mL        PHYSICAL EXAM:  GENERAL: NAD, cachectic  EYES: EOMI,  conjunctiva and sclera clear  NECK: Supple,   CHEST/LUNG: Clear to auscultation bilaterally; No wheeze  HEART:  s1 s2 + Regular rate and rhythm;   ABDOMEN: Soft, Nontender, Nondistended; Bowel sounds present  EXTREMITIES:   No clubbing, cyanosis  NEURO: AAOx3  SKIN: abd surgical scar      LABS:                        12.0   8.05  )-----------( 347      ( 2019 07:15 )             37.4     -    140  |  104  |  23  ----------------------------<  97  3.8   |  24  |  1.08    Ca    9.4      2019 07:15  Mg     1.8                 Carlos Oviedo MD  Hospitalist  P/ [phone]

## 2019-04-22 NOTE — PROGRESS NOTE ADULT - PROBLEM SELECTOR PLAN 5
Continue Xtandi ( family to bring pt's medication), tamsulosin with bone mets  Continue Xtandi ( family to bring pt's medication), tamsulosin

## 2019-04-22 NOTE — PROGRESS NOTE ADULT - ASSESSMENT
63 y/o man with pmhx of Atrial fibrillation and flutter (not on anticoagulants due to GI bleed), prostate CA w/ mets to bone (on chemo with seeds implant), HTN, HLD, hx of anxiety, emphysema presents from [facility] with tachycardia and hypotension for 4 days. Admitted to telemetry for acute on chronic diastolic CHF, atrial flutter w/ RVR and hypokalemia. Found to have hypoxic respiratory failure

## 2019-04-23 DIAGNOSIS — G47.33 OBSTRUCTIVE SLEEP APNEA (ADULT) (PEDIATRIC): ICD-10-CM

## 2019-04-23 LAB
ANION GAP SERPL CALC-SCNC: 12 MMO/L — SIGNIFICANT CHANGE UP (ref 7–14)
BUN SERPL-MCNC: 16 MG/DL — SIGNIFICANT CHANGE UP (ref 7–23)
CALCIUM SERPL-MCNC: 9.3 MG/DL — SIGNIFICANT CHANGE UP (ref 8.4–10.5)
CHLORIDE SERPL-SCNC: 104 MMOL/L — SIGNIFICANT CHANGE UP (ref 98–107)
CO2 SERPL-SCNC: 23 MMOL/L — SIGNIFICANT CHANGE UP (ref 22–31)
CREAT SERPL-MCNC: 0.78 MG/DL — SIGNIFICANT CHANGE UP (ref 0.5–1.3)
GLUCOSE SERPL-MCNC: 92 MG/DL — SIGNIFICANT CHANGE UP (ref 70–99)
HCT VFR BLD CALC: 37.7 % — LOW (ref 39–50)
HGB BLD-MCNC: 11.9 G/DL — LOW (ref 13–17)
MAGNESIUM SERPL-MCNC: 1.6 MG/DL — SIGNIFICANT CHANGE UP (ref 1.6–2.6)
MCHC RBC-ENTMCNC: 29.5 PG — SIGNIFICANT CHANGE UP (ref 27–34)
MCHC RBC-ENTMCNC: 31.6 % — LOW (ref 32–36)
MCV RBC AUTO: 93.5 FL — SIGNIFICANT CHANGE UP (ref 80–100)
NRBC # FLD: 0 K/UL — SIGNIFICANT CHANGE UP (ref 0–0)
PLATELET # BLD AUTO: 304 K/UL — SIGNIFICANT CHANGE UP (ref 150–400)
PMV BLD: 10.6 FL — SIGNIFICANT CHANGE UP (ref 7–13)
POTASSIUM SERPL-MCNC: 3.3 MMOL/L — LOW (ref 3.5–5.3)
POTASSIUM SERPL-SCNC: 3.3 MMOL/L — LOW (ref 3.5–5.3)
RBC # BLD: 4.03 M/UL — LOW (ref 4.2–5.8)
RBC # FLD: 15 % — HIGH (ref 10.3–14.5)
SODIUM SERPL-SCNC: 139 MMOL/L — SIGNIFICANT CHANGE UP (ref 135–145)
WBC # BLD: 9.62 K/UL — SIGNIFICANT CHANGE UP (ref 3.8–10.5)
WBC # FLD AUTO: 9.62 K/UL — SIGNIFICANT CHANGE UP (ref 3.8–10.5)

## 2019-04-23 PROCEDURE — 99233 SBSQ HOSP IP/OBS HIGH 50: CPT

## 2019-04-23 PROCEDURE — 93306 TTE W/DOPPLER COMPLETE: CPT | Mod: 26

## 2019-04-23 RX ORDER — POTASSIUM CHLORIDE 20 MEQ
40 PACKET (EA) ORAL EVERY 4 HOURS
Qty: 0 | Refills: 0 | Status: COMPLETED | OUTPATIENT
Start: 2019-04-23 | End: 2019-04-23

## 2019-04-23 RX ADMIN — Medication 650 MILLIGRAM(S): at 07:45

## 2019-04-23 RX ADMIN — OXYCODONE HYDROCHLORIDE 5 MILLIGRAM(S): 5 TABLET ORAL at 00:00

## 2019-04-23 RX ADMIN — Medication 40 MILLIGRAM(S): at 05:46

## 2019-04-23 RX ADMIN — Medication 81 MILLIGRAM(S): at 11:41

## 2019-04-23 RX ADMIN — PANTOPRAZOLE SODIUM 40 MILLIGRAM(S): 20 TABLET, DELAYED RELEASE ORAL at 05:46

## 2019-04-23 RX ADMIN — BUDESONIDE AND FORMOTEROL FUMARATE DIHYDRATE 2 PUFF(S): 160; 4.5 AEROSOL RESPIRATORY (INHALATION) at 21:25

## 2019-04-23 RX ADMIN — Medication 100 MILLIGRAM(S): at 05:46

## 2019-04-23 RX ADMIN — CLOPIDOGREL BISULFATE 75 MILLIGRAM(S): 75 TABLET, FILM COATED ORAL at 11:41

## 2019-04-23 RX ADMIN — ENOXAPARIN SODIUM 40 MILLIGRAM(S): 100 INJECTION SUBCUTANEOUS at 13:19

## 2019-04-23 RX ADMIN — Medication 1 TABLET(S): at 11:41

## 2019-04-23 RX ADMIN — Medication 325 MILLIGRAM(S): at 11:41

## 2019-04-23 RX ADMIN — ATORVASTATIN CALCIUM 80 MILLIGRAM(S): 80 TABLET, FILM COATED ORAL at 21:25

## 2019-04-23 RX ADMIN — Medication 650 MILLIGRAM(S): at 06:45

## 2019-04-23 RX ADMIN — Medication 650 MILLIGRAM(S): at 16:50

## 2019-04-23 RX ADMIN — Medication 40 MILLIEQUIVALENT(S): at 18:43

## 2019-04-23 RX ADMIN — Medication 40 MILLIEQUIVALENT(S): at 13:18

## 2019-04-23 RX ADMIN — Medication 100 MILLIGRAM(S): at 18:42

## 2019-04-23 RX ADMIN — Medication 650 MILLIGRAM(S): at 15:52

## 2019-04-23 RX ADMIN — TAMSULOSIN HYDROCHLORIDE 0.4 MILLIGRAM(S): 0.4 CAPSULE ORAL at 21:25

## 2019-04-23 NOTE — PROGRESS NOTE ADULT - ASSESSMENT
65 y/o man with pmhx of Atrial fibrillation and flutter (not on anticoagulants due to GI bleed), prostate CA w/ mets to bone (on chemo with seeds implant), HTN, HLD, hx of anxiety, emphysema presents from [facility] with tachycardia and hypotension for 4 days. Admitted to telemetry for acute on chronic diastolic CHF, atrial flutter w/ RVR and hypokalemia. Found to have hypoxic respiratory failure

## 2019-04-23 NOTE — PROGRESS NOTE ADULT - SUBJECTIVE AND OBJECTIVE BOX
Patient is a 64y old  Male who presents with a chief complaint of c/o tachycardia (2019 14:14)      SUBJECTIVE / OVERNIGHT EVENTS: Loose bowel movement resolved. No CP or SOb    MEDICATIONS  (STANDING):  aspirin enteric coated 81 milliGRAM(s) Oral daily  atorvastatin 80 milliGRAM(s) Oral at bedtime  buDESOnide 160 MICROgram(s)/formoterol 4.5 MICROgram(s) Inhaler 2 Puff(s) Inhalation two times a day  clopidogrel Tablet 75 milliGRAM(s) Oral daily  enoxaparin Injectable 40 milliGRAM(s) SubCutaneous every 24 hours  ferrous    sulfate 325 milliGRAM(s) Oral daily  furosemide    Tablet 40 milliGRAM(s) Oral daily  metoprolol tartrate 100 milliGRAM(s) Oral two times a day  midodrine 5 milliGRAM(s) Oral <User Schedule>  multivitamin 1 Tablet(s) Oral daily  pantoprazole    Tablet 40 milliGRAM(s) Oral before breakfast  potassium chloride    Tablet ER 40 milliEquivalent(s) Oral every 4 hours  tamsulosin 0.4 milliGRAM(s) Oral at bedtime  tiotropium 18 MICROgram(s) Capsule 1 Capsule(s) Inhalation daily    MEDICATIONS  (PRN):  acetaminophen   Tablet .. 650 milliGRAM(s) Oral every 6 hours PRN Mild Pain (1 - 3), Moderate Pain (4 - 6)  ALBUTerol/ipratropium for Nebulization 3 milliLiter(s) Nebulizer every 6 hours PRN Shortness of Breath and/or Wheezing  oxyCODONE    IR 5 milliGRAM(s) Oral every 4 hours PRN Severe Pain (7 - 10)      T(C): 36.9 (19 @ 09:05), Max: 37 (19 @ 05:43)  HR: 98 (19 @ 09:05) (98 - 109)  BP: 104/77 (19 @ 09:05) (104/77 - 116/88)  RR: 17 (19 @ 09:05) (17 - 18)  SpO2: 95% (19 @ 09:05) (94% - 96%)  CAPILLARY BLOOD GLUCOSE        I&O's Summary    2019 07:01  -  2019 07:00  --------------------------------------------------------  IN: 540 mL / OUT: 1350 mL / NET: -810 mL    2019 07:01  -  2019 13:20  --------------------------------------------------------  IN: 0 mL / OUT: 400 mL / NET: -400 mL        PHYSICAL EXAM:  GENERAL: NAD,   EYES: EOMI,  conjunctiva and sclera clear  NECK: Supple,   CHEST/LUNG: Clear to auscultation bilaterally; No wheeze  HEART:  s1 s2 + Regular rate and rhythm;   ABDOMEN: Soft, Nontender, Nondistended; Bowel sounds present  EXTREMITIES:   No clubbing, cyanosis  NEURO: AAOx3      LABS:                        11.9   9.62  )-----------( 304      ( 2019 06:20 )             37.7         139  |  104  |  16  ----------------------------<  92  3.3<L>   |  23  |  0.78    Ca    9.3      2019 06:20  Mg     1.6                     Carlos Oviedo MD  Hospitalist  P/ [phone]

## 2019-04-24 ENCOUNTER — TRANSCRIPTION ENCOUNTER (OUTPATIENT)
Age: 64
End: 2019-04-24

## 2019-04-24 VITALS — OXYGEN SATURATION: 96 % | HEART RATE: 108 BPM

## 2019-04-24 DIAGNOSIS — I50.23 ACUTE ON CHRONIC SYSTOLIC (CONGESTIVE) HEART FAILURE: ICD-10-CM

## 2019-04-24 LAB
ANION GAP SERPL CALC-SCNC: 11 MMO/L — SIGNIFICANT CHANGE UP (ref 7–14)
BUN SERPL-MCNC: 19 MG/DL — SIGNIFICANT CHANGE UP (ref 7–23)
CALCIUM SERPL-MCNC: 9.5 MG/DL — SIGNIFICANT CHANGE UP (ref 8.4–10.5)
CHLORIDE SERPL-SCNC: 106 MMOL/L — SIGNIFICANT CHANGE UP (ref 98–107)
CO2 SERPL-SCNC: 23 MMOL/L — SIGNIFICANT CHANGE UP (ref 22–31)
CREAT SERPL-MCNC: 0.99 MG/DL — SIGNIFICANT CHANGE UP (ref 0.5–1.3)
GLUCOSE SERPL-MCNC: 92 MG/DL — SIGNIFICANT CHANGE UP (ref 70–99)
HCT VFR BLD CALC: 38.8 % — LOW (ref 39–50)
HGB BLD-MCNC: 11.7 G/DL — LOW (ref 13–17)
MAGNESIUM SERPL-MCNC: 1.7 MG/DL — SIGNIFICANT CHANGE UP (ref 1.6–2.6)
MCHC RBC-ENTMCNC: 29.1 PG — SIGNIFICANT CHANGE UP (ref 27–34)
MCHC RBC-ENTMCNC: 30.2 % — LOW (ref 32–36)
MCV RBC AUTO: 96.5 FL — SIGNIFICANT CHANGE UP (ref 80–100)
NRBC # FLD: 0 K/UL — SIGNIFICANT CHANGE UP (ref 0–0)
PLATELET # BLD AUTO: 316 K/UL — SIGNIFICANT CHANGE UP (ref 150–400)
PMV BLD: 10.6 FL — SIGNIFICANT CHANGE UP (ref 7–13)
POTASSIUM SERPL-MCNC: 3.8 MMOL/L — SIGNIFICANT CHANGE UP (ref 3.5–5.3)
POTASSIUM SERPL-SCNC: 3.8 MMOL/L — SIGNIFICANT CHANGE UP (ref 3.5–5.3)
RBC # BLD: 4.02 M/UL — LOW (ref 4.2–5.8)
RBC # FLD: 15 % — HIGH (ref 10.3–14.5)
SODIUM SERPL-SCNC: 140 MMOL/L — SIGNIFICANT CHANGE UP (ref 135–145)
WBC # BLD: 6.28 K/UL — SIGNIFICANT CHANGE UP (ref 3.8–10.5)
WBC # FLD AUTO: 6.28 K/UL — SIGNIFICANT CHANGE UP (ref 3.8–10.5)

## 2019-04-24 PROCEDURE — 99239 HOSP IP/OBS DSCHRG MGMT >30: CPT

## 2019-04-24 RX ORDER — LISINOPRIL 2.5 MG/1
2.5 TABLET ORAL DAILY
Qty: 0 | Refills: 0 | Status: DISCONTINUED | OUTPATIENT
Start: 2019-04-24 | End: 2019-04-24

## 2019-04-24 RX ORDER — LISINOPRIL 2.5 MG/1
1 TABLET ORAL
Qty: 0 | Refills: 0 | COMMUNITY
Start: 2019-04-24

## 2019-04-24 RX ORDER — ACETAMINOPHEN 500 MG
325 TABLET ORAL ONCE
Qty: 0 | Refills: 0 | Status: COMPLETED | OUTPATIENT
Start: 2019-04-24 | End: 2019-04-24

## 2019-04-24 RX ORDER — FUROSEMIDE 40 MG
1 TABLET ORAL
Qty: 0 | Refills: 0 | COMMUNITY

## 2019-04-24 RX ORDER — FUROSEMIDE 40 MG
1 TABLET ORAL
Qty: 30 | Refills: 0 | OUTPATIENT
Start: 2019-04-24 | End: 2019-05-23

## 2019-04-24 RX ORDER — ACETAMINOPHEN 500 MG
975 TABLET ORAL ONCE
Qty: 0 | Refills: 0 | Status: COMPLETED | OUTPATIENT
Start: 2019-04-24 | End: 2019-04-24

## 2019-04-24 RX ORDER — POTASSIUM CHLORIDE 20 MEQ
40 PACKET (EA) ORAL ONCE
Qty: 0 | Refills: 0 | Status: COMPLETED | OUTPATIENT
Start: 2019-04-24 | End: 2019-04-24

## 2019-04-24 RX ORDER — LISINOPRIL 2.5 MG/1
1 TABLET ORAL
Qty: 30 | Refills: 0 | OUTPATIENT
Start: 2019-04-24 | End: 2019-05-23

## 2019-04-24 RX ORDER — MAGNESIUM OXIDE 400 MG ORAL TABLET 241.3 MG
400 TABLET ORAL ONCE
Qty: 0 | Refills: 0 | Status: COMPLETED | OUTPATIENT
Start: 2019-04-24 | End: 2019-04-24

## 2019-04-24 RX ADMIN — Medication 1 TABLET(S): at 11:54

## 2019-04-24 RX ADMIN — Medication 100 MILLIGRAM(S): at 06:27

## 2019-04-24 RX ADMIN — Medication 81 MILLIGRAM(S): at 11:54

## 2019-04-24 RX ADMIN — Medication 40 MILLIGRAM(S): at 06:27

## 2019-04-24 RX ADMIN — Medication 40 MILLIEQUIVALENT(S): at 11:54

## 2019-04-24 RX ADMIN — CLOPIDOGREL BISULFATE 75 MILLIGRAM(S): 75 TABLET, FILM COATED ORAL at 11:54

## 2019-04-24 RX ADMIN — Medication 325 MILLIGRAM(S): at 11:54

## 2019-04-24 RX ADMIN — Medication 650 MILLIGRAM(S): at 09:26

## 2019-04-24 RX ADMIN — Medication 325 MILLIGRAM(S): at 09:26

## 2019-04-24 RX ADMIN — MAGNESIUM OXIDE 400 MG ORAL TABLET 400 MILLIGRAM(S): 241.3 TABLET ORAL at 09:25

## 2019-04-24 RX ADMIN — LISINOPRIL 2.5 MILLIGRAM(S): 2.5 TABLET ORAL at 11:54

## 2019-04-24 RX ADMIN — ENOXAPARIN SODIUM 40 MILLIGRAM(S): 100 INJECTION SUBCUTANEOUS at 13:12

## 2019-04-24 RX ADMIN — Medication 325 MILLIGRAM(S): at 10:30

## 2019-04-24 RX ADMIN — Medication 975 MILLIGRAM(S): at 01:05

## 2019-04-24 RX ADMIN — Medication 975 MILLIGRAM(S): at 00:07

## 2019-04-24 RX ADMIN — Medication 650 MILLIGRAM(S): at 10:30

## 2019-04-24 RX ADMIN — PANTOPRAZOLE SODIUM 40 MILLIGRAM(S): 20 TABLET, DELAYED RELEASE ORAL at 06:27

## 2019-04-24 NOTE — PROGRESS NOTE ADULT - PROBLEM SELECTOR PROBLEM 6
Hyperlipidemia, unspecified hyperlipidemia type
Prostate Cancer
Prostate Cancer

## 2019-04-24 NOTE — PROGRESS NOTE ADULT - PROBLEM SELECTOR PLAN 4
Improved,  Supplement K for goal 4.0  Mg and phos WNL
Hypoxic resp failure improving,  on room air  CTA chest negative for PE but had mild pulm edema and severe centrilobular   emphysema with right upper lobe bullae  Check TTE with bubble study.  Continue budesonide-formoterol, albuterol PRN, tiotropium PRN, incentive spirometry. Will need outpatient PFTs
Resolved, stable K  Supplement K for goal 4.0  Mg and phos WNL
Resolved, stable K  Supplement K for goal 4.0  Mg and phos WNL
Resolved, stable K  replete as needed
Hypoxic resp failure improving,  on room air  CTA chest negative for PE but had mild pulm edema and severe centrilobular   emphysema with right upper lobe bullae  Check TTE with bubble study.  Continue budesonide-formoterol, albuterol PRN, tiotropium PRN, incentive spirometry. Will need outpatient PFTs

## 2019-04-24 NOTE — PROGRESS NOTE ADULT - ASSESSMENT
Pt is a 65 y/o man with a PMHx of Atrial fibrillation and flutter (not on anticoagulants due to GI bleed), prostate CA w/ mets to bone (on chemo with seeds implant), HTN, HLD, hx of anxiety, emphysema and HFpEF, presents from [facility] with tachycardia and hypotension for past 4 days. Cardiology consulted for acute on chronic HFpEF exacerbation    #Acute on chronic HFpEF exacerbation: Severe global hypokinesis- LVEF 10-15%, down from 30-35 in 6/2018  -Pt appears euvolemic today, asymptomatic  -Continue home PO 40 lasix daily  -Continue metoprolol  -Monitor K closely. Keep >4, Mg >2  -Would see if pt able to tolerate being off midodrine. If BP remains stable, will need to be on ACE/ARB  -Will likely need LHC prior to discharge    #Afib/flutter:   -Continue metoprolol for rate control  -[id number]: 3-->Not on AC due to significant GI bleed last year    #CAD; NST w/ predominately fixed defects  -Continue home asa/plavix/statin Pt is a 63 y/o man with a PMHx of Atrial fibrillation and flutter (not on anticoagulants due to GI bleed), prostate CA w/ mets to bone (on chemo with seeds implant), HTN, HLD, hx of anxiety, emphysema and HFpEF, presents from [facility] with tachycardia and hypotension for past 4 days. Cardiology consulted for acute on chronic HFpEF exacerbation    #Acute on chronic HFpEF exacerbation: Severe global hypokinesis- LVEF 10-15%, down from 30-35 in 6/2018  -Pt appears euvolemic today, asymptomatic  -Continue home PO 40 lasix daily  -Continue metoprolol  -Monitor K closely. Keep >4, Mg >2  -Would see if pt able to tolerate being off midodrine. If BP remains stable, will need to be on ACE/ARB    #Afib/flutter:   -Continue metoprolol for rate control  -[id number]: 3-->Not on AC due to significant GI bleed last year    #CAD; NST w/ predominately fixed defects  -Continue home asa/plavix/statin Pt is a 63 y/o man with a PMHx of Atrial fibrillation and flutter (not on anticoagulants due to GI bleed), prostate CA w/ mets to bone (on chemo with seeds implant), HTN, HLD, hx of anxiety, emphysema and HFpEF, presents from [facility] with tachycardia and hypotension for past 4 days. Cardiology consulted for acute on chronic HFpEF exacerbation    #Acute on chronic HFpEF exacerbation: Severe global hypokinesis- LVEF 10-15%, down from 30-35 in 6/2018  -Pt appears euvolemic today, asymptomatic  -Continue home PO 40 lasix daily  -Continue metoprolol  -Monitor K closely. Keep >4, Mg >2  -Would see if pt able to tolerate being off midodrine. If BP remains stable, will need to be on ACE/ARB  -Continue telemetry monitoring    #Afib/flutter:   -Continue metoprolol for rate control  -[id number]: 3-->Not on AC due to significant GI bleed last year    #CAD; NST w/ predominately fixed defects  -Continue home asa/plavix/statin

## 2019-04-24 NOTE — PROGRESS NOTE ADULT - SUBJECTIVE AND OBJECTIVE BOX
Patient is a 64y old  Male who presents with a chief complaint of c/o tachycardia (2019 11:39)      SUBJECTIVE / OVERNIGHT EVENTS: No CP, SOB or MOISE. No Diarrhea    MEDICATIONS  (STANDING):  aspirin enteric coated 81 milliGRAM(s) Oral daily  atorvastatin 80 milliGRAM(s) Oral at bedtime  buDESOnide 160 MICROgram(s)/formoterol 4.5 MICROgram(s) Inhaler 2 Puff(s) Inhalation two times a day  clopidogrel Tablet 75 milliGRAM(s) Oral daily  enoxaparin Injectable 40 milliGRAM(s) SubCutaneous every 24 hours  ferrous    sulfate 325 milliGRAM(s) Oral daily  furosemide    Tablet 40 milliGRAM(s) Oral daily  lisinopril 2.5 milliGRAM(s) Oral daily  metoprolol tartrate 100 milliGRAM(s) Oral two times a day  multivitamin 1 Tablet(s) Oral daily  pantoprazole    Tablet 40 milliGRAM(s) Oral before breakfast  tamsulosin 0.4 milliGRAM(s) Oral at bedtime  tiotropium 18 MICROgram(s) Capsule 1 Capsule(s) Inhalation daily    MEDICATIONS  (PRN):  acetaminophen   Tablet .. 650 milliGRAM(s) Oral every 6 hours PRN Mild Pain (1 - 3), Moderate Pain (4 - 6)  ALBUTerol/ipratropium for Nebulization 3 milliLiter(s) Nebulizer every 6 hours PRN Shortness of Breath and/or Wheezing  oxyCODONE    IR 5 milliGRAM(s) Oral every 4 hours PRN Severe Pain (7 - 10)      T(C): 36.6 (19 @ 08:45), Max: 36.8 (19 @ 18:30)  HR: 104 (19 @ 11:49) (99 - 109)  BP: 110/80 (19 @ 11:49) (102/77 - 121/95)  RR: 17 (19 @ 11:49) (16 - 17)  SpO2: 94% (19 @ 11:49) (92% - 97%)  CAPILLARY BLOOD GLUCOSE        I&O's Summary    2019 07:01  -  2019 07:00  --------------------------------------------------------  IN: 180 mL / OUT: 600 mL / NET: -420 mL    2019 07:01  -  2019 12:41  --------------------------------------------------------  IN: 0 mL / OUT: 600 mL / NET: -600 mL        PHYSICAL EXAM:  GENERAL: NAD,   HEAD:  Normocephalic  EYES: EOMI,  conjunctiva and sclera clear  NECK: Supple,   CHEST/LUNG: Clear to auscultation bilaterally; No wheeze  HEART:  s1 s2 + Regular rate and rhythm;   ABDOMEN: Soft, Nontender, Nondistended; Bowel sounds present  EXTREMITIES:   No clubbing, cyanosis  NEURO: AAOx3      LABS:                        11.7   6.28  )-----------( 316      ( 2019 07:00 )             38.8     04-24    140  |  106  |  19  ----------------------------<  92  3.8   |  23  |  0.99    Ca    9.5      2019 07:00  Mg     1.7     24          Care Discussed with Consultants/Other Providers: Dr. Florina Oviedo MD  Hospitalist  P/ [phone]

## 2019-04-24 NOTE — PROVIDER CONTACT NOTE (MEDICATION) - SITUATION
Pt due for his symbicort & spirvia.
Patient due for AM inhalers. Patient stating he doesn't want them at this time.

## 2019-04-24 NOTE — DISCHARGE NOTE PROVIDER - HOSPITAL COURSE
HPI: 63 y/o  M with pmhx of Atrial fibrillation and flutter (not on anticoagulants due to GI bleed), prostate CA w/ mets to bone (on chemo with seeds implant), HTN, HLD, hx of anxiety , emphysema and CHF, presents from [facility] with tachycardia and hypotension for past 4 days. Pt reports blood pressure to be around 100/70, usually SBP is 120-140's. Pt states he's hypotensive s/p medications. Pt was told to go to ED 4 days ago by nursing home staff, however pt refused. Pt comes in today after his cardiologist recommended him to go to ED. Pt states he feels well and his only complaint is the intermittent BLE edema for 1 month and LE weakness, which started after taking chemo pills. Pt admits to being wheelchair bound. On admission, pt tachycardic, +JVD, has mild rales and significant b/l LE edema.        Patient admitted to telemetry for acute on chronic HF exacerbation. Patient was IV diuresed and transitioned to PO Lasix. Patient is euvolemic on discharge.     CTA: No pulmonary embolism. Mild pulmonary edema. Centrilobular emphysema.    TTE:     1. Severely dilated left atrium.    2. Left ventricular ejection fraction, by visual estimation, is 10-15%.  Severe global hypokinesia.    3. Elevated left atrial pressure=18mmHG.    4. Moderate right atrial enlargement.    5. Right ventricle mildly dilated with severely reduced systolic function.    6. No significant valvular abnormalities.    7. Normal pericardium with no pericardial effusion.    8. Saline contrast bubble study done. Bubbles visualized after greater than 5 beats suggestive of significant pulmonary venous shunt.    EF 10-15%        Case discussed with Dr. Oviedo on 4/25/19. The patient is medically stable for discharge and has appropriate follow up. HPI: 65 y/o  M with pmhx of Atrial fibrillation and flutter (not on anticoagulants due to GI bleed), prostate CA w/ mets to bone (on chemo with seeds implant), HTN, HLD, hx of anxiety , emphysema and CHF, presents from [facility] with tachycardia and hypotension for past 4 days. Pt reports blood pressure to be around 100/70, usually SBP is 120-140's. Pt states he's hypotensive s/p medications. Pt was told to go to ED 4 days ago by nursing home staff, however pt refused. Pt comes in today after his cardiologist recommended him to go to ED. Pt states he feels well and his only complaint is the intermittent BLE edema for 1 month and LE weakness, which started after taking chemo pills. Pt admits to being wheelchair bound. On admission, pt tachycardic, +JVD, has mild rales and significant b/l LE edema.        Patient admitted to telemetry for acute on chronic HF exacerbation. Patient was IV diuresed and transitioned to PO Lasix. Patient is euvolemic on discharge. No plans for cardiac cath or intervention by cardiology as per Dr. Oviedo. Patient instructed to follow up with cardiology outpatient.         CTA: No pulmonary embolism. Mild pulmonary edema. Centrilobular emphysema.    TTE:     1. Severely dilated left atrium.    2. Left ventricular ejection fraction, by visual estimation, is 10-15%.  Severe global hypokinesia.    3. Elevated left atrial pressure=18mmHG.    4. Moderate right atrial enlargement.    5. Right ventricle mildly dilated with severely reduced systolic function.    6. No significant valvular abnormalities.    7. Normal pericardium with no pericardial effusion.    8. Saline contrast bubble study done. Bubbles visualized after greater than 5 beats suggestive of significant pulmonary venous shunt.    EF 10-15%        Case discussed with Dr. Oviedo on 4/25/19. The patient is medically stable for discharge and has appropriate follow up.

## 2019-04-24 NOTE — DISCHARGE NOTE PROVIDER - NSDCCPCAREPLAN_GEN_ALL_CORE_FT
PRINCIPAL DISCHARGE DIAGNOSIS  Diagnosis: Acute on chronic diastolic (congestive) heart failure  Assessment and Plan of Treatment: You were treated with IV Lasix for an acute on chronic heart failure exacerbation that has since resolved. Continue your medication as prescribed.      SECONDARY DISCHARGE DIAGNOSES  Diagnosis: Atrial fibrillation and flutter  Assessment and Plan of Treatment: Atrial fibrillation and flutter PRINCIPAL DISCHARGE DIAGNOSIS  Diagnosis: Acute on chronic diastolic (congestive) heart failure  Assessment and Plan of Treatment: You were treated with IV Lasix for an acute on chronic heart failure exacerbation that has since resolved. Continue your medication as prescribed.      SECONDARY DISCHARGE DIAGNOSES  Diagnosis: Atrial fibrillation and flutter  Assessment and Plan of Treatment: Continue with metoprolol for rate control. You are not on anticoagulation because of a history of a GI bleed. PRINCIPAL DISCHARGE DIAGNOSIS  Diagnosis: Acute on chronic diastolic (congestive) heart failure  Assessment and Plan of Treatment: You were treated with IV Lasix for an acute on chronic heart failure exacerbation that has since resolved. Continue all your medication as prescribed. Lisinopril was added to your medication regimen. Follow up with your primary care physician for blood pressure check and bloodwork.      SECONDARY DISCHARGE DIAGNOSES  Diagnosis: Atrial fibrillation and flutter  Assessment and Plan of Treatment: Continue with metoprolol for rate control. You are not on anticoagulation because of a history of a GI bleed. PRINCIPAL DISCHARGE DIAGNOSIS  Diagnosis: Acute on chronic diastolic (congestive) heart failure  Assessment and Plan of Treatment: You were treated with IV Lasix for an acute on chronic heart failure exacerbation that has since resolved. Continue all your medication as prescribed. Lisinopril was added to your medication regimen. Follow up with your primary care physician for blood pressure check and bloodwork.  Your echocardiogram showed: Severely dilated left atrium, left ventricular ejection fraction 10-15% and a pulmonary venous shunt. It is recommended you follow up with Dr. Howell for repeat echocardiogram and further cardiac workup.      SECONDARY DISCHARGE DIAGNOSES  Diagnosis: Atrial fibrillation and flutter  Assessment and Plan of Treatment: Continue with metoprolol for rate control. You are not on anticoagulation because of a history of a GI bleed.

## 2019-04-24 NOTE — PROGRESS NOTE ADULT - ASSESSMENT
65 y/o man with pmhx of Atrial fibrillation and flutter (not on anticoagulants due to GI bleed), prostate CA w/ mets to bone (on chemo with seeds implant), HTN, HLD, hx of anxiety, emphysema presents from [facility] with tachycardia and hypotension for 4 days. Admitted to telemetry for acute on chronic diastolic CHF, atrial flutter w/ RVR and hypokalemia. Found to have hypoxic respiratory failure

## 2019-04-24 NOTE — PROGRESS NOTE ADULT - SUBJECTIVE AND OBJECTIVE BOX
Patient seen and examined at bedside.    Overnight Events: TTE yesterday with severe global hypokinesia, EF 10-15% - worse from last echo 2018. No complaints this AM    Review Of Systems: No chest pain, shortness of breath, or palpitations            Medications:  MEDICATIONS  (STANDING):  aspirin enteric coated 81 milliGRAM(s) Oral daily  atorvastatin 80 milliGRAM(s) Oral at bedtime  buDESOnide 160 MICROgram(s)/formoterol 4.5 MICROgram(s) Inhaler 2 Puff(s) Inhalation two times a day  clopidogrel Tablet 75 milliGRAM(s) Oral daily  enoxaparin Injectable 40 milliGRAM(s) SubCutaneous every 24 hours  ferrous    sulfate 325 milliGRAM(s) Oral daily  furosemide    Tablet 40 milliGRAM(s) Oral daily  metoprolol tartrate 100 milliGRAM(s) Oral two times a day  midodrine 5 milliGRAM(s) Oral <User Schedule>  multivitamin 1 Tablet(s) Oral daily  pantoprazole    Tablet 40 milliGRAM(s) Oral before breakfast  potassium chloride    Tablet ER 40 milliEquivalent(s) Oral once  tamsulosin 0.4 milliGRAM(s) Oral at bedtime  tiotropium 18 MICROgram(s) Capsule 1 Capsule(s) Inhalation daily    MEDICATIONS  (PRN):  acetaminophen   Tablet .. 650 milliGRAM(s) Oral every 6 hours PRN Mild Pain (1 - 3), Moderate Pain (4 - 6)  ALBUTerol/ipratropium for Nebulization 3 milliLiter(s) Nebulizer every 6 hours PRN Shortness of Breath and/or Wheezing  oxyCODONE    IR 5 milliGRAM(s) Oral every 4 hours PRN Severe Pain (7 - 10)        PAST MEDICAL & SURGICAL HISTORY:  History of anxiety  Atrial fibrillation and flutter: not on anticoagulants due to GI bleed  Prostate Cancer: with seeds implant    Diverticulitis of Colon: with Hx of colostomy bag  Emphysema  HTN (Hypertension)  History of electrophysiologic study: 3/18 s/p ablation [facility]  History of ventral hernia repair  S/P Kamini's procedure: with reversal  History of Hernia Surgery: BL inguinal  History of Cholecystectomy  History of Appendectomy      Vitals:  Vital Signs Last 24 Hrs  T(C): 36.6 (2019 08:45), Max: 36.8 (2019 18:30)  T(F): 97.9 (2019 08:45), Max: 98.2 (2019 18:30)  HR: 104 (2019 08:45) (99 - 109)  BP: 121/92 (2019 08:45) (102/77 - 121/95)  BP(mean): --  RR: 16 (2019 08:45) (16 - 16)  SpO2: 92% (2019 08:45) (92% - 97%)    I&O's Summary    2019 07:01  -  2019 07:00  --------------------------------------------------------  IN: 180 mL / OUT: 600 mL / NET: -420 mL    Physical Exam:  GENERAL: No acute distress, well-developed  HEAD:  Atraumatic, Normocephalic  ENT: EOMI, PERRLA, conjunctiva and sclera clear, Neck supple, no JVD  CHEST/LUNG: CTA b/l; No wheeze, equal breath sounds bilaterally   BACK: No spinal tenderness  HEART: Regular rate and rhythm; No murmurs, rubs, or gallops  ABDOMEN: Soft, Nontender, Nondistended; Bowel sounds present  EXTREMITIES:  No clubbing, cyanosis, or edema  PSYCH: Nl behavior, nl affect  NEUROLOGY: AAOx3, non-focal, cranial nerves intact  SKIN: Normal color, No rashes or lesions                                     11.7   6.28  )-----------( 316      ( 2019 07:00 )             38.8     -24    140  |  106  |  19  ----------------------------<  92  3.8   |  23  |  0.99    Ca    9.5      2019 07:00  Mg     1.7     04-24        04-19    142  |  103  |  12  ----------------------------<  91  3.5   |  26  |  0.88    Ca    9.1      2019 06:30  Phos  3.6     04-19  Mg     1.8     -19    TPro  5.7<L>  /  Alb  2.7<L>  /  TBili  0.5  /  DBili  x   /  AST  8   /  ALT  4   /  AlkPhos  157<H>          Serum Pro-Brain Natriuretic Peptide: 2560 pg/mL ( @ 21:40)    Total Cholesterol: 101  LDL: 61  HDL: 26  T      Echo:  Study Date: 2019  ------------------------------------------------------------------------  DIMENSIONS:  Dimensions:     Normal Values:  LA:     4.7 cm    2.0 - 4.0 cm  Ao:     3.2 cm    2.0 - 3.8 cm  SEPTUM: 0.8 cm    0.6 - 1.2 cm  PWT:    1.1 cm    0.6 - 1.1 cm  LVIDd:  4.4 cm    3.0 - 5.6 cm  LVIDs:  3.1 cm    1.8 - 4.0 cm  Derived Variables:  LVMI: 73 g/m2  RWT: 0.50  Fractional short: 30 %  Ejection Fraction (Visual Estimate): 10-15 %  ------------------------------------------------------------------------  OBSERVATIONS:  Mitral Valve: Normal mitral valve.  Aortic Root: Normal aortic root.  Aortic Valve: Normal trileaflet aortic valve.  Left Atrium: Severely dilated left atrium.  Left Ventricle: Left ventricular ejection fraction, by  visual estimation, is 10-15%.  Severe global hypokinesia.  Elevated left atrial pressure=18mmHG.  Right Heart: Moderate right atrial enlargement. Right  ventricle mildly dilated with severely reduced systolic  function.  Normal tricuspid valve. Severe tricuspid  regurgitation. Normal pulmonic valve.  Pericardium/PleuraNormal pericardium with no pericardial  effusion. Saline contrast bubble study done. Bubbles  visualized after greater than 5 beats suggestive of  significant pulmonary venous shunt.  Hemodynamic: Estimated right ventricular systolic pressure  equals 46 mm Hg, assuming right atrial pressure equals 10  mm Hg, consistent with mild pulmonary hypertension.  ------------------------------------------------------------------------  CONCLUSIONS:  1. Severely dilated left atrium.  2. Left ventricular ejection fraction, by visual  estimation, is 10-15%.  Severe global hypokinesia.  3. Elevated left atrial pressure=18mmHG.  4. Moderate right atrial enlargement.  5. Right ventricle mildly dilated with severely reduced  systolic function.  6. No significant valvular abnormalities.  7. Normal pericardium with no pericardial effusion.  8. Saline contrast bubble study done. Bubbles visualized  after greater than 5 beats suggestive of significant  pulmonary venous shunt.  ------------------------------------------------------------------------  Confirmed on  2019 - 02:23:45 by Oral La M.D.  ------------------------------------------------------------------------    TTE: 2018  ------------------------------------------------------------------------  DIMENSIONS:  Dimensions:     Normal Values:  LA:     3.6 cm    2.0 - 4.0 cm  Ao:     3.0 cm    2.0 - 3.8 cm  SEPTUM: 1.1 cm    0.6 - 1.2 cm  PWT:   1.4 cm    0.6 - 1.1 cm  LVIDd:  3.7 cm    3.0 - 5.6 cm  LVIDs:  2.4 cm    1.8 - 4.0 cm  Derived Variables:  LVMI: 79 g/m2  RWT: 0.75  Fractional short: 35 %  Ejection Fraction (Teicholtz): 65 %  ------------------------------------------------------------------------  OBSERVATIONS:  Mitral Valve: Normal mitral valve. Mild mitral  regurgitation.  Aortic Root: Normal aortic root.  Aortic Valve: Normal trileaflet aortic valve.  Left Atrium: Mildly dilated left atrium.  LA volume index =  36 cc/m2.  Left Ventricle: Endocardium not well visualized; grossly  normal left ventricular systolic function. Increased  relative wall thickness with normal left ventricular mass  index, consistent with concentric left ventricular  remodeling. (DT:121 ms).  Right Heart: Moderate right atrial enlargement. Right  ventricular enlargement with decreased right ventricular  systolic function. Normal tricuspid valve.  Moderate-severe tricuspid regurgitation. Normal pulmonic  valve.  Pericardium/PleuraNormal pericardium with no pericardial  effusion.  Hemodynamic: Estimated right ventricular systolic pressure  equals 75 mm Hg, assuming right atrial pressure equals 10  mm Hg, consistent with severe pulmonary hypertension.  ------------------------------------------------------------------------  CONCLUSIONS:  1. Mildly dilated left atrium.  LA volume index = 36 cc/m2.  2. Increased relative wall thickness with normal left  ventricular mass index, consistent with concentric left  ventricular remodeling.  3. Endocardium not well visualized; grossly normal left  ventricular systolic function.  4. Moderate right atrial enlargement.  5. Right ventricular enlargement with decreased right  ventricular systolic function.  6. Normal tricuspid valve.    Moderate-severe tricuspid  regurgitation.  7. Estimated pulmonary artery systolic pressure equals 75  mm Hg, assuming right atrial pressure equals 10  mm Hg,  consistent with severe pulmonary hypertension.  ------------------------------------------------------------------------  Confirmed on  2018 - 17:19:21 by Layne Peña M.D. [facility]  ------------------------------------------------------------------------      Stress Testing:  IMPRESSIONS: Abnormal Study  * Myocardial Perfusion SPECT results are abnormal.  * Review of raw data shows: The study is of good technical  quality.  * The left ventricle was normal in size. There are medium  sized, moderate to severe defects in inferior and  inferoseptal walls that are fixed, suggestive of infarct.  * Post-stress gated wall motion analysis was performed  (LVEF = 47 %;LVEDV = 94 ml.), revealing mild overall  hypokinesis, due to segmental wall motion abnormality.  There was mild inferior / inferoseptal hypokinesis with  severely diminished systolic thickening.  * No clear evidence of ischemia.  ------------------------------------------------------------------------  Confirmed on  2018 - 15:57:48 by Jules Torres M.D.  ------------------------------------------------------------------------      Cath: No previous caths in [facility]      Interpretation of Telemetry:    Imaging:  CTA :    IMPRESSION:     No pulmonary embolism.  Mild pulmonary edema.  Centrilobular emphysema.

## 2019-04-24 NOTE — PROGRESS NOTE ADULT - PROBLEM SELECTOR PROBLEM 1
Acute on chronic diastolic (congestive) heart failure
Acute on chronic systolic (congestive) heart failure
Acute on chronic diastolic (congestive) heart failure

## 2019-04-24 NOTE — PROVIDER CONTACT NOTE (MEDICATION) - BACKGROUND
Pt has emphysema, admitted for HF & hypoxia.
Patient admitted for hypoxia and has a history of emphysema.

## 2019-04-24 NOTE — DISCHARGE NOTE NURSING/CASE MANAGEMENT/SOCIAL WORK - NSDCDPATPORTLINK_GEN_ALL_CORE
You can access the "[employer]"[facility] Patient Portal, offered by [facility], by registering with the following website: [email or web address]

## 2019-04-24 NOTE — DISCHARGE NOTE PROVIDER - CARE PROVIDERS DIRECT ADDRESSES
[email or web address]

## 2019-04-24 NOTE — PROGRESS NOTE ADULT - REASON FOR ADMISSION
c/o tachycardia

## 2019-04-24 NOTE — DISCHARGE NOTE NURSING/CASE MANAGEMENT/SOCIAL WORK - NSDCCRNAME_GEN_ALL_CORE_FT
1- [facility] [address], [address] [facility]  2 - [facility]

## 2019-04-24 NOTE — PROGRESS NOTE ADULT - PROBLEM SELECTOR PLAN 2
with hypoxic respiratory failure. Saturating 88% on RA at rest and 85-86% with ambulation. O2 sat low 90s OK, do not titrate O2 for 100% pulse ox  CTA chest negative for PE but had mild pulm edema and severe centrilobular   emphysema with right upper lobe bullae, now s/p IV lasix and transitioned to PO lasix  Check TTE with bubble study. Will need O2 on DC  Continue budesonide-formoterol, albuterol PRN, tiotropium PRN, incentive spirometry. Will need outpatient PFTs
Hypoxic resp failure improving,  on room air  CTA chest negative for PE but had mild pulm edema and severe centrilobular   emphysema with right upper lobe bullae  Check TTE with bubble study.  Continue budesonide-formoterol, albuterol PRN, tiotropium PRN, incentive spirometry. Will need outpatient PFTs
Now O2 sat stable on room air  CTA chest negative for PE but had mild pulm edema and severe centrilobular   emphysema with right upper lobe bullae  Check TTE with bubble study.  Continue budesonide-formoterol, albuterol PRN, tiotropium PRN, incentive spirometry. Will need outpatient PFTs
Now O2 sat stable on room air  CTA chest negative for PE but had mild pulm edema and severe centrilobular   emphysema with right upper lobe bullae  Check TTE with bubble study.  Continue budesonide-formoterol, albuterol PRN, tiotropium PRN, incentive spirometry. Will need outpatient PFTs
pt with nocturnal hypoxia, per pt he has a cpap machine at home (not compliant)  c/w cpap for chronic hypoxic resp failure (pt refusing)  Pt  bring home device to  rehab. They verbalized understanding
pt with nocturnal hypoxia, per pt he has a cpap machine at home (not compliant)  will start cpap for chronic hypoxic resp failure  Pt and daughter advised to bring home device to hospital or rehab. They verbalized understanding

## 2019-04-24 NOTE — PROGRESS NOTE ADULT - PROBLEM SELECTOR PLAN 1
Presently euvolemic, now back on lasix 40mg PO daily  Continue metoprolol 100mg BID  TTE shows worsening  EF 15%, will add ACEi, Goal SBP >90  2g sodium diet with 1.5L fluid restriction , Strict I&Os and daily weights  Cardio recs appreciated  Poor candidate for CATH given comorbidity and GIB risk with antiPLT should there be need for PCI
Presently euvolemic, now back on lasix 40mg PO daily  Continue metoprolol 100mg BID  await TTE with bubble study  2g sodium diet with 1.5L fluid restriction , Strict I&Os and daily weights  Cardio recs appreciated
Presently euvolemic, now back on lasix 40mg PO daily  Continue metoprolol 100mg BID  await TTE with bubble study  2g sodium diet with 1.5L fluid restriction , Strict I&Os and daily weights  Cardio recs appreciated
Presently euvolemic, now back on lasix 40mg PO daily  Continue metoprolol 100mg BID  await TTE with bubble study per cards recs  2g sodium diet with 1.5L fluid restriction , Strict I&Os and daily weights  Cardio recs appreciated
Presently euvolemic, now back on lasix 40mg PO daily  Continue metoprolol 100mg BID  await TTE with bubble study per cards recs  2g sodium diet with 1.5L fluid restriction , Strict I&Os and daily weights  Cardio recs appreciated
Presently euvolemic, DC IV lasix, and placed back on lasix 40mg PO daily  Continue metoprolol 100mg BID  TTE with bubble study  2g sodium diet with 1.5L fluid restriction , Strict I&Os and daily weights  Cardio recs appreciated

## 2019-04-24 NOTE — PROGRESS NOTE ADULT - ATTENDING COMMENTS
Loose Bm likely due to laxatives, will hold  D/c pending TTE with no acute findings and NH
Plan for dc with Cardiology follow up outpt. Spent 35mins on dc
dc pending, tte with no acute finding. Spent 40 mins on dc

## 2019-04-24 NOTE — PROGRESS NOTE ADULT - PROBLEM SELECTOR PROBLEM 7
Need for prophylactic measure
Hyperlipidemia, unspecified hyperlipidemia type
Need for prophylactic measure
Hyperlipidemia, unspecified hyperlipidemia type

## 2019-04-24 NOTE — PROVIDER CONTACT NOTE (MEDICATION) - RECOMMENDATIONS
Patient educated on risks of not taking meds.
Pt educated on reasoning for taking inhaler...pt states he already took it last night and feels okay without it.

## 2019-04-24 NOTE — PROGRESS NOTE ADULT - PROBLEM SELECTOR PLAN 8
DVT ppx: Lovenox 40mg SC Q24h  PT recs: JOHANNY pending review of TTE
DVT ppx: Lovenox 40mg SC Q24h  PT recs: JOHANNY pending review of TTE

## 2019-04-24 NOTE — PROGRESS NOTE ADULT - PROBLEM SELECTOR PROBLEM 2
Centrilobular emphysema
TI (obstructive sleep apnea)
TI (obstructive sleep apnea)
Centrilobular emphysema

## 2019-04-24 NOTE — DISCHARGE NOTE PROVIDER - CARE PROVIDER_API CALL
Brenton Heaton)  Cardiovascular Disease; Nuclear Cardiology  [address] [address] [place] [address]  Phone: [phone]  Fax: [phone]  Follow Up Time: Esther Howell)  Cardiovascular Disease  [facility], [address] [place] [address] [id number] [place] [address]  Phone: [phone]  Fax: [phone]  Follow Up Time:

## 2019-04-24 NOTE — PROGRESS NOTE ADULT - PROBLEM SELECTOR PLAN 3
Tele monitoring  Continue metoprolol 100mg BID for rate control, on midodrine with hold parameters if SBP>100 (home med). If further rate control needed, will likely need to start digoxin given episodes of hypotension  TSH WNL  EKG showed Atrial flutter with RVR 2:1 conduction  [id number]: 3-->c/w ASA and PLAVIX only, pt has hx of GI bleed last year.
Tele monitoring  Continue metoprolol 100mg BID for rate control, on midodrine with hold parameters if SBP>100 (home med).   TSH WNL  EKG showed Atrial flutter with RVR 2:1 conduction  [id number]: 3-->c/w ASA and PLAVIX only, pt has hx of GI bleed last year.
Tele monitoring  Continue metoprolol 100mg BID for rate control, on midodrine with hold parameters if SBP>100 (home med).   TSH WNL  EKG showed Atrial flutter with RVR 2:1 conduction  [id number]: 3-->c/w ASA and PLAVIX only, pt has hx of GI bleed last year.
Tele monitoring  Continue metoprolol 100mg BID for rate control, on midodrine with hold parameters if SBP>100 (home med).   TSH WNL  EKG showed Atrial flutter with RVR 2:1 conduction  [id number]: 3-->c/w ASA and PLAVIX only, pt has hx of GI bleed last year.
Tele monitoring, hr improving  Continue metoprolol 100mg BID for rate control, on midodrine with hold parameters if SBP>100 (home med).   TSH WNL  EKG showed Atrial flutter with RVR 2:1 conduction  [id number]: 3-->c/w ASA and PLAVIX only, pt has hx of GI bleed last year.
Tele monitoring, hr improving  Continue metoprolol 100mg BID for rate control, on midodrine with hold parameters if SBP>100 (home med).   TSH WNL  EKG showed Atrial flutter with RVR 2:1 conduction  [id number]: 3-->c/w ASA and PLAVIX only, pt has hx of GI bleed last year.

## 2019-04-24 NOTE — PROGRESS NOTE ADULT - PROBLEM SELECTOR PLAN 6
Continue atorvastatin
with bone mets  Continue Xtandi ( family to bring pt's medication), tamsulosin  c/w oxycodone prn neoplasm related pain given bone mets
with bone mets  Continue Xtandi ( family to bring pt's medication), tamsulosin  c/w oxycodone prn neoplasm related pain given bone mets

## 2019-04-24 NOTE — PROVIDER CONTACT NOTE (MEDICATION) - ASSESSMENT
Pt sating 95% on RA. Denies any chest pain & sob.
Patient O2 sat on room air 92%, patient denies SOB.

## 2019-04-24 NOTE — PROGRESS NOTE ADULT - PROBLEM SELECTOR PLAN 7
DVT ppx: Lovenox 40mg SC Q24h  PT recs: JOHANNY  Also with severe protein calorie malnutrition-evaluated by nutrition
Continue atorvastatin
DVT ppx: Lovenox 40mg SC Q24h  PT recs: JOHANNY pending review of TTE
DVT ppx: Lovenox 40mg SC Q24h  PT recs: JOHANNY pending review of TTE  Also with severe protein calorie malnutrition-evaluated by nutrition
DVT ppx: Lovenox 40mg SC Q24h  PT recs: JOHANNY pending review of TTE  Also with severe protein calorie malnutrition-evaluated by nutrition
Continue atorvastatin

## 2019-04-24 NOTE — DISCHARGE NOTE PROVIDER - NSDCFUADDINST_GEN_ALL_CORE_FT
Follow up with your primary care doctor within one week of discharge. You will need your physician to check your blood pressure and check blood work since you were started on Lisinopril. A follow up appointment has been made with Dr. Howell on May 14th at 2:45pm.   For new or worsening symptoms, please return to the hospital.

## 2019-06-03 ENCOUNTER — NON-APPOINTMENT (OUTPATIENT)
Age: 64
End: 2019-06-03

## 2019-06-03 ENCOUNTER — APPOINTMENT (OUTPATIENT)
Dept: CARDIOLOGY | Facility: CLINIC | Age: 64
End: 2019-06-03
Payer: MEDICAID

## 2019-06-03 VITALS
WEIGHT: 140 LBS | HEART RATE: 74 BPM | OXYGEN SATURATION: 98 % | DIASTOLIC BLOOD PRESSURE: 81 MMHG | SYSTOLIC BLOOD PRESSURE: 121 MMHG | HEIGHT: 69.5 IN | BODY MASS INDEX: 20.27 KG/M2

## 2019-06-03 DIAGNOSIS — I48.91 UNSPECIFIED ATRIAL FIBRILLATION: ICD-10-CM

## 2019-06-03 DIAGNOSIS — I48.92 UNSPECIFIED ATRIAL FLUTTER: ICD-10-CM

## 2019-06-03 DIAGNOSIS — I10 ESSENTIAL (PRIMARY) HYPERTENSION: ICD-10-CM

## 2019-06-03 PROCEDURE — 93000 ELECTROCARDIOGRAM COMPLETE: CPT

## 2019-06-03 PROCEDURE — 99214 OFFICE O/P EST MOD 30 MIN: CPT

## 2019-06-03 RX ORDER — FUROSEMIDE 20 MG/1
20 TABLET ORAL
Qty: 30 | Refills: 2 | Status: DISCONTINUED | COMMUNITY
Start: 2019-01-22 | End: 2019-06-03

## 2019-06-03 NOTE — HISTORY OF PRESENT ILLNESS
[FreeTextEntry1] : Here for followup. Remains in [facility]/NH. In a wheelchair, appears thin. Complains of back pain but no chest discomfort, dyspnea or edema. No palpitations.\par 1. AF/Flutter- on Metoprolol 100 mg BID. EKG reviewed today on in NSR. On daily ASA. Condition is stable. Continue present meds\par 2. HTN- Condition is stable. Continue present meds\par 3. HLD- On statin therapy. Condition is stable. Continue present meds

## 2019-06-03 NOTE — REASON FOR VISIT
[FreeTextEntry2] : Afib/flutter sp ablation 2015 with DCCV for AF in 2018 [Follow-Up - Clinic] : a clinic follow-up of

## 2019-06-03 NOTE — DISCUSSION/SUMMARY
[FreeTextEntry1] : 64 year old man with history afib/flutter here for followup. Doing well from cardiac perspective perspective\par \par 1. AF/Flutter- on Metoprolol 100 mg BID. EKG reviewed today on in NSR. On daily ASA. Condition is stable. Continue present meds\par 2. HTN- Condition is stable. Continue present meds\par 3. HLD- On statin therapy. Condition is stable. Continue present meds\par 4. FU in 6 months

## 2019-06-03 NOTE — PHYSICAL EXAM
[Normal Appearance] : normal appearance [General Appearance - Well Developed] : well developed [General Appearance - Well Nourished] : well nourished [No Deformities] : no deformities [Well Groomed] : well groomed [Normal Oral Mucosa] : normal oral mucosa [General Appearance - In No Acute Distress] : no acute distress [No Oral Pallor] : no oral pallor [No Oral Cyanosis] : no oral cyanosis [Normal Jugular Venous V Waves Present] : normal jugular venous V waves present [Normal Jugular Venous A Waves Present] : normal jugular venous A waves present [No Jugular Venous Catherine A Waves] : no jugular venous catherine A waves [Respiration, Rhythm And Depth] : normal respiratory rhythm and effort [Exaggerated Use Of Accessory Muscles For Inspiration] : no accessory muscle use [Auscultation Breath Sounds / Voice Sounds] : lungs were clear to auscultation bilaterally [Heart Sounds] : normal S1 and S2 [Heart Rate And Rhythm] : heart rate and rhythm were normal [Murmurs] : no murmurs present [Abdomen Soft] : soft [Abdomen Tenderness] : non-tender [Abdomen Mass (___ Cm)] : no abdominal mass palpated [Abnormal Walk] : normal gait [Gait - Sufficient For Exercise Testing] : the gait was sufficient for exercise testing [Nail Clubbing] : no clubbing of the fingernails [Cyanosis, Localized] : no localized cyanosis [Petechial Hemorrhages (___cm)] : no petechial hemorrhages [] : no ischemic changes [Affect] : the affect was normal [Mood] : the mood was normal [Oriented To Time, Place, And Person] : oriented to person, place, and time [No Anxiety] : not feeling anxious
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
## 2019-09-27 ENCOUNTER — OUTPATIENT (OUTPATIENT)
Dept: OUTPATIENT SERVICES | Facility: HOSPITAL | Age: 64
LOS: 1 days | End: 2019-09-27
Payer: MEDICAID

## 2019-09-27 ENCOUNTER — APPOINTMENT (OUTPATIENT)
Dept: CT IMAGING | Facility: IMAGING CENTER | Age: 64
End: 2019-09-27
Payer: MEDICAID

## 2019-09-27 DIAGNOSIS — Z98.890 OTHER SPECIFIED POSTPROCEDURAL STATES: Chronic | ICD-10-CM

## 2019-09-27 DIAGNOSIS — C61 MALIGNANT NEOPLASM OF PROSTATE: ICD-10-CM

## 2019-09-27 PROCEDURE — 71260 CT THORAX DX C+: CPT

## 2019-09-27 PROCEDURE — 74177 CT ABD & PELVIS W/CONTRAST: CPT | Mod: 26

## 2019-09-27 PROCEDURE — 82565 ASSAY OF CREATININE: CPT

## 2019-09-27 PROCEDURE — 71260 CT THORAX DX C+: CPT | Mod: 26

## 2019-09-27 PROCEDURE — 74177 CT ABD & PELVIS W/CONTRAST: CPT

## 2019-10-01 ENCOUNTER — OUTPATIENT (OUTPATIENT)
Dept: OUTPATIENT SERVICES | Facility: HOSPITAL | Age: 64
LOS: 1 days | End: 2019-10-01

## 2019-10-01 DIAGNOSIS — Z98.890 OTHER SPECIFIED POSTPROCEDURAL STATES: Chronic | ICD-10-CM

## 2019-10-30 ENCOUNTER — EMERGENCY (EMERGENCY)
Facility: HOSPITAL | Age: 64
LOS: 1 days | Discharge: HOME | End: 2019-10-30
Attending: EMERGENCY MEDICINE | Admitting: EMERGENCY MEDICINE
Payer: MEDICAID

## 2019-10-30 VITALS
OXYGEN SATURATION: 93 % | HEART RATE: 66 BPM | SYSTOLIC BLOOD PRESSURE: 96 MMHG | DIASTOLIC BLOOD PRESSURE: 68 MMHG | RESPIRATION RATE: 16 BRPM | TEMPERATURE: 98 F

## 2019-10-30 DIAGNOSIS — Z98.890 OTHER SPECIFIED POSTPROCEDURAL STATES: Chronic | ICD-10-CM

## 2019-10-30 LAB
ALBUMIN SERPL ELPH-MCNC: 3 G/DL — LOW (ref 3.3–5)
ALP SERPL-CCNC: 316 U/L — HIGH (ref 40–120)
ALT FLD-CCNC: 5 U/L — SIGNIFICANT CHANGE UP (ref 4–41)
ANION GAP SERPL CALC-SCNC: 14 MMO/L — SIGNIFICANT CHANGE UP (ref 7–14)
ANISOCYTOSIS BLD QL: SLIGHT — SIGNIFICANT CHANGE UP
APTT BLD: 50.2 SEC — HIGH (ref 27.5–36.3)
AST SERPL-CCNC: 32 U/L — SIGNIFICANT CHANGE UP (ref 4–40)
BASOPHILS # BLD AUTO: 0.03 K/UL — SIGNIFICANT CHANGE UP (ref 0–0.2)
BASOPHILS NFR BLD AUTO: 0.4 % — SIGNIFICANT CHANGE UP (ref 0–2)
BASOPHILS NFR SPEC: 0 % — SIGNIFICANT CHANGE UP (ref 0–2)
BILIRUB SERPL-MCNC: 0.5 MG/DL — SIGNIFICANT CHANGE UP (ref 0.2–1.2)
BLD GP AB SCN SERPL QL: NEGATIVE — SIGNIFICANT CHANGE UP
BUN SERPL-MCNC: 18 MG/DL — SIGNIFICANT CHANGE UP (ref 7–23)
CALCIUM SERPL-MCNC: 7.9 MG/DL — LOW (ref 8.4–10.5)
CHLORIDE SERPL-SCNC: 110 MMOL/L — HIGH (ref 98–107)
CO2 SERPL-SCNC: 18 MMOL/L — LOW (ref 22–31)
CREAT SERPL-MCNC: 0.52 MG/DL — SIGNIFICANT CHANGE UP (ref 0.5–1.3)
EOSINOPHIL # BLD AUTO: 0.09 K/UL — SIGNIFICANT CHANGE UP (ref 0–0.5)
EOSINOPHIL NFR BLD AUTO: 1.1 % — SIGNIFICANT CHANGE UP (ref 0–6)
EOSINOPHIL NFR FLD: 1 % — SIGNIFICANT CHANGE UP (ref 0–6)
GLUCOSE SERPL-MCNC: 103 MG/DL — HIGH (ref 70–99)
HCT VFR BLD CALC: 21.1 % — LOW (ref 39–50)
HGB BLD-MCNC: 6.1 G/DL — CRITICAL LOW (ref 13–17)
HYPOCHROMIA BLD QL: SLIGHT — SIGNIFICANT CHANGE UP
IMM GRANULOCYTES NFR BLD AUTO: 8.4 % — HIGH (ref 0–1.5)
INR BLD: 1.17 — SIGNIFICANT CHANGE UP (ref 0.88–1.17)
LYMPHOCYTES # BLD AUTO: 0.94 K/UL — LOW (ref 1–3.3)
LYMPHOCYTES # BLD AUTO: 11.1 % — LOW (ref 13–44)
LYMPHOCYTES NFR SPEC AUTO: 7 % — LOW (ref 13–44)
MANUAL SMEAR VERIFICATION: SIGNIFICANT CHANGE UP
MCHC RBC-ENTMCNC: 27.9 PG — SIGNIFICANT CHANGE UP (ref 27–34)
MCHC RBC-ENTMCNC: 28.9 % — LOW (ref 32–36)
MCV RBC AUTO: 96.3 FL — SIGNIFICANT CHANGE UP (ref 80–100)
METAMYELOCYTES # FLD: 3 % — HIGH (ref 0–1)
MONOCYTES # BLD AUTO: 0.72 K/UL — SIGNIFICANT CHANGE UP (ref 0–0.9)
MONOCYTES NFR BLD AUTO: 8.5 % — SIGNIFICANT CHANGE UP (ref 2–14)
MONOCYTES NFR BLD: 9 % — SIGNIFICANT CHANGE UP (ref 2–9)
NEUTROPHIL AB SER-ACNC: 79 % — HIGH (ref 43–77)
NEUTROPHILS # BLD AUTO: 5.96 K/UL — SIGNIFICANT CHANGE UP (ref 1.8–7.4)
NEUTROPHILS NFR BLD AUTO: 70.5 % — SIGNIFICANT CHANGE UP (ref 43–77)
NEUTS BAND # BLD: 1 % — SIGNIFICANT CHANGE UP (ref 0–6)
NRBC # BLD: 0 /100WBC — SIGNIFICANT CHANGE UP
NRBC # FLD: 0.24 K/UL — SIGNIFICANT CHANGE UP (ref 0–0)
NRBC FLD-RTO: 2.8 — SIGNIFICANT CHANGE UP
OB PNL STL: NEGATIVE — SIGNIFICANT CHANGE UP
PLATELET # BLD AUTO: 386 K/UL — SIGNIFICANT CHANGE UP (ref 150–400)
PLATELET COUNT - ESTIMATE: NORMAL — SIGNIFICANT CHANGE UP
PMV BLD: 10.8 FL — SIGNIFICANT CHANGE UP (ref 7–13)
POIKILOCYTOSIS BLD QL AUTO: SLIGHT — SIGNIFICANT CHANGE UP
POLYCHROMASIA BLD QL SMEAR: SLIGHT — SIGNIFICANT CHANGE UP
POTASSIUM SERPL-MCNC: 3.7 MMOL/L — SIGNIFICANT CHANGE UP (ref 3.5–5.3)
POTASSIUM SERPL-SCNC: 3.7 MMOL/L — SIGNIFICANT CHANGE UP (ref 3.5–5.3)
PROT SERPL-MCNC: 6.3 G/DL — SIGNIFICANT CHANGE UP (ref 6–8.3)
PROTHROM AB SERPL-ACNC: 13.1 SEC — SIGNIFICANT CHANGE UP (ref 9.8–13.1)
RBC # BLD: 2.19 M/UL — LOW (ref 4.2–5.8)
RBC # FLD: 19.3 % — HIGH (ref 10.3–14.5)
RH IG SCN BLD-IMP: POSITIVE — SIGNIFICANT CHANGE UP
SODIUM SERPL-SCNC: 142 MMOL/L — SIGNIFICANT CHANGE UP (ref 135–145)
WBC # BLD: 8.45 K/UL — SIGNIFICANT CHANGE UP (ref 3.8–10.5)
WBC # FLD AUTO: 8.45 K/UL — SIGNIFICANT CHANGE UP (ref 3.8–10.5)

## 2019-10-30 PROCEDURE — 99283 EMERGENCY DEPT VISIT LOW MDM: CPT

## 2019-10-30 RX ORDER — ACETAMINOPHEN 500 MG
650 TABLET ORAL ONCE
Refills: 0 | Status: COMPLETED | OUTPATIENT
Start: 2019-10-30 | End: 2019-10-30

## 2019-10-30 RX ADMIN — Medication 650 MILLIGRAM(S): at 19:06

## 2019-10-30 NOTE — ED PROVIDER NOTE - CLINICAL SUMMARY MEDICAL DECISION MAKING FREE TEXT BOX
64 year old male presents from PCP for low hemoglobin, reportedly 6. Denies fever, dizziness, fatigue, melena, or BRBPR. No focal findings on exam. Plan for labs, EKG, guaiac, transfuse as necessary. Reassess and dispo accordingly.

## 2019-10-30 NOTE — ED PROVIDER NOTE - OBJECTIVE STATEMENT
64 year old male PMH AF on ASA, prostate CA (PO chemo?), HTN, HLD, emphysema, CHF, presents from PCP office for anemia. Patient had bloodwork today, told his hemoglobin was 6. Not a/w fatigue, dizziness, or visual changes. Patient states he had GI bleed in past, treated w/ transfusion. Denies fever, chills, headache, chest pain, shortness of breath, abdominal pain, nausea, vomiting, diarrhea, constipation, melena, hematochezia, dysuria, or numbness/tingling. Patient states chronic weakness to lower extremities after starting PO chemo, unable to walk for few months.     PCP: Dr. Perez 64 year old male PMH AF on ASA, prostate CA (PO chemo?), HTN, HLD, emphysema, CHF, presents from PCP office for anemia. Patient had bloodwork today, told his hemoglobin was 6. Not a/w fatigue, dizziness, or visual changes. Patient states he had GI bleed in past, treated w/ transfusion. Denies fever, chills, headache, chest pain, shortness of breath, abdominal pain, nausea, vomiting, diarrhea, constipation, melena, hematochezia, dysuria, or numbness/tingling. Patient states chronic weakness to lower extremities after starting PO chemo, unable to walk for few months.     PCP: Marcus Sparrow

## 2019-10-30 NOTE — ED PROVIDER NOTE - PROGRESS NOTE DETAILS
Jules Mathias PGY1 pt signed out to me pending 2 units pRBC transfusion and rechecking H&H. Currently still on his 1st unit of pRBC. Pt denies CP, dizziness, SOB, abd pain, nausea, brbpr, melena. Labs reviewed, noting Hb of 6.1. Stool guiaci negative. /78. Will continue to reassess. Jules Mathias PGY1 pt rpt hb is 8.1 from 6.1 after 2 u prbcs. paged dr. morrow, left message, awaiting call back. Jules Mathias PGY1 jennifer kaur/ dr. Beckwith, discussed case. Pt to be d/c back to [facility] and to be followed by Dr. Beckwith. Will d/c patient

## 2019-10-30 NOTE — ED ADULT NURSE NOTE - OBJECTIVE STATEMENT
Break Coverage RN: Pt hx of a-fib and prostate cancer received to spot 17 sent in from PMD for low H&H. Pt a&ox4 and uses wheelchair, skin intact however pt appears pale, legs are contracted. Pt endorsing weakness and reports hx of blood transfusion. Pt endorses to taking blood thinners. 20G placed by MICHAEL Dewey, will continue to monitor.

## 2019-10-30 NOTE — ED ADULT NURSE NOTE - NSIMPLEMENTINTERV_GEN_ALL_ED
Implemented All Fall Risk Interventions:  [place] to call system. Call bell, personal items and telephone within reach. Instruct patient to call for assistance. Room bathroom lighting operational. Non-slip footwear when patient is off stretcher. Physically safe environment: no spills, clutter or unnecessary equipment. Stretcher in lowest position, wheels locked, appropriate side rails in place. Provide visual cue, wrist band, yellow gown, etc. Monitor gait and stability. Monitor for mental status changes and reorient to person, place, and time. Review medications for side effects contributing to fall risk. Reinforce activity limits and safety measures with patient and family.

## 2019-10-30 NOTE — ED ADULT NURSE REASSESSMENT NOTE - NS ED NURSE REASSESS COMMENT FT1
Break Coverage RN: Blood bank called to release blood, confirm type and screen not necessary as per blood bank, will continue to monitor.

## 2019-10-30 NOTE — ED PROVIDER NOTE - NS ED ROS FT
GENERAL: no fever, no chills  EYES: no change in vision, no irritation  HEENT: no trouble swallowing or speaking  CARDIAC: no chest pain, no palpitations   PULMONARY: no cough, no shortness of breath, no wheezing  GI: no abdominal pain, no nausea, no vomiting, no diarrhea, no constipation, no melena, no hematochezia  : no changes in urination, no dysuria  SKIN: no rashes  NEURO: no headache, no numbness, no weakness  MSK: no joint pain, no muscle pain, no back pain, no calf pain     -Efraín Manley, PGY-1

## 2019-10-30 NOTE — ED PROVIDER NOTE - PMH
Atrial fibrillation and flutter  not on anticoagulants due to GI bleed  Diverticulitis of Colon  with Hx of colostomy bag  Emphysema    History of anxiety    HTN (Hypertension)    Prostate Cancer  with seeds implant

## 2019-10-30 NOTE — ED PROVIDER NOTE - PATIENT PORTAL LINK FT
You can access the FollowMyHealth Patient Portal offered by [facility] by registering at the following website: [email or web address]. By joining [employer]’s FollowMyHealth portal, you will also be able to view your health information using other applications (apps) compatible with our system.

## 2019-10-30 NOTE — ED PROVIDER NOTE - PHYSICAL EXAMINATION
GENERAL: A&Ox3, non-toxic appearing, no acute distress  HEENT: NCAT, EOMI, oral mucosa moist, pale conjunctiva  RESP: CTAB, no respiratory distress, no wheezes/rhonchi/rales, speaking in full sentences  CV: irregularly irregular, 1+ pitting pedal edema  ABDOMEN: soft, non-tender, non-distended, no guarding, well-healed surgical incision in LLQ w/ protruding prolene suture  MSK: no visible deformities, no calf tenderness  NEURO: no focal sensory or motor deficits, DIMAS  SKIN: warm, normal color, well perfused, no rash  PSYCH: normal affect    -Efraín Manley, PGY-1 GENERAL: A&Ox3, non-toxic appearing, no acute distress  HEENT: NCAT, EOMI, oral mucosa moist, conjunctival pallor  RESP: CTAB, no respiratory distress, no wheezes/rhonchi/rales, speaking in full sentences  CV: irregularly irregular, 1+ pitting pedal edema  ABDOMEN: soft, non-tender, non-distended, no guarding, well-healed surgical incision in LLQ w/ protruding prolene suture  RECTAL (Dr. Bloch Chap): no external hemorrhoids, non-tender, dark brown stool, no gross blood  MSK: no visible deformities, no calf tenderness  NEURO: no focal sensory or motor deficits, DIMAS  SKIN: warm, pale, well perfused, no rash  PSYCH: normal affect    -Efraín Manley, PGY-1

## 2019-10-30 NOTE — ED PROVIDER NOTE - ATTENDING CONTRIBUTION TO CARE
DR. BLOCH, ATTENDING MD-  I performed a face to face bedside interview with patient regarding history of present illness, review of symptoms and past medical history. I completed an independent physical exam.  I have discussed patient's plan of care with the resident.  Patient examined chronically ill appearing, NAD, pale, throat nml, heart sounds nml lungs clear, abd soft nontender, extre, mod pitting edema, weakness lower extremities.

## 2019-10-30 NOTE — ED PROVIDER NOTE - NSFOLLOWUPINSTRUCTIONS_ED_ALL_ED_FT
Please take your medicines as prescribed.  Please follow up with Dr. Beckwith.  Please return to the emergency department should your symptoms worsen.

## 2019-10-31 VITALS
DIASTOLIC BLOOD PRESSURE: 80 MMHG | SYSTOLIC BLOOD PRESSURE: 101 MMHG | HEART RATE: 112 BPM | OXYGEN SATURATION: 97 % | TEMPERATURE: 98 F | RESPIRATION RATE: 18 BRPM

## 2019-10-31 DIAGNOSIS — Z71.89 OTHER SPECIFIED COUNSELING: ICD-10-CM

## 2019-10-31 LAB
HCT VFR BLD CALC: 25.8 % — LOW (ref 39–50)
HGB BLD-MCNC: 8.1 G/DL — LOW (ref 13–17)
MCHC RBC-ENTMCNC: 26.3 PG — LOW (ref 27–34)
MCHC RBC-ENTMCNC: 31.4 % — LOW (ref 32–36)
MCV RBC AUTO: 83.8 FL — SIGNIFICANT CHANGE UP (ref 80–100)
NRBC # FLD: 0.23 K/UL — SIGNIFICANT CHANGE UP (ref 0–0)
NRBC FLD-RTO: 2.7 — SIGNIFICANT CHANGE UP
PLATELET # BLD AUTO: 283 K/UL — SIGNIFICANT CHANGE UP (ref 150–400)
PMV BLD: 9.5 FL — SIGNIFICANT CHANGE UP (ref 7–13)
RBC # BLD: 3.08 M/UL — LOW (ref 4.2–5.8)
RBC # FLD: 26.1 % — HIGH (ref 10.3–14.5)
WBC # BLD: 8.38 K/UL — SIGNIFICANT CHANGE UP (ref 3.8–10.5)
WBC # FLD AUTO: 8.38 K/UL — SIGNIFICANT CHANGE UP (ref 3.8–10.5)

## 2019-10-31 RX ORDER — ACETAMINOPHEN 500 MG
650 TABLET ORAL ONCE
Refills: 0 | Status: COMPLETED | OUTPATIENT
Start: 2019-10-31 | End: 2019-10-31

## 2019-10-31 RX ADMIN — Medication 650 MILLIGRAM(S): at 00:06

## 2019-10-31 RX ADMIN — Medication 650 MILLIGRAM(S): at 04:38

## 2019-12-03 ENCOUNTER — APPOINTMENT (OUTPATIENT)
Dept: CARDIOLOGY | Facility: CLINIC | Age: 64
End: 2019-12-03

## 2020-01-28 NOTE — DIETITIAN INITIAL EVALUATION ADULT. - PERTINENT LABORATORY DATA
none
04-19 Na142 mmol/L Glu 91 mg/dL K+ 3.5 mmol/L Cr  0.88 mg/dL BUN 12 mg/dL 04-19 Phos 3.6 mg/dL 04-17 Alb 2.7 g/dL<L> 04-18 Chol 101 mg/dL<L> LDL 61 mg/dL HDL 26 mg/dL<L> Trig 80 mg/dL

## 2020-07-31 NOTE — ED ADULT NURSE NOTE - CAS EDP DISCH DISPOSITION ADMI
Patient Education     Hives (Adult)  Hives are pink or red bumps on the skin. These bumps are also known as wheals. The bumps can itch, burn, or sting. Hives can occur anywhere on the body. They vary in size and shape and can form in clusters. Individual hives can appear and go away quickly. New hives may develop as old ones fade. Hives are common and usually harmless. Occasionally hives are a sign of a serious allergy. Hives are often caused by an allergic reaction. It may be an allergic reaction to foods such as fruit, shellfish, chocolate, nuts, or tomatoes. It may be a reaction to pollens, animal fur, or mold spores. Medicines, chemicals, and insect bites can also cause hives. And hives can be caused by hot sun or cold air. The cause of hives can be difficult to find. You may be given medicines to relieve swelling and itching. Follow all instructions when using these medicines. The hives will usually fade in a few days, but can last up to 2 weeks. Home care  Follow these tips:  Â· Try to find the cause of the hives and eliminate it. Discuss possible causes with your healthcare provider. Future reactions to the same allergen may be worse. Â· Donât scratch the hives. Scratching will delay healing. To reduce itching, apply cool, wet compresses to the skin. Â· Dress in soft, loose cotton clothing. Â· Donât bathe in hot water. This can make the itching worse. Â· Apply an ice pack or cool pack wrapped in a thin towel to your skin. This will help reduce redness and itching. But if your hives were caused by exposure to cold, then do not apply more cold to them. Â· You may use over-the counter antihistamines to reduce itching. Some older antihistamines, such as diphenhydramine and chlorpheniramine, are inexpensive. But they need to be taken often and may make you sleepy. They are best used at bedtime. Donât use diphenhydramine if you have glaucoma or have trouble urinating because of an enlarged prostate.  Newer antihistamines, such as loratadine, cetirizine, and fexofenadine, are generally more expensive. But they tend to have fewer side effects, such as drowsiness. They can be taken less often. Â· Another type of antihistamine is used to treat heartburn. This type includes ranitidine, nizatidine, famotidine, and cimetidine. These are sometimes used along with the above antihistamines if a single medicine is not working. Follow-up care  Follow up with your healthcare provider if your symptoms don't get better in 2 days. Ask your provider about allergy testing if you have had a severe reaction, or have had several episodes of hives. He or she can use the allergy testing to find out what you are allergic to. When to seek medical advice  Call your healthcare provider right awayÂ if any of these occur:  Â· Fever of 100.4Â°F (38.0Â°C) or higher, or as directed by your healthcare provider  Â· Redness, swelling, or pain  Â· Foul-smelling fluid coming from the rash  Call 911  Call 911 if any of the following occur:  Â· Swelling of the face, throat, or tongue  Â· Trouble breathing or swallowing  Â· Dizziness, weakness, or fainting  Date Last Reviewed: 9/1/2016  Â© 5148-7208 [employer]. [address] [place] [address], [facility] [address] All rights reserved. This information is not intended as a substitute for professional medical care. Always follow your healthcare professional's instructions. CDU

## 2020-09-02 NOTE — CONSULT NOTE ADULT - EXTREMITIES
Instructions: This plan will send the code FBSD to the PM system.  DO NOT or CHANGE the price. Detail Level: Simple Price (Do Not Change): 0.00 detailed exam

## 2020-11-18 NOTE — ED CDU PROVIDER DISPOSITION NOTE - CLINICAL COURSE
18-Nov-2020 18:44 Corey: Abd pain, N/V. Dx'd with SBO containing ventral abdominal wall hernia. Surgery team reduced hernia with improvement of symptoms. Admitted to CDU for observation. [place] well overnight and today. Tolerated PO. Discharge home w/ Gen Surg f/u.

## 2021-03-16 NOTE — ED PROVIDER NOTE - BIRTH SEX
Allopurinol prescription request reviewed. Per last FU on 5-6-19 Request is ppropriate. Pt due for RTC 8-12-19. Order written, signed and E scribed electronically to [facility] .  
Male
Airway patent, TM normal bilaterally, normal appearing mouth, nose, throat, neck supple with full range of motion, no cervical adenopathy.

## 2021-04-22 NOTE — ASU PATIENT PROFILE, ADULT - TEACHING/LEARNING LEARNING PREFERENCES
verbal instruction/individual instruction/pictorial/audio/group instruction/written material/skill demonstration/computer/internet/video RLQ

## 2021-08-02 NOTE — H&P ADULT - NEGATIVE PSYCHIATRIC SYMPTOMS
no agitation/no mood swings/no hyperactivity Tranexamic Acid Pregnancy And Lactation Text: It is unknown if this medication is safe during pregnancy or breast feeding.

## 2021-10-25 NOTE — ED ADULT NURSE NOTE - TEMPLATE LIST FOR HEAD TO TOE ASSESSMENT
Abdominal Pain, N/V/D Cimzia Pregnancy And Lactation Text: This medication crosses the placenta but can be considered safe in certain situations. Cimzia may be excreted in breast milk.

## 2021-11-18 NOTE — ED ADULT NURSE NOTE - HARM RISK FACTORS
Head,  normocephalic,  atraumatic,  Face,  Face within normal limits,  Ears,  External ears within normal limits,  Nose/Nasopharynx,  External nose  normal appearance,  nares patent,  no nasal discharge,  Mouth and Throat,  Oral cavity appearance normal,  Breath odor normal,  Lips,  Appearance normal no

## 2021-12-05 NOTE — PROGRESS NOTE ADULT - PROBLEM SELECTOR PLAN 1
-Hx of prostate cancer  -CT/MRI revealing multiple osteolytic lesions throughout spine and pelvis  -Plan for Bx by IR on Monday  -heme/onc on board, appreciate recomendations stated

## 2021-12-22 NOTE — CONSULT NOTE ADULT - GIT ABD PE PAL DETAILS PC
Thalidomide Counseling: I discussed with the patient the risks of thalidomide including but not limited to birth defects, anxiety, weakness, chest pain, dizziness, cough and severe allergy. distended

## 2022-04-05 NOTE — DISCHARGE NOTE ADULT - PATIENT PORTAL LINK FT
[de-identified] : positive leg raising bilateral reflexes equal You can access the [facility] Patient [facility], offered by [facility], by registering with the following website: [email or web address]

## 2022-07-11 NOTE — ED ADULT NURSE NOTE - EXTENSIONS OF SELF_ADULT
Patient experienced an episode of VT on 6/30 and experienced an ICD shock thought to be related to hypokalemia (K of 3.1 on 6/30)  - Plan to aggressively replete K, keep K>4 and Mg>2  - Will continue mexiletine 25mg q8hrs and amiodarone 400mg daily   None

## 2022-09-07 NOTE — PROVIDER CONTACT NOTE (MEDICATION) - NAME OF MD/NP/PA/DO NOTIFIED:
[facility] SHI
Comment: Discussed with patient that using antibiotics and an oral steroids will help clear this. At this time patient defers oral steroids. Will start doxycycline hyclate 100 mg capsule x7 days for 1 week. Will start Topicort 0.25 % topical spray. Follow Up PRN.
Detail Level: Simple
Render Risk Assessment In Note?: no

## 2022-10-02 NOTE — PROGRESS NOTE ADULT - PROVIDER SPECIALTY LIST ADULT
Cardiology
Electrophysiology
Electrophysiology
Heme/Onc
Internal Medicine
Cardiology
Electrophysiology
Internal Medicine
Heme/Onc
Attending

## 2023-01-16 NOTE — H&P PST ADULT - ABILITY TO HEAR (WITH HEARING AID OR HEARING APPLIANCE IF NORMALLY USED):
Adequate: hears normal conversation without difficulty Mustarde Flap Text: The defect edges were debeveled with a #15 scalpel blade.  Given the size, depth and location of the defect and the proximity to free margins a Mustarde flap was deemed most appropriate.  Using a sterile surgical marker, an appropriate flap was drawn incorporating the defect. The area thus outlined was incised with a #15 scalpel blade.  The skin margins were undermined to an appropriate distance in all directions utilizing iris scissors.

## 2023-04-17 NOTE — ASU PREOP CHECKLIST - CHLOROHEXIDINE WASH 1
Pt wife called asking if Sai needs to get a refill on Lisinopril, please call back.    31-May-2018 12:00

## 2023-05-22 NOTE — ED ADULT TRIAGE NOTE - TEMPERATURE IN CELSIUS (DEGREES C)
36.8 Bactrim Counseling:  I discussed with the patient the risks of sulfa antibiotics including but not limited to GI upset, allergic reaction, drug rash, diarrhea, dizziness, photosensitivity, and yeast infections.  Rarely, more serious reactions can occur including but not limited to aplastic anemia, agranulocytosis, methemoglobinemia, blood dyscrasias, liver or kidney failure, lung infiltrates or desquamative/blistering drug rashes.

## 2023-10-13 NOTE — PROGRESS NOTE ADULT - PROBLEM SELECTOR PLAN 4
- no SOB, cough, sputum at this time  - not in exacerbation  - will treat w/ albuterol prn
(4) rarely moist

## 2023-11-03 NOTE — PROGRESS NOTE ADULT - CONSTITUTIONAL DETAILS
no distress
no distress
PAST SURGICAL HISTORY:  H/O arterial bypass of lower limb     Status post closed fracture of right femur

## 2024-04-16 NOTE — PATIENT PROFILE ADULT. - URINARY CATHETER
Patient's cardiac device reprogrammed by [employer] rep and was returned to pre-MRI settings with no immediate complications.     
Patient's cardiac device was programmed to MRI safe mode by [employer] in person programming.    
no

## 2024-10-07 NOTE — DISCHARGE NOTE PROVIDER - NSDCQMAMI_CARD_ALL_CORE
- Low suspicion for bladder trauma, however recommend CT cystogram in order to rule it out  - Monitor H/H  - Discussed with Dr. Vega No

## 2024-12-20 NOTE — PROGRESS NOTE ADULT - PROBLEM SELECTOR PROBLEM 4
Hypokalemia
20-Dec-2024 11:30
Centrilobular emphysema
Hypokalemia
Centrilobular emphysema

## 2025-01-17 NOTE — DISCHARGE NOTE ADULT - FUNCTIONAL SCREEN CURRENT LEVEL: DRESSING, MLM
Physical Therapy Visit    Visit Type: Daily Treatment Note  Visit: 12  Referring Provider: Sally Adams PA-C  Next referring provider visit: 1/27/2025  Medical Diagnosis (from order): R52 - Pain  S42.202D - Closed fracture of proximal end of left humerus with routine healing, unspecified fracture morphology, subsequent encounter   Patient alert and oriented X3.  Chart reviewed at time of initial evaluation (relevant co-morbidities, allergies, tests and medications listed):             SUBJECTIVE                                                                                                               01- pt states she is doing better and her pain levels today 2/3-10    1/14/25 She states feeling like she is making slow improvement.  She feels she is better able to reach up into the microwave.  She feels she is sleeping better. She has continued discomfort in the shoulder but is not nearly as bad as last week.  She reports having to leave a little early today due to a work meeting.     Initial eval: Patient reports to physical therapy with complaints of left shoulder pain following ORIF for the proximal humerus.  She states tripping on the stairs going up bleachers.  She had surgery on 11/5/24 ORIF. At this time she doesn't have any restrictions.  She is currently in a sling for the next 4 weeks.  She reports sleeping is the worst.  She is currently not driving due to medication.  She is taking hydrocodone for pain management.  She is still using ice.    Functional Change: Able to use her left arm for light ADLs below shoulder height    Pain / Symptoms  - Pain rating (out of 10): Current: 3 ; Best: 3; Worst: 6    Patient Goals: decreased pain and independence with ADLs/IADLs.      OBJECTIVE                                                                                                                                         Treatment     Therapeutic Exercise  Posture correction  Strap stretch behind  back.     - Seated Cervical Retraction  - 2 sets - 10 reps - 5 hold  - Seated Scapular Retraction  - 2 sets - 10 reps - 5 hold  - Seated shoulder circles - 2 sets - 10 reps    - Seated Shoulder Flexion AAROM with Pulley Behind  - 2 sets - 10-20 reps  - Seated Shoulder Scaption AAROM with Pulley at Side  - 2 sets - 10-20 reps  - Seated Shoulder Abduction AAROM with Pulley Behind  - 2 sets - 10-20 reps  - Standing Shoulder Internal Rotation AAROM with Pulley  - 2 sets - 10-20 reps                    Manual Therapy   L shoulder PROM  L shoulder MFR clavicle; 3 rd rib; scapular   L shoulder deltoid; posterior aspect; upper trap rock blade scraping          Therapeutic Activity  - Shoulder Flexion Wall Slide with Towel  - 3 sets - 10 reps  - Shoulder scaption wall slide with towel - 3 sets - 10 reps    - Standing Shoulder Abduction AAROM with Swiss Ball on Table  - 2 sets - 20 reps  - Standing Shoulder Flexion AAROM with Swiss Ball  - 2 sets - 20 reps    - Black band shoulder flex  AAROM  LUE only - 2 sets - 10 reps ( in pain free range)             Skilled input: verbal instruction/cues, tactile instruction/cues and posture correction    Writer verbally educated and received verbal consent for hand placement, positioning of patient, and techniques to be performed today from patient   Home Exercise Program  Access Code: [email or web address]  URL: [email or web address]  Date: 11/25/2024  Prepared by: Fede Pierre    Exercises  - Standing Elbow Flexion Extension AROM  - 1-2 x daily - 7 x weekly - 1 sets - 10 reps - 1-2 s hold  - Wrist Circumduction AROM  - 1 x daily - 7 x weekly - 3 sets - 10 reps  - Circular Shoulder Pendulum with Table Support  - 1-2 x daily - 7 x weekly - 3 sets - 10 reps  - Standing Shoulder and Trunk Flexion at Table  - 1-3 x daily - 7 x weekly - 1 sets - 10 reps - 10 s hold  - Seated Shoulder Shrug Circles AROM Backward  - 1-2 x daily - 7 x weekly - 1 sets - 15 reps - 2 s hold  -  Standing Isometric Shoulder Flexion with Doorway - Arm Bent  - 1 x daily - 7 x weekly - 3 sets - 10 reps  - Standing Isometric Shoulder Extension with Doorway - Arm Bent  - 1 x daily - 7 x weekly - 3 sets - 10 reps  - Standing Isometric Shoulder Abduction with Doorway - Arm Bent  - 1 x daily - 7 x weekly - 3 sets - 10 reps  - Isometric Shoulder Adduction  - 1 x daily - 7 x weekly - 3 sets - 10 reps  - Standing Isometric Shoulder External Rotation with Doorway and Towel Roll  - 1 x daily - 7 x weekly - 3 sets - 10 reps  - Standing Isometric Shoulder Internal Rotation with Towel Roll at Doorway  - 1 x daily - 7 x weekly - 3 sets - 10 reps    Access Code: [email or web address]  URL: [email or web address]  Date: 01/17/2025  Prepared by: Sharon Lara    Exercises     - Seated Cervical Retraction  - 1 x daily - 7 x weekly - 1-2 sets - 10 reps - 5 hold  - Seated Scapular Retraction  - 1 x daily - 7 x weekly - 1-2 sets - 10 reps - 5 hold  - Seated Shoulder Flexion AAROM with Pulley Behind  - 1 x daily - 7 x weekly - 1-2 sets - 10-20 reps  - Seated Shoulder Scaption AAROM with Pulley at Side  - 1 x daily - 7 x weekly - 1-2 sets - 10-20 reps  - Seated Shoulder Abduction AAROM with Pulley Behind  - 1 x daily - 7 x weekly - 1-2 sets - 10-20 reps  - Standing Shoulder Internal Rotation AAROM with Pulley  - 1 x daily - 7 x weekly - 1-2 sets - 10-20 reps  - Shoulder Flexion Wall Slide with Towel  - 1 x daily - 7 x weekly - 1-3 sets - 10-20 reps  - Standing Shoulder Abduction AAROM with Swiss Ball on Table  - 1 x daily - 7 x weekly - 1-2 sets - 10-20 reps  - Standing Shoulder Flexion AAROM with Swiss Ball  - 1 x daily - 7 x weekly - 1-2 sets - 10-20 reps      ASSESSMENT                                                                                                            [place] is having a difficult day with increased stiffness.  She states the first thing in the morning is usually the worst.  She has not lost any  mobility in the shoulder but is just feeling tight.  She felt a little better following therapy.  She would benefit from continued range of motion, joint mobilization and strengthening.  01- pt tolerated session well with focus on ROM and scapular activation. Pt demonstrated increased activity and improved ROM post manual therapy and Rock blade scraping.      Pain/symptoms after session (out of 10): 1 and 0  Education:   - Present and ready to learn: patient  - Results of above outlined education: Verbalizes understanding and Demonstrates understanding    PLAN                                                                                                                           Suggestions for next session as indicated: Progress per plan of care       Therapy procedure time and total treatment time can be found documented on the Time Entry flowsheet     (0) independent

## 2025-04-09 NOTE — DISCHARGE NOTE ADULT - SMOKING EVEN A SINGLE PUFF INCREASES THE LIKELIHOOD OF A FULL RELAPSE, WITHDRAWAL SYMPTOMS PEAK WITHIN 1-2 WEEKS, BUT CAN PERSIST FOR MONTHS
Form received at [facility] on 4/9/2025.   Please note that it takes 7-10 business days for completion.  Auth on file - from 04/04/25.         Statement Selected